# Patient Record
Sex: MALE | Race: WHITE | Employment: OTHER | URBAN - METROPOLITAN AREA
[De-identification: names, ages, dates, MRNs, and addresses within clinical notes are randomized per-mention and may not be internally consistent; named-entity substitution may affect disease eponyms.]

---

## 2016-04-21 LAB — COLONOSCOPY, EXTERNAL: NORMAL

## 2017-04-13 ENCOUNTER — OFFICE VISIT (OUTPATIENT)
Dept: INTERNAL MEDICINE CLINIC | Age: 67
End: 2017-04-13

## 2017-04-13 VITALS
OXYGEN SATURATION: 98 % | HEART RATE: 59 BPM | SYSTOLIC BLOOD PRESSURE: 110 MMHG | HEIGHT: 77 IN | RESPIRATION RATE: 18 BRPM | DIASTOLIC BLOOD PRESSURE: 65 MMHG | WEIGHT: 244.6 LBS | BODY MASS INDEX: 28.88 KG/M2 | TEMPERATURE: 97.9 F

## 2017-04-13 DIAGNOSIS — L23.9 ALLERGIC DERMATITIS: ICD-10-CM

## 2017-04-13 DIAGNOSIS — J30.9 ALLERGIC RHINITIS, UNSPECIFIED ALLERGIC RHINITIS TRIGGER, UNSPECIFIED RHINITIS SEASONALITY: Primary | ICD-10-CM

## 2017-04-13 RX ORDER — HYDROXYZINE HYDROCHLORIDE 10 MG/1
TABLET, FILM COATED ORAL
Qty: 60 TAB | Refills: 0 | Status: SHIPPED | OUTPATIENT
Start: 2017-04-13 | End: 2018-01-31 | Stop reason: ALTCHOICE

## 2017-04-13 RX ORDER — CETIRIZINE HCL 10 MG
10 TABLET ORAL
Qty: 30 TAB | Refills: 0 | Status: ON HOLD
Start: 2017-04-13 | End: 2018-04-05

## 2017-04-13 RX ORDER — METHYLPREDNISOLONE 4 MG/1
TABLET ORAL
Qty: 1 DOSE PACK | Refills: 0 | Status: SHIPPED | OUTPATIENT
Start: 2017-04-13 | End: 2017-12-28 | Stop reason: ALTCHOICE

## 2017-04-13 NOTE — PROGRESS NOTES
Demi Perry is a 79 y.o. male  Chief Complaint   Patient presents with    Rash     biltaeral forearms x 6 days; itching, red    Eye Swelling     bilateral x 2 days; watery; itching     1. Have you been to the ER, urgent care clinic since your last visit? Hospitalized since your last visit? No    2. Have you seen or consulted any other health care providers outside of the [address] [place] [address] since your last visit? Include any pap smears or colon screening. Colonoscopy done on  4/21 records have been scanned to patients chart.

## 2017-04-13 NOTE — MR AVS SNAPSHOT
Visit Information Date & Time Provider Department Dept. Phone Encounter #  
 4/13/2017 11:20 AM Noah Perrin NP [facility] [address] [id number] Upcoming Health Maintenance Date Due Hepatitis C Screening 1950 DTaP/Tdap/Td series (1 - Tdap) 12/16/2003 GLAUCOMA SCREENING Q2Y 1/6/2015 INFLUENZA AGE 9 TO ADULT 8/1/2016 MEDICARE YEARLY EXAM 8/31/2017 Pneumococcal 65+ High/Highest Risk (2 of 2 - PPSV23) 1/1/2019 COLONOSCOPY 4/21/2019 Allergies as of 4/13/2017  Review Complete On: 4/13/2017 By: Noah Perrin NP No Known Allergies Current Immunizations  Reviewed on 8/30/2016 Name Date Hep A Vaccine 11/15/2010 Hep B Vaccine 6/25/2004, 1/16/2004, 12/15/2003 IPV 12/15/2003 Influenza Nasal Vaccine 10/15/2015, 1/9/2008 Influenza Vaccine 9/16/2013, 9/22/2010, 9/24/2009, 12/18/2006, 5/16/2005 Japanese Encephalitis Vaccine 5/23/2005, 5/16/2005 Meningococcal (MCV4) Vaccine 12/15/2003 Novel Influenza-H1N1-09, Injectable 2/20/2010 Pneumococcal Conjugate (PCV-13) 9/15/2016 Pneumococcal Polysaccharide (PPSV-23) 1/1/2014 TB Skin Test (PPD) 9/17/2013 Td 12/15/2003 Typhoid Vaccine, Parenteral, Acetone-Killed, Dried ([place] ) 11/12/2010 Zoster Vaccine, Live 1/1/2014 Not reviewed this visit You Were Diagnosed With   
  
 Codes Comments Allergic rhinitis, unspecified allergic rhinitis trigger, unspecified rhinitis seasonality    -  Primary ICD-10-CM: J30.9 ICD-9-CM: 477.9 Allergic dermatitis     ICD-10-CM: L23.9 ICD-9-CM: 692.9 Vitals BP Pulse Temp Resp Height(growth percentile) Weight(growth percentile) 110/65 (BP 1 Location: Left arm, BP Patient Position: Sitting) (!) 59 97.9 °F (36.6 °C) 18 6' 5\" (1.956 m) 244 lb 9.6 oz (110.9 kg) SpO2 BMI Smoking Status 98% 29.01 kg/m2 Never Smoker Vitals History BMI and BSA Data Body Mass Index Body Surface Area  
 29.01 kg/m 2 2.45 m 2 Preferred Pharmacy Pharmacy Name Phone [id number] [address] [phone] Your Updated Medication List  
  
   
This list is accurate as of: 4/13/17 12:17 PM.  Always use your most recent med list.  
  
  
  
  
 cetirizine 10 mg tablet Commonly known as:  ZYRTEC Take 1 Tab by mouth daily as needed for Allergies. hydrOXYzine HCl 10 mg tablet Commonly known as:  ATARAX Take 1-3 tab every 8 hours as needed  
  
 levothyroxine 125 mcg tablet Commonly known as:  SYNTHROID Take 1 Tab by mouth Daily (before breakfast). Fill after 10/5/2015. Indications: HYPOTHYROIDISM  
  
 methylPREDNISolone 4 mg tablet Commonly known as:  Caye Ser Take as directed  
  
 multivitamin tablet Commonly known as:  ONE A DAY Take 1 Tab by mouth daily. pneumococcal 13 claudio conj dip 0.5 mL Syrg injection Commonly known as:  PREVNAR 13 (PF)  
0.5 mL by IntraMUSCular route PRIOR TO DISCHARGE for 1 dose. Prescriptions Sent to Pharmacy Refills  
 hydrOXYzine HCl (ATARAX) 10 mg tablet 0 Sig: Take 1-3 tab every 8 hours as needed Class: Normal  
 Pharmacy: [facility]acy [address] Ph #: [phone]  
 methylPREDNISolone (MEDROL DOSEPACK) 4 mg tablet 0 Sig: Take as directed Class: Normal  
 Pharmacy: [address] [place] [address], [address] Ph #: [phone] Patient Instructions Dermatitis: Care Instructions Your Care Instructions Dermatitis is the general name used for any rash or inflammation of the skin. Different kinds of dermatitis cause different kinds of rashes. Common causes of a rash include new medicines, plants (such as poison oak or poison ivy), heat, and stress. Certain illnesses can also cause a rash.  
An allergic reaction to something that touches your skin, such as latex, nickel, or poison ivy, is called contact dermatitis. Contact dermatitis may also be caused by something that irritates the skin, such as bleach, a chemical, or soap. These types of rashes cannot be spread from person to person. How long your rash will last depends on what caused it. Rashes may last a few days or months. Follow-up care is a key part of your treatment and safety. Be sure to make and go to all appointments, and call your doctor if you are having problems. It's also a good idea to know your test results and keep a list of the medicines you take. How can you care for yourself at home? · Do not scratch the rash. Cut your nails short, and file them smooth. Or wear gloves if this helps keep you from scratching. · Wash the area with water only. Pat dry. · Put cold, wet cloths on the rash to reduce itching. · Keep cool, and stay out of the sun. · Leave the rash open to the air as much as possible. · If the rash itches, use hydrocortisone cream. Follow the directions on the label. Calamine lotion may help for plant rashes. · Take an over-the-counter antihistamine, such as diphenhydramine (Benadryl) or loratadine (Claritin), to help calm the itching. Read and follow all instructions on the label. · If your doctor prescribed a cream, use it as directed. If your doctor prescribed medicine, take it exactly as directed. When should you call for help? Call your doctor now or seek immediate medical care if: 
· You have symptoms of infection, such as: 
¨ Increased pain, swelling, warmth, or redness. ¨ Red streaks leading from the area. ¨ Pus draining from the area. ¨ A fever. · You have joint pain along with the rash. Watch closely for changes in your health, and be sure to contact your doctor if: 
· Your rash is changing or getting worse. · You are not getting better as expected. Where can you learn more? Go to http://nolberto-roseanna.info/. Enter [phone] in the search box to learn more about \"Dermatitis: Care Instructions. \" Current as of: October 13, 2016 Content Version: 11.2 © 7269-6635 [employer]. Care instructions adapted under license by [employer] (which disclaims liability or warranty for this information). If you have questions about a medical condition or this instruction, always ask your healthcare professional. [place] [address] any warranty or liability for your use of this information. Seasonal Allergies: Care Instructions Your Care Instructions Allergies occur when your body's defense system (immune system) overreacts to certain substances. The immune system treats a harmless substance as if it were a harmful germ or virus. Many things can cause this to happen. Examples include pollens, medicine, food, dust, animal dander, and mold. Your allergies are seasonal if you have symptoms just at certain times of the year. In that case, you are probably allergic to pollens from certain trees, grasses, or weeds. Allergies can be mild or severe. Over-the-counter allergy medicine may help with some symptoms. Read and follow all instructions on the label. Managing your allergies is an important part of staying healthy. Your doctor may suggest that you have tests to help find the cause of your allergies. When you know what things trigger your symptoms, you can avoid them. This can prevent allergy symptoms and other health problems. In some cases, immunotherapy might help. For this treatment, you get shots or use pills that have a small amount of certain allergens in them. Your body \"gets used to\" the allergen, so you react less to it over time. This kind of treatment may help prevent or reduce some allergy symptoms. Follow-up care is a key part of your treatment and safety.  Be sure to make and go to all appointments, and call your doctor if you are having problems. It's also a good idea to know your test results and keep a list of the medicines you take. How can you care for yourself at home? · Be safe with medicines. Take your medicines exactly as prescribed. Call your doctor if you think you are having a problem with your medicine. · During your allergy season, keep windows closed. If you need to use air-conditioning, change or clean all filters every month. Take a shower and change your clothes after you have been outside. · Stay inside when pollen counts are high. Vacuum once or twice a week. Use a vacuum  with a HEPA filter or a double-thickness filter. When should you call for help? Call 911 anytime you think you may need emergency care. For example, call if: 
· You have symptoms of a severe allergic reaction. These may include: 
¨ Sudden raised, red areas (hives) all over your body. ¨ Swelling of the throat, mouth, lips, or tongue. ¨ Trouble breathing. ¨ Passing out (losing consciousness). Or you may feel very lightheaded or suddenly feel weak, confused, or restless. Watch closely for changes in your health, and be sure to contact your doctor if: 
· You need help controlling your allergies. · You have questions about allergy testing. · You do not get better as expected. Where can you learn more? Go to http://nolberto-roseanna.info/. Enter J912 in the search box to learn more about \"Seasonal Allergies: Care Instructions. \" Current as of: February 12, 2016 Content Version: 11.2 © 4861-4583 [employer]. Care instructions adapted under license by [employer] (which disclaims liability or warranty for this information). If you have questions about a medical condition or this instruction, always ask your healthcare professional. [place] [address] any warranty or liability for your use of this information. Introducing [facility]! Dear Zahra Oconnor: Thank you for requesting a InfluAds account. Our records indicate that you already have an active InfluAds account. You can access your account anytime at [email or web address] Did you know that you can access your hospital and ER discharge instructions at any time in InfluAds? You can also review all of your test results from your hospital stay or ER visit. Additional Information If you have questions, please visit the Frequently Asked Questions section of the InfluAds website at [email or web address] Remember, InfluAds is NOT to be used for urgent needs. For medical emergencies, dial 911. Now available from your iPhone and Android! Please provide this summary of care documentation to your next provider. Your primary care clinician is listed as Emery Gastelum. If you have any questions after today's visit, please call [phone].

## 2017-04-13 NOTE — PROGRESS NOTES
HISTORY OF PRESENT ILLNESS  Carolina Miramontes is a 79 y.o. male. HPI  Presents with complaints of itchy reddened rash to bilateral forearms for the past 6 days. Has also noted some itchy to upper back and for the past 2 days he has had some edema to upper eyelids with itching and watery drainage from eyes. Believes rash began after he was working in the yard but denies any known exposure to poisonous plants. Reports he had similar reaction last spring while in [place] and was treated at urgent care center then. Denies any new medications, lotions, soaps, detergents, foods. Denies difficulty swallowing or shortness of breath. Moved to [facility] from [facility] and has been having more allergy symptoms especially this spring. Review of Systems   Constitutional: Positive for malaise/fatigue. Negative for chills and fever. HENT: Positive for congestion. Negative for sore throat. Eyes: Positive for discharge and redness. Respiratory: Negative for cough and shortness of breath. Cardiovascular: Negative for chest pain and palpitations. Gastrointestinal: Negative for nausea and vomiting. Musculoskeletal: Negative for myalgias. Skin: Positive for itching and rash. Neurological: Negative for dizziness, tingling and headaches. Physical Exam   Constitutional: He is oriented to person, place, and time. He appears well-developed and well-nourished. HENT:   Head: Normocephalic and atraumatic. Right Ear: External ear normal.   Left Ear: External ear normal.   Nose: Mucosal edema present. Right sinus exhibits no maxillary sinus tenderness. Left sinus exhibits no maxillary sinus tenderness. Mouth/Throat: No posterior oropharyngeal erythema. Eyes: Right conjunctiva is injected. Left conjunctiva is injected. Mild edema to bilateral upper eyelids with several papular lesions noted   Neck: Normal range of motion. Neck supple. No thyromegaly present.    Cardiovascular: Normal rate and regular rhythm. Pulmonary/Chest: Effort normal and breath sounds normal. He has no wheezes. Lymphadenopathy:     He has no cervical adenopathy. Neurological: He is alert and oriented to person, place, and time. Skin: Rash noted. Rash is papular and urticarial.        Psychiatric: He has a normal mood and affect. His behavior is normal.   Nursing note and vitals reviewed. ASSESSMENT and PLAN  Bishnu Edwards was seen today for rash and eye swelling. Diagnoses and all orders for this visit:    Allergic rhinitis, unspecified allergic rhinitis trigger, unspecified rhinitis seasonality  -     cetirizine (ZYRTEC) 10 mg tablet; Take 1 Tab by mouth daily as needed for Allergies. Allergic dermatitis  -     hydrOXYzine HCl (ATARAX) 10 mg tablet; Take 1-3 tab every 8 hours as needed  -     methylPREDNISolone (MEDROL DOSEPACK) 4 mg tablet; Take as directed    Follow up if signs and symptoms worsen or change. After hours number given.    reviewed diet, exercise and weight control  reviewed medications and side effects in detail

## 2017-04-13 NOTE — PATIENT INSTRUCTIONS
Dermatitis: Care Instructions  Your Care Instructions  Dermatitis is the general name used for any rash or inflammation of the skin. Different kinds of dermatitis cause different kinds of rashes. Common causes of a rash include new medicines, plants (such as poison oak or poison ivy), heat, and stress. Certain illnesses can also cause a rash. An allergic reaction to something that touches your skin, such as latex, nickel, or poison ivy, is called contact dermatitis. Contact dermatitis may also be caused by something that irritates the skin, such as bleach, a chemical, or soap. These types of rashes cannot be spread from person to person. How long your rash will last depends on what caused it. Rashes may last a few days or months. Follow-up care is a key part of your treatment and safety. Be sure to make and go to all appointments, and call your doctor if you are having problems. It's also a good idea to know your test results and keep a list of the medicines you take. How can you care for yourself at home? · Do not scratch the rash. Cut your nails short, and file them smooth. Or wear gloves if this helps keep you from scratching. · Wash the area with water only. Pat dry. · Put cold, wet cloths on the rash to reduce itching. · Keep cool, and stay out of the sun. · Leave the rash open to the air as much as possible. · If the rash itches, use hydrocortisone cream. Follow the directions on the label. Calamine lotion may help for plant rashes. · Take an over-the-counter antihistamine, such as diphenhydramine (Benadryl) or loratadine (Claritin), to help calm the itching. Read and follow all instructions on the label. · If your doctor prescribed a cream, use it as directed. If your doctor prescribed medicine, take it exactly as directed. When should you call for help?   Call your doctor now or seek immediate medical care if:  · You have symptoms of infection, such as:  ¨ Increased pain, swelling, warmth, or redness. ¨ Red streaks leading from the area. ¨ Pus draining from the area. ¨ A fever. · You have joint pain along with the rash. Watch closely for changes in your health, and be sure to contact your doctor if:  · Your rash is changing or getting worse. · You are not getting better as expected. Where can you learn more? Go to http://nolberto-roseanna.info/. Enter [phone] in the search box to learn more about \"Dermatitis: Care Instructions. \"  Current as of: October 13, 2016  Content Version: 11.2  © 5129-4345 [employer]. Care instructions adapted under license by [employer] (which disclaims liability or warranty for this information). If you have questions about a medical condition or this instruction, always ask your healthcare professional. [address] any warranty or liability for your use of this information. Seasonal Allergies: Care Instructions  Your Care Instructions  Allergies occur when your body's defense system (immune system) overreacts to certain substances. The immune system treats a harmless substance as if it were a harmful germ or virus. Many things can cause this to happen. Examples include pollens, medicine, food, dust, animal dander, and mold. Your allergies are seasonal if you have symptoms just at certain times of the year. In that case, you are probably allergic to pollens from certain trees, grasses, or weeds. Allergies can be mild or severe. Over-the-counter allergy medicine may help with some symptoms. Read and follow all instructions on the label. Managing your allergies is an important part of staying healthy. Your doctor may suggest that you have tests to help find the cause of your allergies. When you know what things trigger your symptoms, you can avoid them. This can prevent allergy symptoms and other health problems. In some cases, immunotherapy might help.  For this treatment, you get shots or use pills that have a small amount of certain allergens in them. Your body \"gets used to\" the allergen, so you react less to it over time. This kind of treatment may help prevent or reduce some allergy symptoms. Follow-up care is a key part of your treatment and safety. Be sure to make and go to all appointments, and call your doctor if you are having problems. It's also a good idea to know your test results and keep a list of the medicines you take. How can you care for yourself at home? · Be safe with medicines. Take your medicines exactly as prescribed. Call your doctor if you think you are having a problem with your medicine. · During your allergy season, keep windows closed. If you need to use air-conditioning, change or clean all filters every month. Take a shower and change your clothes after you have been outside. · Stay inside when pollen counts are high. Vacuum once or twice a week. Use a vacuum  with a HEPA filter or a double-thickness filter. When should you call for help? Call 911 anytime you think you may need emergency care. For example, call if:  · You have symptoms of a severe allergic reaction. These may include:  ¨ Sudden raised, red areas (hives) all over your body. ¨ Swelling of the throat, mouth, lips, or tongue. ¨ Trouble breathing. ¨ Passing out (losing consciousness). Or you may feel very lightheaded or suddenly feel weak, confused, or restless. Watch closely for changes in your health, and be sure to contact your doctor if:  · You need help controlling your allergies. · You have questions about allergy testing. · You do not get better as expected. Where can you learn more? Go to http://nolberto-roseanna.info/. Enter J912 in the search box to learn more about \"Seasonal Allergies: Care Instructions. \"  Current as of: February 12, 2016  Content Version: 11.2  © 1456-9672 [employer]ertiv.  Care instructions adapted under license by [employer] (which disclaims liability or warranty for this information). If you have questions about a medical condition or this instruction, always ask your healthcare professional. [address] any warranty or liability for your use of this information.

## 2017-12-28 ENCOUNTER — OFFICE VISIT (OUTPATIENT)
Dept: INTERNAL MEDICINE CLINIC | Age: 67
End: 2017-12-28

## 2017-12-28 VITALS
HEIGHT: 77 IN | HEART RATE: 62 BPM | DIASTOLIC BLOOD PRESSURE: 78 MMHG | RESPIRATION RATE: 18 BRPM | TEMPERATURE: 97.7 F | BODY MASS INDEX: 29.87 KG/M2 | SYSTOLIC BLOOD PRESSURE: 122 MMHG | OXYGEN SATURATION: 97 % | WEIGHT: 253 LBS

## 2017-12-28 DIAGNOSIS — S39.012A STRAIN OF LUMBAR REGION, INITIAL ENCOUNTER: Primary | ICD-10-CM

## 2017-12-28 RX ORDER — CYCLOBENZAPRINE HCL 10 MG
10 TABLET ORAL
Qty: 30 TAB | Refills: 0 | Status: SHIPPED | OUTPATIENT
Start: 2017-12-28 | End: 2018-01-08 | Stop reason: SDUPTHER

## 2017-12-28 RX ORDER — TRIAMCINOLONE ACETONIDE 1 MG/G
CREAM TOPICAL
Refills: 12 | COMMUNITY
Start: 2017-11-20

## 2017-12-28 RX ORDER — DICLOFENAC SODIUM 75 MG/1
75 TABLET, DELAYED RELEASE ORAL 2 TIMES DAILY
Qty: 30 TAB | Refills: 0 | Status: SHIPPED | OUTPATIENT
Start: 2017-12-28 | End: 2018-01-08 | Stop reason: SDUPTHER

## 2017-12-28 NOTE — MR AVS SNAPSHOT
Visit Information Date & Time Provider Department Dept. Phone Encounter #  
 12/28/2017 10:00 AM Zoey Arredondo MD [facility] [address] [id number] Follow-up Instructions Return if symptoms worsen or fail to improve. Your Appointments 1/3/2018  9:00 AM  
Office Visit with Jimmie Sheppard NP [facility]) Appt Note: np - SCC - differentiated - left cheek - referred by Dr. Cedillo Fall - patient will complete paperwork [facility] [id number]  
[address] [place] [id number] Upcoming Health Maintenance Date Due Hepatitis C Screening 1950 DTaP/Tdap/Td series (1 - Tdap) 12/16/2003 Influenza Age 5 to Adult 8/1/2017 MEDICARE YEARLY EXAM 8/31/2017 Pneumococcal 65+ High/Highest Risk (2 of 2 - PPSV23) 1/1/2019 COLONOSCOPY 4/21/2019 GLAUCOMA SCREENING Q2Y 6/23/2019 Allergies as of 12/28/2017  Review Complete On: 12/28/2017 By: Zoey Arredondo MD  
 No Known Allergies Current Immunizations  Reviewed on 8/30/2016 Name Date Hep A Vaccine 11/15/2010 Hep B Vaccine 6/25/2004, 1/16/2004, 12/15/2003 IPV 12/15/2003 Influenza Nasal Vaccine 10/15/2015, 1/9/2008 Influenza Vaccine 9/16/2013, 9/22/2010, 9/24/2009, 12/18/2006, 5/16/2005 Japanese Encephalitis Vaccine 5/23/2005, 5/16/2005 Meningococcal (MCV4) Vaccine 12/15/2003 Novel Influenza-H1N1-09, Injectable 2/20/2010 Pneumococcal Conjugate (PCV-13) 9/15/2016 Pneumococcal Polysaccharide (PPSV-23) 1/1/2014 TB Skin Test (PPD) 9/17/2013 Td 12/15/2003 Typhoid Vaccine, Parenteral, Acetone-Killed, Dried ([place] ) 11/12/2010 Zoster Vaccine, Live 1/1/2014 Not reviewed this visit You Were Diagnosed With   
  
 Codes Comments  Strain of lumbar region, initial encounter    -  Primary ICD-10-CM: V96.757A ICD-9-CM: 168. 2 Vitals BP Pulse Temp Resp Height(growth percentile) Weight(growth percentile) 122/78 (BP 1 Location: Left arm, BP Patient Position: Sitting) 62 97.7 °F (36.5 °C) (Oral) 18 6' 5\" (1.956 m) 253 lb (114.8 kg) SpO2 BMI Smoking Status 97% 30 kg/m2 Never Smoker Vitals History BMI and BSA Data Body Mass Index Body Surface Area  
 30 kg/m 2 2.5 m 2 Preferred Pharmacy Pharmacy Name Phone [id number], [address] [phone] Your Updated Medication List  
  
   
This list is accurate as of: 12/28/17 10:14 AM.  Always use your most recent med list.  
  
  
  
  
 cetirizine 10 mg tablet Commonly known as:  ZYRTEC Take 1 Tab by mouth daily as needed for Allergies. cyclobenzaprine 10 mg tablet Commonly known as:  FLEXERIL Take 1 Tab by mouth three (3) times daily as needed for Muscle Spasm(s). diclofenac EC 75 mg EC tablet Commonly known as:  VOLTAREN Take 1 Tab by mouth two (2) times a day.  
  
 hydrOXYzine HCl 10 mg tablet Commonly known as:  ATARAX Take 1-3 tab every 8 hours as needed  
  
 levothyroxine 125 mcg tablet Commonly known as:  SYNTHROID  
TAKE 1 TABLET BY MOUTH EVERY DAY BEFORE BREAKFAST FOR HYPOTHROIDISM  
  
 multivitamin tablet Commonly known as:  ONE A DAY Take 1 Tab by mouth daily. triamcinolone acetonide 0.1 % topical cream  
Commonly known as:  KENALOG  
APPLY TO BODY ECZEMA UP TO TWICE A DAY AS NEEDED Prescriptions Sent to Pharmacy Refills  
 diclofenac EC (VOLTAREN) 75 mg EC tablet 0 Sig: Take 1 Tab by mouth two (2) times a day. Class: Normal  
 Pharmacy: [address] [place] [address] [address] Ph #: [phone] Route: Oral  
 cyclobenzaprine (FLEXERIL) 10 mg tablet 0 Sig: Take 1 Tab by mouth three (3) times daily as needed for Muscle Spasm(s).   
 Class: Normal  
 Pharmacy: [address] [place] [address], [address] [place] [address] #: [phone] Route: Oral  
  
Follow-up Instructions Return if symptoms worsen or fail to improve. Patient Instructions Back Strain: Care Instructions Your Care Instructions Back strain happens when you overstretch, or pull, a muscle in your back. You may hurt your back in an accident or when you exercise or lift something. Most back pain will get better with rest and time. You can take care of yourself at home to help your back heal. 
Follow-up care is a key part of your treatment and safety. Be sure to make and go to all appointments, and call your doctor if you are having problems. It's also a good idea to know your test results and keep a list of the medicines you take. How can you care for yourself at home? · Try to stay as active as you can, but stop or reduce any activity that causes pain. · Put ice or a cold pack on the sore muscle for 10 to 20 minutes at a time to stop swelling. Try this every 1 to 2 hours for 3 days (when you are awake) or until the swelling goes down. Put a thin cloth between the ice pack and your skin. · After 2 or 3 days, apply a heating pad on low or a warm cloth to your back. Some doctors suggest that you go back and forth between hot and cold treatments. · Take pain medicines exactly as directed. ¨ If the doctor gave you a prescription medicine for pain, take it as prescribed. ¨ If you are not taking a prescription pain medicine, ask your doctor if you can take an over-the-counter medicine. · Try sleeping on your side with a pillow between your legs. Or put a pillow under your knees when you lie on your back. These measures can ease pain in your lower back. · Return to your usual level of activity slowly. When should you call for help? Call 911 anytime you think you may need emergency care. For example, call if: 
? · You are unable to move a leg at all. ?Call your doctor now or seek immediate medical care if: 
? · You have new or worse symptoms in your legs, belly, or buttocks. Symptoms may include: ¨ Numbness or tingling. ¨ Weakness. ¨ Pain. ? · You lose bladder or bowel control. ? Watch closely for changes in your health, and be sure to contact your doctor if you are not getting better as expected. Where can you learn more? Go to http://nolberto-roseanna.info/. Enter Y719 in the search box to learn more about \"Back Strain: Care Instructions. \" Current as of: March 21, 2017 Content Version: 11.4 © 4335-3430 [employer]. Care instructions adapted under license by [employer] (which disclaims liability or warranty for this information). If you have questions about a medical condition or this instruction, always ask your healthcare professional. [address] any warranty or liability for your use of this information. Introducing hospitals & HEALTH SERVICES! Dear Eloina Bennett: Thank you for requesting a 4Tech account. Our records indicate that you already have an active 4Tech account. You can access your account anytime at [email or web address] Did you know that you can access your hospital and ER discharge instructions at any time in 4Tech? You can also review all of your test results from your hospital stay or ER visit. Additional Information If you have questions, please visit the Frequently Asked Questions section of the 4Tech website at [email or web address] Remember, 4Tech is NOT to be used for urgent needs. For medical emergencies, dial 911. Now available from your iPhone and Android! Please provide this summary of care documentation to your next provider. Your primary care clinician is listed as Blank Pillai. If you have any questions after today's visit, please call [phone].

## 2017-12-28 NOTE — PATIENT INSTRUCTIONS
Back Strain: Care Instructions  Your Care Instructions    Back strain happens when you overstretch, or pull, a muscle in your back. You may hurt your back in an accident or when you exercise or lift something. Most back pain will get better with rest and time. You can take care of yourself at home to help your back heal.  Follow-up care is a key part of your treatment and safety. Be sure to make and go to all appointments, and call your doctor if you are having problems. It's also a good idea to know your test results and keep a list of the medicines you take. How can you care for yourself at home? · Try to stay as active as you can, but stop or reduce any activity that causes pain. · Put ice or a cold pack on the sore muscle for 10 to 20 minutes at a time to stop swelling. Try this every 1 to 2 hours for 3 days (when you are awake) or until the swelling goes down. Put a thin cloth between the ice pack and your skin. · After 2 or 3 days, apply a heating pad on low or a warm cloth to your back. Some doctors suggest that you go back and forth between hot and cold treatments. · Take pain medicines exactly as directed. ¨ If the doctor gave you a prescription medicine for pain, take it as prescribed. ¨ If you are not taking a prescription pain medicine, ask your doctor if you can take an over-the-counter medicine. · Try sleeping on your side with a pillow between your legs. Or put a pillow under your knees when you lie on your back. These measures can ease pain in your lower back. · Return to your usual level of activity slowly. When should you call for help? Call 911 anytime you think you may need emergency care. For example, call if:  ? · You are unable to move a leg at all. ?Call your doctor now or seek immediate medical care if:  ? · You have new or worse symptoms in your legs, belly, or buttocks. Symptoms may include:  ¨ Numbness or tingling. ¨ Weakness. ¨ Pain.    ? · You lose bladder or bowel control. ? Watch closely for changes in your health, and be sure to contact your doctor if you are not getting better as expected. Where can you learn more? Go to http://nolberto-roseanna.info/. Enter A101 in the search box to learn more about \"Back Strain: Care Instructions. \"  Current as of: March 21, 2017  Content Version: 11.4  © 7781-9168 [employer]. Care instructions adapted under license by [employer] (which disclaims liability or warranty for this information). If you have questions about a medical condition or this instruction, always ask your healthcare professional. [place] [address] any warranty or liability for your use of this information.

## 2017-12-28 NOTE — PROGRESS NOTES
HISTORY OF PRESENT ILLNESS  Nuno Giron is a 79 y.o. male. HPI  Low Back Pain:  Nuno Giron is a 79 y.o. male who complains of low back pain bilaterally then on L for 5 day(s), is positional with bending or lifting, without radiation down the legs. Had worked out in [place], [place] [address] prior. Severity of pain is 10 out of 10 at worst.  0/10 currently with standing.  numbness, tingling, weakness is not present in left leg(s)/ foot. Precipitating factors: exercise as above. . Prior history of back problems: recurrent rare much milder self limited episodes of low back pain in the past.  Self treatment:  muscle relaxant of his wifes used and beneficial .    The patient denies fevers, chills or sweats. The patient denies bowel/bladder incontinence and saddle numbness. Patient Active Problem List   Diagnosis Code    Acquired hypothyroidism E03.9    Vitamin D deficiency E55.9    Skin cancer C44.90    Advanced care planning/counseling discussion Z71.89     Past Medical History:   Diagnosis Date    Thyroid disease      No Known Allergies  Current Outpatient Prescriptions on File Prior to Visit   Medication Sig Dispense Refill    levothyroxine (SYNTHROID) 125 mcg tablet TAKE 1 TABLET BY MOUTH EVERY DAY BEFORE BREAKFAST FOR HYPOTHROIDISM 90 Tab 1    hydrOXYzine HCl (ATARAX) 10 mg tablet Take 1-3 tab every 8 hours as needed (Patient taking differently: Take 10 mg by mouth three (3) times daily as needed. Take 1-3 tab every 8 hours as needed) 60 Tab 0    cetirizine (ZYRTEC) 10 mg tablet Take 1 Tab by mouth daily as needed for Allergies. 30 Tab 0    multivitamin (ONE A DAY) tablet Take 1 Tab by mouth daily. No current facility-administered medications on file prior to visit.       Social History   Substance Use Topics    Smoking status: Never Smoker    Smokeless tobacco: Never Used    Alcohol use 0.6 - 1.2 oz/week     1 - 2 Cans of beer per week         ROS    Physical Exam  Current vital are: Visit Vitals    /78 (BP 1 Location: Left arm, BP Patient Position: Sitting)    Pulse 62    Temp 97.7 °F (36.5 °C) (Oral)    Resp 18    Ht 6' 5\" (1.956 m)    Wt 253 lb (114.8 kg)    SpO2 97%    BMI 30 kg/m2      Patient appears to be in mild pain, antalgic gait noted. Lumbosacral spine area reveals no local tenderness. Painful LS ROM noted. Spinal alignment is normal.  Straight leg raise is negative at 80 degrees on both sides. Lumbosacral distribution DTR's, motor strength and sensation are normal, including heel and toe gait. Peripheral pulses are palpable and 2 plus. Prior studies inlcude: Lumbar spine X-Ray: not indicated. MRI not indicated    ASSESSMENT and PLAN  Diagnoses and all orders for this visit:    1. Strain of lumbar region, initial encounter  -     diclofenac EC (VOLTAREN) 75 mg EC tablet; Take 1 Tab by mouth two (2) times a day. -     cyclobenzaprine (FLEXERIL) 10 mg tablet; Take 1 Tab by mouth three (3) times daily as needed for Muscle Spasm(s). Wilson Tierney was counseled on causes and treatment of low back pain. he was educated on stretching and strengthening exercises. Written instructions for these were given. he was also counseled on medications like antiinflammatories, muscle relaxants, analgesics, heat or ice if they were prescribed. he is advised to call our office immediately for any new symptoms like weakness, numbness or worsening pain should they develop. he will follow up with me if not improving over 2-4 weeks. Follow-up Disposition:  Return if symptoms worsen or fail to improve.

## 2018-01-03 ENCOUNTER — DOCUMENTATION ONLY (OUTPATIENT)
Dept: DERMATOLOGY | Facility: AMBULATORY SURGERY CENTER | Age: 68
End: 2018-01-03

## 2018-01-03 ENCOUNTER — OFFICE VISIT (OUTPATIENT)
Dept: DERMATOLOGY | Facility: AMBULATORY SURGERY CENTER | Age: 68
End: 2018-01-03

## 2018-01-03 VITALS
TEMPERATURE: 98.1 F | BODY MASS INDEX: 28.34 KG/M2 | RESPIRATION RATE: 16 BRPM | WEIGHT: 240 LBS | HEART RATE: 82 BPM | OXYGEN SATURATION: 97 % | HEIGHT: 77 IN | SYSTOLIC BLOOD PRESSURE: 120 MMHG | DIASTOLIC BLOOD PRESSURE: 82 MMHG

## 2018-01-03 DIAGNOSIS — L57.0 ACTINIC KERATOSIS: ICD-10-CM

## 2018-01-03 DIAGNOSIS — C44.329 SQUAMOUS CELL CARCINOMA OF SKIN OF LEFT CHEEK: Primary | ICD-10-CM

## 2018-01-03 DIAGNOSIS — Z85.828 HISTORY OF NONMELANOMA SKIN CANCER: ICD-10-CM

## 2018-01-03 DIAGNOSIS — L82.1 SEBORRHEIC KERATOSES: ICD-10-CM

## 2018-01-03 NOTE — PROGRESS NOTES
Name: Kaitlin Aguilera       Age: 79 y.o. Date: 1/3/2018    Chief Complaint: had concerns including Skin Exam.    Subjective:    HPI:  Mr.. Kaitlin Aguilera is a 79 y.o. male who was kindly referred by Dr. Betsy Reese and presents for a consult prior to Mohs surgery for a lesion on the left cheek. The lesion appeared 1 years ago. The patient has not had any prior treatment. Associated symptoms include persistent bump that started to grow larger in August.  The patient's pathology report confirms moderate to poorly differentiated SCC. He reports a prior history of NMSC with history of Mohs on his nose. He has a consultation appointment with Dr. Gurjit Villarreal on 1/9/18 for possible post mohs reconstruction. ROS: Consitutional: Negative. Dermatological : positive for - skin lesion changes    Social History     Social History    Marital status:      Spouse name: N/A    Number of children: N/A    Years of education: N/A     Occupational History    Not on file.      Social History Main Topics    Smoking status: Never Smoker    Smokeless tobacco: Never Used    Alcohol use 0.6 - 1.2 oz/week     1 - 2 Cans of beer per week    Drug use: No    Sexual activity: Yes     Partners: Female     Other Topics Concern    Not on file     Social History Narrative       Family History   Problem Relation Age of Onset    Arthritis-osteo Mother     Diabetes Father     Heart Disease Father     Arthritis-osteo Brother     Cancer Neg Hx     Hypertension Neg Hx     Thyroid Disease Neg Hx        Past Medical History:   Diagnosis Date    Skin cancer     SCC nose, right eye lid, left eye brow and left cheek    Sun-damaged skin     Thyroid disease        Past Surgical History:   Procedure Laterality Date    HX COLONOSCOPY      HX OTHER SURGICAL  2012    colonoscopy       Current Outpatient Prescriptions   Medication Sig Dispense Refill    triamcinolone acetonide (KENALOG) 0.1 % topical cream APPLY TO BODY ECZEMA UP TO TWICE A DAY AS NEEDED  12    diclofenac EC (VOLTAREN) 75 mg EC tablet Take 1 Tab by mouth two (2) times a day. 30 Tab 0    cyclobenzaprine (FLEXERIL) 10 mg tablet Take 1 Tab by mouth three (3) times daily as needed for Muscle Spasm(s). 30 Tab 0    levothyroxine (SYNTHROID) 125 mcg tablet TAKE 1 TABLET BY MOUTH EVERY DAY BEFORE BREAKFAST FOR HYPOTHROIDISM 90 Tab 1    hydrOXYzine HCl (ATARAX) 10 mg tablet Take 1-3 tab every 8 hours as needed (Patient taking differently: Take 10 mg by mouth three (3) times daily as needed. Take 1-3 tab every 8 hours as needed) 60 Tab 0    cetirizine (ZYRTEC) 10 mg tablet Take 1 Tab by mouth daily as needed for Allergies. 30 Tab 0    multivitamin (ONE A DAY) tablet Take 1 Tab by mouth daily. No Known Allergies      Objective:    Visit Vitals    /82 (BP 1 Location: Right arm, BP Patient Position: Sitting)    Pulse 82    Temp 98.1 °F (36.7 °C) (Oral)    Resp 16    Ht 6' 5\" (1.956 m)    Wt 108.9 kg (240 lb)    SpO2 97%    BMI 28.46 kg/m2       Satish Coulter is a 79 y.o. male who appears well and in no distress. He is awake, alert, and oriented. There is no preauricular, submandibular, or cervical lymphadenopathy. A limited skin evaluation was completed including the face. There are scattered seborrheic keratoses. There is an AK on the left forehead. There is an erythematous papule on the left medial cheek, with central scab. He confirms location. This is at least 12 mm in diameter. Assessment/Plan:    1. Seborrheic keratoses. The diagnosis was reviewed and the patient was reassured that no treatment is needed for these benign lesions. 2.Personal history of skin cancer. I discussed sun protection, sunscreen use, the warning signs of skin cancer, the need for self-skin examinations, and the need for regular practitioner exams every 6 months with primary derm.   The patient should follow up sooner as needed if new, changing, or symptomatic skin lesions arise. 3. Actinic keratosis. Follow up with primary derm. 4. Squamous cell carcinoma of the left medial cheek. We discussed the diagnosis and process of Mohs surgery. We discussed cure rates, risks and benefits, and option for primary closure here or with Dr. Joe Garcia. He is a consultation upcoming on 1/9/18 with Dr. Joe Garcia. After that consultation he will let us know if he wants to proceed with closure here or post Mohs reconstruction with Dr. Joe Garcia. His questions were answered. He understands that he may eat and drink normally prior to his Mohs surgery, drive himself if desired, take his medications as prescribed. He was scheduled for 1/22/18, tentatively, with the ability to reassess once he has consultation with Dr. Joe Garcia.

## 2018-01-03 NOTE — PROGRESS NOTES
Chief Complaint   Patient presents with    Skin Exam     1. Have you been to the ER, urgent care clinic since your last visit? Hospitalized since your last visit? No    2. Have you seen or consulted any other health care providers outside of the [address] [place] [address] since your last visit? Include any pap smears or colon screening.  No

## 2018-01-03 NOTE — MR AVS SNAPSHOT
Visit Information Date & Time Provider Department Dept. Phone Encounter #  
 1/3/2018  9:00 AM Juliann Bingham NP [phone] [id number] Upcoming Health Maintenance Date Due Hepatitis C Screening 1950 DTaP/Tdap/Td series (1 - Tdap) 12/16/2003 Influenza Age 5 to Adult 8/1/2017 MEDICARE YEARLY EXAM 8/31/2017 Pneumococcal 65+ High/Highest Risk (2 of 2 - PPSV23) 1/1/2019 COLONOSCOPY 4/21/2019 GLAUCOMA SCREENING Q2Y 6/23/2019 Allergies as of 1/3/2018  Review Complete On: 1/3/2018 By: Robbin Martell No Known Allergies Current Immunizations  Reviewed on 8/30/2016 Name Date Hep A Vaccine 11/15/2010 Hep B Vaccine 6/25/2004, 1/16/2004, 12/15/2003 IPV 12/15/2003 Influenza Nasal Vaccine 10/15/2015, 1/9/2008 Influenza Vaccine 9/16/2013, 9/22/2010, 9/24/2009, 12/18/2006, 5/16/2005 Japanese Encephalitis Vaccine 5/23/2005, 5/16/2005 Meningococcal (MCV4) Vaccine 12/15/2003 Novel Influenza-H1N1-09, Injectable 2/20/2010 Pneumococcal Conjugate (PCV-13) 9/15/2016 Pneumococcal Polysaccharide (PPSV-23) 1/1/2014 TB Skin Test (PPD) 9/17/2013 Td 12/15/2003 Typhoid Vaccine, Parenteral, Acetone-Killed, Dried ([place] ) 11/12/2010 Zoster Vaccine, Live 1/1/2014 Not reviewed this visit Vitals BP Pulse Temp Resp Height(growth percentile) Weight(growth percentile) 120/82 (BP 1 Location: Right arm, BP Patient Position: Sitting) 82 98.1 °F (36.7 °C) (Oral) 16 6' 5\" (1.956 m) 240 lb (108.9 kg) SpO2 BMI Smoking Status 97% 28.46 kg/m2 Never Smoker BMI and BSA Data Body Mass Index Body Surface Area  
 28.46 kg/m 2 2.43 m 2 Preferred Pharmacy Pharmacy Name Phone [id number] [address], [address] [phone] Your Updated Medication List  
  
   
 This list is accurate as of: 1/3/18  9:04 AM.  Always use your most recent med list.  
  
  
  
  
 cetirizine 10 mg tablet Commonly known as:  ZYRTEC Take 1 Tab by mouth daily as needed for Allergies. cyclobenzaprine 10 mg tablet Commonly known as:  FLEXERIL Take 1 Tab by mouth three (3) times daily as needed for Muscle Spasm(s). diclofenac EC 75 mg EC tablet Commonly known as:  VOLTAREN Take 1 Tab by mouth two (2) times a day.  
  
 hydrOXYzine HCl 10 mg tablet Commonly known as:  ATARAX Take 1-3 tab every 8 hours as needed  
  
 levothyroxine 125 mcg tablet Commonly known as:  SYNTHROID  
TAKE 1 TABLET BY MOUTH EVERY DAY BEFORE BREAKFAST FOR HYPOTHROIDISM  
  
 multivitamin tablet Commonly known as:  ONE A DAY Take 1 Tab by mouth daily. triamcinolone acetonide 0.1 % topical cream  
Commonly known as:  KENALOG  
APPLY TO BODY ECZEMA UP TO TWICE A DAY AS NEEDED Patient Instructions Self Skin Exam and Sunscreens Early detection and treatment is essential in the treatment of all forms of skin cancer. If caught early, all forms of skin cancer are curable. In addition to your regular visits, you should perform a monthly skin examination. Over time, you become familiar with what is normally found on your skin and can identify new or suspicious spots. One of the screening tools you can use to assess your skin is to follow the ABCDEs: 
 
A= Asymmetry (One half is unlike the other half) B= Border (An irregular, scalloped or poorly defined edge) C= Color (Is varied from one area to another, has shades of tan, brown/ black,       white, red or blue) D= Diameter (Spots larger than 6mm or a pencil eraser) E= Evolving (New spots or one that is changing in size, shape, or color) A follow- up interval will be customized based on your history of skin cancer or level of skin damage and risk factors.   In any case, if you notice a suspicious or new spot, an appointment should be arranged between regular visits. Everyone should use sunscreen and sun-safe practices, which is especially important for those with a personal or family history of skin cancer. Suggestions for this include: 1. Use daily moisturizers containing SPF 30 or higher. 2. Wear long sleeve clothing with UPF ratings and a broad-brimmed hat. 3. Apply sunscreen with SPF 30 or higher to all sun exposed areas if you are going to be in the sun. A broad spectrum UVA/ UVB sunscreen is best.  Dont forget to REAPPLY every two hours or more often if swimming or sweating! 4. Avoid outside activities during peak sun hours, especially in the summer (10am- 2pm). 5. DO NOT use tanning beds. Using sunscreen and sun-safe practices can help reduce the likelihood of developing skin cancer or additional skin cancers in those previously diagnosed. Introducing [facility] & HEALTH SERVICES! Dear Odette Grace: Thank you for requesting a Swipely account. Our records indicate that you already have an active Swipely account. You can access your account anytime at [email or web address] Did you know that you can access your hospital and ER discharge instructions at any time in Swipely? You can also review all of your test results from your hospital stay or ER visit. Additional Information If you have questions, please visit the Frequently Asked Questions section of the Swipely website at [email or web address] Remember, Swipely is NOT to be used for urgent needs. For medical emergencies, dial 911. Now available from your iPhone and Android! Please provide this summary of care documentation to your next provider. Your primary care clinician is listed as Mary Varma. If you have any questions after today's visit, please call [phone].

## 2018-01-03 NOTE — PROGRESS NOTES
Patient Appointment Date: 1/22      Anastasia Herron, 79 y.o., male  Is calling for their Mohs Pre-Op Assessment    does not have Hepatitis C   does not have HIV (If YES, set up consult appointment)  does confirm site (if pathology available)  does ID site. (Can they still visibly see the site)    Brief description of tumor: (symptoms, If prior treatment, duration)    Allergies:  No Known Allergies      does not have an Electrical Implanted Device (Pacemaker, Defibrillator, AICD, brain stimulator)      does not need antibiotics      is not taking NSAIDs    is not taking aspirin      is not taking Garlic  is not taking Ginkgo  is not taking Ginseng  is not taking Fish oils  is not taking Vit E    does not take a blood thinner(i.e. Coumadin/Warfarin, Plavix, Brilinta, Pradaxa, Xarelto, Effient)    is not taking Coumadin/Warfarin (If taking needs to have PT/INR drawn and faxed results within a week of surgery)      Pre operative assessment questions asked to patient. Patient has a general understanding of the procedure, and has been versed that there will be local anesthesia used in the procedure and that He will be ok to drive themselves to and from the appointment.

## 2018-01-08 ENCOUNTER — HOSPITAL ENCOUNTER (OUTPATIENT)
Dept: GENERAL RADIOLOGY | Age: 68
Discharge: HOME | End: 2018-01-08
Payer: MEDICARE

## 2018-01-08 ENCOUNTER — OFFICE VISIT (OUTPATIENT)
Dept: INTERNAL MEDICINE CLINIC | Age: 68
End: 2018-01-08

## 2018-01-08 VITALS
WEIGHT: 250.13 LBS | DIASTOLIC BLOOD PRESSURE: 77 MMHG | HEART RATE: 75 BPM | HEIGHT: 77 IN | SYSTOLIC BLOOD PRESSURE: 115 MMHG | OXYGEN SATURATION: 99 % | RESPIRATION RATE: 18 BRPM | BODY MASS INDEX: 29.53 KG/M2 | TEMPERATURE: 97.6 F

## 2018-01-08 DIAGNOSIS — S39.012D STRAIN OF LUMBAR REGION, SUBSEQUENT ENCOUNTER: Primary | ICD-10-CM

## 2018-01-08 DIAGNOSIS — S39.012D STRAIN OF LUMBAR REGION, SUBSEQUENT ENCOUNTER: ICD-10-CM

## 2018-01-08 PROCEDURE — 72100 X-RAY EXAM L-S SPINE 2/3 VWS: CPT

## 2018-01-08 RX ORDER — DICLOFENAC SODIUM 75 MG/1
75 TABLET, DELAYED RELEASE ORAL 2 TIMES DAILY
Qty: 30 TAB | Refills: 0 | Status: SHIPPED | OUTPATIENT
Start: 2018-01-08 | End: 2018-01-13

## 2018-01-08 RX ORDER — CYCLOBENZAPRINE HCL 10 MG
10 TABLET ORAL
Qty: 30 TAB | Refills: 0 | Status: SHIPPED | OUTPATIENT
Start: 2018-01-08 | End: 2018-01-13

## 2018-01-08 NOTE — PROGRESS NOTES
HISTORY OF PRESENT ILLNESS  Reanna Floyd is a 76 y.o. male. HPI  Problem follow up:  Reanna Floyd returns for follow up visit regarding low changed. he was seen 10 days ago in office diagnosed with lumbar strain and treated with voltaren, flexeril . Workup was significant for same. Notes, labs, studies, imaging related to this problem during prior visit were available . Since that visit, he has not changed. he has been compliant with prescribed treatment. Residual symptoms include: same occasional 10/10 at worst pain. intermittant. New issues associated with this problem include: none. Went to chiropracter x 2 and manipulated. ROS    Physical Exam  Current vital are:   Visit Vitals    /77 (BP 1 Location: Left arm, BP Patient Position: Sitting)    Pulse 75    Temp 97.6 °F (36.4 °C) (Oral)    Resp 18    Ht 6' 5\" (1.956 m)    Wt 250 lb 2 oz (113.5 kg)    SpO2 99%    BMI 29.66 kg/m2      Patient appears to be in mild pain, antalgic gait noted. Lumbosacral spine area reveals mild local tenderness. Painful LS ROM noted. Spinal alignment is normal.  Straight leg raise is negative at 80 degrees on both sides. Lumbosacral distribution DTR's, motor strength and sensation are normal, including heel and toe gait. Peripheral pulses are palpable and 2 plus. Prior studies inlcude: Lumbar spine X-Ray: not available. MRI not indicated    ASSESSMENT and PLAN  Diagnoses and all orders for this visit:    1. Strain of lumbar region, subsequent encounter - ? Improvement with intermittant severe episodes of pain despite multiple approaches. Refer to PT. Check lumbar xray. -     XR SPINE LUMB 2 OR 3 V; Future  -     [facility]  -     diclofenac EC (VOLTAREN) 75 mg EC tablet; Take 1 Tab by mouth two (2) times a day. -     cyclobenzaprine (FLEXERIL) 10 mg tablet; Take 1 Tab by mouth three (3) times daily as needed for Muscle Spasm(s).       Follow-up Disposition:  Return if symptoms worsen or fail to improve.

## 2018-01-08 NOTE — MR AVS SNAPSHOT
Visit Information Date & Time Provider Department Dept. Phone Encounter #  
 1/8/2018  2:15 PM Tiff Chaudhari MD [facility] [address] [facility] [id number] Follow-up Instructions Return if symptoms worsen or fail to improve. Your Appointments 1/22/2018  8:15 AM  
SURGERY with MD Jessica Randall [address] [facility]) Appt Note: scc, left cheek( approved) [facility] [address] [facility] [address]  
[address] [place] [address] [address] [facility] [address] Upcoming Health Maintenance Date Due Hepatitis C Screening 1950 DTaP/Tdap/Td series (1 - Tdap) 12/16/2003 Influenza Age 5 to Adult 8/1/2017 MEDICARE YEARLY EXAM 8/31/2017 Pneumococcal 65+ High/Highest Risk (2 of 2 - PPSV23) 1/1/2019 COLONOSCOPY 4/21/2019 GLAUCOMA SCREENING Q2Y 6/23/2019 Allergies as of 1/8/2018  Review Complete On: 1/3/2018 By: Naye Hahn NP No Known Allergies Current Immunizations  Reviewed on 8/30/2016 Name Date Hep A Vaccine 11/15/2010 Hep B Vaccine 6/25/2004, 1/16/2004, 12/15/2003 IPV 12/15/2003 Influenza Nasal Vaccine 10/15/2015, 1/9/2008 Influenza Vaccine 9/16/2013, 9/22/2010, 9/24/2009, 12/18/2006, 5/16/2005 Japanese Encephalitis Vaccine 5/23/2005, 5/16/2005 Meningococcal (MCV4) Vaccine 12/15/2003 Novel Influenza-H1N1-09, Injectable 2/20/2010 Pneumococcal Conjugate (PCV-13) 9/15/2016 Pneumococcal Polysaccharide (PPSV-23) 1/1/2014 TB Skin Test (PPD) 9/17/2013 Td 12/15/2003 Typhoid Vaccine, Parenteral, Acetone-Killed, Dried ([place] ) 11/12/2010 Zoster Vaccine, Live 1/1/2014 Not reviewed this visit You Were Diagnosed With   
  
 Codes Comments Strain of lumbar region, subsequent encounter    -  Primary ICD-10-CM: S39.012D ICD-9-CM: V58.89, 847.2 Vitals BP Pulse Temp Resp Height(growth percentile) Weight(growth percentile) 115/77 (BP 1 Location: Left arm, BP Patient Position: Sitting) 75 97.6 °F (36.4 °C) (Oral) 18 6' 5\" (1.956 m) 250 lb 2 oz (113.5 kg) SpO2 BMI Smoking Status 99% 29.66 kg/m2 Never Smoker Vitals History BMI and BSA Data Body Mass Index Body Surface Area  
 29.66 kg/m 2 2.48 m 2 Preferred Pharmacy Pharmacy Name Phone [id number]- [address] , [address] [phone] Your Updated Medication List  
  
   
This list is accurate as of: 1/8/18  2:28 PM.  Always use your most recent med list.  
  
  
  
  
 cetirizine 10 mg tablet Commonly known as:  ZYRTEC Take 1 Tab by mouth daily as needed for Allergies. cyclobenzaprine 10 mg tablet Commonly known as:  FLEXERIL Take 1 Tab by mouth three (3) times daily as needed for Muscle Spasm(s). diclofenac EC 75 mg EC tablet Commonly known as:  VOLTAREN Take 1 Tab by mouth two (2) times a day.  
  
 hydrOXYzine HCl 10 mg tablet Commonly known as:  ATARAX Take 1-3 tab every 8 hours as needed  
  
 levothyroxine 125 mcg tablet Commonly known as:  SYNTHROID  
TAKE 1 TABLET BY MOUTH EVERY DAY BEFORE BREAKFAST FOR HYPOTHROIDISM  
  
 multivitamin tablet Commonly known as:  ONE A DAY Take 1 Tab by mouth daily. triamcinolone acetonide 0.1 % topical cream  
Commonly known as:  KENALOG  
APPLY TO BODY ECZEMA UP TO TWICE A DAY AS NEEDED Prescriptions Sent to Pharmacy Refills  
 diclofenac EC (VOLTAREN) 75 mg EC tablet 0 Sig: Take 1 Tab by mouth two (2) times a day. Class: Normal  
 Pharmacy: [address] [place] [address] [address] Ph #: [phone] Route: Oral  
 cyclobenzaprine (FLEXERIL) 10 mg tablet 0 Sig: Take 1 Tab by mouth three (3) times daily as needed for Muscle Spasm(s).   
 Class: Normal  
 Pharmacy: [address] [facility], [address] [facility] #: [phone] Route: Oral  
  
We Performed the Following REFERRAL TO PHYSICAL THERAPY [XCY50 Custom] Follow-up Instructions Return if symptoms worsen or fail to improve. To-Do List   
 01/08/2018 Imaging:  XR SPINE LUMB 2 OR 3 V Referral Information Referral ID Referred By Referred To  
  
 [id number] HERIBERTO, [address], [place] Phone: [phone] Visits Status Start Date End Date 1 New Request 1/8/18 1/8/19 If your referral has a status of pending review or denied, additional information will be sent to support the outcome of this decision. Introducing [facility] [place] [facility]! Dear Angela Duke: Thank you for requesting a [employer] account. Our records indicate that you already have an active [employer] account. You can access your account anytime at [email or web address] Did you know that you can access your hospital and ER discharge instructions at any time in [employer]? You can also review all of your test results from your hospital stay or ER visit. Additional Information If you have questions, please visit the Frequently Asked Questions section of the [employer] website at [email or web address] Remember, [employer] is NOT to be used for urgent needs. For medical emergencies, dial 911. Now available from your iPhone and Android! Please provide this summary of care documentation to your next provider. Your primary care clinician is listed as Timbo Lam. If you have any questions after today's visit, please call [phone].

## 2018-01-09 ENCOUNTER — TELEPHONE (OUTPATIENT)
Dept: DERMATOLOGY | Facility: AMBULATORY SURGERY CENTER | Age: 68
End: 2018-01-09

## 2018-01-09 ENCOUNTER — PATIENT MESSAGE (OUTPATIENT)
Dept: INTERNAL MEDICINE CLINIC | Age: 68
End: 2018-01-09

## 2018-01-09 NOTE — TELEPHONE ENCOUNTER
Return the patient's call. He did meet with Dr. Charol Councilman today. He expresses much frustration over the delay in diagnosis with Dr. Root and the fact that the only coordination for removal could be done with him here in the morning and seeing Dr. Charol Councilman late in the afternoon for closure. He also was not aware that Dr. Charol Councilman could do the surgery solely by himself. I told him that I did not present that as an option when he was here for consultation as I was under the impression that Dr. Root had wanted him to have Mohs surgery. We discussed the 3 options of Mohs surgery and closure here, Mohs surgery here and then closure with Dr. Charol Councilman as well as Dr. Charol Councilman doing the entire surgery and reconstruction. We discussed some of the restrictions. He is frustrated by the inconvenience of the  to coordinated appointments. I explained to him that coordination with 2 surgeons and his personal schedule with his wife's travel and his personal travel is difficult. I was happy to have secured this appointment for him which is earlier than the next available. At that point he probably told me he would have to call me back.

## 2018-01-09 NOTE — TELEPHONE ENCOUNTER
Pt called in stated would like to speak with Jama Armstrong, stated Dr. Enrico Phillips office agreed to keep his appointment on 1/22/17, however he stated it's an inconvenience that he has to wait until 4:30pm to see them after having surgery in the morning.  Call back # [phone]

## 2018-01-10 ENCOUNTER — HOSPITAL ENCOUNTER (OUTPATIENT)
Dept: PHYSICAL THERAPY | Age: 68
Discharge: HOME | End: 2018-01-10
Payer: MEDICARE

## 2018-01-10 PROCEDURE — G8978 MOBILITY CURRENT STATUS: HCPCS | Performed by: PHYSICAL THERAPIST

## 2018-01-10 PROCEDURE — 97112 NEUROMUSCULAR REEDUCATION: CPT | Performed by: PHYSICAL THERAPIST

## 2018-01-10 PROCEDURE — G8979 MOBILITY GOAL STATUS: HCPCS | Performed by: PHYSICAL THERAPIST

## 2018-01-10 PROCEDURE — 97161 PT EVAL LOW COMPLEX 20 MIN: CPT | Performed by: PHYSICAL THERAPIST

## 2018-01-10 NOTE — PROGRESS NOTES
[address] Jina Garcia, [facility] Zack Perez [address]  Phone: [phone]  Fax: [phone]    Plan of Care/Statement of Necessity for Physical Therapy Services  2-15    Patient name: Karyn Grier  : 1950  Provider#: [id number]  Referral source: Angela Raines MD      Medical/Treatment Diagnosis: Strain of muscle, fascia and tendon of lower back, subsequent encounter [S39.012D]     Prior Hospitalization: see medical history     Comorbidities: None  Prior Level of Function: see initial eval  Medications: Verified on Patient Summary List  Start of Care: 1/10/18      Onset Date: 17   The Plan of Care and following information is based on the information from the initial evaluation. Assessment/ key information: Patient is recovering from an episode of acute lumbar muscle spasms on 17. He is showing good progress, but continues to be limited in ROM and functional mobility, specifically with squatting. He also demonstrated significant lumbopelvic stability and postural dysfunction that could be contributing to this problem. He should progress well with PT with a gradual increase in core strength to reduce re-injury. Evaluation Complexity History MEDIUM  Complexity : 1-2 comorbidities / personal factors will impact the outcome/ POC ; Examination MEDIUM Complexity : 3 Standardized tests and measures addressing body structure, function, activity limitation and / or participation in recreation  ;Presentation MEDIUM Complexity : Evolving with changing characteristics  ; Clinical Decision Making MEDIUM Complexity : FOTO score of 26-74  Overall Complexity Rating: MEDIUM    Problem List: pain affecting function, decrease ROM, decrease strength, decrease ADL/ functional abilitiies, decrease activity tolerance, decrease flexibility/ joint mobility and decrease transfer abilities   Treatment Plan may include any combination of the following: Therapeutic exercise, Therapeutic activities, Neuromuscular re-education, Physical agent/modality, Gait/balance training, Manual therapy, Patient education, Self Care training, Functional mobility training, Home safety training and Stair training  Patient / Family readiness to learn indicated by: asking questions, trying to perform skills and interest  Persons(s) to be included in education: patient (P)  Barriers to Learning/Limitations: None  Patient Goal (s): I want to be able to get back to playing golf pain-free.   Patient Self Reported Health Status: excellent  Rehabilitation Potential: excellent    Short Term Goals: To be accomplished in 8 treatments:  1. Patient will be able to bend forward and tie his shoes with no pain or limitation. 2. Patient will be able to squat down and  10# from the floor with no pain or limitation. 3. Patient will be able to walk 1/2 mile with no pain or limitation. Long Term Goals: To be accomplished in 16 treatments:  1. Patient will be able to get in and out of a low car with no pain or limitation. 2. Patient will be able to squat down and  20# with no pain or limitation. 3. Patient will be able to sit for an hour with no pain or limitation. Frequency / Duration: Patient to be seen 2 times per week for 8 weeks. Patient/ Caregiver education and instruction: self care, activity modification and exercises    [x]  Plan of care has been reviewed with RONNA    G-Codes (GP)  Mobility   Current  CK= 40-59%   Goal  CI= 1-19%    The severity rating is based on clinical judgment and the Oswestry score. Certification Period: 1/11/18 - 4/11/18   Julian Landaverde, PT , DPT, OCS, Cert. DN   1/10/2018 2:52 PM    ________________________________________________________________________    I certify that the above Therapy Services are being furnished while the patient is under my care. I agree with the treatment plan and certify that this therapy is necessary.     [address] [place] [address] Signature:____________________  Date:____________Time: _________

## 2018-01-10 NOTE — PROGRESS NOTES
PT INITIAL EVALUATION NOTE - [facility] 2-15    Patient Name: Radhames Sim  Date:1/10/2018  : 1950  [x]  Patient  Verified  Payor: [employer] / Plan: [place] MED[place]ARE PART A & B / Product Type: Medicare /    In time:10:30 AM  Out time:11:30 AM  Total Treatment Time (min): 60  Total Timed Codes (min): 40  1:1 Treatment Time ( [address] only): 60   Visit #: 1     Treatment Area: Strain of muscle, fascia and tendon of lower back, subsequent encounter [S39.012D]    SUBJECTIVE  Pain Level (0-10 scale): 2  Any medication changes, allergies to medications, adverse drug reactions, diagnosis change, or new procedure performed?: [] No    [x] Yes (see summary sheet for update)  Subjective:    Lumbar strain  PLOF: No limitations with bending forward, walking, playing golf  Mechanism of Injury: Patient reports a series of events on 17 that led to a muscle spasm at his lower back. He first played golf in the morning, then he went caroling with his wife for about 45 minutes in the afternoon. When they returned home, he bent forward to  a piece of trash (paper) and his back went into spasm. Previous Treatment/Compliance: After several days he went to a chiropractor that his wife referred him to. He performed adjustments and soft tissue mobilization and this helped some. He returned for two f/u visits the next week with similar effects. He then f/u with his PCP due to have imaging performed. X-rays revealed DDD at L4-S1 and his PCP referred him to PT for core strengthening. PMHx/Surgical Hx: No other significant PMH  Work Hx: Retired  Living Situation: lives at home with wife  Pt Goals: to reduce pain and decrease risk of re-injury  Barriers: none  Motivation: very motivated  Substance use: none  FABQ Score: low  Cognition: A & O x 3       OBJECTIVE/EXAMINATION  Posture:   Increased lumbar lordosis/anterior pelvic tilt in standing  Gait and Functional Mobility:  Gait: WNL  Squat; Unable to advance past 50% of available ROM due to stiffness    Palpation: Mild TTP along the lower right and left lumbar paraspinals  Joint Mobility:NT    Lumbar AROM: WNL          Aberrant movements:  Painful arc: [] Yes  [x] No  Instability catch: [] Yes  [x] No  Difficulty returning from flexion: [x] Yes  [] No  Reversal of lumbopelvic rhythm: [] Yes  [x] No               MMT:  All LE myotomes: .4/5   Core strength: 4/5, pain  Neurological: Sensation: Intact  Special Tests:        Slump: Negative   Nando: Positive bilaterally        Albino: Positive bilaterally    SLR: Negative   HANS: Negative               Modality rationale: decrease pain and increase tissue extensibility to improve the patients ability to bend forward without pain   Min Type Additional Details    [] Estim: []Att   []Unatt        []TENS instruct                  []IFC  []Premod   []NMES                     []Other:  []w/US   []w/ice   []w/heat  Position:  Location:    []  Traction: [] Cervical       []Lumbar                       [] Prone          []Supine                       []Intermittent   []Continuous Lbs:  [] before manual  [] after manual  []w/heat    []  Ultrasound: []Continuous   [] Pulsed at:                           []1MHz   []3MHz Location:  W/cm2:    [] Paraffin         Location:   []w/heat   10 []  Ice     [x]  Heat  []  Ice massage Position: supine  Location: low back    []  Laser  []  Other: Position:  Location:      []  Vasopneumatic Device Pressure:       [] lo [] med [] hi   Temperature:      [x] Skin assessment post-treatment:  [x]intact []redness- no adverse reaction    []redness - adverse reaction:      40 min Neuromuscular Re-education:  [x]  See flow sheet :   Rationale: increase ROM, increase strength and improve coordination  to improve the patients ability to bend forward without pain    With   [] TE   [] TA   [] neuro   [] other: Patient Education: [x] Review HEP    [] Progressed/Changed HEP based on:   [] positioning   [] body mechanics   [] transfers [] heat/ice application    [] other:      Other Objective/Functional Measures:    Pain Level (0-10 scale) post treatment: 0    ASSESSMENT/Changes in Function:     [x]  See Plan of Dong Madden, PT , DPT, OCS, Cert.  DN   1/10/2018  2:51 PM

## 2018-01-11 ENCOUNTER — TELEPHONE (OUTPATIENT)
Dept: DERMATOLOGY | Facility: AMBULATORY SURGERY CENTER | Age: 68
End: 2018-01-11

## 2018-01-11 ENCOUNTER — APPOINTMENT (OUTPATIENT)
Dept: PHYSICAL THERAPY | Age: 68
End: 2018-01-11

## 2018-01-11 NOTE — TELEPHONE ENCOUNTER
I spoke with the patient to see if he had questions about our conversation 2 days ago. He states he would like a minimal scar and would like for Dr. Mary Alex to do everything at once but if the defect is worthy of a plastic surgeon then he would like to use Dr. Ollie Funez as planned. I explained that I would check with Dr. Ollie Funez to see if holding a slot for him would be okay and if Dr. Ollie Funez is good with the plan he has proposed, we will proceed in that fashion. I explained that unless I contacted him, the above plan would be okay to proceed with. I spoke with Dr. Ollie Funez who agrees to be on call for closure if needed/ desired.

## 2018-01-12 ENCOUNTER — APPOINTMENT (OUTPATIENT)
Dept: CT IMAGING | Age: 68
End: 2018-01-12
Attending: PHYSICIAN ASSISTANT
Payer: MEDICARE

## 2018-01-12 ENCOUNTER — HOSPITAL ENCOUNTER (EMERGENCY)
Age: 68
Discharge: HOME | End: 2018-01-13
Attending: EMERGENCY MEDICINE
Payer: MEDICARE

## 2018-01-12 VITALS
TEMPERATURE: 98.2 F | WEIGHT: 245 LBS | RESPIRATION RATE: 19 BRPM | DIASTOLIC BLOOD PRESSURE: 77 MMHG | BODY MASS INDEX: 28.93 KG/M2 | HEIGHT: 77 IN | OXYGEN SATURATION: 92 % | SYSTOLIC BLOOD PRESSURE: 143 MMHG | HEART RATE: 86 BPM

## 2018-01-12 DIAGNOSIS — S39.012A STRAIN OF LUMBAR REGION, INITIAL ENCOUNTER: ICD-10-CM

## 2018-01-12 DIAGNOSIS — M62.830 SPASM OF LUMBAR PARASPINOUS MUSCLE: Primary | ICD-10-CM

## 2018-01-12 PROCEDURE — 99284 EMERGENCY DEPT VISIT MOD MDM: CPT

## 2018-01-12 PROCEDURE — 72131 CT LUMBAR SPINE W/O DYE: CPT

## 2018-01-12 RX ORDER — DEXAMETHASONE SODIUM PHOSPHATE 10 MG/ML
10 INJECTION INTRAMUSCULAR; INTRAVENOUS
Status: COMPLETED | OUTPATIENT
Start: 2018-01-12 | End: 2018-01-13

## 2018-01-12 RX ORDER — DIAZEPAM 5 MG/1
5 TABLET ORAL
Status: COMPLETED | OUTPATIENT
Start: 2018-01-12 | End: 2018-01-13

## 2018-01-13 PROCEDURE — 74011250637 HC RX REV CODE- 250/637: Performed by: PHYSICIAN ASSISTANT

## 2018-01-13 RX ORDER — DIAZEPAM 5 MG/1
5 TABLET ORAL
Qty: 12 TAB | Refills: 0 | Status: ON HOLD | OUTPATIENT
Start: 2018-01-13 | End: 2018-04-05

## 2018-01-13 RX ORDER — METHYLPREDNISOLONE 4 MG/1
TABLET ORAL
Qty: 1 DOSE PACK | Refills: 0 | Status: SHIPPED | OUTPATIENT
Start: 2018-01-13 | End: 2018-04-05

## 2018-01-13 RX ADMIN — DIAZEPAM 5 MG: 5 TABLET ORAL at 00:12

## 2018-01-13 RX ADMIN — DEXAMETHASONE SODIUM PHOSPHATE 10 MG: 10 INJECTION, SOLUTION INTRAMUSCULAR; INTRAVENOUS at 00:15

## 2018-01-13 NOTE — ED NOTES
Patient ambulatory in ED baker. Patient's gait WDL. Provider aware. Patient verbalizes feeling better after medication administration.

## 2018-01-13 NOTE — DISCHARGE INSTRUCTIONS
Back Spasm: Care Instructions  Your Care Instructions  A back spasm is sudden tightness and pain in your back muscles. It may happen from overuse or an injury. Things like sleeping in an awkward way, bending, lifting, standing, or sitting can sometimes cause a spasm. But the cause isn't always clear. Home treatment includes using heat or ice, taking over-the-counter (OTC) pain medicines, and avoiding activities that may cause back pain. For a back spasm that doesn't get better with home care, your doctor may prescribe medicine. Treatments such as massage or manipulation may also help ease a back spasm. Your doctor may also suggest exercise or physical therapy to help improve strength and flexibility in your back muscles. In most cases, getting back to your normal activities is good foryour back. Just make sure to avoid doing things that make your pain worse. Follow-up care is a key part of your treatment and safety. Be sure to make and go to all appointments, and call your doctor if you are having problems. It's also a good idea to know your test results and keep a list of the medicines you take. How can you care for yourself at home? ? Heat, ice, and medicines  ? · To relieve pain, use heat or ice (whichever feels better) on the affected area. ¨ Put a warm water bottle, a heating pad set on low, or a warm cloth on your back. Put a thin cloth between the heating pad and your skin. Do not go to sleep with a heating pad on your skin. ¨ Try ice or a cold pack on the area for 10 to 20 minutes at a time. Put a thin cloth between the ice and your skin. ? · Ask your doctor if you can take acetaminophen (such as Tylenol) or nonsteroidal anti-inflammatory drugs, such as ibuprofen or naproxen. Your doctor can prescribe stronger medicines if needed. Be safe with medicines. Read and follow all instructions on the label. ?Body positions and posture  ?  · Sit or lie in positions that are most comfortable for you and that reduce pain. Try one of these positions when you lie down:  ¨ Lie on your back with your knees bent and supported by large pillows. ¨ Lie on the floor with your legs on the seat of a sofa or chair. Isidore Inoue on your side with your knees and hips bent and a pillow between your legs. ¨ Lie on your stomach if it does not make pain worse. ? · Do not sit up in bed. Avoid soft couches and twisted positions. ? · Avoid bed rest after the first day of back pain. Bed rest can help relieve pain at first, but it delays healing. Continued rest without activity is usually not good for your back. ? · If you must sit for long periods of time, take breaks from sitting. Change positions every 30 minutes. Get up and walk around, or lie in a comfortable position. Activity  ? · Take short walks several times a day. You can start with 5 to 10 minutes, 3 or 4 times a day, and work up to longer walks. Walk on level surfaces and avoid hills and stairs until your back starts to feel better. ? · After your back spasm starts to feel better, try to stretch your muscles every day, especially before and after exercise and at bedtime. Regular stretching can help relax your muscles. ? · To prevent future back pain, do exercises to stretch and strengthen your back and stomach. Learn to use good posture, safe lifting techniques, and other ways to move to help you avoid back pain. When should you call for help? Call 911 anytime you think you may need emergency care. For example, call if:  ? · You are unable to move an arm or a leg at all. ?Call your doctor now or seek immediate medical care if:  ? · You have new or worse symptoms in your legs, belly, or buttocks. Symptoms may include:  ¨ Numbness or tingling. ¨ Weakness. ¨ Pain. ? · You lose bladder or bowel control. ? Watch closely for changes in your health, and be sure to contact your doctor if:  ? · You do not get better as expected. Where can you learn more?   Go to http://nolberto-roseanna.info/. Enter E232 in the search box to learn more about \"Back Spasm: Care Instructions. \"  Current as of: March 21, 2017  Content Version: 11.4  © 8964-4841 [employer]. Care instructions adapted under license by [employer] (which disclaims liability or warranty for this information). If you have questions about a medical condition or this instruction, always ask your healthcare professional. [address] any warranty or liability for your use of this information. Low Back Pain: Exercises  Your Care Instructions  Here are some examples of typical rehabilitation exercises for your condition. Start each exercise slowly. Ease off the exercise if you start to have pain. Your doctor or physical therapist will tell you when you can start these exercises and which ones will work best for you. How to do the exercises  Press-up    1. Lie on your stomach, supporting your body with your forearms. 2. Press your elbows down into the floor to raise your upper back. As you do this, relax your stomach muscles and allow your back to arch without using your back muscles. As your press up, do not let your hips or pelvis come off the floor. 3. Hold for 15 to 30 seconds, then relax. 4. Repeat 2 to 4 times. Alternate arm and leg (bird dog) exercise    Do this exercise slowly. Try to keep your body straight at all times, and do not let one hip drop lower than the other. 1. Start on the floor, on your hands and knees. 2. Tighten your belly muscles. 3. Raise one leg off the floor, and hold it straight out behind you. Be careful not to let your hip drop down, because that will twist your trunk. 4. Hold for about 6 seconds, then lower your leg and switch to the other leg. 5. Repeat 8 to 12 times on each leg. 6. Over time, work up to holding for 10 to 30 seconds each time.   7. If you feel stable and secure with your leg raised, try raising the opposite arm straight out in front of you at the same time. Knee-to-chest exercise    1. Lie on your back with your knees bent and your feet flat on the floor. 2. Bring one knee to your chest, keeping the other foot flat on the floor (or keeping the other leg straight, whichever feels better on your lower back). 3. Keep your lower back pressed to the floor. Hold for at least 15 to 30 seconds. 4. Relax, and lower the knee to the starting position. 5. Repeat with the other leg. Repeat 2 to 4 times with each leg. 6. To get more stretch, put your other leg flat on the floor while pulling your knee to your chest.  Curl-ups    1. Lie on the floor on your back with your knees bent at a 90-degree angle. Your feet should be flat on the floor, about 12 inches from your buttocks. 2. Cross your arms over your chest. If this bothers your neck, try putting your hands behind your neck (not your head), with your elbows spread apart. 3. Slowly tighten your belly muscles and raise your shoulder blades off the floor. 4. Keep your head in line with your body, and do not press your chin to your chest.  5. Hold this position for 1 or 2 seconds, then slowly lower yourself back down to the floor. 6. Repeat 8 to 12 times. Pelvic tilt exercise    1. Lie on your back with your knees bent. 2. \"Brace\" your stomach. This means to tighten your muscles by pulling in and imagining your belly button moving toward your spine. You should feel like your back is pressing to the floor and your hips and pelvis are rocking back. 3. Hold for about 6 seconds while you breathe smoothly. 4. Repeat 8 to 12 times. Heel dig bridging    1. Lie on your back with both knees bent and your ankles bent so that only your heels are digging into the floor. Your knees should be bent about 90 degrees.   2. Then push your heels into the floor, squeeze your buttocks, and lift your hips off the floor until your shoulders, hips, and knees are all in a straight line.  3. Hold for about 6 seconds as you continue to breathe normally, and then slowly lower your hips back down to the floor and rest for up to 10 seconds. 4. Do 8 to 12 repetitions. Hamstring stretch in doorway    1. Lie on your back in a doorway, with one leg through the open door. 2. Slide your leg up the wall to straighten your knee. You should feel a gentle stretch down the back of your leg. 3. Hold the stretch for at least 15 to 30 seconds. Do not arch your back, point your toes, or bend either knee. Keep one heel touching the floor and the other heel touching the wall. 4. Repeat with your other leg. 5. Do 2 to 4 times for each leg. Hip flexor stretch    1. Kneel on the floor with one knee bent and one leg behind you. Place your forward knee over your foot. Keep your other knee touching the floor. 2. Slowly push your hips forward until you feel a stretch in the upper thigh of your rear leg. 3. Hold the stretch for at least 15 to 30 seconds. Repeat with your other leg. 4. Do 2 to 4 times on each side. Wall sit    1. Stand with your back 10 to 12 inches away from a wall. 2. Lean into the wall until your back is flat against it. 3. Slowly slide down until your knees are slightly bent, pressing your lower back into the wall. 4. Hold for about 6 seconds, then slide back up the wall. 5. Repeat 8 to 12 times. Follow-up care is a key part of your treatment and safety. Be sure to make and go to all appointments, and call your doctor if you are having problems. It's also a good idea to know your test results and keep a list of the medicines you take. Where can you learn more? Go to http://nolberto-roseanna.info/. Enter K694 in the search box to learn more about \"Low Back Pain: Exercises. \"  Current as of: March 21, 2017  Content Version: 11.4  © 4259-1281 [employer].  Care instructions adapted under license by [employer] (which disclaims liability or warranty for this information). If you have questions about a medical condition or this instruction, always ask your healthcare professional. [place] [address] any warranty or liability for your use of this information.

## 2018-01-13 NOTE — ED PROVIDER NOTES
HPI Comments: Karyn Grier is a 76 y.o. male who presents via EMS to ER with c/o low back pain and difficulty standing up x 1 hour PTA. 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 Notes he has been having intermittent low back pain/spasms since that time. Has seen PCP twice, had negative xray, and has tried two courses of tx with prednisone, diclofenac and flexeril without improvement in sx. Notes sometimes he has some temporary relief in the pain when  He takes flexeril however when it wears off the pain returns. Notes this evening he was having a dull aching pain in his lower back and was sitting on the toilet when he suddenly started having sharp/stabbing pains in his mid and left lower back. States tried to stand up but was unable to due to pain. Wife tried to help pt stand up for 20-30minutes without success and pt actually ended up falling to the floor. Was able to sit up and get onto his knees with his wife's assistance but after an hour of trying to get up he was still not able to stand up on his own thus called EMS. Notes he did take a flexeril around 9:50PM which feels like it has started to kick in some but notes every time he tries to move, sharp, stabbing pains return. Fentanyl given by EMS en route with some improvement in pain at rest. No urinary/bowel incontinence. No weakness, numbness, tingling. No other complaints. He specifically denies any fevers, chills, nausea, vomiting, chest pain, shortness of breath, headache, rash, diarrhea, abdominal pain, urinary/bowel changes, sweating or weight loss.     PCP: Ellie Clements MD   PMHx significant for: Past Medical History:  No date: Skin cancer      Comment: SCC nose, right eye lid, left eye brow and                left cheek  No date: Sun-damaged skin  No date: Thyroid disease    PSHx significant for: Past Surgical History:  No date: HX COLONOSCOPY  2012: HX OTHER SURGICAL      Comment: colonoscopy       No Known Allergies    There are no other complaints, changes or physical findings at this time. The history is provided by the patient and the spouse. Past Medical History:   Diagnosis Date    Skin cancer     SCC nose, right eye lid, left eye brow and left cheek    Sun-damaged skin     Thyroid disease        Past Surgical History:   Procedure Laterality Date    HX COLONOSCOPY      HX OTHER SURGICAL  2012    colonoscopy         Family History:   Problem Relation Age of Onset    Arthritis-osteo Mother     Diabetes Father     Heart Disease Father     Arthritis-osteo Brother     Cancer Neg Hx     Hypertension Neg Hx     Thyroid Disease Neg Hx        Social History     Social History    Marital status:      Spouse name: N/A    Number of children: N/A    Years of education: N/A     Occupational History    Not on file. Social History Main Topics    Smoking status: Never Smoker    Smokeless tobacco: Never Used    Alcohol use 0.6 - 1.2 oz/week     1 - 2 Cans of beer per week    Drug use: No    Sexual activity: Yes     Partners: Female     Other Topics Concern    Not on file     Social History Narrative         ALLERGIES: Review of patient's allergies indicates no known allergies. Review of Systems   Constitutional: Negative. Negative for appetite change, chills, fatigue and fever. HENT: Negative. Negative for congestion and sore throat. Eyes: Negative. Negative for visual disturbance. Respiratory: Negative. Negative for cough and shortness of breath. Cardiovascular: Negative. Negative for chest pain, palpitations and leg swelling. Gastrointestinal: Negative. Negative for abdominal pain, constipation, diarrhea, nausea and vomiting. Genitourinary: Negative.   Negative for dysuria, flank pain and hematuria. No urinary/bowel incontinence   Musculoskeletal: Positive for back pain. Negative for neck pain. Skin: Negative. Negative for rash. Neurological: Negative. Negative for dizziness, syncope, weakness, numbness and headaches. Hematological: Negative. Psychiatric/Behavioral: Negative. All other systems reviewed and are negative. Vitals:    01/12/18 2327   BP: 143/77   Pulse: 86   Resp: 19   Temp: 98.2 °F (36.8 °C)   SpO2: 92%   Weight: 111.1 kg (245 lb)   Height: 6' 5\" (1.956 m)            Physical Exam   Constitutional: He is oriented to person, place, and time. He appears well-developed and well-nourished. He appears distressed (uncomfortable appearing). HENT:   Head: Normocephalic and atraumatic. Mouth/Throat: Oropharynx is clear and moist.   Neck: Normal range of motion. Cardiovascular: Normal rate, S1 normal, S2 normal, normal heart sounds, intact distal pulses and normal pulses. Exam reveals no gallop and no friction rub. No murmur heard. Pulses:       Dorsalis pedis pulses are 2+ on the right side, and 2+ on the left side. Pulmonary/Chest: Effort normal and breath sounds normal. No accessory muscle usage. No respiratory distress. He has no decreased breath sounds. He has no wheezes. He has no rhonchi. He has no rales. Abdominal: Soft. Normal appearance and bowel sounds are normal. He exhibits no distension. There is no hepatosplenomegaly. There is no tenderness. There is no CVA tenderness. Musculoskeletal: Normal range of motion. He exhibits no edema or tenderness. Midline lumbar spinal TTP with associated L lumbar paraspinal musculature TTP and active spasm on exam. No crepitus or stepoff. No midline cervical, thoracic spinal tenderness to palpation. FROM spine and all joints/extremities in ER without difficulty. Ambulatory in ER without difficulty. Neurological: He is alert and oriented to person, place, and time. He has normal strength.  No sensory deficit. No focal neurologic deficit. Strength 5/5 and equal bilateral upper and lower extremities. NVI all extremities, brisk cap refill all extremities. DP pulse 2+ bilaterally. Radial pulse 2+ bilaterally. Skin: Skin is warm and dry. No rash noted. He is not diaphoretic. No erythema. No pallor. Psychiatric: He has a normal mood and affect. His behavior is normal.   Nursing note and vitals reviewed. MDM  Number of Diagnoses or Management Options  Diagnosis management comments: DDx: spasm, herniated disc, lumbar stenosis    1:17 AM  I reviewed our electronic medical record system for any past medical records and prior imaging and/or lab results that were available that may contribute to the patients current condition. Reviewed Lumbar xray from PCP visit 1/8/18, results below:  Impression:  1. Degenerative disc disease at L4/L5 and L5/S1. Degenerative changes in the  posterior elements about L5. Sarah Tana Amount and/or Complexity of Data Reviewed  Tests in the radiology section of CPT®: ordered and reviewed  Obtain history from someone other than the patient: yes (Wife; daughter )  Review and summarize past medical records: yes  Discuss the patient with other providers: yes (Dr. Mario Harris)    Patient Progress  Patient progress: stable    ED Course       Procedures                       12:32 AM   Rob Tena PA-C discussed patient with Amber Roche MD who is in agreement with care plan as outlined. Will be in to see the patient. No further recommendations. Rob Tena PA-C        2:04 AM  CT shows mild spondylosis otherwise no acute abnormality. Amber Roche MD in to evaluate patient. States pain has improved but still there especially with movement and doesn't think he will be able to walk/stand due to pain. Will attempt to ambulate patient. If unable to ambulate, will admit for disability.  Rob Tena PA-C     2:33 AM  Patient ambulated in ER without assistance without difficulty. Will discharge home. Angelique Ulloa PA-C     2:33 AM  Pt has been reevaluated. There are no new complaints, changes, or physical findings at this time. Medications have been reviewed w/ pt and/or family. Pt and/or family's questions have been answered. Pt and/or family expressed good understanding of the dx/tx/rx and is in agreement with plan of care. Pt instructed and agreed to f/u w/ PCP, ortho and to return to ED upon further deterioration. Pt is ready for discharge. LABORATORY TESTS:  No results found for this or any previous visit (from the past 12 hour(s)). IMAGING RESULTS:  CT SPINE LUMB WO CONT   Final Result        Ct Spine Lumb Wo Cont    Result Date: 1/13/2018  Clinical indication: Low back pain. Axial CT scan of the lumbar sacral spine obtained without contrast with coronal and sagittal reconstructions. CT dose reduction was achieved through the use of a standardized protocol tailored for this examination and automatic exposure control for dose modulation . Kelli Cosme The alignment is satisfactory. There is no fracture or focal lytic lesion. There are no paraspinal masses or fluid collection. There is no bony canal compromise. There is some mild facet hypertrophy. IMPRESSION: Mild spondylosis. No acute findings. MEDICATIONS GIVEN:  Medications   diazePAM (VALIUM) tablet 5 mg (5 mg Oral Given 1/13/18 0012)   dexamethasone (PF) (DECADRON) injection 10 mg (10 mg Oral Given 1/13/18 0015)       IMPRESSION:  1. Spasm of lumbar paraspinous muscle    2. Strain of lumbar region, initial encounter        PLAN:  1. Current Discharge Medication List      START taking these medications    Details   diazePAM (VALIUM) 5 mg tablet Take 1 Tab by mouth three (3) times daily as needed (spasm).  Max Daily Amount: 15 mg.  Qty: 12 Tab, Refills: 0    Associated Diagnoses: Spasm of lumbar paraspinous muscle      methylPREDNISolone (MEDROL, PAM,) 4 mg tablet Take as directed on package  Qty: 1 Dose Pack, Refills: 0         CONTINUE these medications which have NOT CHANGED    Details   triamcinolone acetonide (KENALOG) 0.1 % topical cream APPLY TO BODY ECZEMA UP TO TWICE A DAY AS NEEDED  Refills: 12    Associated Diagnoses: Strain of lumbar region, initial encounter      levothyroxine (SYNTHROID) 125 mcg tablet TAKE 1 TABLET BY MOUTH EVERY DAY BEFORE BREAKFAST FOR HYPOTHROIDISM  Qty: 90 Tab, Refills: 1    Associated Diagnoses: Acquired hypothyroidism      hydrOXYzine HCl (ATARAX) 10 mg tablet Take 1-3 tab every 8 hours as needed  Qty: 60 Tab, Refills: 0    Associated Diagnoses: Allergic dermatitis      cetirizine (ZYRTEC) 10 mg tablet Take 1 Tab by mouth daily as needed for Allergies. Qty: 30 Tab, Refills: 0    Associated Diagnoses: Allergic rhinitis, unspecified allergic rhinitis trigger, unspecified rhinitis seasonality      multivitamin (ONE A DAY) tablet Take 1 Tab by mouth daily.          STOP taking these medications       diclofenac EC (VOLTAREN) 75 mg EC tablet Comments:   Reason for Stopping:         cyclobenzaprine (FLEXERIL) 10 mg tablet Comments:   Reason for Stoppin.   Follow-up Information     Follow up With Details Comments Contact Info    Martin Dang MD Schedule an appointment as soon as possible for a visit in 1 week As needed; [address]  [address] [facility] [address]  [address]      Junior Matias MD Schedule an appointment as soon as possible for a visit in 3 days As needed [address]  [phone]      [facility] EMERGENCY DEPT  If symptoms worsen [address]  [phone]            Return to ED if worse

## 2018-01-13 NOTE — ED TRIAGE NOTES
Pt. Ashley Loan in by EMS for back pain, was sitting on toilet, had spasm and could not stand up without help. Pt. Received 100mcg of fentanyl and 4mg zofran by EMS. Pt. Took PO flexeril prior to leaving. Back pain noted since prior to noel.

## 2018-01-16 ENCOUNTER — HOSPITAL ENCOUNTER (OUTPATIENT)
Dept: PHYSICAL THERAPY | Age: 68
Discharge: HOME | End: 2018-01-16
Payer: MEDICARE

## 2018-01-16 PROCEDURE — 97112 NEUROMUSCULAR REEDUCATION: CPT | Performed by: PHYSICAL THERAPIST

## 2018-01-16 NOTE — PROGRESS NOTES
PT DAILY TREATMENT NOTE - [facility] 2-15    Patient Name: April Jodi  Date:2018  : 1950  [x]  Patient  Verified  Payor: Lukasz Franoc / Plan: [place] & [place] / Product Type: Medicare /    In time:10:00 AM  Out time:10:45 AM  Total Treatment Time (min): 45  Total Timed Codes (min): 30  1:1 Treatment Time ( only): 30   Visit #: 2     Treatment Area: Strain of muscle, fascia and tendon of lower back, subsequent encounter [S39.012D]    SUBJECTIVE  Pain Level (0-10 scale): 2  Any medication changes, allergies to medications, adverse drug reactions, diagnosis change, or new procedure performed?: [x] No    [] Yes (see summary sheet for update)  Subjective functional status/changes:   [] No changes reported  Patient reports that on Friday his low back went into muscle spasm while he was on the toilet. It is a very low toilet and it occurred as he went to stand up. He also is curious if it is due to seeing PT and chiropractor on consecutive days. This week he will hold off from the chiropractor.     OBJECTIVE    Modality rationale: decrease pain and increase tissue extensibility to improve the patients ability to the patients ability to get up from a chair   Min Type Additional Details    [] Estim: []Att   []Unatt        []TENS instruct                  []IFC  []Premod   []NMES                     []Other:  []w/US   []w/ice   []w/heat  Position:  Location:    []  Traction: [] Cervical       []Lumbar                       [] Prone          []Supine                       []Intermittent   []Continuous Lbs:  [] before manual  [] after manual  []w/heat    []  Ultrasound: []Continuous   [] Pulsed at:                           []1MHz   []3MHz Location:  W/cm2:    [] Paraffin         Location:   []w/heat   15 []  Ice     [x]  Heat  []  Ice massage Position: supine  Location: low back    []  Laser  []  Other: Position:  Location:      []  Vasopneumatic Device Pressure:       [] lo [] med [] hi   Temperature: [x] Skin assessment post-treatment:  [x]intact []redness- no adverse reaction    []redness - adverse reaction:     10 min Therapeutic exercise:  [x]  See flow sheet :   Rationale: increase ROM and increase strength  to improve the patients ability to get up from a chair without pain     30 min Neuromuscular Re-education:  [x]  See flow sheet :   Rationale: increase ROM, increase strength and improve coordination  to improve the patients ability to get up from a chair without pain          With   [] TE   [] TA   [] neuro   [] other: Patient Education: [x] Review HEP    [] Progressed/Changed HEP based on:   [] positioning   [] body mechanics   [] transfers   [] heat/ice application    [] other:      Other Objective/Functional Measures:      Pain Level (0-10 scale) post treatment: 0    ASSESSMENT/Changes in Function:     Patient will continue to benefit from skilled PT services to modify and progress therapeutic interventions, address functional mobility deficits, address ROM deficits, address strength deficits, analyze and address soft tissue restrictions, analyze and cue movement patterns, analyze and modify body mechanics/ergonomics and assess and modify postural abnormalities to attain remaining goals. []  See Plan of Care  []  See progress note/recertification  []  See Discharge Summary         Progress towards goals / Updated goals:  Patient tolerated today's therapy well and the patient will incorporate more stretching into his daily HEP to prevent muscle spasms. PLAN  [x]  Upgrade activities as tolerated     [x]  Continue plan of care  []  Update interventions per flow sheet       []  Discharge due to:_  []  Other:_      Mercedes Agarwal, PT , DPT, OCS, Cert.  DN   1/16/2018  1:48 PM

## 2018-01-18 ENCOUNTER — HOSPITAL ENCOUNTER (OUTPATIENT)
Dept: PHYSICAL THERAPY | Age: 68
Discharge: HOME | End: 2018-01-18
Payer: MEDICARE

## 2018-01-18 PROCEDURE — 97112 NEUROMUSCULAR REEDUCATION: CPT | Performed by: PHYSICAL THERAPIST

## 2018-01-18 PROCEDURE — 97110 THERAPEUTIC EXERCISES: CPT | Performed by: PHYSICAL THERAPIST

## 2018-01-18 NOTE — PROGRESS NOTES
PT DAILY TREATMENT NOTE - [facility] 2-15    Patient Name: Jeff Neri  Date:2018  : 1950  [x]  Patient  Verified  Payor: Carlo Cid / Plan: [place] MED[place]ARE PART A & B / Product Type: Medicare /    In time:5:00 PM  Out time:5:55 PM  Total Treatment Time (min): 55  Total Timed Codes (min):40  1:1 Treatment Time ( [address] only): 25  Visit #: 3     Treatment Area: Strain of muscle, fascia and tendon of lower back, subsequent encounter [S39.036D]    SUBJECTIVE  Pain Level (0-10 scale): 1  Any medication changes, allergies to medications, adverse drug reactions, diagnosis change, or new procedure performed?: [x] No    [] Yes (see summary sheet for update)  Subjective functional status/changes:   [] No changes reported  Patient reports that he has no instances of muscle spasm since his last visit and is mainly just stiff.     OBJECTIVE    Modality rationale: decrease pain and increase tissue extensibility to improve the patients ability to the patients ability to get up from a chair   Min Type Additional Details    [] Estim: []Att   []Unatt        []TENS instruct                  []IFC  []Premod   []NMES                     []Other:  []w/US   []w/ice   []w/heat  Position:  Location:    []  Traction: [] Cervical       []Lumbar                       [] Prone          []Supine                       []Intermittent   []Continuous Lbs:  [] before manual  [] after manual  []w/heat    []  Ultrasound: []Continuous   [] Pulsed at:                           []1MHz   []3MHz Location:  W/cm2:    [] Paraffin         Location:   []w/heat   15 []  Ice     [x]  Heat  []  Ice massage Position: supine  Location: low back    []  Laser  []  Other: Position:  Location:      []  Vasopneumatic Device Pressure:       [] lo [] med [] hi   Temperature:      [x] Skin assessment post-treatment:  [x]intact []redness- no adverse reaction    []redness - adverse reaction:     10 min Therapeutic exercise:  [x]  See flow sheet :   Rationale: increase ROM and increase strength  to improve the patients ability to get up from a chair without pain     30 min Neuromuscular Re-education:  [x]  See flow sheet :   Rationale: increase ROM, increase strength and improve coordination  to improve the patients ability to get up from a chair without pain          With   [] TE   [] TA   [] neuro   [] other: Patient Education: [x] Review HEP    [] Progressed/Changed HEP based on:   [] positioning   [] body mechanics   [] transfers   [] heat/ice application    [] other:      Other Objective/Functional Measures:      Pain Level (0-10 scale) post treatment: 0    ASSESSMENT/Changes in Function:     Patient will continue to benefit from skilled PT services to modify and progress therapeutic interventions, address functional mobility deficits, address ROM deficits, address strength deficits, analyze and address soft tissue restrictions, analyze and cue movement patterns, analyze and modify body mechanics/ergonomics and assess and modify postural abnormalities to attain remaining goals. []  See Plan of Care  []  See progress note/recertification  []  See Discharge Summary         Progress towards goals / Updated goals:  Patient tolerated today's progression of therapeutic exercises well and continues to show good progress with regular performance of HEP. PLAN  [x]  Upgrade activities as tolerated     [x]  Continue plan of care  []  Update interventions per flow sheet       []  Discharge due to:_  []  Other:_      Paul Mares PT , DPT, OCS, Cert.  DN   1/18/2018  1:48 PM

## 2018-01-22 ENCOUNTER — OFFICE VISIT (OUTPATIENT)
Dept: DERMATOLOGY | Facility: AMBULATORY SURGERY CENTER | Age: 68
End: 2018-01-22

## 2018-01-22 ENCOUNTER — TELEPHONE (OUTPATIENT)
Dept: INTERNAL MEDICINE CLINIC | Age: 68
End: 2018-01-22

## 2018-01-22 VITALS
HEART RATE: 87 BPM | DIASTOLIC BLOOD PRESSURE: 84 MMHG | SYSTOLIC BLOOD PRESSURE: 154 MMHG | WEIGHT: 245 LBS | RESPIRATION RATE: 16 BRPM | OXYGEN SATURATION: 99 % | TEMPERATURE: 97.8 F | BODY MASS INDEX: 29.05 KG/M2

## 2018-01-22 DIAGNOSIS — C44.329 SQUAMOUS CELL CARCINOMA OF SKIN OF LEFT CHEEK: Primary | ICD-10-CM

## 2018-01-22 DIAGNOSIS — M54.50 ACUTE MIDLINE LOW BACK PAIN WITHOUT SCIATICA: Primary | ICD-10-CM

## 2018-01-22 NOTE — PATIENT INSTRUCTIONS
WOUND CARE INSTRUCTIONS    1. Keep the dressing clean and dry and do not remove for 48 hours. 2. Then change the dressing once a day as follows:  a. Wash hands before and after each dressing change. b. Remove dressing and wash site gently with mild soap and water, rinse, and pat dry.  c. Apply an ointment (Bacitracin, Polysporin, Neosporin, Petroleum jelly or Aquaphor). d. Apply a non-stick (Telfa) dressing or Band-Aid to cover the wound. Remove pressure bandage on wednesday, then wash gently and apply a thin layer Vaseline and a band-aid to site daily for 1 week. 3. Watch for:  BLEEDING: A small amount of drainage may occur. If bleeding occurs, elevate and rest the surgery site. Apply gauze and steady pressure for 15 minutes. If bleeding continues, call this office. INFECTION: Signs of infection include increased redness, pain, warmth, drainage of pus, and fever. If this occurs, call this office. 4. Special Instructions (follow any that are checked):  · [x] You have stitches that DO NOT need to be removed. · [x] Avoid bending at the waist and heavy lifting for two days. · [x] Sleep with your head elevated for the next two nights. · [x] Rest the surgery site and keep it elevated as much as possible for two days. · [] You may apply an ice-pack for 10-15 minutes every waking hour for the rest of the day. · [] Eat a soft diet and avoid hot food and hot drinks for the rest of the day. · [] Other instructions: Follow up as directed. Take Tylenol or Ibuprofen for pain as needed. Once the site is healed with no remaining bandages or open areas, protect your surgical site and scar from the sun, as this area will be more sensitive. Use a broad spectrum sunscreen SPF 30 or higher daily, and a chemical free product (one containing zinc oxide or titanium dioxide) is a good choice if the area is sensitive.     You may begin to gently massage the surgical site in 2-3 weeks, rubbing in a circular motion along the scar. This can help reduce swelling and thickness of a scar. A scar cream may be used beginnning 1 month after the surgery. If you have any questions or concerns, please call our office Monday through Friday at [phone].

## 2018-01-22 NOTE — TELEPHONE ENCOUNTER
Pt is requesting a  MRI for his lower back. Pt was seen on 01/08/2018 with PCP for back issues and then ended up in the ER  on 01/12/0218 for back issues.  ( hosp notes in chart)       Pt can be reached at [phone]

## 2018-01-22 NOTE — TELEPHONE ENCOUNTER
Mukesh Bound, can you get him an appt with Dr Maryanne Mcpherson or [facility] this week for unrelenting low back pain? He's had CT of L spine which revealed mild spondylosis. I don't know what MRI would add to that but would have ortho spine order if necessary. Thanks.

## 2018-01-22 NOTE — PROGRESS NOTES
This note is written by Faye Montes, as dictated by Jeet Gastelum. Dee Lou MD.    CC: Squamous cell carcinoma on the left cheek     History of present illness:     Win Blackwood is a 76 y.o. male referred by Dr. Zay Chaudhary. 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 This is a new squamous cell carcinoma present for one year described as a bump with no prior treatment. Biopsy confirmed the diagnosis of squamous cell carcinoma, and I reviewed the written pathology report. He has discussed reconstruction of the surgical site with Dr. Marisue Nissen. He states he would like to have the surgical site reconstructed by me if possible, but will have Dr. Marisue Nissen perform reconstruction if necessary. He is feeling well and in his usual state of health today. He has no pain, no current illnesses, no other skin concerns. His allergies, medications, medical, and social history are reviewed by me today. He has a history of nonmelanoma skin cancer. His PCP, Dr. Lisa Orozco, will be informed of this visit. Exam:     He is an awake, alert, and oriented 76 y.o. male who appears well and in no distress. There is no preauricular, submandibular, or cervical lymphadenopathy. I examined his face. He has a 9 x 6 mm firm papule on his left cheek. He confirms location. Assessment/plan:    1. Squamous cell carcinoma, left cheek. I discussed the diagnosis of squamous cell carcinoma and summarized the pathology report. Mohs surgery is indicated by site, size, and histology. The procedure was discussed, verbal and written consent were obtained. I performed the procedure. Two stages were required to reach a tumor free plane. He elected to have the surgical defect managed by me with complex repair. There were no complications. He will follow up as needed as the site heals. Indications, risks, and options were discussed with Win Blackwood preoperatively.  Risks including, but not limited to: pain, bleeding, infection, tumor recurrence, scarring and damage to motor and/or sensory nerves, were discussed. Ronald Andino chose Mohs surgery. Ronald Andino was an acceptable surgery candidate. Ronald Andino was placed in the appropriate position on the operating table in the Mohs surgery procedure room. The area was prepped and draped in the standard manner. Gentian violet was used to outline the clinical margins of the tumor. Local anesthesia was then obtained. The grossly visible tumor was then removed, an underlying layer was excised and mapped according to the Mohs technique, and the individual specimens examined microscopically. The process was repeated until microscopic examination of the tissue specimens confirmed a tumor-free plane. Hemostasis was obtained with electrosurgery and pressure. The wound was covered between stages with moist saline gauze. The wound management options of second intent healing, layered closure, local flap, and/or full thickness skin graft were discussed. Ronald Andino understands the aims, risks, alternatives, and possible complications and elects to proceed with a complex layered closure. Wound margins were made vertical, edges undermined in the subcutaneous plane, standing cones corrected at both poles followed by layered closure. The wound was closed with buried 5-0 polysorb suture in the muscle and deep subcutis to reduce width of the wound, a second layer in the dermis to reduce tension on the skin edges, and skin edges were approximated with 6-0 fast gut suture. The final closure length was 35 mm. The wound was bandaged with Vaseline, Telfa, gauze and Coverroll. Wound care instructions (written and verbal) and a follow up appointment were given to Ronald Andino before discharge. Ronald Andino was discharged in good condition. 2. History of nonmelanoma skin cancer.  I discussed the diagnosis and recommend routine examinations with Dr. Nghia Yip for surveillance. The documentation recorded by the scribe accurately reflects the service I personally performed and the decisions made by me. [facility]   OFFICE PROCEDURE PROGRESS NOTE     Chart reviewed for the following:     Shad Duke MD, have reviewed the History, Physical and updated the Allergic reactions for [address] performed immediately prior to start of procedure:     Shad Duke MD, have performed the following reviews on Sonny Graft prior to the start of the procedure:     * Patient was identified by name and date of birth   * Agreement on procedure being performed was verified   * Risks and Benefits explained to the patient   * Procedure site verified and marked as necessary   * Patient was positioned for comfort   * Consent was signed and verified     Time: 8:17 AM   Date of procedure: 1/22/2018  Procedure performed by: Kirit Duke MD   Provider assisted by: LPN   Patient assisted by: self   How tolerated by patient: tolerated the procedure well with no complications   Comments: none

## 2018-01-22 NOTE — MR AVS SNAPSHOT
[facility] [address] 
[phone] Patient: Whitney Long MRN: [id number] UTN:[id number] Visit Information Date & Time Provider Department Dept. Phone Encounter #  
 1/22/2018  8:15 AM Raymundo Delacruz MD [facility] [phone] Upcoming Health Maintenance Date Due Hepatitis C Screening 1950 DTaP/Tdap/Td series (1 - Tdap) 12/16/2003 Influenza Age 5 to Adult 8/1/2017 MEDICARE YEARLY EXAM 8/31/2017 Pneumococcal 65+ High/Highest Risk (2 of 2 - PPSV23) 1/1/2019 COLONOSCOPY 4/21/2019 GLAUCOMA SCREENING Q2Y 6/23/2019 Allergies as of 1/22/2018  Review Complete On: 1/22/2018 By: Rob Coats LPN No Known Allergies Current Immunizations  Reviewed on 8/30/2016 Name Date Hep A Vaccine 11/15/2010 Hep B Vaccine 6/25/2004, 1/16/2004, 12/15/2003 IPV 12/15/2003 Influenza Nasal Vaccine 10/15/2015, 1/9/2008 Influenza Vaccine 9/16/2013, 9/22/2010, 9/24/2009, 12/18/2006, 5/16/2005 Japanese Encephalitis Vaccine 5/23/2005, 5/16/2005 Meningococcal (MCV4) Vaccine 12/15/2003 Novel Influenza-H1N1-09, Injectable 2/20/2010 Pneumococcal Conjugate (PCV-13) 9/15/2016 Pneumococcal Polysaccharide (PPSV-23) 1/1/2014 TB Skin Test (PPD) 9/17/2013 Td 12/15/2003 Typhoid Vaccine, Parenteral, Acetone-Killed, Dried ([place] ) 11/12/2010 Zoster Vaccine, Live 1/1/2014 Not reviewed this visit You Were Diagnosed With   
  
 Codes Comments Squamous cell carcinoma of skin of left cheek    -  Primary ICD-10-CM: K85.593 ICD-9-CM: 173.32 Vitals BP Pulse Temp Resp Weight(growth percentile) SpO2  
 154/84 (BP 1 Location: Left arm, BP Patient Position: Sitting) 87 97.8 °F (36.6 °C) (Oral) 16 245 lb (111.1 kg) 99% BMI Smoking Status 29.05 kg/m2 Never Smoker BMI and BSA Data Body Mass Index Body Surface Area 29.05 kg/m 2 2.46 m 2 Preferred Pharmacy Pharmacy Name Phone [id number] [address], [address] [phone] Your Updated Medication List  
  
   
This list is accurate as of: 1/22/18  8:34 AM.  Always use your most recent med list.  
  
  
  
  
 cetirizine 10 mg tablet Commonly known as:  ZYRTEC Take 1 Tab by mouth daily as needed for Allergies. diazePAM 5 mg tablet Commonly known as:  VALIUM Take 1 Tab by mouth three (3) times daily as needed (spasm). Max Daily Amount: 15 mg.  
  
 hydrOXYzine HCl 10 mg tablet Commonly known as:  ATARAX Take 1-3 tab every 8 hours as needed  
  
 levothyroxine 125 mcg tablet Commonly known as:  SYNTHROID  
TAKE 1 TABLET BY MOUTH EVERY DAY BEFORE BREAKFAST FOR HYPOTHROIDISM  
  
 methylPREDNISolone 4 mg tablet Commonly known as:  MEDROL (PAM) Take as directed on package  
  
 multivitamin tablet Commonly known as:  ONE A DAY Take 1 Tab by mouth daily. triamcinolone acetonide 0.1 % topical cream  
Commonly known as:  KENALOG  
APPLY TO BODY ECZEMA UP TO TWICE A DAY AS NEEDED To-Do List   
 01/24/2018 10:30 AM  
  Appointment with Tram Li PT at [facility] [address] ([phone])  
  
 01/26/2018 12:00 PM  
  Appointment with Tram Li PT at [facility] [address] ([phone])  
  
 01/30/2018 10:00 AM  
  Appointment with Tram Li PT at [facility] [address] ([phone])  
  
 02/01/2018 11:00 AM  
  Appointment with Tram Li PT at [facility] [address] ([phone])  
  
 02/06/2018 10:00 AM  
  Appointment with Tram Li PT at [facility] [address] ([phone])  
  
 02/08/2018 10:00 AM  
  Appointment with Tram Li PT at [facility] [address] ([phone]) Patient Instructions WOUND CARE INSTRUCTIONS 1. Keep the dressing clean and dry and do not remove for 48 hours. 2. Then change the dressing once a day as follows: 
a. Wash hands before and after each dressing change. b. Remove dressing and wash site gently with mild soap and water, rinse, and pat dry. 
c. Apply an ointment (Bacitracin, Polysporin, Neosporin, Petroleum jelly or Aquaphor). d. Apply a non-stick (Telfa) dressing or Band-Aid to cover the wound. Remove pressure bandage on wednesday, then wash gently and apply a thin layer Vaseline and a band-aid to site daily for 1 week. 3. Watch for: BLEEDING: A small amount of drainage may occur. If bleeding occurs, elevate and rest the surgery site. Apply gauze and steady pressure for 15 minutes. If bleeding continues, call this office. INFECTION: Signs of infection include increased redness, pain, warmth, drainage of pus, and fever. If this occurs, call this office. 4. Special Instructions (follow any that are checked): ·  You have stitches that DO NOT need to be removed. ·  Avoid bending at the waist and heavy lifting for two days. ·  Sleep with your head elevated for the next two nights. ·  Rest the surgery site and keep it elevated as much as possible for two days. ·  You may apply an ice-pack for 10-15 minutes every waking hour for the rest of the day. ·  Eat a soft diet and avoid hot food and hot drinks for the rest of the day. ·  Other instructions: Follow up as directed. Take Tylenol or Ibuprofen for pain as needed. Once the site is healed with no remaining bandages or open areas, protect your surgical site and scar from the sun, as this area will be more sensitive. Use a broad spectrum sunscreen SPF 30 or higher daily, and a chemical free product (one containing zinc oxide or titanium dioxide) is a good choice if the area is sensitive. You may begin to gently massage the surgical site in 2-3 weeks, rubbing in a circular motion along the scar.  This can help reduce swelling and thickness of a scar. A scar cream may be used beginnning 1 month after the surgery. If you have any questions or concerns, please call our office Monday through Friday at [phone]. Introducing [facility]! Dear Fabio Larios: Thank you for requesting a [employer] account. Our records indicate that you already have an active [employer] account. You can access your account anytime at [email or web address] Did you know that you can access your hospital and ER discharge instructions at any time in [employer] Stack? You can also review all of your test results from your hospital stay or ER visit. Additional Information If you have questions, please visit the Frequently Asked Questions section of the [employer] website at [email or web address] Remember, [employer] Stack is NOT to be used for urgent needs. For medical emergencies, dial 911. Now available from your iPhone and Android! Please provide this summary of care documentation to your next provider. Your primary care clinician is listed as Luis Durant. If you have any questions after today's visit, please call [phone].

## 2018-01-23 ENCOUNTER — PATIENT MESSAGE (OUTPATIENT)
Dept: INTERNAL MEDICINE CLINIC | Age: 68
End: 2018-01-23

## 2018-01-23 NOTE — TELEPHONE ENCOUNTER
Patient has an appointment already scheduled with a provider at [address] [place] [address] for his back pain.

## 2018-01-24 ENCOUNTER — TELEPHONE (OUTPATIENT)
Dept: INTERNAL MEDICINE CLINIC | Age: 68
End: 2018-01-24

## 2018-01-24 ENCOUNTER — HOSPITAL ENCOUNTER (OUTPATIENT)
Dept: PHYSICAL THERAPY | Age: 68
Discharge: HOME | End: 2018-01-24
Payer: MEDICARE

## 2018-01-24 PROCEDURE — 97110 THERAPEUTIC EXERCISES: CPT | Performed by: PHYSICAL THERAPIST

## 2018-01-24 PROCEDURE — 97112 NEUROMUSCULAR REEDUCATION: CPT | Performed by: PHYSICAL THERAPIST

## 2018-01-24 NOTE — TELEPHONE ENCOUNTER
----- Message from Timbo Larios Page sent at 1/24/2018  8:13 AM EST -----  Regarding: Dr. Priya Marte  Pt is requesting a return call, regarding an appt for today. Pt states they are having lower back pain. Pt called on Monday and did not receive a return call. Best contact number is [phone].

## 2018-01-24 NOTE — PROGRESS NOTES
PT DAILY TREATMENT NOTE - [facility] 2-15    Patient Name: Bobby Turpin  Date:2018  : 1950  [x]  Patient  Verified  Payor: [place] MED[place] / Plan: [place] MED[place]ARE [place] A & B / Product Type: Medicare /    In time: 10:30 PM  Out time:5:55 PM  Total Treatment Time (min): 60  Total Timed Codes (min):45  1:1 Treatment Time ([address] only): 39  Visit #: 4     Treatment Area: Strain of muscle, fascia and tendon of lower back, subsequent encounter [S39.302D]    SUBJECTIVE  Pain Level (0-10 scale): 210  Any medication changes, allergies to medications, adverse drug reactions, diagnosis change, or new procedure performed?: [x] No    [] Yes (see summary sheet for update)  Subjective functional status/changes:   [] No changes reported  Patient reports that he is a little better than when he started. He reports that the pain is a little less intense and a little less often. He reports that he does his exercises some but not as much as he probably should.     OBJECTIVE    Modality rationale: decrease pain and increase tissue extensibility to improve the patients ability to the patients ability to return to N ADL skills   Min Type Additional Details    [] Estim: []Att   []Unatt        []TENS instruct                  []IFC  []Premod   []NMES                     []Other:  []w/US   []w/ice   []w/heat  Position:   Location:     []  Traction: [] Cervical       []Lumbar                       [] Prone          []Supine                       []Intermittent   []Continuous Lbs:  [] before manual  [] after manual  []w/heat    []  Ultrasound: []Continuous   [] Pulsed at:                           []1MHz   []3MHz Location:  W/cm2:    [] Paraffin         Location:   []w/heat   15 []  Ice     [x]  Heat  []  Ice massage Position: supine  Location: low back    []  Laser  []  Other: Position:  Location:      []  Vasopneumatic Device Pressure:       [] lo [] med [] hi   Temperature:      [x] Skin assessment post-treatment:  [x]intact [x]redness- no adverse reaction    []redness - adverse reaction:     15 min Therapeutic exercise:  [x]  See flow sheet :   Rationale: increase ROM and increase strength  to improve the patients ability to get up from a chair without pain     30 min Neuromuscular Re-education:  [x]  See flow sheet :   Rationale: increase ROM, increase strength and improve coordination  to improve the patients ability to ambulate without antalgia          With   [] TE   [] TA   [] neuro   [] other: Patient Education: [x] Review HEP    [] Progressed/Changed HEP based on:   [] positioning   [] body mechanics   [] transfers   [] heat/ice application    [] other:      Other Objective/Functional Measures:   Limited UE swing, limited trunk rotation and short stride length bilaterally with ambulation  All transfers with good quality and poor (slow) speed of movement     Pain Level (0-10 scale) post treatment: 0    ASSESSMENT/Changes in Function:     Patient will continue to benefit from skilled PT services to modify and progress therapeutic interventions, address functional mobility deficits, address ROM deficits, address strength deficits, analyze and address soft tissue restrictions, analyze and cue movement patterns, analyze and modify body mechanics/ergonomics and assess and modify postural abnormalities to attain remaining goals. []  See Plan of Care  []  See progress note/recertification  []  See Discharge Summary         Progress towards goals / Updated goals:  Patient demonstrates some nervousness and tentative movement during session secondary to presumption of onset of pain. He tolerated today's intervention well with good noted body awareness. PLAN  [x]  Upgrade activities as tolerated     [x]  Continue plan of care  []  Update interventions per flow sheet       []  Discharge due to:_  [x]  Other: resume PT with Cal Villanueva, primary PT, at next visit.   Progress with combination of traditional intervention and NIRMALA intervention as appropriate.       Jacqueline Kirkpatrick, PT , DPT, [facility]1/24/2018  1:48 PM

## 2018-01-26 ENCOUNTER — APPOINTMENT (OUTPATIENT)
Dept: PHYSICAL THERAPY | Age: 68
End: 2018-01-26
Payer: MEDICARE

## 2018-01-26 NOTE — TELEPHONE ENCOUNTER
From: Karyn Grier  To: Ellie Clements MD  Sent: 1/23/2018 10:00 PM EST  Subject: Referral Request    I am continuing to have back issues, limiting what I can do. It has been a month with little results other than a trip to the emergency room with back spasms. I would like to talk with you about my options. I do not want to wait another month to find out what is happening with my back. I will be at PT at 1030 and would like to stop in to see you either before or after or at a time tomorrow that allows us to discuss this issue. I called on Monday and have not had a return response. Thank you for your time.     Karlie Mattson

## 2018-01-28 DIAGNOSIS — S39.012D STRAIN OF LUMBAR REGION, SUBSEQUENT ENCOUNTER: ICD-10-CM

## 2018-01-29 ENCOUNTER — HOSPITAL ENCOUNTER (OUTPATIENT)
Dept: PHYSICAL THERAPY | Age: 68
Discharge: HOME | End: 2018-01-29
Payer: MEDICARE

## 2018-01-29 PROCEDURE — 97110 THERAPEUTIC EXERCISES: CPT | Performed by: PHYSICAL THERAPIST

## 2018-01-29 PROCEDURE — 97112 NEUROMUSCULAR REEDUCATION: CPT | Performed by: PHYSICAL THERAPIST

## 2018-01-29 PROCEDURE — 97162 PT EVAL MOD COMPLEX 30 MIN: CPT | Performed by: PHYSICAL THERAPIST

## 2018-01-29 PROCEDURE — 97014 ELECTRIC STIMULATION THERAPY: CPT | Performed by: PHYSICAL THERAPIST

## 2018-01-29 PROCEDURE — 97140 MANUAL THERAPY 1/> REGIONS: CPT | Performed by: PHYSICAL THERAPIST

## 2018-01-29 RX ORDER — DICLOFENAC SODIUM 75 MG/1
TABLET, DELAYED RELEASE ORAL
Qty: 30 TAB | Refills: 0 | Status: SHIPPED | OUTPATIENT
Start: 2018-01-29 | End: 2018-02-10 | Stop reason: SDUPTHER

## 2018-01-29 NOTE — PROGRESS NOTES
PT DAILY TREATMENT NOTE - [facility] 2-15    Patient Name: Kacy Boyer  Date:2018  : 1950  [x]  Patient  Verified  Payor: [employer] / Plan: [place] MEDICARE PART A & B / Product Type: Medicare /    In time: 10:55 AM  Out time:12:00 PM  Total Treatment Time (min): 65  Total Timed Codes (min):55  1:1 Treatment Time ([address] only): 54  Visit #: 5     Treatment Area: Strain of muscle, fascia and tendon of lower back, subsequent encounter [S39.314D]    SUBJECTIVE  Pain Level (0-10 scale): 2/10  Any medication changes, allergies to medications, adverse drug reactions, diagnosis change, or new procedure performed?: [x] No    [] Yes (see summary sheet for update)  Subjective functional status/changes:   [] No changes reported  Patient is now able to bend forward past his knees, which is a significant improvement, but is still unable to tie his shoes.     OBJECTIVE    Modality rationale: decrease pain and increase tissue extensibility to improve the patients ability to the patients ability to return to N ADL skills   Min Type Additional Details    [] Estim: []Att   []Unatt        []TENS instruct                  []IFC  []Premod   []NMES                     []Other:  []w/US   []w/ice   []w/heat  Position:   Location:     []  Traction: [] Cervical       []Lumbar                       [] Prone          []Supine                       []Intermittent   []Continuous Lbs:  [] before manual  [] after manual  []w/heat    []  Ultrasound: []Continuous   [] Pulsed at:                           []1MHz   []3MHz Location:  W/cm2:    [] Paraffin         Location:   []w/heat   10 []  Ice     [x]  Heat  []  Ice massage Position: supine  Location: low back    []  Laser  []  Other: Position:  Location:      []  Vasopneumatic Device Pressure:       [] lo [] med [] hi   Temperature:      [x] Skin assessment post-treatment:  [x]intact [x]redness- no adverse reaction    []redness - adverse reaction:     25 min Therapeutic exercise:  [x]  See flow sheet :   Rationale: increase ROM and increase strength  to improve the patients ability to get up from a chair without pain     30 min Neuromuscular Re-education:  [x]  See flow sheet :   Rationale: increase ROM, increase strength and improve coordination  to improve the patients ability to ambulate without antalgia          With   [] TE   [] TA   [] neuro   [] other: Patient Education: [x] Review HEP    [] Progressed/Changed HEP based on:   [] positioning   [] body mechanics   [] transfers   [] heat/ice application    [] other:      Other Objective/Functional Measures:     Pain Level (0-10 scale) post treatment: 0    ASSESSMENT/Changes in Function:     Patient will continue to benefit from skilled PT services to modify and progress therapeutic interventions, address functional mobility deficits, address ROM deficits, address strength deficits, analyze and address soft tissue restrictions, analyze and cue movement patterns, analyze and modify body mechanics/ergonomics and assess and modify postural abnormalities to attain remaining goals. []  See Plan of Care  []  See progress note/recertification  []  See Discharge Summary         Progress towards goals / Updated goals:  Patient tolerated today's progression of therapeutic and neuromuscular re-education exercises very well and is showing improved tolerance to forward bending. PLAN  [x]  Upgrade activities as tolerated     [x]  Continue plan of care  []  Update interventions per flow sheet       []  Discharge due to:_  [x]  Other:    Kalyan Lares, PT  , DPT, OCS, Cert.  DN   1/29/2018  1:48 PM

## 2018-01-30 ENCOUNTER — APPOINTMENT (OUTPATIENT)
Dept: PHYSICAL THERAPY | Age: 68
End: 2018-01-30
Payer: MEDICARE

## 2018-01-31 ENCOUNTER — HOSPITAL ENCOUNTER (OUTPATIENT)
Dept: PHYSICAL THERAPY | Age: 68
Discharge: HOME | End: 2018-01-31
Payer: MEDICARE

## 2018-01-31 ENCOUNTER — OFFICE VISIT (OUTPATIENT)
Dept: INTERNAL MEDICINE CLINIC | Age: 68
End: 2018-01-31

## 2018-01-31 VITALS
OXYGEN SATURATION: 98 % | HEIGHT: 77 IN | HEART RATE: 98 BPM | BODY MASS INDEX: 28.71 KG/M2 | WEIGHT: 243.2 LBS | RESPIRATION RATE: 12 BRPM | DIASTOLIC BLOOD PRESSURE: 76 MMHG | TEMPERATURE: 98.2 F | SYSTOLIC BLOOD PRESSURE: 126 MMHG

## 2018-01-31 DIAGNOSIS — E55.9 VITAMIN D DEFICIENCY: ICD-10-CM

## 2018-01-31 DIAGNOSIS — Z13.6 SCREENING FOR ISCHEMIC HEART DISEASE: ICD-10-CM

## 2018-01-31 DIAGNOSIS — Z13.39 SCREENING FOR ALCOHOLISM: ICD-10-CM

## 2018-01-31 DIAGNOSIS — R53.83 FATIGUE, UNSPECIFIED TYPE: ICD-10-CM

## 2018-01-31 DIAGNOSIS — Z00.00 MEDICARE ANNUAL WELLNESS VISIT, SUBSEQUENT: Primary | ICD-10-CM

## 2018-01-31 DIAGNOSIS — Z12.11 SCREEN FOR COLON CANCER: ICD-10-CM

## 2018-01-31 DIAGNOSIS — Z71.89 ADVANCED CARE PLANNING/COUNSELING DISCUSSION: ICD-10-CM

## 2018-01-31 DIAGNOSIS — Z13.220 SCREENING FOR LIPID DISORDERS: ICD-10-CM

## 2018-01-31 DIAGNOSIS — Z23 ENCOUNTER FOR IMMUNIZATION: ICD-10-CM

## 2018-01-31 DIAGNOSIS — E03.9 ACQUIRED HYPOTHYROIDISM: Chronic | ICD-10-CM

## 2018-01-31 DIAGNOSIS — M54.50 ACUTE MIDLINE LOW BACK PAIN WITHOUT SCIATICA: ICD-10-CM

## 2018-01-31 DIAGNOSIS — Z11.59 ENCOUNTER FOR HEPATITIS C SCREENING TEST FOR LOW RISK PATIENT: ICD-10-CM

## 2018-01-31 DIAGNOSIS — Z12.5 SPECIAL SCREENING FOR MALIGNANT NEOPLASM OF PROSTATE: ICD-10-CM

## 2018-01-31 DIAGNOSIS — Z13.31 SCREENING FOR DEPRESSION: ICD-10-CM

## 2018-01-31 PROCEDURE — 97112 NEUROMUSCULAR REEDUCATION: CPT | Performed by: PHYSICAL THERAPIST

## 2018-01-31 PROCEDURE — 97110 THERAPEUTIC EXERCISES: CPT | Performed by: PHYSICAL THERAPIST

## 2018-01-31 NOTE — PROGRESS NOTES
PT DAILY TREATMENT NOTE - [facility] 2-15    Patient Name: James Case  Date:2018  : 1950  [x]  Patient  Verified  Payor: [place] MEDICARE / Plan: [place] MEDICARE PART A & B / Product Type: Medicare /    In time: 10:55 AM  Out time:12:00 PM  Total Treatment Time (min): 65  Total Timed Codes (min):55  1:1 Treatment Time ([address] only): 54  Visit #: 5     Treatment Area: Strain of muscle, fascia and tendon of lower back, subsequent encounter [S39.392D]    SUBJECTIVE  Pain Level (0-10 scale): 1/10  Any medication changes, allergies to medications, adverse drug reactions, diagnosis change, or new procedure performed?: [x] No    [] Yes (see summary sheet for update)  Subjective functional status/changes:   [] No changes reported  Patient reports that he had little pain this morning, except when getting out of the car.     OBJECTIVE    Modality rationale: decrease pain and increase tissue extensibility to improve the patients ability to the patients ability to return to N ADL skills   Min Type Additional Details    [] Estim: []Att   []Unatt        []TENS instruct                  []IFC  []Premod   []NMES                     []Other:  []w/US   []w/ice   []w/heat  Position:   Location:     []  Traction: [] Cervical       []Lumbar                       [] Prone          []Supine                       []Intermittent   []Continuous Lbs:  [] before manual  [] after manual  []w/heat    []  Ultrasound: []Continuous   [] Pulsed at:                           []1MHz   []3MHz Location:  W/cm2:    [] Paraffin         Location:   []w/heat   10 []  Ice     [x]  Heat  []  Ice massage Position: supine  Location: low back    []  Laser  []  Other: Position:  Location:      []  Vasopneumatic Device Pressure:       [] lo [] med [] hi   Temperature:      [x] Skin assessment post-treatment:  [x]intact [x]redness- no adverse reaction    []redness - adverse reaction:     25 min Therapeutic exercise:  [x]  See flow sheet :   Rationale: increase ROM and increase strength  to improve the patients ability to get up from a chair without pain     30 min Neuromuscular Re-education:  [x]  See flow sheet :   Rationale: increase ROM, increase strength and improve coordination  to improve the patients ability to ambulate without antalgia          With   [] TE   [] TA   [] neuro   [] other: Patient Education: [x] Review HEP    [] Progressed/Changed HEP based on:   [] positioning   [] body mechanics   [] transfers   [] heat/ice application    [] other:      Other Objective/Functional Measures:     Pain Level (0-10 scale) post treatment: 0    ASSESSMENT/Changes in Function:     Patient will continue to benefit from skilled PT services to modify and progress therapeutic interventions, address functional mobility deficits, address ROM deficits, address strength deficits, analyze and address soft tissue restrictions, analyze and cue movement patterns, analyze and modify body mechanics/ergonomics and assess and modify postural abnormalities to attain remaining goals. []  See Plan of Care  []  See progress note/recertification  []  See Discharge Summary         Progress towards goals / Updated goals:  Patient continues to do very well with PT with improved lumbar AROM towards WNL and less pain reported at end-range. PLAN  [x]  Upgrade activities as tolerated     [x]  Continue plan of care  []  Update interventions per flow sheet       []  Discharge due to:_  [x]  Other:    Moe Taylor, PT  , DPT, OCS, Cert.  DN   1/31/2018  1:48 PM

## 2018-01-31 NOTE — MR AVS SNAPSHOT
[address] [place] [address] 
 
 
 [id number] [facility] 
[phone] Patient: Kacy Boyer MRN: [id number] U:0/7/3250 Visit Information Date & Time Provider Department Dept. Phone Encounter #  
 1/31/2018  9:00 AM Mariah Madden NP [facility] [address] [facility] [id number] Upcoming Health Maintenance Date Due Hepatitis C Screening 1950 DTaP/Tdap/Td series (1 - Tdap) 12/16/2003 Influenza Age 5 to Adult 8/1/2017 MEDICARE YEARLY EXAM 8/31/2017 Pneumococcal 65+ High/Highest Risk (2 of 2 - PPSV23) 1/1/2019 COLONOSCOPY 4/21/2019 GLAUCOMA SCREENING Q2Y 6/23/2019 Allergies as of 1/31/2018  Review Complete On: 1/31/2018 By: Mariah Madden NP No Known Allergies Current Immunizations  Reviewed on 1/31/2018 Name Date Hep A Vaccine 11/15/2010 Hep B Vaccine 6/25/2004, 1/16/2004, 12/15/2003 IPV 12/15/2003 Influenza Nasal Vaccine 10/15/2015, 1/9/2008 Influenza Vaccine 9/16/2013, 9/22/2010, 9/24/2009, 12/18/2006, 5/16/2005 Japanese Encephalitis Vaccine 5/23/2005, 5/16/2005 Meningococcal (MCV4) Vaccine 12/15/2003 Novel Influenza-H1N1-09, Injectable 2/20/2010 Pneumococcal Conjugate (PCV-13) 9/15/2016 Pneumococcal Polysaccharide (PPSV-23)  Incomplete, 1/1/2014 TB Skin Test (PPD) 9/17/2013 Td 12/15/2003 Tdap 1/24/2012 Typhoid Vaccine, Parenteral, Acetone-Killed, Dried ([place] ) 11/12/2010 Zoster Vaccine, Live 1/1/2014 Reviewed by Mariah Madden NP on 1/31/2018 at  9:26 AM  
You Were Diagnosed With   
  
 Codes Comments Acquired hypothyroidism    -  Primary ICD-10-CM: E03.9 ICD-9-CM: 615. 9 Vitamin D deficiency     ICD-10-CM: E55.9 ICD-9-CM: 268.9 Encounter for hepatitis C screening test for low risk patient     ICD-10-CM: Z11.59 
ICD-9-CM: V73.89 Screening for prostate cancer     ICD-10-CM: Z12.5 ICD-9-CM: V76.44   
 Screening for lipid disorders     ICD-10-CM: Z13.220 ICD-9-CM: V77.91 Medicare annual wellness visit, subsequent     ICD-10-CM: Z00.00 ICD-9-CM: V70.0 Encounter for immunization     ICD-10-CM: B94 ICD-9-CM: V03.89 Special screening for malignant neoplasm of prostate     ICD-10-CM: Z12.5 ICD-9-CM: V76.44 Screening for ischemic heart disease     ICD-10-CM: Z13.6 ICD-9-CM: V81.0 Screening for alcoholism     ICD-10-CM: Z13.89 ICD-9-CM: V79.1 Screening for depression     ICD-10-CM: Z13.89 ICD-9-CM: V79.0 Screen for colon cancer     ICD-10-CM: Z12.11 ICD-9-CM: V76.51 Fatigue, unspecified type     ICD-10-CM: R53.83 ICD-9-CM: 780.79 Vitals BP Pulse Temp Resp Height(growth percentile) Weight(growth percentile) 126/76 (BP 1 Location: Left arm, BP Patient Position: Sitting) 98 98.2 °F (36.8 °C) (Oral) 12 6' 5\" (1.956 m) 243 lb 3.2 oz (110.3 kg) SpO2 BMI Smoking Status 98% 28.84 kg/m2 Never Smoker BMI and BSA Data Body Mass Index Body Surface Area  
 28.84 kg/m 2 2.45 m 2 Preferred Pharmacy Pharmacy Name Phone [phone] [facility], [address] [phone] Your Updated Medication List  
  
   
This list is accurate as of: 1/31/18  9:52 AM.  Always use your most recent med list.  
  
  
  
  
 cetirizine 10 mg tablet Commonly known as:  ZYRTEC Take 1 Tab by mouth daily as needed for Allergies. diazePAM 5 mg tablet Commonly known as:  VALIUM Take 1 Tab by mouth three (3) times daily as needed (spasm). Max Daily Amount: 15 mg.  
  
 diclofenac EC 75 mg EC tablet Commonly known as:  VOLTAREN  
TAKE 1 TABLET BY MOUTH TWICE A DAY  
  
 levothyroxine 125 mcg tablet Commonly known as:  SYNTHROID  
TAKE 1 TABLET BY MOUTH EVERY DAY BEFORE BREAKFAST FOR HYPOTHROIDISM  
  
 methylPREDNISolone 4 mg tablet Commonly known as:  MEDROL (PAM) Take as directed on package  
  
 multivitamin tablet Commonly known as:  ONE A DAY Take 1 Tab by mouth daily. triamcinolone acetonide 0.1 % topical cream  
Commonly known as:  KENALOG  
APPLY TO BODY ECZEMA UP TO TWICE A DAY AS NEEDED  
  
 VITAMIN C PO Take  by mouth. We Performed the Following ADMIN PNEUMOCOCCAL VACCINE [ [facility]] CBC WITH AUTOMATED DIFF [50791 CPT(R)] [address] [[id number] [facility]] HEPATITIS C AB [19853 CPT(R)] LIPID PANEL [08762 CPT(R)] METABOLIC PANEL, COMPREHENSIVE [89322 CPT(R)] OCCULT BLOOD, IMMUNOASSAY (FIT) [id number] CPT(R)] PNEUMOCOCCAL POLYSACCHARIDE VACCINE, 23-VALENT, ADULT OR IMMUNOSUPPRESSED PT DOSE, [69460 CPT(R)] IN ANNUAL ALCOHOL SCREEN 15 MIN [id number] [facility]] IN PROSTATE CA SCREENING; ALEENA [ [facility]] PSA SCREENING (SCREENING) [ [facility]] TSH 3RD GENERATION [86800 CPT(R)] URINALYSIS W/ RFLX MICROSCOPIC [54286 CPT(R)] VITAMIN D, 25 HYDROXY [id number] CPT(R)] To-Do List   
 01/31/2018 10:00 AM  
  Appointment with Ladonna Alvarado, PT at [facility]  [address] ([phone])  
  
 02/01/2018 8:00 PM  
(Arrive by 7:45 PM) Appointment with [facility] MRI 1 at [facility] [address] ([phone]) 1. Please bring a list or a bag of your current medications to your appointment 2. Please be sure to remove ALL hair clips, pins, extensions, etc., prior to arriving for your MRI procedure. 3. If you have any medical implants or devices, please bring associated medical card with you. 4. Bring any non [facility] films or CDs pertaining to the area being imaged with you on the day of appointment. 5. A written order with a valid diagnosis and Physicians  signature is required for all scheduled tests. 6. Check in at registration 30min before your appointment time unless you were instructed to do otherwise. [place] patients, please arrive 15 minutes prior to appointment for registration.   
  
 02/02/2018 9:00 AM  
 Appointment with Katie Herrera PT at [facility] PT [address] ([phone])  
  
 02/05/2018 10:00 AM  
  Appointment with Katie Herrera PT at [facility] PT [address] ([phone])  
  
 02/07/2018 10:00 AM  
  Appointment with Katie Herrera PT at [facility] PT [address] ([phone])  
  
 02/09/2018 10:00 AM  
  Appointment with Katie Herrera PT at [facility] PT [address] ([phone])  
  
 02/12/2018 10:00 AM  
  Appointment with Katie Herrera PT at [facility] PT [address] ([phone])  
  
 02/14/2018 10:00 AM  
  Appointment with Katie Herrera PT at [facility] PT [address] ([phone])  
  
 02/16/2018 10:00 AM  
  Appointment with Katie Herrera PT at [facility] PT [address] ([phone])  
  
 02/19/2018 10:00 AM  
  Appointment with Katie Herrera PT at [facility] PT [address] ([phone]) Patient Instructions Medicare Wellness Visit, Male The best way to live healthy is to have a healthy lifestyle by eating a well-balanced diet, exercising regularly, limiting alcohol and stopping smoking. Regular physical exams and screening tests are another way to keep healthy. Preventive exams provided by your health care provider can find health problems before they become diseases or illnesses. Preventive services including immunizations, screening tests, monitoring and exams can help you take care of your own health. All people over age 72 should have a pneumovax  and and a prevnar shot to prevent pneumonia. These are once in a lifetime unless you and your provider decide differently. All people over 65 should have a yearly flu shot and a tetanus vaccine every 10 years. Screening for diabetes mellitus with a blood sugar test should be done every year.  
 
Glaucoma is a disease of the eye due to increased ocular pressure that can lead to blindness and it should be done every year by an eye professional. 
 
Cardiovascular screening tests that check for elevated lipids (fatty part of blood) which can lead to heart disease and strokes should be done every 5 years. Colorectal screening that evaluates for blood or polyps in your colon should be done yearly as a stool test or every five years as a flexible sigmoidoscope or every 10 years as a colonoscopy up to age 76. Men up to age 76 may need a screening blood test for prostate cancer at certain intervals, depending on their personal and family history. This decision is between the patient and his provider. If you have been a smoker or had family history of abdominal aortic aneurysms, you and your provider may decide to schedule an ultrasound test of your aorta. Hepatitis C screening is also recommended for anyone born between [birth year] through [address]. A shingles vaccine is also recommended once in a lifetime after age 61. Your Medicare Wellness Exam is recommended annually. Here is a list of your current Health Maintenance items with a due date: 
Health Maintenance Due Topic Date Due  
 Hepatitis C Test  1950  DTaP/Tdap/Td  (1 - Tdap) 12/16/2003  Flu Vaccine  08/01/2017 [address] [place] [address] Annual Well Visit  08/31/2017 Pneumococcal Polysaccharide Vaccine: Care Instructions Your Care Instructions The pneumococcal polysaccharide vaccine (PPSV) can prevent some of the serious complications of pneumonia. This includes infection in the bloodstream (bacteremia) or throughout the body (septicemia). PPSV is recommended for people ages 72 years and older. People ages 3 to 59 who have a long-term illness should also get the vaccine. This includes people with diabetes, heart disease, liver disease, or lung disease. PPSV can also help people who have a weakened immune system. This includes cancer patients and people who don't have a spleen. The immune system helps your body fight infection and other illnesses. PPSV is given as a shot. It's usually given in the arm.  Healthy older adults get the shot once. Other people may need to have a second dose. The shot may cause pain and redness at the site. It may also cause a mild fever for a short time. Follow-up care is a key part of your treatment and safety. Be sure to make and go to all appointments, and call your doctor if you are having problems. It's also a good idea to know your test results and keep a list of the medicines you take. How can you care for yourself at home? · Take an over-the-counter pain medicine, such as acetaminophen (Tylenol), ibuprofen (Advil, Motrin), or naproxen (Aleve), if your arm is sore after the shot. Be safe with medicines. Read and follow all instructions on the label. · Give acetaminophen (Tylenol) or ibuprofen (Advil, Motrin) to your child for pain or fussiness after the shot. Read and follow all instructions on the label. Do not give aspirin to anyone younger than 20. It has been linked to Reye syndrome, a serious illness. · Put ice or a cold pack on the sore area for 10 to 20 minutes at a time. Put a thin cloth between the ice and your skin. When should you call for help? Call 911 anytime you think you may need emergency care. For example, call if: 
? · You have a seizure. ? · You have symptoms of a severe allergic reaction. These may include: 
¨ Sudden raised, red areas (hives) all over the body. ¨ Swelling of the throat, mouth, lips, or tongue. ¨ Trouble breathing. ¨ Passing out (losing consciousness). Or you may feel very lightheaded or suddenly feel weak, confused, or restless. ?Call your doctor now or seek immediate medical care if: 
? · You have symptoms of an allergic reaction, such as: ¨ A rash or hives (raised, red areas on the skin). ¨ Itching. ¨ Swelling. ¨ Belly pain, nausea, or vomiting. ? · You have a high fever. ? Watch closely for changes in your health, and be sure to contact your doctor if you have any problems. Where can you learn more? Go to http://nolberto-roseanna.info/. Enter T225 in the search box to learn more about \"Pneumococcal Polysaccharide Vaccine: Care Instructions. \" Current as of: September 24, 2016 Content Version: 11.4 © 2006-2017 [employer]. Care instructions adapted under license by [employer] (which disclaims liability or warranty for this information). If you have questions about a medical condition or this instruction, always ask your healthcare professional. [address] any warranty or liability for your use of this information. Pneumococcal Polysaccharide Vaccine: What You Need to Know Why get vaccinated? Vaccination can protect older adults (and some children and younger adults) from pneumococcal disease. Pneumococcal disease is caused by bacteria that can spread from person to person through close contact. It can cause ear infections, and it can also lead to more serious infections of the: 
· Lungs (pneumonia), · Blood (bacteremia), and 
· Covering of the brain and spinal cord (meningitis). Meningitis can cause deafness and brain damage, and it can be fatal. 
Anyone can get pneumococcal disease, but children under 3years of age, people with certain medical conditions, adults over 72years of age, and cigarette smokers are at the highest risk. About 18,000 older adults die each year from pneumococcal disease in the [place]. Treatment of pneumococcal infections with penicillin and other drugs used to be more effective. But some strains of the disease have become resistant to these drugs. This makes prevention of the disease, through vaccination, even more important. Pneumococcal polysaccharide vaccine (PPSV23) Pneumococcal polysaccharide vaccine (PPSV23) protects against 23 types of pneumococcal bacteria. It will not prevent all pneumococcal disease. PPSV23 is recommended for: · All adults 72years of age and older, 
 · Anyone 2 through 59years of age with certain long-term health problems, 
· Anyone 2 through 59years of age with a weakened immune system, 
· Adults 23 through 59years of age who smoke cigarettes or have asthma. Most people need only one dose of PPSV. A second dose is recommended for certain high-risk groups. People 72 and older should get a dose even if they have gotten one or more doses of the vaccine before they turned 65. Your healthcare provider can give you more information about these recommendations. Most healthy adults develop protection within 2 to 3 weeks of getting the shot. Some people should not get this vaccine · Anyone who has had a life-threatening allergic reaction to PPSV should not get another dose. · Anyone who has a severe allergy to any component of PPSV should not receive it. Tell your provider if you have any severe allergies. · Anyone who is moderately or severely ill when the shot is scheduled may be asked to wait until they recover before getting the vaccine. Someone with a mild illness can usually be vaccinated. · Children less than 3years of age should not receive this vaccine. · There is no evidence that PPSV is harmful to either a pregnant woman or to her fetus. However, as a precaution, women who need the vaccine should be vaccinated before becoming pregnant, if possible. Risks of a vaccine reaction With any medicine, including vaccines, there is a chance of side effects. These are usually mild and go away on their own, but serious reactions are also possible. About half of people who get PPSV have mild side effects, such as redness or pain where the shot is given, which go away within about two days. Less than 1 out of 100 people develop a fever, muscle aches, or more severe local reactions. Problems that could happen after any vaccine: · People sometimes faint after a medical procedure, including vaccination. Sitting or lying down for about 15 minutes can help prevent fainting, and injuries caused by a fall. Tell your doctor if you feel dizzy, or have vision changes or ringing in the ears. · Some people get severe pain in the shoulder and have difficulty moving the arm where a shot was given. This happens very rarely. · Any medication can cause a severe allergic reaction. Such reactions from a vaccine are very rare, estimated at about 1 in a million doses, and would happen within a few minutes to a few hours after the vaccination. As with any medicine, there is a very remote chance of a vaccine causing a serious injury or death. The safety of vaccines is always being monitored. For more information, visit: www.cdc.gov/vaccinesafety/ What if there is a serious reaction? What should I look for? Look for anything that concerns you, such as signs of a severe allergic reaction, very high fever, or unusual behavior. Signs of a severe allergic reaction can include hives, swelling of the face and throat, difficulty breathing, a fast heartbeat, dizziness, and weakness. These would usually start a few minutes to a few hours after the vaccination. What should I do? If you think it is a severe allergic reaction or other emergency that can't wait, call 9-1-1 or get to the nearest hospital. Otherwise, call your doctor. Afterward, the reaction should be reported to the Vaccine Adverse Event Reporting System (VAERS). Your doctor might file this report, or you can do it yourself through the VAERS web site at www.vaers. hhs.gov, or by calling 2-768.848.4569. VAERS does not give medical advice. How can I learn more? · Ask your doctor. He or she can give you the vaccine package insert or suggest other sources of information. · Call your local or state health department. · Contact the Centers for Disease Control and Prevention (CDC): 
¨ Call 6-221.715.8122 (1-800-CDC-INFO) or ¨ Visit CDC's website at www.cdc.gov/vaccines Vaccine Information Statement PPSV Vaccine 
(04/24/2015) Department of Health and [employer] Centers for Disease Control and Prevention Many Vaccine Information Statements are available in Fijian and other languages. See www.immunize.org/vis. Hojas de información Sobre Vacunas están disponibles en español y en muchos otros idiomas. Visite Martha.si. Care instructions adapted under license by [facility] (which disclaims liability or warranty for this information). If you have questions about a medical condition or this instruction, always ask your healthcare professional. [address] [address] any warranty or liability for your use of this information. Well Visit, Over 72: Care Instructions Your Care Instructions Physical exams can help you stay healthy. Your doctor has checked your overall health and may have suggested ways to take good care of yourself. He or she also may have recommended tests. At home, you can help prevent illness with healthy eating, regular exercise, and other steps. Follow-up care is a key part of your treatment and safety. Be sure to make and go to all appointments, and call your doctor if you are having problems. It's also a good idea to know your test results and keep a list of the medicines you take. How can you care for yourself at home? · Reach and stay at a healthy weight. This will lower your risk for many problems, such as obesity, diabetes, heart disease, and high blood pressure. · Get at least 30 minutes of exercise on most days of the week. Walking is a good choice. You also may want to do other activities, such as running, swimming, cycling, or playing tennis or team sports. · Do not smoke. Smoking can make health problems worse. If you need help quitting, talk to your doctor about stop-smoking programs and medicines. These can increase your chances of quitting for good. · Protect your skin from too much sun. When you're outdoors from 10 a.m. to 4 p.m., stay in the shade or cover up with clothing and a hat with a wide brim. Wear sunglasses that block UV rays. Even when it's cloudy, put broad-spectrum sunscreen (SPF 30 or higher) on any exposed skin. · See a dentist one or two times a year for checkups and to have your teeth cleaned. · Wear a seat belt in the car. · Limit alcohol to 2 drinks a day for men and 1 drink a day for women. Too much alcohol can cause health problems. Follow your doctor's advice about when to have certain tests. These tests can spot problems early. For men and women · Cholesterol. Your doctor will tell you how often to have this done based on your overall health and other things that can increase your risk for heart attack and stroke. · Blood pressure. Have your blood pressure checked during a routine doctor visit. Your doctor will tell you how often to check your blood pressure based on your age, your blood pressure results, and other factors. · Diabetes. Ask your doctor whether you should have tests for diabetes. · Vision. Experts recommend that you have yearly exams for glaucoma and other age-related eye problems. · Hearing. Tell your doctor if you notice any change in your hearing. You can have tests to find out how well you hear. · Colon cancer tests. Keep having colon cancer tests as your doctor recommends. You can have one of several types of tests. · Heart attack and stroke risk. At least every 4 to 6 years, you should have your risk for heart attack and stroke assessed. Your doctor uses factors such as your age, blood pressure, cholesterol, and whether you smoke or have diabetes to show what your risk for a heart attack or stroke is over the next 10 years. · Osteoporosis. Talk to your doctor about whether you should have a bone density test to find out whether you have thinning bones.  Also ask your doctor about whether you should take calcium and vitamin D supplements. For women · Pap test and pelvic exam. You may no longer need a Pap test. Talk with your doctor about whether to stop or continue to have Pap tests. · Breast exam and mammogram. Ask how often you should have a mammogram, which is an X-ray of your breasts. A mammogram can spot breast cancer before it can be felt and when it is easiest to treat. · Thyroid disease. Talk to your doctor about whether to have your thyroid checked as part of a regular physical exam. Women have an increased chance of a thyroid problem. For men · Prostate exam. Talk to your doctor about whether you should have a blood test (called a PSA test) for prostate cancer. Experts disagree on whether men should have this test. Some experts recommend that you discuss the benefits and risks of the test with your doctor. · Abdominal aortic aneurysm. Ask your doctor whether you should have a test to check for an aneurysm. You may need a test if you ever smoked or if your parent, brother, sister, or child has had an aneurysm. When should you call for help? Watch closely for changes in your health, and be sure to contact your doctor if you have any problems or symptoms that concern you. Where can you learn more? Go to http://nolberto-roseanna.info/. Enter F001 in the search box to learn more about \"Well Visit, Over 65: Care Instructions. \" Current as of: May 12, 2017 Content Version: 11.4 © 1458-6842 Healthwise, [employer]orporated. Care instructions adapted under license by [employer] (which disclaims liability or warranty for this information). If you have questions about a medical condition or this instruction, always ask your healthcare professional. [address]  any warranty or liability for your use of this information. Introducing [facility]! Dear Elspeth Saint: Thank you for requesting a [facility] account. Our records indicate that you already have an active [facility] account. You can access your account anytime at [email or web address] Did you know that you can access your hospital and ER discharge instructions at any time in [facility]? You can also review all of your test results from your hospital stay or ER visit. Additional Information If you have questions, please visit the Frequently Asked Questions section of the [facility] website at [email or web address] Remember, [facility] is NOT to be used for urgent needs. For medical emergencies, dial 911. Now available from your iPhone and Android! Please provide this summary of care documentation to your next provider. Your primary care clinician is listed as aKy Young. If you have any questions after today's visit, please call [phone].

## 2018-01-31 NOTE — PATIENT INSTRUCTIONS
Medicare Wellness Visit, Male    The best way to live healthy is to have a healthy lifestyle by eating a well-balanced diet, exercising regularly, limiting alcohol and stopping smoking. Regular physical exams and screening tests are another way to keep healthy. Preventive exams provided by your health care provider can find health problems before they become diseases or illnesses. Preventive services including immunizations, screening tests, monitoring and exams can help you take care of your own health. All people over age 72 should have a pneumovax  and and a prevnar shot to prevent pneumonia. These are once in a lifetime unless you and your provider decide differently. All people over 65 should have a yearly flu shot and a tetanus vaccine every 10 years. Screening for diabetes mellitus with a blood sugar test should be done every year. Glaucoma is a disease of the eye due to increased ocular pressure that can lead to blindness and it should be done every year by an eye professional.    Cardiovascular screening tests that check for elevated lipids (fatty part of blood) which can lead to heart disease and strokes should be done every 5 years. Colorectal screening that evaluates for blood or polyps in your colon should be done yearly as a stool test or every five years as a flexible sigmoidoscope or every 10 years as a colonoscopy up to age 76. Men up to age 76 may need a screening blood test for prostate cancer at certain intervals, depending on their personal and family history. This decision is between the patient and his provider. If you have been a smoker or had family history of abdominal aortic aneurysms, you and your provider may decide to schedule an ultrasound test of your aorta. Hepatitis C screening is also recommended for anyone born between [birth year] through [address]. A shingles vaccine is also recommended once in a lifetime after age 61.     Your Medicare Wellness Exam is recommended annually. Here is a list of your current Health Maintenance items with a due date:  Health Maintenance Due   Topic Date Due    Hepatitis C Test  1950    DTaP/Tdap/Td  (1 - Tdap) 12/16/2003    Flu Vaccine  08/01/2017    Annual Well Visit  08/31/2017          Pneumococcal Polysaccharide Vaccine: Care Instructions  Your Care Instructions    The pneumococcal polysaccharide vaccine (PPSV) can prevent some of the serious complications of pneumonia. This includes infection in the bloodstream (bacteremia) or throughout the body (septicemia). PPSV is recommended for people ages 72 years and older. People ages 3 to 59 who have a long-term illness should also get the vaccine. This includes people with diabetes, heart disease, liver disease, or lung disease. PPSV can also help people who have a weakened immune system. This includes cancer patients and people who don't have a spleen. The immune system helps your body fight infection and other illnesses. PPSV is given as a shot. It's usually given in the arm. Healthy older adults get the shot once. Other people may need to have a second dose. The shot may cause pain and redness at the site. It may also cause a mild fever for a short time. Follow-up care is a key part of your treatment and safety. Be sure to make and go to all appointments, and call your doctor if you are having problems. It's also a good idea to know your test results and keep a list of the medicines you take. How can you care for yourself at home? · Take an over-the-counter pain medicine, such as acetaminophen (Tylenol), ibuprofen (Advil, Motrin), or naproxen (Aleve), if your arm is sore after the shot. Be safe with medicines. Read and follow all instructions on the label. · Give acetaminophen (Tylenol) or ibuprofen (Advil, Motrin) to your child for pain or fussiness after the shot. Read and follow all instructions on the label. Do not give aspirin to anyone younger than 20.  It has been linked to Reye syndrome, a serious illness. · Put ice or a cold pack on the sore area for 10 to 20 minutes at a time. Put a thin cloth between the ice and your skin. When should you call for help? Call 911 anytime you think you may need emergency care. For example, call if:  ? · You have a seizure. ? · You have symptoms of a severe allergic reaction. These may include:  ¨ Sudden raised, red areas (hives) all over the body. ¨ Swelling of the throat, mouth, lips, or tongue. ¨ Trouble breathing. ¨ Passing out (losing consciousness). Or you may feel very lightheaded or suddenly feel weak, confused, or restless. ?Call your doctor now or seek immediate medical care if:  ? · You have symptoms of an allergic reaction, such as:  ¨ A rash or hives (raised, red areas on the skin). ¨ Itching. ¨ Swelling. ¨ Belly pain, nausea, or vomiting. ? · You have a high fever. ? Watch closely for changes in your health, and be sure to contact your doctor if you have any problems. Where can you learn more? Go to http://nolbertoPromimicroseanna.info/. Enter T225 in the search box to learn more about \"Pneumococcal Polysaccharide Vaccine: Care Instructions. \"  Current as of: September 24, 2016  Content Version: 11.4  © 0142-9954 [employer]. Care instructions adapted under license by [employer] (which disclaims liability or warranty for this information). If you have questions about a medical condition or this instruction, always ask your healthcare professional. [place] [address] any warranty or liability for your use of this information. Pneumococcal Polysaccharide Vaccine: What You Need to Know  Why get vaccinated? Vaccination can protect older adults (and some children and younger adults) from pneumococcal disease. Pneumococcal disease is caused by bacteria that can spread from person to person through close contact.  It can cause ear infections, and it can also lead to more serious infections of the:  · Lungs (pneumonia),  · Blood (bacteremia), and  · Covering of the brain and spinal cord (meningitis). Meningitis can cause deafness and brain damage, and it can be fatal.  Anyone can get pneumococcal disease, but children under 3years of age, people with certain medical conditions, adults over 72years of age, and cigarette smokers are at the highest risk. About 18,000 older adults die each year from pneumococcal disease in the [place]. Treatment of pneumococcal infections with penicillin and other drugs used to be more effective. But some strains of the disease have become resistant to these drugs. This makes prevention of the disease, through vaccination, even more important. Pneumococcal polysaccharide vaccine (PPSV23)  Pneumococcal polysaccharide vaccine (PPSV23) protects against 23 types of pneumococcal bacteria. It will not prevent all pneumococcal disease. PPSV23 is recommended for:  · All adults 72years of age and older,  · Anyone 2 through 59years of age with certain long-term health problems,  · Anyone 2 through 59years of age with a weakened immune system,  · Adults 23 through 59years of age who smoke cigarettes or have asthma. Most people need only one dose of PPSV. A second dose is recommended for certain high-risk groups. People 72 and older should get a dose even if they have gotten one or more doses of the vaccine before they turned 65. Your healthcare provider can give you more information about these recommendations. Most healthy adults develop protection within 2 to 3 weeks of getting the shot. Some people should not get this vaccine  · Anyone who has had a life-threatening allergic reaction to PPSV should not get another dose. · Anyone who has a severe allergy to any component of PPSV should not receive it. Tell your provider if you have any severe allergies.   · Anyone who is moderately or severely ill when the shot is scheduled may be asked to wait until they recover before getting the vaccine. Someone with a mild illness can usually be vaccinated. · Children less than 3years of age should not receive this vaccine. · There is no evidence that PPSV is harmful to either a pregnant woman or to her fetus. However, as a precaution, women who need the vaccine should be vaccinated before becoming pregnant, if possible. Risks of a vaccine reaction  With any medicine, including vaccines, there is a chance of side effects. These are usually mild and go away on their own, but serious reactions are also possible. About half of people who get PPSV have mild side effects, such as redness or pain where the shot is given, which go away within about two days. Less than 1 out of 100 people develop a fever, muscle aches, or more severe local reactions. Problems that could happen after any vaccine:  · People sometimes faint after a medical procedure, including vaccination. Sitting or lying down for about 15 minutes can help prevent fainting, and injuries caused by a fall. Tell your doctor if you feel dizzy, or have vision changes or ringing in the ears. · Some people get severe pain in the shoulder and have difficulty moving the arm where a shot was given. This happens very rarely. · Any medication can cause a severe allergic reaction. Such reactions from a vaccine are very rare, estimated at about 1 in a million doses, and would happen within a few minutes to a few hours after the vaccination. As with any medicine, there is a very remote chance of a vaccine causing a serious injury or death. The safety of vaccines is always being monitored. For more information, visit: www.cdc.gov/vaccinesafety/  What if there is a serious reaction? What should I look for? Look for anything that concerns you, such as signs of a severe allergic reaction, very high fever, or unusual behavior.   Signs of a severe allergic reaction can include hives, swelling of the face and throat, difficulty breathing, a fast heartbeat, dizziness, and weakness. These would usually start a few minutes to a few hours after the vaccination. What should I do? If you think it is a severe allergic reaction or other emergency that can't wait, call 9-1-1 or get to the nearest hospital. Otherwise, call your doctor. Afterward, the reaction should be reported to the Vaccine Adverse Event Reporting System (VAERS). Your doctor might file this report, or you can do it yourself through the VAERS web site at www.vaers. [facility].gov, or by calling 3-845.814.2744. VAERS does not give medical advice. How can I learn more? · Ask your doctor. He or she can give you the vaccine package insert or suggest other sources of information. · Call your local or state health department. · Contact the Centers for Disease Control and Prevention (CDC):  ¨ Call 5-291.832.2729 (1-800-CDC-INFO) or  ¨ Visit CDC's website at www.cdc.gov/vaccines  Vaccine Information Statement  PPSV Vaccine  (04/24/2015)  Department of Health and Human Services  Centers for Disease Control and Prevention  Many Vaccine Information Statements are available in [place] and other languages. See www.immunize.org/vis. Hojas de información Sobre Vacunas están disponibles en español y en muchos otros idiomas. Visite Martha.si. Care instructions adapted under license by [employer] (which disclaims liability or warranty for this information). If you have questions about a medical condition or this instruction, always ask your healthcare professional. [place] [address] any warranty or liability for your use of this information. Well Visit, Over 72: Care Instructions  Your Care Instructions    Physical exams can help you stay healthy. Your doctor has checked your overall health and may have suggested ways to take good care of yourself. He or she also may have recommended tests.  At home, you can help prevent illness with healthy eating, regular exercise, and other steps. Follow-up care is a key part of your treatment and safety. Be sure to make and go to all appointments, and call your doctor if you are having problems. It's also a good idea to know your test results and keep a list of the medicines you take. How can you care for yourself at home? · Reach and stay at a healthy weight. This will lower your risk for many problems, such as obesity, diabetes, heart disease, and high blood pressure. · Get at least 30 minutes of exercise on most days of the week. Walking is a good choice. You also may want to do other activities, such as running, swimming, cycling, or playing tennis or team sports. · Do not smoke. Smoking can make health problems worse. If you need help quitting, talk to your doctor about stop-smoking programs and medicines. These can increase your chances of quitting for good. · Protect your skin from too much sun. When you're outdoors from 10 a.m. to 4 p.m., stay in the shade or cover up with clothing and a hat with a wide brim. Wear sunglasses that block UV rays. Even when it's cloudy, put broad-spectrum sunscreen (SPF 30 or higher) on any exposed skin. · See a dentist one or two times a year for checkups and to have your teeth cleaned. · Wear a seat belt in the car. · Limit alcohol to 2 drinks a day for men and 1 drink a day for women. Too much alcohol can cause health problems. Follow your doctor's advice about when to have certain tests. These tests can spot problems early. For men and women  · Cholesterol. Your doctor will tell you how often to have this done based on your overall health and other things that can increase your risk for heart attack and stroke. · Blood pressure. Have your blood pressure checked during a routine doctor visit. Your doctor will tell you how often to check your blood pressure based on your age, your blood pressure results, and other factors. · Diabetes.  Ask your doctor whether you should have tests for diabetes. · Vision. Experts recommend that you have yearly exams for glaucoma and other age-related eye problems. · Hearing. Tell your doctor if you notice any change in your hearing. You can have tests to find out how well you hear. · Colon cancer tests. Keep having colon cancer tests as your doctor recommends. You can have one of several types of tests. · Heart attack and stroke risk. At least every 4 to 6 years, you should have your risk for heart attack and stroke assessed. Your doctor uses factors such as your age, blood pressure, cholesterol, and whether you smoke or have diabetes to show what your risk for a heart attack or stroke is over the next 10 years. · Osteoporosis. Talk to your doctor about whether you should have a bone density test to find out whether you have thinning bones. Also ask your doctor about whether you should take calcium and vitamin D supplements. For women  · Pap test and pelvic exam. You may no longer need a Pap test. Talk with your doctor about whether to stop or continue to have Pap tests. · Breast exam and mammogram. Ask how often you should have a mammogram, which is an X-ray of your breasts. A mammogram can spot breast cancer before it can be felt and when it is easiest to treat. · Thyroid disease. Talk to your doctor about whether to have your thyroid checked as part of a regular physical exam. Women have an increased chance of a thyroid problem. For men  · Prostate exam. Talk to your doctor about whether you should have a blood test (called a PSA test) for prostate cancer. Experts disagree on whether men should have this test. Some experts recommend that you discuss the benefits and risks of the test with your doctor. · Abdominal aortic aneurysm. Ask your doctor whether you should have a test to check for an aneurysm.  You may need a test if you ever smoked or if your parent, brother, sister, or child has had an aneurysm. When should you call for help? Watch closely for changes in your health, and be sure to contact your doctor if you have any problems or symptoms that concern you. Where can you learn more? Go to http://nolberto-roseanna.info/. Enter A282 in the search box to learn more about \"Well Visit, Over 65: Care Instructions. \"  Current as of: May 12, 2017  Content Version: 11.4  © 5258-2748 [employer]. Care instructions adapted under license by [employer] (which disclaims liability or warranty for this information). If you have questions about a medical condition or this instruction, always ask your healthcare professional. [place] [address] any warranty or liability for your use of this information. Medicare Part B Preventive Services Guidelines/Limitations Date last completed and Frequency Due Date   Cardiovascular Screening Blood Tests (every 5 years)  Total cholesterol, HDL, Triglycerides Order as a panel if possible As recommended by your PCP or Specialist    As recommended by your PCP As recommended by your PCP or Specialist   Colorectal Cancer Screening  -Fecal occult blood test (annual)  -Flexible sigmoidoscopy (5y)  -Screening colonoscopy (10y)  -Barium Enema Age 49-80;  After age [de-identified] if history of abnormal results Completed 4/21/2016     Recommended follow-up in 3-5 years  As recommended by your PCP or Specialist     Counseling to Prevent Tobacco Use (up to 8 sessions per year)  - Counseling greater than 3 and up to 10 minutes  - Counseling greater than 10 minutes Patients must be asymptomatic of tobacco-related conditions to receive as preventive service N/A N/A   Diabetes Screening Tests (at least every 3 years, Medicare covers annually or at 6-month intervals for prediabetic patients)    Fasting blood sugar (FBS) or glucose tolerance test (GTT) Patient must be diagnosed with one of the following:  -Hypertension, Dyslipidemia, obesity, previous impaired FBS or GTT  Or any two of the following: overweight, FH of diabetes, age ? 72, history of gestational diabetes, birth of baby weighing more than 9 pounds Completed 1/2016    Recommended every 3 years for non-diabetics     As recommended by your PCP or Specialist     Glaucoma Screening (no USPSTF recommendation) Diabetes mellitus, family history, , age 48 or over,  American, age 72 or over Completed 6/2017    Recommended annually As recommended by your PCP or Specialist   Prostate Cancer Screening (annually up to age 76)  - Digital rectal exam (ALEENA)  - Prostate specific antigen (PSA) Annually (age 48 or over), ALEENA not paid separately when covered E/M service is provided on same date Completed 1/2016    Recommended annually to age 76 As recommended by your PCP or Specialist     Seasonal Influenza Vaccination (annually)   Recommended Annually Due Fall 2017   TDAP Vaccination  As recommended by your PCP or Specialist    Recommended every 10 years As recommended by your PCP or Specialist   Zoster (Shingles) Vaccination Covered by Medicare Part D through the pharmacy- PCP provides prescription Completed 1/2014    Recommended once over age 48  Complete   Pneumococcal Vaccination (once after 72)  Pneumo 23- 1/2014  Recommended once over the age of 72    Prevnar 15- 9/2016 Recommended once over the age of 72 Complete        Complete   Ultrasound Screening for Abdominal Aortic Aneurysm (AAA) (once) Patient must be referred through IP[facility] and not have had a screening for abdominal aortic aneurysm before under Medicare.   Limited to patients who meet one of the following criteria:  - Men who are 73-68 years old and have smoked more than 100 cigarettes in their lifetime.  -Anyone with a FH of AAA  -Anyone recommended for screening by USPSTF Not indicated unless recommended by PCP   Not indicated unless recommended by PCP     Family Practice Management 2011    Please bring a copy of your completed advance medical directive to the office so it may be added to your medical record. Thank you. If you have any questions or concerns please feel free to contact me at [phone]. It was a pleasure meeting you today and participating in your healthcare. Esteban Snowden RN        Medicare Wellness Visit, Male    The best way to live healthy is to have a healthy lifestyle by eating a well-balanced diet, exercising regularly, limiting alcohol and stopping smoking. Regular physical exams and screening tests are another way to keep healthy. Preventive exams provided by your health care provider can find health problems before they become diseases or illnesses. Preventive services including immunizations, screening tests, monitoring and exams can help you take care of your own health. All people over age 72 should have a pneumovax  and and a prevnar shot to prevent pneumonia. These are once in a lifetime unless you and your provider decide differently. All people over 65 should have a yearly flu shot and a tetanus vaccine every 10 years. Screening for diabetes mellitus with a blood sugar test should be done every year. Glaucoma is a disease of the eye due to increased ocular pressure that can lead to blindness and it should be done every year by an eye professional.    Cardiovascular screening tests that check for elevated lipids (fatty part of blood) which can lead to heart disease and strokes should be done every 5 years. Colorectal screening that evaluates for blood or polyps in your colon should be done yearly as a stool test or every five years as a flexible sigmoidoscope or every 10 years as a colonoscopy up to age 76. Men up to age 76 may need a screening blood test for prostate cancer at certain intervals, depending on their personal and family history. This decision is between the patient and his provider.     If you have been a smoker or had family history of abdominal aortic aneurysms, you and your provider may decide to schedule an ultrasound test of your aorta. Hepatitis C screening is also recommended for anyone born between [birth year] through [address]. A shingles vaccine is also recommended once in a lifetime after age 61. Your Medicare Wellness Exam is recommended annually.     Here is a list of your current Health Maintenance items with a due date:  Health Maintenance Due   Topic Date Due    Hepatitis C Test  1950    Flu Vaccine  08/01/2017

## 2018-01-31 NOTE — PROGRESS NOTES
This is a Subsequent Medicare Annual Wellness Exam (AWV) (Performed 12 months after IPPE or effective date of Medicare Part B enrollment)    I have reviewed the patient's medical history in detail and updated the computerized patient record. History     Past Medical History:   Diagnosis Date    Family history of skin cancer     brother-BCC    Skin cancer     SCC nose, right eye lid, left eye brow and left cheek    Sun-damaged skin     Thyroid disease       Past Surgical History:   Procedure Laterality Date    HX COLONOSCOPY      HX MOHS PROCEDURES  01/22/2018    SCC L cheek by Dr. Nguyễn Yuan  2012    colonoscopy     Current Outpatient Prescriptions   Medication Sig Dispense Refill    ASCORBATE CALCIUM (VITAMIN C PO) Take  by mouth.  diclofenac EC (VOLTAREN) 75 mg EC tablet TAKE 1 TABLET BY MOUTH TWICE A DAY 30 Tab 0    diazePAM (VALIUM) 5 mg tablet Take 1 Tab by mouth three (3) times daily as needed (spasm). Max Daily Amount: 15 mg. 12 Tab 0    methylPREDNISolone (MEDROL, PAM,) 4 mg tablet Take as directed on package 1 Dose Pack 0    triamcinolone acetonide (KENALOG) 0.1 % topical cream APPLY TO BODY ECZEMA UP TO TWICE A DAY AS NEEDED  12    levothyroxine (SYNTHROID) 125 mcg tablet TAKE 1 TABLET BY MOUTH EVERY DAY BEFORE BREAKFAST FOR HYPOTHROIDISM 90 Tab 1    cetirizine (ZYRTEC) 10 mg tablet Take 1 Tab by mouth daily as needed for Allergies. 30 Tab 0    multivitamin (ONE A DAY) tablet Take 1 Tab by mouth daily.        No Known Allergies  Family History   Problem Relation Age of Onset    Arthritis-osteo Mother     Diabetes Father     Heart Disease Father     Arthritis-osteo Brother     Osteoporosis Brother     No Known Problems Son     No Known Problems Son     No Known Problems Daughter     Cancer Neg Hx     Hypertension Neg Hx     Thyroid Disease Neg Hx      Social History   Substance Use Topics    Smoking status: Never Smoker    Smokeless tobacco: Never Used  Alcohol use 0.6 - 1.2 oz/week     1 - 2 Cans of beer per week     Patient Active Problem List   Diagnosis Code    Acquired hypothyroidism E03.9    Vitamin D deficiency E55.9    Skin cancer C44.90    Advanced care planning/counseling discussion Z71.89       Depression Risk Factor Screening:     PHQ over the last two weeks 2017   Little interest or pleasure in doing things Not at all   Feeling down, depressed or hopeless Not at all   Total Score PHQ 2 0     Alcohol Risk Factor Screening:   {screenin}    Functional Ability and Level of Safety:   Hearing Loss  {Desc; hearing loss:34436::\"Hearing is good. \"}    Activities of Daily Living  The home contains: {AWV Home Safety:51271::\"no safety equipment. \"}  {Functional ADL's:19540::\"Patient does total self care\"}    Fall Risk  Fall Risk Assessment, last 12 mths 2017   Able to walk? Yes   Fall in past 12 months? No       Abuse Screen  {Abuse Screen:::\"Patient is not abused\"}    Cognitive Screening   Evaluation of Cognitive Function:  Has your family/caregiver stated any concerns about your memory: {AWV no/yes:65335::\"no\"}  {Cognitive Screenin::\"Normal\"}    Patient Care Team   Patient Care Team:  Lady Ramos MD as PCP - General (Internal Medicine)    Assessment/Plan   Education and counseling provided:  {Education List, choose as appropriate:::\"Are appropriate based on today's review and evaluation\"}    Diagnoses and all orders for this visit:    1. Acquired hypothyroidism    2. Vitamin D deficiency    3. Encounter for hepatitis C screening test for low risk patient  -     HEPATITIS C AB    4. Screening for prostate cancer    5. Screening for lipid disorders  -     Lipid Panel ([id number])    6. Medicare annual wellness visit, subsequent    7. Encounter for immunization  -     Pneumococcal Admin ()  -     Pneumococcal Polysaccharide Vaccine    8.  Special screening for malignant neoplasm of prostate  -     Digital Rectal Exam ()  -     PSA Screening ([id number])    9. Screening for ischemic heart disease  -     Lipid Panel ([id number])    10. Screening for alcoholism  -     Annual  Alcohol Screen 15 min ()    11. Screening for depression  -     Depression Screen Annual    12.  Screen for colon cancer        Health Maintenance Due   Topic Date Due    Hepatitis C Screening  1950    DTaP/Tdap/Td series (1 - Tdap) 12/16/2003    Influenza Age 5 to Adult  08/01/2017    MEDICARE YEARLY EXAM  08/31/2017

## 2018-02-01 ENCOUNTER — HOSPITAL ENCOUNTER (OUTPATIENT)
Dept: MRI IMAGING | Age: 68
Discharge: HOME | End: 2018-02-01
Attending: FAMILY MEDICINE
Payer: MEDICARE

## 2018-02-01 ENCOUNTER — HOSPITAL ENCOUNTER (OUTPATIENT)
Dept: LAB | Age: 68
Discharge: HOME | End: 2018-02-01
Payer: MEDICARE

## 2018-02-01 ENCOUNTER — APPOINTMENT (OUTPATIENT)
Dept: PHYSICAL THERAPY | Age: 68
End: 2018-02-01
Payer: MEDICARE

## 2018-02-01 DIAGNOSIS — M47.816 FACET SYNDROME, LUMBAR: ICD-10-CM

## 2018-02-01 DIAGNOSIS — M51.37 DDD (DEGENERATIVE DISC DISEASE), LUMBOSACRAL: ICD-10-CM

## 2018-02-01 PROCEDURE — 84443 ASSAY THYROID STIM HORMONE: CPT

## 2018-02-01 PROCEDURE — 85025 COMPLETE CBC W/AUTO DIFF WBC: CPT

## 2018-02-01 PROCEDURE — 86803 HEPATITIS C AB TEST: CPT

## 2018-02-01 PROCEDURE — 36415 COLL VENOUS BLD VENIPUNCTURE: CPT

## 2018-02-01 PROCEDURE — 82306 VITAMIN D 25 HYDROXY: CPT

## 2018-02-01 PROCEDURE — 81003 URINALYSIS AUTO W/O SCOPE: CPT

## 2018-02-01 PROCEDURE — 80061 LIPID PANEL: CPT

## 2018-02-01 PROCEDURE — 84153 ASSAY OF PSA TOTAL: CPT

## 2018-02-01 PROCEDURE — 72148 MRI LUMBAR SPINE W/O DYE: CPT

## 2018-02-01 PROCEDURE — 80053 COMPREHEN METABOLIC PANEL: CPT

## 2018-02-01 NOTE — PROGRESS NOTES
Nurse Navigator Medicare Wellness Visit performed by WILLAM Chacon RN    This is a Subsequent Ivan Exam (AWV) (Performed 12 months after IPPE or effective date of Medicare Part B enrollment)    I have reviewed the patient's medical history in detail and updated the computerized patient record. History     Past Medical History:   Diagnosis Date    Family history of skin cancer     brother-BCC    Skin cancer     SCC nose, right eye lid, left eye brow and left cheek    Sun-damaged skin     Thyroid disease       Past Surgical History:   Procedure Laterality Date    HX COLONOSCOPY      HX MOHS PROCEDURES  01/22/2018    SCC L cheek by Dr. Marino Blank  2012    colonoscopy     Current Outpatient Prescriptions   Medication Sig Dispense Refill    ASCORBATE CALCIUM (VITAMIN C PO) Take  by mouth.  diclofenac EC (VOLTAREN) 75 mg EC tablet TAKE 1 TABLET BY MOUTH TWICE A DAY 30 Tab 0    diazePAM (VALIUM) 5 mg tablet Take 1 Tab by mouth three (3) times daily as needed (spasm). Max Daily Amount: 15 mg. 12 Tab 0    methylPREDNISolone (MEDROL, PAM,) 4 mg tablet Take as directed on package 1 Dose Pack 0    triamcinolone acetonide (KENALOG) 0.1 % topical cream APPLY TO BODY ECZEMA UP TO TWICE A DAY AS NEEDED  12    levothyroxine (SYNTHROID) 125 mcg tablet TAKE 1 TABLET BY MOUTH EVERY DAY BEFORE BREAKFAST FOR HYPOTHROIDISM 90 Tab 1    cetirizine (ZYRTEC) 10 mg tablet Take 1 Tab by mouth daily as needed for Allergies. 30 Tab 0    multivitamin (ONE A DAY) tablet Take 1 Tab by mouth daily.        No Known Allergies  Family History   Problem Relation Age of Onset    Arthritis-osteo Mother     Diabetes Father     Heart Disease Father     Arthritis-osteo Brother     Osteoporosis Brother     No Known Problems Son     No Known Problems Son     No Known Problems Daughter     Cancer Neg Hx     Hypertension Neg Hx     Thyroid Disease Neg Hx      Social History   Substance Use Topics    Smoking status: Never Smoker    Smokeless tobacco: Never Used    Alcohol use 0.6 - 1.2 oz/week     1 - 2 Cans of beer per week     Patient Active Problem List   Diagnosis Code    Acquired hypothyroidism E03.9    Vitamin D deficiency E55.9    Skin cancer C44.90    Advanced care planning/counseling discussion Z71.89       Depression Risk Factor Screening:   Patient denies feelings of being down, depressed or hopeless at this time. Patient states that they have a strong support system within their family & friends. PHQ over the last two weeks 2/1/2018   Little interest or pleasure in doing things Not at all   Feeling down, depressed or hopeless Not at all   Total Score PHQ 2 0     Alcohol Risk Factor Screening: You do not drink alcohol or very rarely. Functional Ability and Level of Safety:   Hearing Loss  Hearing is good. Activities of Daily Living  The home contains: no safety equipment. Patient does total self care   Patient states that he lives with his wife in a private residence. Patient states independence in all ADLs & denies the use of assistive devices for ambulation. NN encouraged patient to continue and/ or introduce routine physical exercise into their daily routine as applicable & as recommended by PCP. Patient verbalized understanding & agreement to take this into consideration. Patient shares that he is normally very physically active (bowling, golf, racquetball), but he has been experiencing back spasms/ back pain which limits his activity & has begun to interfere with his quality of life. Patient adds that he will be getting an MRI this Thursday night & he is followed by Dr. Christa Simental. Patient will discuss this in more detail with PCP today.   ADL Assessment 2/1/2018   Feeding yourself No Help Needed   Getting from bed to chair No Help Needed   Getting dressed No Help Needed   Bathing or showering No Help Needed   Walk across the room (includes cane/walker) No Help Needed Using the telphone No Help Needed   Taking your medications No Help Needed   Preparing meals No Help Needed   Managing money (expenses/bills) No Help Needed   Moderately strenuous housework (laundry) No Help Needed   Shopping for personal items (toiletries/medicines) No Help Needed   Shopping for groceries No Help Needed   Driving No Help Needed   Climbing a flight of stairs No Help Needed   Getting to places beyond walking distances No Help Needed     Patient denies falls within the past year & verbalizes awareness of fall prevention strategies. Fall Risk  Fall Risk Assessment, last 12 mths 2/1/2018   Able to walk? Yes   Fall in past 12 months? No       Abuse Screen  Patient is not abused   Abuse Screening Questionnaire 2/1/2018   Do you ever feel afraid of your partner? N   Are you in a relationship with someone who physically or mentally threatens you? N   Is it safe for you to go home? Y       Cognitive Screening   Evaluation of Cognitive Function:  Has your family/caregiver stated any concerns about your memory: no      Patient Care Team   Patient Care Team:  Thurmond Holter, MD as PCP - General (Internal Medicine)    Assessment/Plan   Education and counseling provided:  Are appropriate based on today's review and evaluation  End-of-Life planning (with patient's consent)  Pneumococcal Vaccine  Influenza Vaccine  Prostate cancer screening tests (PSA, covered annually)  Colorectal cancer screening tests  Screening for glaucoma  tdap & shingles vaccinations    Diagnoses and all orders for this visit:    1. Medicare annual wellness visit, subsequent    2. Acquired hypothyroidism  -     TSH 3RD GENERATION    3. Acute midline low back pain without sciatica    4. Vitamin D deficiency  -     VITAMIN D, 25 HYDROXY    5. Fatigue, unspecified type  -     CBC WITH AUTOMATED DIFF  -     URINALYSIS W/ RFLX MICROSCOPIC  -     METABOLIC PANEL, COMPREHENSIVE    6.  Encounter for hepatitis C screening test for low risk patient  -     HEPATITIS C AB    7. Screening for lipid disorders  -     Lipid Panel ([id number])    8. Encounter for immunization  -     Pneumococcal Admin ()  -     Pneumococcal Polysaccharide Vaccine    9. Special screening for malignant neoplasm of prostate  -     Digital Rectal Exam ()  -     PSA Screening ([id number])    10. Screening for ischemic heart disease  -     Lipid Panel ([id number])    11. Screening for alcoholism  -     Annual  Alcohol Screen 15 min ()    12. Screening for depression  -     Depression Screen Annual    13. Screen for colon cancer  -     OCCULT BLOOD, IMMUNOASSAY (FIT)    AWV Summary:    1. Patient states that a completed Advanced Medical Directive is at home. NN encouraged patient to bring a copy of the completed Advanced Medical Directive to the office for scanning into the medical record. Patient verbalized understanding & agreement. 2. Patient is up to date on the following immunizations:  Immunization History   Administered Date(s) Administered    Hep A Vaccine 11/15/2010    Hep B Vaccine 12/15/2003, 01/16/2004, 06/25/2004    IPV 12/15/2003    Influenza Nasal Vaccine 01/09/2008, 10/15/2015    Influenza Vaccine 05/16/2005, 12/18/2006, 09/24/2009, 09/22/2010, 09/16/2013    Japanese Encephalitis Vaccine 05/16/2005, 05/23/2005    Meningococcal (MCV4) Vaccine 12/15/2003    Novel Influenza-H1N1-09, Injectable 02/20/2010    Pneumococcal Conjugate (PCV-13) 09/15/2016    Pneumococcal Polysaccharide (PPSV-23) 01/01/2014, 01/31/2018    TB Skin Test (PPD) 09/17/2013    Td 12/15/2003    Tdap 01/24/2012    Typhoid Vaccine, Parenteral, Acetone-Killed, Dried ([place] ) 11/12/2010    Zoster Vaccine, Live 01/01/2014   Patient confirmed the aforementioned preventative immunization dates are correct. Patient states that he has not yet received a 2017 flu vaccine. NN encouraged patient to stop by the pharmacy on his way out of the building today. PCP notified.  Patient's health maintenance immunization record has been updated & is current. 3. Patient states that he follows his PCP's recommendations for PSA screenings (report on file from 1/2016) & Colonoscopy screenings (report on file from 4/21/2016 performed by Dr. Jas Mattson at [facility] with recommended follow-up screening in 3-5 years). Patient confirmed the aforementioned preventative screening dates. Today, PCP provided patient with orders for screening Hep C & PSA lab tests, as well as an order for a take home FIT colon screening & the applicable take home kit. 4. Patient wears corrective lenses. Patient reports having a routine eye exam & glaucoma screening within the last year performed by Dr. Kaylie Aranda. TRE faxed requesting a copy of patient's last eye exam with glaucoma screening with patient's verbal approval. Patient completed a specialized eye exam on 6/23/2017 performed by Dr. Linda Brown at the [facility] & a copy of this report is on file in the patient's medical record. Patient verbalized understanding of all information discussed. Patient was given the opportunity to ask questions. Medication reconciliation completed by MA/ LPN and reviewed by PCP. Patient provided AVS, either a printed version or electronic version in [employer], which includes Medicare Wellness Preventative Screening Table.       Health Maintenance Due   Topic Date Due    Hepatitis C Screening  1950    Influenza Age 5 to Adult  08/01/2017

## 2018-02-01 NOTE — PROGRESS NOTES
HISTORY OF PRESENT ILLNESS  Usha Fajardo is a 76 y.o. male. HPI  Presents for [place] visit. Provider Addendum  I personally saw and examined the patient. I have discussed and reviewed note with Nurse Navigator and agree with the Nurse Navigator's findings and recommendations for this Wellness Exam.  I was present during the key portions of separately billed procedures. Orders written and signed by me as per chart. Scar Gomez, COLLEEN    SUBJECTIVE: Usha Fajardo is a 76 y.o. male here for follow up of hypothyroidism. Lab Results   Component Value Date/Time    TSH 3.310 08/30/2016 09:46 AM     Thyroid ROS: denies fatigue, weight changes, heat/cold intolerance, bowel/skin changes or CVS symptoms. Has been having lower back pain since November after he bent forward to  a piece of paper; he is scheduled to have MRI today; has been seeing Dr Bro Griffin and is currently on Medrol dose pack and taking Valium on prn basis. He has usually been physically active with golf and racquetball but has not been able to do any activities since hurting his back. Has been Vitamin D deficient in the past; he is not currently taking any Vitamin D supplements. Patient Active Problem List   Diagnosis Code    Acquired hypothyroidism E03.9    Vitamin D deficiency E55.9    Skin cancer C44.90    Advanced care planning/counseling discussion Z71.89     Past Surgical History:   Procedure Laterality Date    HX COLONOSCOPY      HX MOHS PROCEDURES  01/22/2018    SCC BRIA agee by Dr. Rikki Hardin  2012    colonoscopy     Social History     Social History    Marital status:      Spouse name: N/A    Number of children: N/A    Years of education: N/A     Occupational History    Not on file.      Social History Main Topics    Smoking status: Never Smoker    Smokeless tobacco: Never Used    Alcohol use 0.6 - 1.2 oz/week     1 - 2 Cans of beer per week    Drug use: No    Sexual activity: Yes     Partners: Female     Other Topics Concern    Not on file     Social History Narrative     Family History   Problem Relation Age of Onset    Arthritis-osteo Mother     Diabetes Father     Heart Disease Father     Arthritis-osteo Brother     Osteoporosis Brother     No Known Problems Son     No Known Problems Son     No Known Problems Daughter     Cancer Neg Hx     Hypertension Neg Hx     Thyroid Disease Neg Hx      Current Outpatient Prescriptions   Medication Sig    ASCORBATE CALCIUM (VITAMIN C PO) Take  by mouth.  diclofenac EC (VOLTAREN) 75 mg EC tablet TAKE 1 TABLET BY MOUTH TWICE A DAY    diazePAM (VALIUM) 5 mg tablet Take 1 Tab by mouth three (3) times daily as needed (spasm). Max Daily Amount: 15 mg.    methylPREDNISolone (MEDROL, PAM,) 4 mg tablet Take as directed on package    triamcinolone acetonide (KENALOG) 0.1 % topical cream APPLY TO BODY ECZEMA UP TO TWICE A DAY AS NEEDED    levothyroxine (SYNTHROID) 125 mcg tablet TAKE 1 TABLET BY MOUTH EVERY DAY BEFORE BREAKFAST FOR HYPOTHROIDISM    cetirizine (ZYRTEC) 10 mg tablet Take 1 Tab by mouth daily as needed for Allergies.  multivitamin (ONE A DAY) tablet Take 1 Tab by mouth daily. No current facility-administered medications for this visit.       No Known Allergies  Immunization History   Administered Date(s) Administered    Hep A Vaccine 11/15/2010    Hep B Vaccine 12/15/2003, 01/16/2004, 06/25/2004    IPV 12/15/2003    Influenza Nasal Vaccine 01/09/2008, 10/15/2015    Influenza Vaccine 05/16/2005, 12/18/2006, 09/24/2009, 09/22/2010, 09/16/2013    Japanese Encephalitis Vaccine 05/16/2005, 05/23/2005    Meningococcal (MCV4) Vaccine 12/15/2003    Novel Influenza-H1N1-09, Injectable 02/20/2010    Pneumococcal Conjugate (PCV-13) 09/15/2016    Pneumococcal Polysaccharide (PPSV-23) 01/01/2014, 01/31/2018    TB Skin Test (PPD) 09/17/2013    Td 12/15/2003    Tdap 01/24/2012    Typhoid Vaccine, Parenteral, Acetone-Killed, Dried ([place] ) 11/12/2010    Zoster Vaccine, Live 01/01/2014         Review of Systems   Constitutional: Positive for malaise/fatigue. Negative for chills and fever. HENT: Negative for congestion, sinus pain and sore throat. Respiratory: Negative for cough, shortness of breath and wheezing. Cardiovascular: Negative for chest pain, palpitations and leg swelling. Gastrointestinal: Negative for abdominal pain, blood in stool, constipation, diarrhea, heartburn, nausea and vomiting. Genitourinary: Negative for dysuria and frequency. Musculoskeletal: Positive for back pain. Negative for myalgias. Neurological: Negative for dizziness, tingling and headaches. Psychiatric/Behavioral: Negative for depression. The patient is not nervous/anxious. /76 (BP 1 Location: Left arm, BP Patient Position: Sitting)  Pulse 98  Temp 98.2 °F (36.8 °C) (Oral)   Resp 12  Ht 6' 5\" (1.956 m)  Wt 243 lb 3.2 oz (110.3 kg)  SpO2 98%  BMI 28.84 kg/m2  Physical Exam   Constitutional: He is oriented to person, place, and time. He appears well-developed and well-nourished. Antalgic gait; appears uncomfortable   HENT:   Head: Normocephalic and atraumatic. Right Ear: External ear normal.   Left Ear: External ear normal.   Nose: Nose normal.   Mouth/Throat: Oropharynx is clear and moist.   Neck: Normal range of motion. Neck supple. No thyromegaly present. Cardiovascular: Normal rate, regular rhythm and normal heart sounds. Pulmonary/Chest: Effort normal and breath sounds normal. He has no wheezes. Abdominal: Soft. Bowel sounds are normal. There is no tenderness. There is no rebound. Genitourinary: Prostate normal and penis normal. Rectal exam shows guaiac negative stool. Musculoskeletal:        Lumbar back: He exhibits decreased range of motion and tenderness. Back:    Lymphadenopathy:     He has no cervical adenopathy.    Neurological: He is alert and oriented to person, place, and time. Nursing note and vitals reviewed. ASSESSMENT and PLAN  Diagnoses and all orders for this visit:    1. Medicare annual wellness visit, subsequent    2. Acquired hypothyroidism  -     TSH 3RD GENERATION    3. Acute midline low back pain without sciatica -- being followed by Dr Rosy Sanchez with Orthopedics and he is scheduled for MRI today    4. Vitamin D deficiency  -     VITAMIN D, 25 HYDROXY    5. Fatigue, unspecified type  -     CBC WITH AUTOMATED DIFF  -     URINALYSIS W/ RFLX MICROSCOPIC  -     METABOLIC PANEL, COMPREHENSIVE    6. Encounter for hepatitis C screening test for low risk patient  -     HEPATITIS C AB    7. Screening for lipid disorders  -     Lipid Panel ([id number])    8. Encounter for immunization -- Pneumovax 23 given today  -     Pneumococcal Admin ()  -     Pneumococcal Polysaccharide Vaccine    9. Special screening for malignant neoplasm of prostate  -     Digital Rectal Exam ()  -     PSA Screening ([id number])    10. Screening for ischemic heart disease  -     Lipid Panel ([id number])    11. Screening for alcoholism  -     Annual  Alcohol Screen 15 min ()    12. Screening for depression  -     Depression Screen Annual    13. Screen for colon cancer -- given cards for occult blood testing  -     OCCULT BLOOD, IMMUNOASSAY (FIT)    Sonny Quintin was counseled on age-appropriate/ guideline-based risk prevention behaviors and screening for a 76y.o. year old   male . We also discussed adjustments in screening based on family history if necessary. Printed instructions for preventative screening guidelines and healthy behaviors given to patient with after visit summary.         lab results and schedule of future lab studies reviewed with patient  reviewed diet, exercise and weight control  cardiovascular risk and specific lipid/LDL goals reviewed  reviewed medications and side effects in detail

## 2018-02-02 ENCOUNTER — HOSPITAL ENCOUNTER (OUTPATIENT)
Dept: PHYSICAL THERAPY | Age: 68
Discharge: HOME | End: 2018-02-02
Payer: MEDICARE

## 2018-02-02 LAB
25(OH)D3+25(OH)D2 SERPL-MCNC: 29.7 NG/ML (ref 30–100)
ALBUMIN SERPL-MCNC: 4.5 G/DL (ref 3.6–4.8)
ALBUMIN/GLOB SERPL: 2 {RATIO} (ref 1.2–2.2)
ALP SERPL-CCNC: 84 IU/L (ref 39–117)
ALT SERPL-CCNC: 20 IU/L (ref 0–44)
APPEARANCE UR: CLEAR
AST SERPL-CCNC: 20 IU/L (ref 0–40)
BASOPHILS # BLD AUTO: 0 X10E3/UL (ref 0–0.2)
BASOPHILS NFR BLD AUTO: 0 %
BILIRUB SERPL-MCNC: 1.4 MG/DL (ref 0–1.2)
BILIRUB UR QL STRIP: NEGATIVE
BUN SERPL-MCNC: 22 MG/DL (ref 8–27)
BUN/CREAT SERPL: 20 (ref 10–24)
CALCIUM SERPL-MCNC: 9 MG/DL (ref 8.6–10.2)
CHLORIDE SERPL-SCNC: 100 MMOL/L (ref 96–106)
CHOLEST SERPL-MCNC: 229 MG/DL (ref 100–199)
CO2 SERPL-SCNC: 24 MMOL/L (ref 18–29)
COLOR UR: YELLOW
CREAT SERPL-MCNC: 1.12 MG/DL (ref 0.76–1.27)
EOSINOPHIL # BLD AUTO: 0 X10E3/UL (ref 0–0.4)
EOSINOPHIL NFR BLD AUTO: 1 %
ERYTHROCYTE [DISTWIDTH] IN BLOOD BY AUTOMATED COUNT: 15.4 % (ref 12.3–15.4)
GFR SERPLBLD CREATININE-BSD FMLA CKD-EPI: 67 ML/MIN/1.73
GFR SERPLBLD CREATININE-BSD FMLA CKD-EPI: 78 ML/MIN/1.73
GLOBULIN SER CALC-MCNC: 2.3 G/DL (ref 1.5–4.5)
GLUCOSE SERPL-MCNC: 99 MG/DL (ref 65–99)
GLUCOSE UR QL: NEGATIVE
HCT VFR BLD AUTO: 41.3 % (ref 37.5–51)
HCV AB S/CO SERPL IA: 0.1 S/CO RATIO (ref 0–0.9)
HDLC SERPL-MCNC: 62 MG/DL
HGB BLD-MCNC: 14.1 G/DL (ref 13–17.7)
HGB UR QL STRIP: NEGATIVE
IMM GRANULOCYTES # BLD: 0 X10E3/UL (ref 0–0.1)
IMM GRANULOCYTES NFR BLD: 0 %
INTERPRETATION, 910389: NORMAL
KETONES UR QL STRIP: NEGATIVE
LDLC SERPL CALC-MCNC: 155 MG/DL (ref 0–99)
LEUKOCYTE ESTERASE UR QL STRIP: NEGATIVE
LYMPHOCYTES # BLD AUTO: 0.8 X10E3/UL (ref 0.7–3.1)
LYMPHOCYTES NFR BLD AUTO: 10 %
MCH RBC QN AUTO: 31.3 PG (ref 26.6–33)
MCHC RBC AUTO-ENTMCNC: 34.1 G/DL (ref 31.5–35.7)
MCV RBC AUTO: 92 FL (ref 79–97)
MICRO URNS: NORMAL
MONOCYTES # BLD AUTO: 0.3 X10E3/UL (ref 0.1–0.9)
MONOCYTES NFR BLD AUTO: 4 %
NEUTROPHILS # BLD AUTO: 6.8 X10E3/UL (ref 1.4–7)
NEUTROPHILS NFR BLD AUTO: 85 %
NITRITE UR QL STRIP: NEGATIVE
PH UR STRIP: 7 [PH] (ref 5–7.5)
PLATELET # BLD AUTO: 199 X10E3/UL (ref 150–379)
POTASSIUM SERPL-SCNC: 4.8 MMOL/L (ref 3.5–5.2)
PROT SERPL-MCNC: 6.8 G/DL (ref 6–8.5)
PROT UR QL STRIP: NEGATIVE
PSA SERPL-MCNC: 1.9 NG/ML (ref 0–4)
RBC # BLD AUTO: 4.5 X10E6/UL (ref 4.14–5.8)
SODIUM SERPL-SCNC: 143 MMOL/L (ref 134–144)
SP GR UR: 1.01 (ref 1–1.03)
TRIGL SERPL-MCNC: 62 MG/DL (ref 0–149)
TSH SERPL DL<=0.005 MIU/L-ACNC: 2.99 UIU/ML (ref 0.45–4.5)
UROBILINOGEN UR STRIP-MCNC: 0.2 MG/DL (ref 0.2–1)
VLDLC SERPL CALC-MCNC: 12 MG/DL (ref 5–40)
WBC # BLD AUTO: 7.9 X10E3/UL (ref 3.4–10.8)

## 2018-02-02 PROCEDURE — 97112 NEUROMUSCULAR REEDUCATION: CPT | Performed by: PHYSICAL THERAPIST

## 2018-02-02 PROCEDURE — 97110 THERAPEUTIC EXERCISES: CPT | Performed by: PHYSICAL THERAPIST

## 2018-02-02 NOTE — PROGRESS NOTES
PT DAILY TREATMENT NOTE - [facility] 2-15    Patient Name: Sarah Tian  Date:2018  : 1950  [x]  Patient  Verified  Payor: Don Gottron / Plan: [place] / Product Type: Medicare /    In time: 9:45 AM  Out time:10:50 AM  Total Treatment Time (min): 65  Total Timed Codes (min):55  1:1 Treatment Time ( [address] only): 54  Visit #: 6    Treatment Area: Strain of muscle, fascia and tendon of lower back, subsequent encounter [S39.154D]    SUBJECTIVE  Pain Level (0-10 scale): 1/10  Any medication changes, allergies to medications, adverse drug reactions, diagnosis change, or new procedure performed?: [x] No    [] Yes (see summary sheet for update)  Subjective functional status/changes:   [] No changes reported  Patient reports that he is moving with greater ease and is walking faster than before.     OBJECTIVE    Modality rationale: decrease pain and increase tissue extensibility to improve the patients ability to the patients ability to return to N ADL skills   Min Type Additional Details    [] Estim: []Att   []Unatt        []TENS instruct                  []IFC  []Premod   []NMES                     []Other:  []w/US   []w/ice   []w/heat  Position:   Location:     []  Traction: [] Cervical       []Lumbar                       [] Prone          []Supine                       []Intermittent   []Continuous Lbs:  [] before manual  [] after manual  []w/heat    []  Ultrasound: []Continuous   [] Pulsed at:                           []1MHz   []3MHz Location:  W/cm2:    [] Paraffin         Location:   []w/heat   10 []  Ice     [x]  Heat  []  Ice massage Position: supine  Location: low back    []  Laser  []  Other: Position:  Location:      []  Vasopneumatic Device Pressure:       [] lo [] med [] hi   Temperature:      [x] Skin assessment post-treatment:  [x]intact [x]redness- no adverse reaction    []redness - adverse reaction:     25 min Therapeutic exercise:  [x]  See flow sheet :   Rationale: increase ROM and increase strength  to improve the patients ability to get up from a chair without pain     30 min Neuromuscular Re-education:  [x]  See flow sheet :   Rationale: increase ROM, increase strength and improve coordination  to improve the patients ability to ambulate without antalgia          With   [] TE   [] TA   [] neuro   [] other: Patient Education: [x] Review HEP    [] Progressed/Changed HEP based on:   [] positioning   [] body mechanics   [] transfers   [] heat/ice application    [] other:      Other Objective/Functional Measures:     Pain Level (0-10 scale) post treatment: 0    ASSESSMENT/Changes in Function:     Patient will continue to benefit from skilled PT services to modify and progress therapeutic interventions, address functional mobility deficits, address ROM deficits, address strength deficits, analyze and address soft tissue restrictions, analyze and cue movement patterns, analyze and modify body mechanics/ergonomics and assess and modify postural abnormalities to attain remaining goals. []  See Plan of Care  []  See progress note/recertification  []  See Discharge Summary         Progress towards goals / Updated goals:  Patient continues to show excellent progress overall and will assess short term goals next visit. PLAN  [x]  Upgrade activities as tolerated     [x]  Continue plan of care  []  Update interventions per flow sheet       []  Discharge due to:_  [x]  Other:    Fidelia Hernadez, PT  , DPT, OCS, Cert.  DN   2/2/2018  1:48 PM

## 2018-02-05 ENCOUNTER — HOSPITAL ENCOUNTER (OUTPATIENT)
Dept: PHYSICAL THERAPY | Age: 68
Discharge: HOME | End: 2018-02-05
Payer: MEDICARE

## 2018-02-05 PROCEDURE — 97110 THERAPEUTIC EXERCISES: CPT | Performed by: PHYSICAL THERAPIST

## 2018-02-05 PROCEDURE — G8978 MOBILITY CURRENT STATUS: HCPCS | Performed by: PHYSICAL THERAPIST

## 2018-02-05 PROCEDURE — G8979 MOBILITY GOAL STATUS: HCPCS | Performed by: PHYSICAL THERAPIST

## 2018-02-05 NOTE — PROGRESS NOTES
[facility]  [place]  [address], [address]  Phone: [phone]      Fax:  [phone]    Progress Note    Name: Twin Nance   : 1950   MD: Moira Sanz MD       Treatment Diagnosis: Strain of muscle, fascia and tendon of lower back, subsequent encounter [S39.012D]  Start of Care: 1/10/18    Visits from Start of Care: 8  Missed Visits: 0    Summary of Care:Patient has progressed very well with PT with improved ROM, core stability, and a significant decrease in pain. He has met all short term goals and is should continue to progress to long term goals by anticipated discharge date. Assessment / Recommendations:     Short Term Goals: To be accomplished in 8 treatments:  1. Patient will be able to bend forward and tie his shoes with no pain or limitation. Met.  *Although patient has recently been ordered to avoid bending forward due to MRI results. Will discuss with orthopedist soon to decide on further action. 2. Patient will be able to squat down and  10# from the floor with no pain or limitation. Met.  3. Patient will be able to walk 1/2 mile with no pain or limitation. Met. Patient will continue to benefit from PT under the current plan of care to reach all long term goals. G-Codes (GP)  Mobility  [id number] Current  CI= 1-19%   Goal  CI= 1-19%     The severity rating is based on the FOTO Score score. Shey Lacy, PT , DPT, OCS, Cert. DN   2018 10:28 AM    ________________________________________________________________________  NOTE TO PHYSICIAN:  Please complete the following and fax to: [facility]: Fax: [phone]. Selvin Clemons Retain this original for your records. If you are unable to process this request in 24 hours, please contact our office.        ____ I have read the above report and request that my patient continue therapy with the following changes/special instructions:  ____ I have read the above report and request that my patient be discharged from therapy    Physician's Signature:_________________ Date:___________Time:__________

## 2018-02-05 NOTE — PROGRESS NOTES
PT DAILY TREATMENT NOTE - [facility] 2-15    Patient Name: Karyn Grier  Date:2018  : 1950  [x]  Patient  Verified  Payor: [place] MEDICARE / Plan: [place] MEDICARE PART A & B / Product Type: Medicare /    In time: 9:45 AM  Out time:10:50 AM  Total Treatment Time (min): 65  Total Timed Codes (min):55  1:1 Treatment Time ([address] only): 54  Visit #: 7    Treatment Area: Strain of muscle, fascia and tendon of lower back, subsequent encounter [S39.245D]    SUBJECTIVE  Pain Level (0-10 scale): 0/10  Any medication changes, allergies to medications, adverse drug reactions, diagnosis change, or new procedure performed?: [x] No    [] Yes (see summary sheet for update)  Subjective functional status/changes:   [] No changes reported  Patient reports that he is feeling much better and is now able to tie his shoes without pain. Earlier this morning, he received the results of MRI and his orthopedist has spotted some spinal edema. Due to the presence of the edema, he ordered that he avoid bending forward over the next 8 weeks. He has a consult with him tomorrow to discuss the results further.     OBJECTIVE    Modality rationale: decrease pain and increase tissue extensibility to improve the patients ability to the patients ability to return to N ADL skills   Min Type Additional Details    [] Estim: []Att   []Unatt        []TENS instruct                  []IFC  []Premod   []NMES                     []Other:  []w/US   []w/ice   []w/heat  Position:   Location:     []  Traction: [] Cervical       []Lumbar                       [] Prone          []Supine                       []Intermittent   []Continuous Lbs:  [] before manual  [] after manual  []w/heat    []  Ultrasound: []Continuous   [] Pulsed at:                           []1MHz   []3MHz Location:  W/cm2:    [] Paraffin         Location:   []w/heat   10 []  Ice     [x]  Heat  []  Ice massage Position: supine  Location: low back    []  Laser  []  Other: Position:  Location:      [] Vasopneumatic Device Pressure:       [] lo [] med [] hi   Temperature:      [x] Skin assessment post-treatment:  [x]intact [x]redness- no adverse reaction    []redness - adverse reaction:     55 min Therapeutic exercise:  [x]  See flow sheet :   Rationale: increase ROM and increase strength  to improve the patients ability to get up from a chair without pain            With   [] TE   [] TA   [] neuro   [] other: Patient Education: [x] Review HEP    [] Progressed/Changed HEP based on:   [] positioning   [] body mechanics   [] transfers   [] heat/ice application    [] other:      Other Objective/Functional Measures:     Pain Level (0-10 scale) post treatment: 0    ASSESSMENT/Changes in Function:     Patient will continue to benefit from skilled PT services to modify and progress therapeutic interventions, address functional mobility deficits, address ROM deficits, address strength deficits, analyze and address soft tissue restrictions, analyze and cue movement patterns, analyze and modify body mechanics/ergonomics and assess and modify postural abnormalities to attain remaining goals. []  See Plan of Care  [x]  See progress note/recertification  []  See Discharge Summary    PLAN  [x]  Upgrade activities as tolerated     [x]  Continue plan of care  []  Update interventions per flow sheet       []  Discharge due to:_  [x]  Other:    Ruth Worthington, PT  , DPT, OCS, Cert.  DN   2/5/2018  1:48 PM

## 2018-02-06 ENCOUNTER — APPOINTMENT (OUTPATIENT)
Dept: PHYSICAL THERAPY | Age: 68
End: 2018-02-06
Payer: MEDICARE

## 2018-02-07 ENCOUNTER — HOSPITAL ENCOUNTER (OUTPATIENT)
Dept: PHYSICAL THERAPY | Age: 68
Discharge: HOME | End: 2018-02-07
Payer: MEDICARE

## 2018-02-07 PROCEDURE — 97110 THERAPEUTIC EXERCISES: CPT | Performed by: PHYSICAL THERAPIST

## 2018-02-07 NOTE — PROGRESS NOTES
PT DAILY TREATMENT NOTE - [facility] 2-15    Patient Name: Noemi Mosquera  Date:2018  : 1950  [x]  Patient  Verified  Payor: [place] MEDICARE / Plan: [place] MEDICARE PART A & B / Product Type: Medicare /    In time: 10:05 AM  Out time:10:50 AM  Total Treatment Time (min): 65  Total Timed Codes (min):55  1:1 Treatment Time ([address] only): 40  Visit #: 8    Treatment Area: Strain of muscle, fascia and tendon of lower back, subsequent encounter [S39.048D]    SUBJECTIVE  Pain Level (0-10 scale): 0/10  Any medication changes, allergies to medications, adverse drug reactions, diagnosis change, or new procedure performed?: [x] No    [] Yes (see summary sheet for update)  Subjective functional status/changes:   [] No changes reported  Patient reports continued improvement at his lower back, however he continues to be on a flexion restriction and was told by his orthopedist to wear a back brace for the next 4 weeks.     OBJECTIVE    Modality rationale: decrease pain and increase tissue extensibility to improve the patients ability to the patients ability to return to N ADL skills   Min Type Additional Details    [] Estim: []Att   []Unatt        []TENS instruct                  []IFC  []Premod   []NMES                     []Other:  []w/US   []w/ice   []w/heat  Position:   Location:     []  Traction: [] Cervical       []Lumbar                       [] Prone          []Supine                       []Intermittent   []Continuous Lbs:  [] before manual  [] after manual  []w/heat    []  Ultrasound: []Continuous   [] Pulsed at:                           []1MHz   []3MHz Location:  W/cm2:    [] Paraffin         Location:   []w/heat   10 []  Ice     [x]  Heat  []  Ice massage Position: supine  Location: low back    []  Laser  []  Other: Position:  Location:      []  Vasopneumatic Device Pressure:       [] lo [] med [] hi   Temperature:      [x] Skin assessment post-treatment:  [x]intact [x]redness- no adverse reaction    []redness - adverse reaction:     55 min Therapeutic exercise:  [x]  See flow sheet :   Rationale: increase ROM and increase strength  to improve the patients ability to get up from a chair without pain            With   [] TE   [] TA   [] neuro   [] other: Patient Education: [x] Review HEP    [] Progressed/Changed HEP based on:   [] positioning   [] body mechanics   [] transfers   [] heat/ice application    [] other:      Other Objective/Functional Measures:     Pain Level (0-10 scale) post treatment: 0    ASSESSMENT/Changes in Function:     Patient will continue to benefit from skilled PT services to modify and progress therapeutic interventions, address functional mobility deficits, address ROM deficits, address strength deficits, analyze and address soft tissue restrictions, analyze and cue movement patterns, analyze and modify body mechanics/ergonomics and assess and modify postural abnormalities to attain remaining goals. []  See Plan of Care  []  See progress note/recertification  []  See Discharge Summary    Progress towards goals:  Patient continues to show good progress overall towards long term goals and tolerates therapeutic exercises very well. PLAN  [x]  Upgrade activities as tolerated     [x]  Continue plan of care  []  Update interventions per flow sheet       []  Discharge due to:_  [x]  Other:    Foster Sandoval, PT  , DPT, OCS, Cert.  DN   2/7/2018  1:48 PM

## 2018-02-08 ENCOUNTER — APPOINTMENT (OUTPATIENT)
Dept: PHYSICAL THERAPY | Age: 68
End: 2018-02-08
Payer: MEDICARE

## 2018-02-09 ENCOUNTER — HOSPITAL ENCOUNTER (OUTPATIENT)
Dept: PHYSICAL THERAPY | Age: 68
Discharge: HOME | End: 2018-02-09
Payer: MEDICARE

## 2018-02-09 PROCEDURE — 77030003560 HC NDL HUBR BARD -A

## 2018-02-09 PROCEDURE — 97110 THERAPEUTIC EXERCISES: CPT | Performed by: PHYSICAL THERAPIST

## 2018-02-09 NOTE — PROGRESS NOTES
PT DAILY TREATMENT NOTE - [facility] 2-15    Patient Name: Satish Coulter  Date:2018  : 1950  [x]  Patient  Verified  Payor: [place] MEDICARE / Plan: [place] MEDICARE PART A & B / Product Type: Medicare /    In time: 10:00 AM  Out time:11:05 AM  Total Treatment Time (min): 65  Total Timed Codes (min):55  1:1 Treatment Time ([address] only): 30  Visit #: 8    Treatment Area: Strain of muscle, fascia and tendon of lower back, subsequent encounter [S39.D]    SUBJECTIVE  Pain Level (0-10 scale): 0/10  Any medication changes, allergies to medications, adverse drug reactions, diagnosis change, or new procedure performed?: [x] No    [] Yes (see summary sheet for update)  Subjective functional status/changes:   [] No changes reported  Patient reports continued improvement at his lower back, however he continues to be on a flexion restriction and was told by his orthopedist to wear a back brace for the next 4 weeks.     OBJECTIVE    Modality rationale: decrease pain and increase tissue extensibility to improve the patients ability to the patients ability to return to N ADL skills   Min Type Additional Details    [] Estim: []Att   []Unatt        []TENS instruct                  []IFC  []Premod   []NMES                     []Other:  []w/US   []w/ice   []w/heat  Position:   Location:     []  Traction: [] Cervical       []Lumbar                       [] Prone          []Supine                       []Intermittent   []Continuous Lbs:  [] before manual  [] after manual  []w/heat    []  Ultrasound: []Continuous   [] Pulsed at:                           []1MHz   []3MHz Location:  W/cm2:    [] Paraffin         Location:   []w/heat   10 []  Ice     [x]  Heat  []  Ice massage Position: supine  Location: low back    []  Laser  []  Other: Position:  Location:      []  Vasopneumatic Device Pressure:       [] lo [] med [] hi   Temperature:      [x] Skin assessment post-treatment:  [x]intact [x]redness- no adverse reaction    []redness - adverse reaction:     55 min Therapeutic exercise:  [x]  See flow sheet :   Rationale: increase ROM and increase strength  to improve the patients ability to get up from a chair without pain            With   [] TE   [] TA   [] neuro   [] other: Patient Education: [x] Review HEP    [] Progressed/Changed HEP based on:   [] positioning   [] body mechanics   [] transfers   [] heat/ice application    [] other:      Other Objective/Functional Measures:     Pain Level (0-10 scale) post treatment: 0    ASSESSMENT/Changes in Function:     Patient will continue to benefit from skilled PT services to modify and progress therapeutic interventions, address functional mobility deficits, address ROM deficits, address strength deficits, analyze and address soft tissue restrictions, analyze and cue movement patterns, analyze and modify body mechanics/ergonomics and assess and modify postural abnormalities to attain remaining goals. []  See Plan of Care  []  See progress note/recertification  []  See Discharge Summary    Progress towards goals:  Patient tolerated today's progression of therapeutic exercises well and has shown no new signs of back pain or radicular symptoms. PLAN  [x]  Upgrade activities as tolerated     [x]  Continue plan of care  []  Update interventions per flow sheet       []  Discharge due to:_  [x]  Other:    Be Catherine, PT  , DPT, OCS, Cert.  DN   2/9/2018  1:48 PM

## 2018-02-10 DIAGNOSIS — S39.012D STRAIN OF LUMBAR REGION, SUBSEQUENT ENCOUNTER: ICD-10-CM

## 2018-02-11 RX ORDER — DICLOFENAC SODIUM 75 MG/1
TABLET, DELAYED RELEASE ORAL
Qty: 30 TAB | Refills: 0 | Status: SHIPPED | OUTPATIENT
Start: 2018-02-11 | End: 2018-02-22 | Stop reason: SDUPTHER

## 2018-02-12 ENCOUNTER — HOSPITAL ENCOUNTER (OUTPATIENT)
Dept: PHYSICAL THERAPY | Age: 68
Discharge: HOME | End: 2018-02-12
Payer: MEDICARE

## 2018-02-12 PROCEDURE — 97110 THERAPEUTIC EXERCISES: CPT | Performed by: PHYSICAL THERAPIST

## 2018-02-14 ENCOUNTER — HOSPITAL ENCOUNTER (OUTPATIENT)
Dept: PHYSICAL THERAPY | Age: 68
Discharge: HOME | End: 2018-02-14
Payer: MEDICARE

## 2018-02-14 DIAGNOSIS — E03.9 ACQUIRED HYPOTHYROIDISM: Chronic | ICD-10-CM

## 2018-02-14 PROCEDURE — 97110 THERAPEUTIC EXERCISES: CPT | Performed by: PHYSICAL THERAPIST

## 2018-02-14 RX ORDER — LEVOTHYROXINE SODIUM 125 UG/1
TABLET ORAL
Qty: 90 TAB | Refills: 1 | Status: SHIPPED | OUTPATIENT
Start: 2018-02-14 | End: 2018-08-09 | Stop reason: SDUPTHER

## 2018-02-14 NOTE — PROGRESS NOTES
PT DAILY TREATMENT NOTE - [facility] 2-15    Patient Name: Rufina Mcmanus  Date:2018  : 1950  [x]  Patient  Verified  Payor: Edith Clements / Plan: [place] MEDICARE PART A & B / Product Type: Medicare /    In time: 9:55 AM  Out time:11:00 AM  Total Treatment Time (min): 65  Total Timed Codes (min):55  1:1 Treatment Time ([address] only): 54  Visit #: 11    Treatment Area: Strain of muscle, fascia and tendon of lower back, subsequent encounter [S39.342D]    SUBJECTIVE  Pain Level (0-10 scale): 0/10  Any medication changes, allergies to medications, adverse drug reactions, diagnosis change, or new procedure performed?: [x] No    [] Yes (see summary sheet for update)  Subjective functional status/changes:   [] No changes reported  Patient reports that his left adductor was very sore after his last visit, but overall his back continues to feel much better. He is now consistently tying his shoes without limitation.     OBJECTIVE    Modality rationale: decrease pain and increase tissue extensibility to improve the patients ability to the patients ability to return to N ADL skills   Min Type Additional Details    [] Estim: []Att   []Unatt        []TENS instruct                  []IFC  []Premod   []NMES                     []Other:  []w/US   []w/ice   []w/heat  Position:   Location:     []  Traction: [] Cervical       []Lumbar                       [] Prone          []Supine                       []Intermittent   []Continuous Lbs:  [] before manual  [] after manual  []w/heat    []  Ultrasound: []Continuous   [] Pulsed at:                           []1MHz   []3MHz Location:  W/cm2:    [] Paraffin         Location:   []w/heat   10 []  Ice     [x]  Heat  []  Ice massage Position: supine  Location: low back    []  Laser  []  Other: Position:  Location:      []  Vasopneumatic Device Pressure:       [] lo [] med [] hi   Temperature:      [x] Skin assessment post-treatment:  [x]intact [x]redness- no adverse reaction    []redness - adverse reaction:     55 min Therapeutic exercise:  [x]  See flow sheet :   Rationale: increase ROM and increase strength  to improve the patients ability to get up from a chair without pain            With   [] TE   [] TA   [] neuro   [] other: Patient Education: [x] Review HEP    [] Progressed/Changed HEP based on:   [] positioning   [] body mechanics   [] transfers   [] heat/ice application    [] other:      Other Objective/Functional Measures:     Pain Level (0-10 scale) post treatment: 0    ASSESSMENT/Changes in Function:     Patient will continue to benefit from skilled PT services to modify and progress therapeutic interventions, address functional mobility deficits, address ROM deficits, address strength deficits, analyze and address soft tissue restrictions, analyze and cue movement patterns, analyze and modify body mechanics/ergonomics and assess and modify postural abnormalities to attain remaining goals. []  See Plan of Care  []  See progress note/recertification  []  See Discharge Summary    Progress towards goals:  Patient tolerated today's progression of therapeutic exercises very well and continues to show excellent progress towards long term goals. PLAN  [x]  Upgrade activities as tolerated     [x]  Continue plan of care  []  Update interventions per flow sheet       []  Discharge due to:_  [x]  Other:    Sebas Felton, PT  , DPT, OCS, Cert.  DN   2/14/2018  1:48 PM

## 2018-02-16 ENCOUNTER — HOSPITAL ENCOUNTER (OUTPATIENT)
Dept: PHYSICAL THERAPY | Age: 68
Discharge: HOME | End: 2018-02-16
Payer: MEDICARE

## 2018-02-16 PROCEDURE — 97110 THERAPEUTIC EXERCISES: CPT | Performed by: PHYSICAL THERAPIST

## 2018-02-19 ENCOUNTER — HOSPITAL ENCOUNTER (OUTPATIENT)
Dept: PHYSICAL THERAPY | Age: 68
Discharge: HOME | End: 2018-02-19
Payer: MEDICARE

## 2018-02-19 PROCEDURE — 97110 THERAPEUTIC EXERCISES: CPT | Performed by: PHYSICAL THERAPIST

## 2018-02-19 NOTE — PROGRESS NOTES
PT DAILY TREATMENT NOTE - [facility] 2-15    Patient Name: Satish Coulter  Date:2018  : 1950  [x]  Patient  Verified  Payor: [place] MEDICARE / Plan: [place] MEDICARE PART A & B / Product Type: Medicare /    In time: 10:00 AM  Out time:11:05 AM  Total Treatment Time (min): 65  Total Timed Codes (min):55  1:1 Treatment Time ([address] only): 40  Visit #: 12    Treatment Area: Strain of muscle, fascia and tendon of lower back, subsequent encounter [S39.350D]    SUBJECTIVE  Pain Level (0-10 scale): 0/10  Any medication changes, allergies to medications, adverse drug reactions, diagnosis change, or new procedure performed?: [x] No    [] Yes (see summary sheet for update)  Subjective functional status/changes:   [x] No changes reported    OBJECTIVE    Modality rationale: decrease pain and increase tissue extensibility to improve the patients ability to the patients ability to return to N ADL skills   Min Type Additional Details    [] Estim: []Att   []Unatt        []TENS instruct                  []IFC  []Premod   []NMES                     []Other:  []w/US   []w/ice   []w/heat  Position:   Location:     []  Traction: [] Cervical       []Lumbar                       [] Prone          []Supine                       []Intermittent   []Continuous Lbs:  [] before manual  [] after manual  []w/heat    []  Ultrasound: []Continuous   [] Pulsed at:                           []1MHz   []3MHz Location:  W/cm2:    [] Paraffin         Location:   []w/heat   10 []  Ice     [x]  Heat  []  Ice massage Position: supine  Location: low back    []  Laser  []  Other: Position:  Location:      []  Vasopneumatic Device Pressure:       [] lo [] med [] hi   Temperature:      [x] Skin assessment post-treatment:  [x]intact [x]redness- no adverse reaction    []redness - adverse reaction:     40 min Therapeutic exercise:  [x]  See flow sheet :   Rationale: increase ROM and increase strength  to improve the patients ability to get up from a chair without pain            With   [] TE   [] TA   [] neuro   [] other: Patient Education: [x] Review HEP    [] Progressed/Changed HEP based on:   [] positioning   [] body mechanics   [] transfers   [] heat/ice application    [] other:      Other Objective/Functional Measures:     Pain Level (0-10 scale) post treatment: 0    ASSESSMENT/Changes in Function:     Patient will continue to benefit from skilled PT services to modify and progress therapeutic interventions, address functional mobility deficits, address ROM deficits, address strength deficits, analyze and address soft tissue restrictions, analyze and cue movement patterns, analyze and modify body mechanics/ergonomics and assess and modify postural abnormalities to attain remaining goals. []  See Plan of Care  []  See progress note/recertification  []  See Discharge Summary    Progress towards goals:  Patient tolerated today's therapy very well and will likely discharge next visit. PLAN  [x]  Upgrade activities as tolerated     [x]  Continue plan of care  []  Update interventions per flow sheet       []  Discharge due to:_  [x]  Other:    Moe Taylor, PT  , DPT, OCS, Cert.  DN   2/19/2018  1:48 PM

## 2018-02-21 ENCOUNTER — HOSPITAL ENCOUNTER (OUTPATIENT)
Dept: PHYSICAL THERAPY | Age: 68
Discharge: HOME | End: 2018-02-21
Payer: MEDICARE

## 2018-02-21 PROCEDURE — 97110 THERAPEUTIC EXERCISES: CPT | Performed by: PHYSICAL THERAPIST

## 2018-02-21 PROCEDURE — G8979 MOBILITY GOAL STATUS: HCPCS | Performed by: PHYSICAL THERAPIST

## 2018-02-21 PROCEDURE — G8980 MOBILITY D/C STATUS: HCPCS | Performed by: PHYSICAL THERAPIST

## 2018-02-21 NOTE — PROGRESS NOTES
PT DAILY TREATMENT NOTE - [facility] 2-15    Patient Name: Twin Nance  Date:2018  : 1950  [x]  Patient  Verified  [place]or: [place] / Plan: [place] MED[place]ARE PART A & B / Product Type: Medicare /    In time: 10:30 AM  Out time:11:25 AM  Total Treatment Time (min): 65  Total Timed Codes (min):55  1:1 Treatment Time ([address] only): 25  Visit #: 13    Treatment Area: Strain of muscle, fascia and tendon of lower back, subsequent encounter [S39.020D]    SUBJECTIVE  Pain Level (0-10 scale): 0/10  Any medication changes, allergies to medications, adverse drug reactions, diagnosis change, or new procedure performed?: [x] No    [] Yes (see summary sheet for update)  Subjective functional status/changes:   [] No changes reported  Patient reports no significant pain this morning and is happy with his progress.     OBJECTIVE    Modality rationale: decrease pain and increase tissue extensibility to improve the patients ability to the patients ability to return to N ADL skills   Min Type Additional Details    [] Estim: []Att   []Unatt        []TENS instruct                  []IFC  []Premod   []NMES                     []Other:  []w/US   []w/ice   []w/heat  Position:   Location:     []  Traction: [] Cervical       []Lumbar                       [] Prone          []Supine                       []Intermittent   []Continuous Lbs:  [] before manual  [] after manual  []w/heat    []  Ultrasound: []Continuous   [] Pulsed at:                           []1MHz   []3MHz Location:  W/cm2:    [] Paraffin         Location:   []w/heat   10 []  Ice     [x]  Heat  []  Ice massage Position: supine  Location: low back    []  Laser  []  Other: Position:  Location:      []  Vasopneumatic Device Pressure:       [] lo [] med [] hi   Temperature:      [x] Skin assessment post-treatment:  [x]intact [x]redness- no adverse reaction    []redness - adverse reaction:     40 min Therapeutic exercise:  [x]  See flow sheet :   Rationale: increase ROM and increase strength  to improve the patients ability to get up from a chair without pain            With   [] TE   [] TA   [] neuro   [] other: Patient Education: [x] Review HEP    [] Progressed/Changed HEP based on:   [] positioning   [] body mechanics   [] transfers   [] heat/ice application    [] other:      Other Objective/Functional Measures:     Pain Level (0-10 scale) post treatment: 0    ASSESSMENT/Changes in Function:     Patient will continue to benefit from skilled PT services to modify and progress therapeutic interventions, address functional mobility deficits, address ROM deficits, address strength deficits, analyze and address soft tissue restrictions, analyze and cue movement patterns, analyze and modify body mechanics/ergonomics and assess and modify postural abnormalities to attain remaining goals. []  See Plan of Care  []  See progress note/recertification  [x]  See Discharge Summary    PLAN  [x]  Upgrade activities as tolerated     [x]  Continue plan of care  []  Update interventions per flow sheet       []  Discharge due to:_  [x]  Other:    Tiffany Benavidez, PT  , DPT, OCS, Cert.  DN   2/21/2018  1:48 PM

## 2018-02-21 NOTE — PROGRESS NOTES
[address] [facility] [place] [address] [place] [address] Zack Perez  Phone: [phone] Fax: [phone]      Discharge Summary 2-15    Patient name: Wni Blackwood  : 1950  Provider#: [id number]  Referral source: Noah Mcginnis MD      Medical/Treatment Diagnosis: Strain of muscle, fascia and tendon of lower back, subsequent encounter [S39.012D]     Prior Hospitalization: see medical history     Comorbidities: See Plan of Care  Prior Level of Function: See Plan of Care  Medications: Verified on Patient Summary List    Start of Care: 1/10/18      Onset Date:17   Visits from Start of Care: 13     Missed Visits: 0  Reporting Period : 1/10/18 to 18    Assessment/Summary of care: Mr. Mcarthur Bosworth was seen for 13 PT visits over the course of 6 weeks and did very well with PT. He demonstrated a significant improvement in lumbar AROM, core stability, and close to a full resolution of low back pain. He achieved all long term goals and will leave for a 6 week vacation. If he has an exacerbation of symptoms, he was told to return to the clinic to resume PT if needed. Short Term Goals: To be accomplished in 8 treatments:  1. Patient will be able to bend forward and tie his shoes with no pain or limitation. Met.  2. Patient will be able to squat down and  10# from the floor with no pain or limitation. Met.  3. Patient will be able to walk 1/2 mile with no pain or limitation. Met.  Long Term Goals: To be accomplished in 16 treatments:  1. Patient will be able to get in and out of a low car with no pain or limitation. Met.  2. Patient will be able to squat down and  20# with no pain or limitation. Met.  3. Patient will be able to sit for an hour with no pain or limitation. Met.    G-Codes (GP)  Mobility   [id number] Goal  CI= 1-19%  D/C  CI= 1-19%    The severity rating is based on clinical judgment and the Oswestry score.     RECOMMENDATIONS:  [x]Discontinue therapy: [x]Patient has reached or is progressing toward set goals     []Patient is non-compliant or has abdicated     []Due to lack of appreciable progress towards set goals     []Other  Radha Harrison, PT , DPT, OCS, Cert.  DN   2/21/2018 11:44 AM

## 2018-02-22 DIAGNOSIS — S39.012D STRAIN OF LUMBAR REGION, SUBSEQUENT ENCOUNTER: ICD-10-CM

## 2018-02-22 RX ORDER — DICLOFENAC SODIUM 75 MG/1
TABLET, DELAYED RELEASE ORAL
Qty: 30 TAB | Refills: 0 | Status: ON HOLD | OUTPATIENT
Start: 2018-02-22 | End: 2018-04-05

## 2018-04-05 ENCOUNTER — HOSPITAL ENCOUNTER (INPATIENT)
Age: 68
LOS: 5 days | Discharge: HOME HEALTH | DRG: 478 | End: 2018-04-10
Attending: STUDENT IN AN ORGANIZED HEALTH CARE EDUCATION/TRAINING PROGRAM | Admitting: INTERNAL MEDICINE
Payer: MEDICARE

## 2018-04-05 ENCOUNTER — HOSPITAL ENCOUNTER (OUTPATIENT)
Dept: MRI IMAGING | Age: 68
Discharge: HOME | DRG: 478 | End: 2018-04-05
Attending: FAMILY MEDICINE
Payer: MEDICARE

## 2018-04-05 VITALS — WEIGHT: 235 LBS | HEIGHT: 77 IN | BODY MASS INDEX: 27.75 KG/M2

## 2018-04-05 DIAGNOSIS — M46.46 DISCITIS OF LUMBAR REGION: Primary | ICD-10-CM

## 2018-04-05 DIAGNOSIS — M48.46XA LUMBAR STRESS FRACTURE: ICD-10-CM
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
## 2018-04-05 PROBLEM — D72.819 LEUKOPENIA: Status: ACTIVE | Noted: 2018-04-05

## 2018-04-05 PROBLEM — M86.9 OSTEOMYELITIS (HCC): Status: ACTIVE | Noted: 2018-04-05

## 2018-04-05 PROBLEM — M46.40 DISKITIS: Status: ACTIVE | Noted: 2018-04-05

## 2018-04-05 LAB
ALBUMIN SERPL-MCNC: 3.6 G/DL (ref 3.5–5)
ALBUMIN/GLOB SERPL: 1.1 {RATIO} (ref 1.1–2.2)
ALP SERPL-CCNC: 99 U/L (ref 45–117)
ALT SERPL-CCNC: 22 U/L (ref 12–78)
ANION GAP SERPL CALC-SCNC: 6 MMOL/L (ref 5–15)
AST SERPL-CCNC: 15 U/L (ref 15–37)
BASOPHILS # BLD: 0 K/UL (ref 0–0.1)
BASOPHILS NFR BLD: 1 % (ref 0–1)
BILIRUB SERPL-MCNC: 0.6 MG/DL (ref 0.2–1)
BUN SERPL-MCNC: 27 MG/DL (ref 6–20)
BUN/CREAT SERPL: 27 (ref 12–20)
CALCIUM SERPL-MCNC: 8.9 MG/DL (ref 8.5–10.1)
CHLORIDE SERPL-SCNC: 105 MMOL/L (ref 97–108)
CO2 SERPL-SCNC: 29 MMOL/L (ref 21–32)
CREAT SERPL-MCNC: 1.01 MG/DL (ref 0.7–1.3)
CRP SERPL-MCNC: 3.17 MG/DL
DIFFERENTIAL METHOD BLD: ABNORMAL
EOSINOPHIL # BLD: 0.1 K/UL (ref 0–0.4)
EOSINOPHIL NFR BLD: 4 % (ref 0–7)
ERYTHROCYTE [DISTWIDTH] IN BLOOD BY AUTOMATED COUNT: 14.2 % (ref 11.5–14.5)
ERYTHROCYTE [SEDIMENTATION RATE] IN BLOOD: 48 MM/HR (ref 0–20)
GLOBULIN SER CALC-MCNC: 3.4 G/DL (ref 2–4)
GLUCOSE SERPL-MCNC: 100 MG/DL (ref 65–100)
HCT VFR BLD AUTO: 35.8 % (ref 36.6–50.3)
HGB BLD-MCNC: 11.6 G/DL (ref 12.1–17)
IMM GRANULOCYTES # BLD: 0 K/UL (ref 0–0.04)
IMM GRANULOCYTES NFR BLD AUTO: 0 % (ref 0–0.5)
LYMPHOCYTES # BLD: 0.8 K/UL (ref 0.8–3.5)
LYMPHOCYTES NFR BLD: 24 % (ref 12–49)
MCH RBC QN AUTO: 29.8 PG (ref 26–34)
MCHC RBC AUTO-ENTMCNC: 32.4 G/DL (ref 30–36.5)
MCV RBC AUTO: 92 FL (ref 80–99)
MONOCYTES # BLD: 0.2 K/UL (ref 0–1)
MONOCYTES NFR BLD: 7 % (ref 5–13)
NEUTS SEG # BLD: 2.1 K/UL (ref 1.8–8)
NEUTS SEG NFR BLD: 64 % (ref 32–75)
NRBC # BLD: 0 K/UL (ref 0–0.01)
NRBC BLD-RTO: 0 PER 100 WBC
PLATELET # BLD AUTO: 189 K/UL (ref 150–400)
PMV BLD AUTO: 9.8 FL (ref 8.9–12.9)
POTASSIUM SERPL-SCNC: 3.7 MMOL/L (ref 3.5–5.1)
PROT SERPL-MCNC: 7 G/DL (ref 6.4–8.2)
RBC # BLD AUTO: 3.89 M/UL (ref 4.1–5.7)
RBC MORPH BLD: ABNORMAL
RBC MORPH BLD: ABNORMAL
SODIUM SERPL-SCNC: 140 MMOL/L (ref 136–145)
WBC # BLD AUTO: 3.2 K/UL (ref 4.1–11.1)

## 2018-04-05 PROCEDURE — 80053 COMPREHEN METABOLIC PANEL: CPT | Performed by: EMERGENCY MEDICINE

## 2018-04-05 PROCEDURE — 74011250637 HC RX REV CODE- 250/637: Performed by: INTERNAL MEDICINE

## 2018-04-05 PROCEDURE — 65270000029 HC RM PRIVATE

## 2018-04-05 PROCEDURE — 36415 COLL VENOUS BLD VENIPUNCTURE: CPT | Performed by: EMERGENCY MEDICINE

## 2018-04-05 PROCEDURE — A9575 INJ GADOTERATE MEGLUMI 0.1ML: HCPCS | Performed by: RADIOLOGY

## 2018-04-05 PROCEDURE — 99284 EMERGENCY DEPT VISIT MOD MDM: CPT

## 2018-04-05 PROCEDURE — 85025 COMPLETE CBC W/AUTO DIFF WBC: CPT | Performed by: EMERGENCY MEDICINE

## 2018-04-05 PROCEDURE — 87040 BLOOD CULTURE FOR BACTERIA: CPT | Performed by: EMERGENCY MEDICINE

## 2018-04-05 PROCEDURE — 74011250636 HC RX REV CODE- 250/636: Performed by: RADIOLOGY

## 2018-04-05 PROCEDURE — 72158 MRI LUMBAR SPINE W/O & W/DYE: CPT

## 2018-04-05 PROCEDURE — 86140 C-REACTIVE PROTEIN: CPT | Performed by: EMERGENCY MEDICINE

## 2018-04-05 PROCEDURE — 85652 RBC SED RATE AUTOMATED: CPT | Performed by: EMERGENCY MEDICINE

## 2018-04-05 RX ORDER — LEVOTHYROXINE SODIUM 125 UG/1
125 TABLET ORAL
Status: DISCONTINUED | OUTPATIENT
Start: 2018-04-06 | End: 2018-04-10 | Stop reason: HOSPADM

## 2018-04-05 RX ORDER — DIAZEPAM 5 MG/1
5 TABLET ORAL
COMMUNITY
End: 2018-04-12 | Stop reason: SDUPTHER

## 2018-04-05 RX ORDER — TRAMADOL HYDROCHLORIDE 50 MG/1
50 TABLET ORAL
Status: DISCONTINUED | OUTPATIENT
Start: 2018-04-05 | End: 2018-04-08

## 2018-04-05 RX ORDER — DIAZEPAM 5 MG/1
5 TABLET ORAL
Status: DISCONTINUED | OUTPATIENT
Start: 2018-04-05 | End: 2018-04-05 | Stop reason: SDUPTHER

## 2018-04-05 RX ORDER — DIAZEPAM 5 MG/1
5 TABLET ORAL
Status: DISCONTINUED | OUTPATIENT
Start: 2018-04-05 | End: 2018-04-10 | Stop reason: HOSPADM

## 2018-04-05 RX ORDER — DIAZEPAM 5 MG/1
5 TABLET ORAL
COMMUNITY
End: 2018-04-17 | Stop reason: SDUPTHER

## 2018-04-05 RX ORDER — GADOTERATE MEGLUMINE 376.9 MG/ML
20 INJECTION INTRAVENOUS
Status: COMPLETED | OUTPATIENT
Start: 2018-04-05 | End: 2018-04-05

## 2018-04-05 RX ORDER — TRAMADOL HYDROCHLORIDE 50 MG/1
50 TABLET ORAL
Status: ON HOLD | COMMUNITY
End: 2018-04-10

## 2018-04-05 RX ORDER — HYDROMORPHONE HYDROCHLORIDE 2 MG/ML
1 INJECTION, SOLUTION INTRAMUSCULAR; INTRAVENOUS; SUBCUTANEOUS
Status: DISCONTINUED | OUTPATIENT
Start: 2018-04-05 | End: 2018-04-07 | Stop reason: CLARIF

## 2018-04-05 RX ORDER — SODIUM CHLORIDE 0.9 % (FLUSH) 0.9 %
5-10 SYRINGE (ML) INJECTION AS NEEDED
Status: DISCONTINUED | OUTPATIENT
Start: 2018-04-05 | End: 2018-04-10 | Stop reason: HOSPADM

## 2018-04-05 RX ORDER — DICLOFENAC SODIUM 75 MG/1
75 TABLET, DELAYED RELEASE ORAL
COMMUNITY
End: 2018-05-16

## 2018-04-05 RX ORDER — SODIUM CHLORIDE 0.9 % (FLUSH) 0.9 %
5-10 SYRINGE (ML) INJECTION EVERY 8 HOURS
Status: DISCONTINUED | OUTPATIENT
Start: 2018-04-05 | End: 2018-04-10 | Stop reason: HOSPADM

## 2018-04-05 RX ORDER — DICLOFENAC SODIUM 75 MG/1
75 TABLET, DELAYED RELEASE ORAL
COMMUNITY
End: 2018-04-17 | Stop reason: SDUPTHER

## 2018-04-05 RX ADMIN — Medication 10 ML: at 23:18

## 2018-04-05 RX ADMIN — DIAZEPAM 5 MG: 5 TABLET ORAL at 23:17

## 2018-04-05 RX ADMIN — TRAMADOL HYDROCHLORIDE 50 MG: 50 TABLET, FILM COATED ORAL at 23:17

## 2018-04-05 RX ADMIN — GADOTERATE MEGLUMINE 20 ML: 376.9 INJECTION INTRAVENOUS at 08:53

## 2018-04-05 NOTE — PROGRESS NOTES
Orthopedics is aware of the patient. IR to be consulted for aspiration and biopsy of L2-L3. No epidural abscess on MRI nor is the patient septic; so no reason to start IV antibiotics until after aspiration by IR. Will consult ID for antibiotic management following aspiration and biopsy. NPO after midnight if IR can't aspirate tonight. Orthopedics will continue to follow. Thank you to hospitalist service for their admission.       MARILU HullC  [facility]  [address] [facility]

## 2018-04-05 NOTE — IP AVS SNAPSHOT
[address] 
[phone] Patient: Wei Flores MRN: [id number] MEW:[birth year] A check paty indicates which time of day the medication should be taken. My Medications START taking these medications Instructions Each Dose to Equal  
 Morning Noon Evening Bedtime  
 cefTRIAXone 2 gram 2 g, ADDaptor 1 Device IVPB Your last dose was: Tuesday at [facility] next dose is: Wednesday 1pm  
   
 2 g by IntraVENous route every twenty-four (24) hours for 53 days. 2 g  
    
   
   
   
  
 cyclobenzaprine 5 mg tablet Commonly known as:  FLEXERIL Your last dose was:  Not given in hospital  
Your next dose is:  As needed Take 1 Tab by mouth three (3) times daily as needed for Muscle Spasm(s). 5 mg DAPTOmycin (CUBICIN) 50 mg/mL IV Syringe Your last dose was: Tuesday at 1200 [facility] next dose is: Wednesday 1pm  
   
 13 mL by IntraVENous route every twenty-four (24) hours for 53 days. 650 mg  
    
   
   
   
  
 senna-docusate 8.6-50 mg per tablet Commonly known as:  Alia [place] Your last dose was:  Declined in hospital  
Your next dose is: If needed Take 1 Tab by mouth daily. 1 Tab CHANGE how you take these medications Instructions Each Dose to Equal  
 Morning Noon Evening Bedtime  
 traMADol 50 mg tablet Commonly known as:  ULTRAM  
What changed:  when to take this Your last dose was:  Sunday at 1200 [facility] next dose is:  As needed Take 1 Tab by mouth every six (6) hours as needed for Pain (for moderate to severe pain). Max Daily Amount: 200 mg.  
 50 mg CONTINUE taking these medications Instructions Each Dose to Equal  
 Morning Noon Evening Bedtime * diclofenac EC 75 mg EC tablet Commonly known as:  VOLTAREN Your last dose was:  Not given in hospital  
 Your next dose is:  Resume home routine Take 75 mg by mouth nightly. 75 mg  
    
   
   
   
  
 * diclofenac EC 75 mg EC tablet Commonly known as:  VOLTAREN Your next dose is:  Resume home routine Take 75 mg by mouth daily as needed. 75 mg  
    
   
   
   
  
 levothyroxine 125 mcg tablet Commonly known as:  SYNTHROID Your last dose was: Tuesday morning Your next dose is: Wednesday morning TAKE 1 TABLET BY MOUTH EVERY DAY BEFORE BREAKFAST FOR HYPOTHROIDISM  
     
   
   
   
  
 multivitamin tablet Commonly known as:  ONE A DAY Your last dose was:  Not given in hospital  
Your next dose is:  Resume home routine Take 1 Tab by mouth daily. 1 Tab  
    
   
   
   
  
 triamcinolone acetonide 0.1 % topical cream  
Commonly known as:  KENALOG Your next dose is:  Resume home routine APPLY TO BODY ECZEMA UP TO TWICE A DAY AS NEEDED * VALIUM 5 mg tablet Generic drug:  diazePAM  
Your last dose was:  Monday at [id number] [address]  next dose is:  Tuesday bedtime Take 5 mg by mouth nightly. 5 mg * VALIUM 5 mg tablet Generic drug:  diazePAM  
Your last dose was:  Monday 10pm  
Your next dose is:  As needed Take 5 mg by mouth daily as needed for Anxiety. 5 mg VITAMIN C PO Your last dose was:  Not given in hospital  
Your next dose is:  Resume home routine Take 1 Tab by mouth daily. 1 Tab * Notice: This list has 4 medication(s) that are the same as other medications prescribed for you. Read the directions carefully, and ask your doctor or other care provider to review them with you. Where to Get Your Medications Information on where to get these meds will be given to you by the nurse or doctor. ! Ask your nurse or doctor about these medications  
  cefTRIAXone 2 gram 2 g, ADDaptor 1 Device IVPB cyclobenzaprine 5 mg tablet DAPTOmycin (CUBICIN) 50 mg/mL IV Syringe  
 senna-docusate 8.6-50 mg per tablet  
 traMADol 50 mg tablet

## 2018-04-05 NOTE — Clinical Note
Status[de-identified] Inpatient [101] Type of Bed: Orthopedics [13] Inpatient Hospitalization Certified Necessary for the Following Reasons: 3. Patient receiving treatment that can only be provided in an inpatient setting (further clarification in H&P documentation) Admitting Diagnosis: Diskitis [813644] Admitting Physician: Lizett Bowers Attending Physician: Lizett Bowers Estimated Length of Stay: 3-4 Midnights Discharge Plan[de-identified] Home with Office Follow-up

## 2018-04-05 NOTE — IP AVS SNAPSHOT
303 [facility] 
 
 
 [address] 1007 [facility] 
[phone] Patient: Gallo Merritt MRN: [id number] TLY:5[birth year]4/[birth year]30 About your hospitalization You were admitted on:  April 5, 2018 You last received care in the:  SF 4M POST SURG ORT 2 You were discharged on:  April 10, 2018 Why you were hospitalized Your primary diagnosis was:  Discitis Your diagnoses also included:  Acquired Hypothyroidism, Leukopenia, Osteomyelitis (Hcc), Fall Follow-up Information Follow up With Details Comments Contact Info 201 [facility] [address] 
1st Floor [facility] [address] 
[phone] [facility] [place]  IV antibiotics  [address] 1200 [facility] [address] [phone] Abraham Alvarez MD Go on 4/17/2018 hospital PCP f/u appointment on Tuesday April 17, 2018 @ 3:00 p.m. If patient is unable to attend, please call the office [address] 250 [facility] [address] 
[phone] Izzy River MD Schedule an appointment as soon as possible for a visit in 2 weeks  [address] 
[phone] Noni De La Paz DO Schedule an appointment as soon as possible for a visit in 6 weeks  [facility]5 [facility] 302 1007 [facility] 
[phone] Your Scheduled Appointments Tuesday April 17, 2018  3:00 PM EDT Office Visit with Nisreen Kirkland NP [facility] ([facility]) [address] [facility] 1007 [facility]  
[phone] Wednesday May 16, 2018  2:00 PM EDT New Patient with Noni De La Paz DO [facility] ([facility]) 320 [facility] 1007 [facility]  
[phone] Discharge Orders None A check paty indicates which time of day the medication should be taken. My Medications START taking these medications Instructions Each Dose to Equal  
 Morning Noon Evening Bedtime  
 cefTRIAXone 2 gram 2 g, ADDaptor 1 Device IVPB Your last dose was: Tuesday at 1200 [facility] next dose is: Wednesday 1pm  
   
 2 g by IntraVENous route every twenty-four (24) hours for 53 days. 2 g  
    
   
   
   
  
 cyclobenzaprine 5 mg tablet Commonly known as:  FLEXERIL Your last dose was:  Not given in hospital  
Your next dose is:  As needed Take 1 Tab by mouth three (3) times daily as needed for Muscle Spasm(s). 5 mg DAPTOmycin (CUBICIN) 50 mg/mL IV Syringe Your last dose was: Tuesday at 1200 [facility] next dose is: Wednesday 1pm  
   
 13 mL by IntraVENous route every twenty-four (24) hours for 53 days. 650 mg  
    
   
   
   
  
 senna-docusate 8.6-50 mg per tablet Commonly known as:  José Miguel Mcleod Your last dose was:  Declined in hospital  
Your next dose is: If needed Take 1 Tab by mouth daily. 1 Tab CHANGE how you take these medications Instructions Each Dose to Equal  
 Morning Noon Evening Bedtime  
 traMADol 50 mg tablet Commonly known as:  ULTRAM  
What changed:  when to take this Your last dose was:  Sunday at 1200 [facility] next dose is:  As needed Take 1 Tab by mouth every six (6) hours as needed for Pain (for moderate to severe pain). Max Daily Amount: 200 mg.  
 50 mg CONTINUE taking these medications Instructions Each Dose to Equal  
 Morning Noon Evening Bedtime * diclofenac EC 75 mg EC tablet Commonly known as:  VOLTAREN Your last dose was:  Not given in hospital  
Your next dose is:  Resume home routine Take 75 mg by mouth nightly. 75 mg  
    
   
   
   
  
 * diclofenac EC 75 mg EC tablet Commonly known as:  VOLTAREN Your next dose is:  Resume home routine Take 75 mg by mouth daily as needed.   
 75 mg  
    
   
   
   
  
 levothyroxine 125 mcg tablet Commonly known as:  SYNTHROID Your last dose was: Tuesday morning Your next dose is: Wednesday morning TAKE 1 TABLET BY MOUTH EVERY DAY BEFORE BREAKFAST FOR HYPOTHROIDISM  
     
   
   
   
  
 multivitamin tablet Commonly known as:  ONE A DAY Your last dose was:  Not given in hospital  
Your next dose is:  Resume home routine Take 1 Tab by mouth daily. 1 Tab  
    
   
   
   
  
 triamcinolone acetonide 0.1 % topical cream  
Commonly known as:  KENALOG Your next dose is:  Resume home routine APPLY TO BODY ECZEMA UP TO TWICE A DAY AS NEEDED * VALIUM 5 mg tablet Generic drug:  diazePAM  
Your last dose was:  Monday at [address] next dose is:  Tuesday bedtime Take 5 mg by mouth nightly. 5 mg * VALIUM 5 mg tablet Generic drug:  diazePAM  
Your last dose was:  Monday 10pm  
Your next dose is:  As needed Take 5 mg by mouth daily as needed for Anxiety. 5 mg VITAMIN C PO Your last dose was:  Not given in hospital  
Your next dose is:  Resume home routine Take 1 Tab by mouth daily. 1 Tab * Notice: This list has 4 medication(s) that are the same as other medications prescribed for you. Read the directions carefully, and ask your doctor or other care provider to review them with you. Where to Get Your Medications Information on where to get these meds will be given to you by the nurse or doctor. ! Ask your nurse or doctor about these medications  
  cefTRIAXone 2 gram 2 g, ADDaptor 1 Device IVPB cyclobenzaprine 5 mg tablet DAPTOmycin (CUBICIN) 50 mg/mL IV Syringe  
 senna-docusate 8.6-50 mg per tablet  
 traMADol 50 mg tablet Opioid Education  Prescription Opioids: What You Need to Know: 
 
 
ADMIT DATE: 2018 DISCHARGE DATE: 4/10/2018 ADMITTING DIAGNOSIS: 
Osteomyelitis and discitis of L2/3 area DISCHARGE DIAGNOSIS: 
same MEDICATIONS: 
See after visit summary · It is important that you take the medication exactly as they are prescribed. · Keep your medication in the bottles provided by the pharmacist and keep a list of the medication names, dosages, and times to be taken in your wallet. · Do not take other medications without consulting your doctor Pain Management: per above medications What to do at [facility] Recommended diet:  Regular Diet Recommended activity: Activity as tolerated 1) Return to the hospital if you feel worse 2) If you experience any of the following symptoms then please call your primary care physician or return to the emergency room if you cannot get hold of your doctor: 
Fever, chills, nausea, vomiting, diarrhea, change in mentation, falling, bleeding, shortness of breath, chest pain, severe headache, severe abdominal pain, 3) Follows instructions from ID and home health 4) You will need IV antibiotics until  5) Follow up with your doctors Follow Up: Follow-up Information Follow up With Details Comments Contact Info 201 [facility] [address] 
1st Floor [facility] [address] 
[phone] [facility]  IV antibiotics  2801 [facility]. [address] [place] [address] [phone] Adalgisa Ruiz MD Schedule an appointment as soon as possible for a visit in 2 weeks  [address] 250 [facility] [address] [facility] 
[phone]  Troy Santana MD Schedule an appointment as soon as possible for a visit in 2 weeks  [address] [place] [address] [place]  
[phone] Dina Agarwal DO Schedule an appointment as soon as possible for a visit in 6 weeks  [place] [address] [address] [place] [address] 
[phone] Information obtained by : 
I understand that if any problems occur once I am at home I am to contact my physician. I understand and acknowledge receipt of the instructions indicated above. Physician's or R.N.'s Signature                                                                  Date/Time Patient or Representative Signature                                                          Date/Time Osteomyelitis: Care Instructions Your Care Instructions Osteomyelitis (say \"yi-orwb-xu-fj-de-MV-tus\") is a bone infection. It is caused by bacteria. The bacteria can infect the bone where it has been injured, or they can be carried through the blood from another area in the body. Osteomyelitis can be a short- or long-term problem. It is treated with antibiotics. You may get the antibiotics as pills or through a needle in a vein (IV). You will probably get treatment in the hospital at first. The type of treatment depends on the type of bacteria causing the infection, the bones affected, and how bad the infection is. Sometimes people need surgery to drain pus from bone or to fix damaged bone. Short-term osteomyelitis that is treated right away usually can be cured. But the long-term form sometimes comes back after treatment. You can help your chances of stopping the infection by taking your medicines as directed. Follow-up care is a key part of your treatment and safety.  Be sure to make and go to all appointments, and call your doctor if you are having problems. It's also a good idea to know your test results and keep a list of the medicines you take. How can you care for yourself at home? · Take your antibiotics as directed. Do not stop taking them just because you feel better. You need to take the full course of antibiotics. · Take pain medicines exactly as directed. ¨ If the doctor gave you a prescription medicine for pain, take it as prescribed. ¨ If you are not taking a prescription pain medicine, ask your doctor if you can take an over-the-counter medicine. · Do mild exercise and stretching if your doctor says it is okay. This can help keep your bones and muscles healthy. Avoid strenuous work or exercise until your doctor says you can do it. · Consider physical therapy if your doctor suggests it. Physical therapy may help you have a normal range of movement. · Do not smoke. Smoking can slow healing of the infection. If you need help quitting, talk to your doctor about stop-smoking programs and medicines. These can increase your chances of quitting for good. When should you call for help? Call 911 anytime you think you may need emergency care. For example, call if: 
? · You have severe bone pain. ?Call your doctor now or seek immediate medical care if: 
? · You continue to have bone pain. ? · You have signs of infection, such as: 
¨ Increased pain, swelling, warmth, or redness. ¨ Red streaks leading from a wound. ¨ Pus draining from a wound. ¨ A fever. ? Watch closely for changes in your health, and be sure to contact your doctor if: 
? · You do not get better as expected. Where can you learn more? Go to http://nolberto-roseanna.info/. Enter W476 in the search box to learn more about \"Osteomyelitis: Care Instructions. \" Current as of: March 20, 2017 Content Version: 11.4 © 1280-0343 Healthwise, Incorporated.  Care instructions adapted under license by [address] (which disclaims liability or warranty for this information). If you have questions about a medical condition or this instruction, always ask your healthcare professional. [address] any warranty or liability for your use of this information. ACO Transitions of Care Introducing [employer] [address] [place] offers a voluntary care coordination program to provide high quality service and care to [facility] fee-for-service beneficiaries. Damian Kidd was designed to help you enhance your health and well-being through the following services: ? Transitions of Care  support for individuals who are transitioning from one care setting to another (example: Hospital to home). ? Chronic and Complex Care Coordination  support for individuals and caregivers of those with serious or chronic illnesses or with more than one chronic (ongoing) condition and those who take a number of different medications. If you meet specific medical criteria, a [facility] [address] may call you directly to coordinate your care with your primary care physician and your other care providers. For questions about the [facility] programs, please, contact your physicians office. For general questions or additional information about Accountable Care Organizations: 
Please visit www.medicare.gov/acos. html or call 1-800-MEDICARE (2-337.373.3927) TTY users should call 5-750.471.1041. Synapse Announcement We are excited to announce that we are making your provider's discharge notes available to you in Synapse. You will see these notes when they are completed and signed by the physician that discharged you from your recent hospital stay.   If you have any questions or concerns about any information you see in Synapse, please call the [facility] Department where you were seen or reach out to your Primary Care Provider for more information about your plan of care. Introducing [facility]! Dear Prince Alvarado: Thank you for requesting a [employer] account. Our records indicate that you already have an active [employer] account. You can access your account anytime at [email or web address] Did you know that you can access your hospital and ER discharge instructions at any time in [employer]? You can also review all of your test results from your hospital stay or ER visit. Additional Information If you have questions, please visit the Frequently Asked Questions section of the [employer] website at [email or web address] Remember, [employer] is NOT to be used for urgent needs. For medical emergencies, dial 911. Now available from your iPhone and Android! Introducing Felix Alarcon As a [facility] patient, I wanted to make you aware of our electronic visit tool called Felix Alarcon. [facility] 24/Skicka TÃ¥rta allows you to connect within minutes with a medical provider 24 hours a day, seven days a week via a mobile device or tablet or logging into a secure website from your computer. You can access Felix Alarcon from anywhere in the [place]. A virtual visit might be right for you when you have a simple condition and feel like you just dont want to get out of bed, or cant get away from work for an appointment, when your regular [facility] provider is not available (evenings, weekends or holidays), or when youre out of town and need minor care. Electronic visits cost only $49 and if the [facility] 24/7 provider determines a prescription is needed to treat your condition, one can be electronically transmitted to a nearby pharmacy*. Please take a moment to enroll today if you have not already done so. The enrollment process is free and takes just a few minutes.   To enroll, please download the [address] [place] [address] 24/7 verena to your tablet or phone, or visit [email or web address] to enroll on your computer. And, as an [address] [place] [address] patient with a [employer] account, the results of your visits will be scanned into your electronic medical record and your primary care provider will be able to view the scanned results. We urge you to continue to see your regular [address] [place] [address] provider for your ongoing medical care. And while your primary care provider may not be the one available when you seek a Strikeface virtual visit, the peace of mind you get from getting a real diagnosis real time can be priceless. For more information on Strikeface, view our Frequently Asked Questions (FAQs) at [email or web address]. Sincerely, 
 
Ruth Maya MD 
Chief Medical Officer [facility] [place] *:  certain medications cannot be prescribed via Strikeface Unresulted Labs-Please follow up with your PCP about these lab tests Order Current Status AFB CULTURE + SMEAR W/RFLX ID FROM CULTURE Preliminary result CULTURE, ANAEROBIC Preliminary result CULTURE, BLOOD Preliminary result CULTURE, BLOOD Preliminary result CULTURE, FUNGUS Preliminary result CULTURE, TISSUE W GRAM STAIN Preliminary result Providers Seen During Your Hospitalization Provider Specialty Primary office phone Shelli Feng MD Emergency Medicine [phone] Prashanth Fischer MD Hospitalist [phone] Carlen Schirmer, MD Internal Medicine [phone] Bridgette Dowling MD Internal Medicine [phone] Immunizations Administered for This Admission Name Date Influenza Vaccine (Quad) PF 4/10/2018 Your Primary Care Physician (PCP) Primary Care Physician Office Phone Office Fax Pam Schroeder [phone] You are allergic to the following No active allergies Recent Documentation Height Weight BMI Smoking Status 1.956 m 106.7 kg 27.89 kg/m2 Never Smoker Emergency Contacts Name Discharge Info Relation Home Work Mobile Alice Mullins CAREGIVER [3] Spouse [3] [phone] Patient Belongings The following personal items are in your possession at time of discharge: 
  Dental Appliances: None  Visual Aid: Glasses, With patient   Hearing Aids/Status: With patient  Home Medications: None   Jewelry: Ring  Clothing: Slippers, Shirt, Undergarments, Pants    Other Valuables: Eyeglasses, Cell Phone, Other (comment) (IPAD) Please provide this summary of care documentation to your next provider. Signatures-by signing, you are acknowledging that this After Visit Summary has been reviewed with you and you have received a copy. Patient Signature:  ____________________________________________________________ Date:  ____________________________________________________________  
  
[facility] Provider Signature:  ____________________________________________________________ Date:  ____________________________________________________________

## 2018-04-05 NOTE — ED TRIAGE NOTES
Patient presents upon recommendation of doctor for evaluation of possible L3 infection that showed up on MRI this morning. Patient reports overuse of back, and increased pain x 3 months.

## 2018-04-05 NOTE — ED PROVIDER NOTES
HPI Comments: 76 y.o. male with past medical history significant for thyroid disease and SCC who presents from home via private vehicle at recommendation of orthopedics with chief complaint of back pain. Pt and spouse report that the pt has been having back issues for ~ the last 4 months after injury on 12/24/17 but had been doing well on medication and PT. He and his wife embarked on international cruises a month ago, but pt has had increased back spasms and fallen 3 - 4 times with multiple other \"stumbles. \" Pt had a contrast MRI this morning and was called this afternoon and told to come to the ED for treatment (IV ABx) of possible discitis. Pt states that he has had no new sx today, but has been having difficulty sitting in chairs and being as active as he normally for a while. He notes that the pain is worse in the evenings. Pain is located in his lower back and does not radiate. Wife notes that the pt is \"urinating all the time\" and that his legs had been swollen on their trip but have improved and are at baseline. Pt has never had any spinal surgery - only surgery reported was for BCCA. There are no other acute medical concerns at this time. PCP: Twila Tomas MD    Note written by John Barrios, as dictated by Chuck Rain MD 5:44 PM     The history is provided by the patient and the spouse. No  was used.         Past Medical History:   Diagnosis Date    Family history of skin cancer     brother-BCC    Skin cancer     SCC nose, right eye lid, left eye brow and left cheek    Sun-damaged skin     Thyroid disease        Past Surgical History:   Procedure Laterality Date    HX COLONOSCOPY      HX MOHS PROCEDURES  01/22/2018    SCC L cheek by Dr. Susan Herrera  2012    colonoscopy         Family History:   Problem Relation Age of Onset    Arthritis-osteo Mother     Diabetes Father     Heart Disease Father     Arthritis-osteo Brother     Osteoporosis Brother     No Known Problems Son     No Known Problems Son     No Known Problems Daughter     Cancer Neg Hx     Hypertension Neg Hx     Thyroid Disease Neg Hx        Social History     Social History    Marital status:      Spouse name: N/A    Number of children: N/A    Years of education: N/A     Occupational History    Not on file. Social History Main Topics    Smoking status: Never Smoker    Smokeless tobacco: Never Used    Alcohol use 0.6 - 1.2 oz/week     1 - 2 Cans of beer per week    Drug use: No    Sexual activity: Yes     Partners: Female     Other Topics Concern    Not on file     Social History Narrative         ALLERGIES: Review of patient's allergies indicates no known allergies. Review of Systems   Constitutional: Negative for chills and fever. HENT: Negative for sore throat. Eyes: Negative for pain. Respiratory: Negative for cough and shortness of breath. Cardiovascular: Negative for chest pain. Leg swelling: chronic. Gastrointestinal: Negative for abdominal pain and vomiting. Genitourinary: Negative for dysuria. Musculoskeletal: Positive for back pain (lower). Skin: Negative for rash. Neurological: Negative for syncope and headaches. Psychiatric/Behavioral: Negative for confusion. All other systems reviewed and are negative. Vitals:    04/05/18 1626   BP: 135/83   Pulse: 79   Resp: 16   Temp: 98 °F (36.7 °C)   SpO2: 96%   Weight: 106.7 kg (235 lb 3.5 oz)   Height: 6' 5\" (1.956 m)            Physical Exam   Constitutional: He is oriented to person, place, and time. He appears well-developed. No distress. HENT:   Head: Normocephalic and atraumatic. Eyes: Conjunctivae and EOM are normal.   Neck: Normal range of motion. Neck supple. Cardiovascular: Normal rate, regular rhythm and normal heart sounds. No murmur heard. Pulmonary/Chest: Effort normal and breath sounds normal. No respiratory distress. Abdominal: Soft.  Bowel sounds are normal. He exhibits no distension. There is no tenderness. There is no rebound. Musculoskeletal: Normal range of motion. He exhibits no edema or tenderness. Neurological: He is alert and oriented to person, place, and time. No cranial nerve deficit. He exhibits normal muscle tone. Coordination normal.   Skin: Skin is warm and dry. Nursing note and vitals reviewed. Note written by John Antonio, as dictated by Brad Ross MD 7:07 PM      [facility]      ED Course       Procedures    CONSULT NOTE:  8:00 PM Brad Ross MD communicated with Hailey Baugh for Orthopedics via [facility] Text. Discussed available diagnostic tests and clinical findings. Ortho would like for the hospitalist to admit the pt. CONSULT NOTE:  8:01 PM Brad Ross MD spoke with Dr. Marie Eckert, Consult for Hospitalist.  Discussed available diagnostic tests and clinical findings. Dr. Marie Eckert will admit the pt.

## 2018-04-06 ENCOUNTER — APPOINTMENT (OUTPATIENT)
Dept: INTERVENTIONAL RADIOLOGY/VASCULAR | Age: 68
DRG: 478 | End: 2018-04-06
Attending: PHYSICIAN ASSISTANT
Payer: MEDICARE

## 2018-04-06 LAB
ANION GAP SERPL CALC-SCNC: 7 MMOL/L (ref 5–15)
BUN SERPL-MCNC: 26 MG/DL (ref 6–20)
BUN/CREAT SERPL: 27 (ref 12–20)
CALCIUM SERPL-MCNC: 8.4 MG/DL (ref 8.5–10.1)
CHLORIDE SERPL-SCNC: 107 MMOL/L (ref 97–108)
CO2 SERPL-SCNC: 27 MMOL/L (ref 21–32)
CREAT SERPL-MCNC: 0.96 MG/DL (ref 0.7–1.3)
ERYTHROCYTE [DISTWIDTH] IN BLOOD BY AUTOMATED COUNT: 14.1 % (ref 11.5–14.5)
GLUCOSE SERPL-MCNC: 101 MG/DL (ref 65–100)
GRAM STN SPEC: NORMAL
HCT VFR BLD AUTO: 32.9 % (ref 36.6–50.3)
HGB BLD-MCNC: 10.6 G/DL (ref 12.1–17)
MCH RBC QN AUTO: 29.4 PG (ref 26–34)
MCHC RBC AUTO-ENTMCNC: 32.2 G/DL (ref 30–36.5)
MCV RBC AUTO: 91.1 FL (ref 80–99)
NRBC # BLD: 0 K/UL (ref 0–0.01)
NRBC BLD-RTO: 0 PER 100 WBC
PLATELET # BLD AUTO: 175 K/UL (ref 150–400)
PMV BLD AUTO: 9.7 FL (ref 8.9–12.9)
POTASSIUM SERPL-SCNC: 3.6 MMOL/L (ref 3.5–5.1)
RBC # BLD AUTO: 3.61 M/UL (ref 4.1–5.7)
SERVICE CMNT-IMP: NORMAL
SODIUM SERPL-SCNC: 141 MMOL/L (ref 136–145)
WBC # BLD AUTO: 3.2 K/UL (ref 4.1–11.1)

## 2018-04-06 PROCEDURE — 77030029065 HC DRSG HEMO QCLOT ZMED -B

## 2018-04-06 PROCEDURE — 85027 COMPLETE CBC AUTOMATED: CPT | Performed by: INTERNAL MEDICINE

## 2018-04-06 PROCEDURE — 77030007812 HC INTRO SPN TRCR KYPH -D

## 2018-04-06 PROCEDURE — 0QB03ZX EXCISION OF LUMBAR VERTEBRA, PERCUTANEOUS APPROACH, DIAGNOSTIC: ICD-10-PCS | Performed by: RADIOLOGY

## 2018-04-06 PROCEDURE — 65270000029 HC RM PRIVATE

## 2018-04-06 PROCEDURE — 80048 BASIC METABOLIC PNL TOTAL CA: CPT | Performed by: INTERNAL MEDICINE

## 2018-04-06 PROCEDURE — 99153 MOD SED SAME PHYS/QHP EA: CPT

## 2018-04-06 PROCEDURE — 77030003666 HC NDL SPINAL BD -A

## 2018-04-06 PROCEDURE — 77030011218 HC DEV BIOP BN KYPH -C

## 2018-04-06 PROCEDURE — 88307 TISSUE EXAM BY PATHOLOGIST: CPT | Performed by: RADIOLOGY

## 2018-04-06 PROCEDURE — 87102 FUNGUS ISOLATION CULTURE: CPT | Performed by: INTERNAL MEDICINE

## 2018-04-06 PROCEDURE — 87075 CULTR BACTERIA EXCEPT BLOOD: CPT | Performed by: PHYSICIAN ASSISTANT

## 2018-04-06 PROCEDURE — 88311 DECALCIFY TISSUE: CPT | Performed by: RADIOLOGY

## 2018-04-06 PROCEDURE — 93970 EXTREMITY STUDY: CPT

## 2018-04-06 PROCEDURE — 87116 MYCOBACTERIA CULTURE: CPT | Performed by: INTERNAL MEDICINE

## 2018-04-06 PROCEDURE — 36415 COLL VENOUS BLD VENIPUNCTURE: CPT | Performed by: INTERNAL MEDICINE

## 2018-04-06 PROCEDURE — 74011250636 HC RX REV CODE- 250/636: Performed by: RADIOLOGY

## 2018-04-06 PROCEDURE — 74011250637 HC RX REV CODE- 250/637: Performed by: INTERNAL MEDICINE

## 2018-04-06 PROCEDURE — 74011000258 HC RX REV CODE- 258: Performed by: INTERNAL MEDICINE

## 2018-04-06 PROCEDURE — 74011000250 HC RX REV CODE- 250: Performed by: RADIOLOGY

## 2018-04-06 PROCEDURE — 87205 SMEAR GRAM STAIN: CPT | Performed by: INTERNAL MEDICINE

## 2018-04-06 PROCEDURE — 74011250636 HC RX REV CODE- 250/636: Performed by: INTERNAL MEDICINE

## 2018-04-06 PROCEDURE — 87205 SMEAR GRAM STAIN: CPT | Performed by: PHYSICIAN ASSISTANT

## 2018-04-06 RX ORDER — VANCOMYCIN/0.9 % SOD CHLORIDE 1.5G/250ML
1500 PLASTIC BAG, INJECTION (ML) INTRAVENOUS EVERY 12 HOURS
Status: DISCONTINUED | OUTPATIENT
Start: 2018-04-07 | End: 2018-04-08 | Stop reason: DRUGHIGH

## 2018-04-06 RX ORDER — BUPIVACAINE HYDROCHLORIDE 5 MG/ML
100 INJECTION, SOLUTION EPIDURAL; INTRACAUDAL
Status: DISCONTINUED | OUTPATIENT
Start: 2018-04-06 | End: 2018-04-06 | Stop reason: HOSPADM

## 2018-04-06 RX ORDER — FENTANYL CITRATE 50 UG/ML
25-200 INJECTION, SOLUTION INTRAMUSCULAR; INTRAVENOUS
Status: DISCONTINUED | OUTPATIENT
Start: 2018-04-06 | End: 2018-04-06 | Stop reason: HOSPADM

## 2018-04-06 RX ORDER — LIDOCAINE HYDROCHLORIDE 10 MG/ML
5-20 INJECTION INFILTRATION; PERINEURAL
Status: DISCONTINUED | OUTPATIENT
Start: 2018-04-06 | End: 2018-04-06 | Stop reason: HOSPADM

## 2018-04-06 RX ORDER — MIDAZOLAM HYDROCHLORIDE 1 MG/ML
.5-1 INJECTION, SOLUTION INTRAMUSCULAR; INTRAVENOUS
Status: DISCONTINUED | OUTPATIENT
Start: 2018-04-06 | End: 2018-04-06 | Stop reason: HOSPADM

## 2018-04-06 RX ADMIN — BUPIVACAINE HYDROCHLORIDE 50 MG: 5 INJECTION, SOLUTION EPIDURAL; INTRACAUDAL; PERINEURAL at 09:38

## 2018-04-06 RX ADMIN — LIDOCAINE HYDROCHLORIDE 15 ML: 10 INJECTION, SOLUTION INFILTRATION; PERINEURAL at 09:30

## 2018-04-06 RX ADMIN — Medication 10 ML: at 15:39

## 2018-04-06 RX ADMIN — SODIUM BICARBONATE 5 ML: 0.2 INJECTION, SOLUTION INTRAVENOUS at 09:33

## 2018-04-06 RX ADMIN — FENTANYL CITRATE 50 MCG: 50 INJECTION, SOLUTION INTRAMUSCULAR; INTRAVENOUS at 09:40

## 2018-04-06 RX ADMIN — Medication 10 ML: at 05:49

## 2018-04-06 RX ADMIN — SODIUM CHLORIDE 3.38 G: 900 INJECTION, SOLUTION INTRAVENOUS at 19:09

## 2018-04-06 RX ADMIN — FENTANYL CITRATE 50 MCG: 50 INJECTION, SOLUTION INTRAMUSCULAR; INTRAVENOUS at 09:32

## 2018-04-06 RX ADMIN — FENTANYL CITRATE 50 MCG: 50 INJECTION, SOLUTION INTRAMUSCULAR; INTRAVENOUS at 09:09

## 2018-04-06 RX ADMIN — PIPERACILLIN SODIUM AND TAZOBACTAM SODIUM 3.38 G: 3; .375 INJECTION, POWDER, LYOPHILIZED, FOR SOLUTION INTRAVENOUS at 10:19

## 2018-04-06 RX ADMIN — VANCOMYCIN HYDROCHLORIDE 2500 MG: 10 INJECTION, POWDER, LYOPHILIZED, FOR SOLUTION INTRAVENOUS at 12:17

## 2018-04-06 RX ADMIN — MIDAZOLAM 2 MG: 1 INJECTION INTRAMUSCULAR; INTRAVENOUS at 09:27

## 2018-04-06 RX ADMIN — DIAZEPAM 5 MG: 5 TABLET ORAL at 20:45

## 2018-04-06 RX ADMIN — TRAMADOL HYDROCHLORIDE 50 MG: 50 TABLET, FILM COATED ORAL at 20:45

## 2018-04-06 RX ADMIN — Medication 10 ML: at 21:11

## 2018-04-06 RX ADMIN — LEVOTHYROXINE SODIUM 125 MCG: 125 TABLET ORAL at 05:48

## 2018-04-06 NOTE — H&P
Radiology History and Physical    Patient: Sly Dubois 76 y.o. male     Chief Complaint:   Chief Complaint   Patient presents with    Back Pain       History of Present Illness: R/O discitis L2-L3. History:    Past Medical History:   Diagnosis Date    Family history of skin cancer     brother-BCC    Skin cancer     SCC nose, right eye lid, left eye brow and left cheek    Sun-damaged skin     Thyroid disease      Family History   Problem Relation Age of Onset    Arthritis-osteo Mother     Diabetes Father     Heart Disease Father     Arthritis-osteo Brother     Osteoporosis Brother     No Known Problems Son     No Known Problems Son     No Known Problems Daughter     Cancer Neg Hx     Hypertension Neg Hx     Thyroid Disease Neg Hx      Social History     Social History    Marital status:      Spouse name: N/A    Number of children: N/A    Years of education: N/A     Occupational History    Not on file.      Social History Main Topics    Smoking status: Never Smoker    Smokeless tobacco: Never Used    Alcohol use 0.6 - 1.2 oz/week     1 - 2 Cans of beer per week    Drug use: No    Sexual activity: Yes     Partners: Female     Other Topics Concern    Not on file     Social History Narrative       Allergies: No Known Allergies    Current Medications:  Current Facility-Administered Medications   Medication Dose Route Frequency    piperacillin-tazobactam (ZOSYN) 3.375 g in 0.9% sodium chloride (MBP/ADV) 100 mL  3.375 g IntraVENous NOW    piperacillin-tazobactam (ZOSYN) 3.375 g in 0.9% sodium chloride (MBP/ADV) 100 mL  3.375 g IntraVENous Q8H    levothyroxine (SYNTHROID) tablet 125 mcg  125 mcg Oral ACB    sodium chloride (NS) flush 5-10 mL  5-10 mL IntraVENous Q8H    sodium chloride (NS) flush 5-10 mL  5-10 mL IntraVENous PRN    HYDROmorphone (PF) (DILAUDID) injection 1 mg  1 mg IntraVENous Q4H PRN    diazePAM (VALIUM) tablet 5 mg  5 mg Oral QHS    diazePAM (VALIUM) tablet 5 mg  5 mg Oral DAILY PRN    traMADol (ULTRAM) tablet 50 mg  50 mg Oral QHS PRN        Physical Exam:  Blood pressure 140/75, pulse 60, temperature 97.7 °F (36.5 °C), resp. rate 16, height 6' 5\" (1.956 m), weight 106.7 kg (235 lb 3.5 oz), SpO2 92 %. GENERAL: alert, cooperative, mild distress, appears stated age, LUNG: clear to auscultation bilaterally, HEART: regular rate and rhythm      Alerts:    Hospital Problems  Date Reviewed: 4/5/2018          Codes Class Noted POA    Discitis ICD-10-CM: M46.40  ICD-9-CM: 722.90  4/5/2018 Unknown        Leukopenia ICD-10-CM: D72.819  ICD-9-CM: 288.50  4/5/2018 Unknown        Osteomyelitis ([address] ICD-10-CM: M86.9  ICD-9-CM: 730.20  4/5/2018 Unknown        Fall ICD-10-CM: Escobar Sr. Yanira Fuchseker  ICD-9-CM: E888.9  4/5/2018 Unknown        Acquired hypothyroidism (Chronic) ICD-10-CM: E03.9  ICD-9-CM: 244.9  1/11/2016 Yes              Laboratory:      Recent Labs      04/06/18   0422   HGB  10.6*   HCT  32.9*   WBC  3.2*   PLT  175   BUN  26*   CREA  0.96   K  3.6         Plan of Care/Planned Procedure:  Risks, benefits, and alternatives reviewed with patient and he agrees to proceed with the procedure.

## 2018-04-06 NOTE — ROUTINE PROCESS
TRANSFER - IN REPORT:    Verbal report received from bedside RN on [facility]  being received from [phone] for ordered procedure      Report consisted of patients Situation, Background, Assessment and   Recommendations(SBAR). Information from the following report(s) SBAR was reviewed with the receiving nurse. Opportunity for questions and clarification was provided. Assessment completed upon patients arrival to unit and care assumed.

## 2018-04-06 NOTE — CONSULTS
ID consult    NAME:  Timo Robin                      :   1950                       MRN:   [id number]   Date/Time:  2018 10:51 AM  Subjective:   REASON FOR CONSULT:   Discitis    History from patient and wife  Timo Robin  is a 76 y.o. with a history of BCC skin, hypothyroidism presents with back pain of 3 month duration with ntermittent flare ups an now with an abnormal MRI suggestive of infection. Pt is a very active male and in Dec he was playing racquet ball, golf, basket ball and one week he over did these along with weight lifting and on  he developed acute back pain of severe intensity and went to his PCP who got an xray ( normal) and gave him diclofenac and flexeril. A few days later while sititng on the toilet his nmuscles spasmed and he could not get up and collapsed on the floor and his daughter and son in law cmae and helped him to the bed as his wife could not get him up. They called 911 and he was taken to [place] on 18 and a CT was okay and he was sent home. HE then saw ortho and was referred for PT and while doing PT his pain was worse and he went for MRI without contrast on  and it showed L3 vertebral body acute versus subacute Schmorl's node formation into an anterior osteophyte at L2-3. He was given Medrol brinda and brace. HE was doing fine for some time and he went on a cruise to [place]. During that trip his back developed spasms and he would collapse to his knees X 5 times- He cut short his trip and returned to [facility] on 3/27 and went to his ortho who got a MRI with contrast done on 18. As it showed findings suggestive of infection he was admitted to the hospital and underwent IRguided lumbar biopsy and after that he was started on vanco and zoyn  I am  asked to consult him for antibiotic management. Pt denies any fever , chills, nausea, vomiting, pain abdomen , urinary symptoms ( eventhough his wife says frequecy).   No cuts or wounds on the skin- he does a lot of yard work. No weakness legs , had constipation but better now    Past Medical History:   Diagnosis Date    Family history of skin cancer     brother-BCC    Skin cancer     SCC nose, right eye lid, left eye brow and left cheek    Sun-damaged skin     Thyroid disease       Past Surgical History:   Procedure Laterality Date    HX COLONOSCOPY      HX MOHS PROCEDURES  01/22/2018    SCC L cheek by Dr. Randalyn Hashimoto  2012    colonoscopy      Social History     Social History    Marital status:      Spouse name: N/A    Number of children: N/A    Years of education: N/A     Occupational History    Not on file.      Social History Main Topics    Smoking status: Never Smoker    Smokeless tobacco: Never Used    Alcohol use 0.6 - 1.2 oz/week     1 - 2 Cans of beer per week    Drug use: No    Sexual activity: Yes     Partners: Female     Other Topics Concern    Not on file     Social History Narrative      Family History   Problem Relation Age of Onset    Arthritis-osteo Mother     Diabetes Father     Heart Disease Father     Arthritis-osteo Brother     Osteoporosis Brother     No Known Problems Son     No Known Problems Son     No Known Problems Daughter     Cancer Neg Hx     Hypertension Neg Hx     Thyroid Disease Neg Hx      No Known Allergies      Current Facility-Administered Medications   Medication Dose Route Frequency Provider Last Rate Last Dose    piperacillin-tazobactam (ZOSYN) 3.375 g in 0.9% sodium chloride (MBP/ADV) 100 mL  3.375 g IntraVENous Q8H Catracho Rushing MD        levothyroxine (SYNTHROID) tablet 125 mcg  125 mcg Oral ACB Filiberto Lazo MD   125 mcg at 04/06/18 0548    sodium chloride (NS) flush 5-10 mL  5-10 mL IntraVENous Q8H Filiberto Lazo MD   10 mL at 04/06/18 0549    sodium chloride (NS) flush 5-10 mL  5-10 mL IntraVENous PRN Filiberto Lazo MD        HYDROmorphone (PF) (DILAUDID) injection 1 mg  1 mg IntraVENous Q4H PRN Filiberto Lazo MD        diazePAM (VALIUM) tablet 5 mg  5 mg Oral QHS Filiberto Lazo MD   5 mg at 04/05/18 2317    diazePAM (VALIUM) tablet 5 mg  5 mg Oral DAILY PRN Filiberot Lazo MD        traMADol (ULTRAM) tablet 50 mg  50 mg Oral QHS PRN Filiberto Lazo MD   50 mg at 04/05/18 2317        REVIEW OF SYSTEMS:     Const: negative fever, negative chills, negative weight loss  Eyes:  negative diplopia or visual changes, negative eye pain  ENT:  negative coryza, negative sore throat  Resp:  negative cough, hemoptysis, dyspnea  Cards: negative for chest pain, palpitations, lower extremity edema  : negative for frequency, dysuria and hematuria  Skin:  negative for rash and pruritus  Heme: negative for easy bruising and gum/nose bleeding  MS: negative for myalgias, arthralgias, back pain and muscle weakness  Neurolo:negative for headaches, dizziness, vertigo, memory problems   Psych: negative for feelings of anxiety, depression     Pertinent Positives include :    Objective:   VITALS:    Visit Vitals    /66 (BP 1 Location: Right arm, BP Patient Position: At rest)    Pulse (!) 56    Temp 97.7 °F (36.5 °C)    Resp 15    Ht 6' 5\" (1.956 m)    Wt 235 lb 3.5 oz (106.7 kg)    SpO2 92%    BMI 27.89 kg/m2       PHYSICAL EXAM:   General:    Alert, cooperative, no distress, appears stated age. Head:   Normocephalic, without obvious abnormality, atraumatic. Eyes:   Conjunctivae clear, anicteric sclerae. Pupils are equal  Nose:  Nares normal. No drainage or sinus tenderness. Throat:    Lips, mucosa, and tongue normal.  No Thrush  Neck:  Supple, symmetrical,  no adenopathy, thyroid: non tender    no carotid bruit and no JVD. Back:    No CVA tenderness. Lungs:   Clear to auscultation bilaterally. No Wheezing or Rhonchi. No rales. Heart:   Regular rate and rhythm,  no murmur, rub or gallop. Abdomen:   Soft, non-tender,not distended. Bowel sounds normal. No masses  Extremities: Extremities normal, atraumatic, no cyanosis. No edema. No clubbing  Skin:     No rashes or lesions. Not Jaundiced  Lymph: Cervical, supraclavicular normal.  Neurologic: Grossly non-focal    Pertinent Labs   wbc 3.2 ( was 7.9 in feb)  N 64%)  HB 11.6 ( was 14.1 in feb)    ESR 48  Cr 1.01    IMAGING RESULTS:  MRI reviewed as above    Impression/Recommendation    Rhonda Halsted  is a 76 y.o. with a history of BCC skin, hypothyroidism presents with back pain of 3 month duration with ntermittent flare ups an now with an abnormal MRI suggestive of infection. R/o vertebral osteo with discitis and phlegmonous collection  S/o IR guided biopsy and culture  Sent for bacterial culture  please add fungal and mycobacterial culture to it because of chronicity  On vanco and zosyn- would have preferred to have waited until the culture finalized as he was stable  But it has been started- will avoid vanco/zosyn cmbo as risk for kidney injury- also zosyn not required as anerobes unlikely. change zosyn to cefepime . Leucopenia- observe closely    Anemia- has been on diclofenac- r/o GI bleed    Hypothyroidism- on synthroid    H/o BCC    Discussed with patient and his wife in great   detail.   ID will follow remotely this weekend- call if needed

## 2018-04-06 NOTE — PROGRESS NOTES
Pepe Fleming [address]yna [address] [place] [address]  [address]  [phone]      Medical Progress Note      NAME: Annie Caldwell   :  1950  MRM:  [id number]    Date/Time: 2018          Subjective:     Chief Complaint:  F/u discitis    Chart/notes/labs/studies reviewed, patient examined at bedside. Still having back pain. No fevers or chills. Notes recent dental cleaning, otherwise no recent surgeries, illness/infections, IVDA. Usually very active. Objective:       Vitals:          Last 24hrs VS reviewed since prior progress note. Most recent are:    Visit Vitals    /64 (BP 1 Location: Left arm, BP Patient Position: At rest)    Pulse 60    Temp 97.9 °F (36.6 °C)    Resp 16    Ht 6' 5\" (1.956 m)    Wt 106.7 kg (235 lb 3.5 oz)    SpO2 99%    BMI 27.89 kg/m2     SpO2 Readings from Last 6 Encounters:   18 99%   18 98%   18 99%   18 92%   18 99%   18 97%          Intake/Output Summary (Last 24 hours) at 18 1508  Last data filed at 18 1217   Gross per 24 hour   Intake              125 ml   Output                0 ml   Net              125 ml          Exam:     Physical Exam:    Gen:  Well-developed, well-nourished, in no acute distress. Pleasant   HEENT:  Sclerae nonicteric, hearing intact to voice, mucous membranes moist  Neck:  Supple, without masses. Resp:  No accessory muscle use, CTAB without wheezes, rales, or rhonchi  Card: RRR, without m/r/g. Trace BLE edema. Abd:  +bowel sounds, soft, NTTP, nondistended. Neuro: Face symmetric, tongue midline, speech fluent, follows commands appropriately  Psych:  Alert, oriented x 3.  Good insight     Medications Reviewed: (see below)    Lab Data Reviewed: (see below)    ______________________________________________________________________    Medications:     Current Facility-Administered Medications   Medication Dose Route Frequency    piperacillin-tazobactam (ZOSYN) 3.375 g in 0.9% sodium chloride (MBP/ADV) 100 mL  3.375 g IntraVENous Q8H    [START ON 4/7/2018] vancomycin (VANCOCIN) 1500 mg in  ml infusion  1,500 mg IntraVENous Q12H    levothyroxine (SYNTHROID) tablet 125 mcg  125 mcg Oral ACB    sodium chloride (NS) flush 5-10 mL  5-10 mL IntraVENous Q8H    sodium chloride (NS) flush 5-10 mL  5-10 mL IntraVENous PRN    HYDROmorphone (PF) (DILAUDID) injection 1 mg  1 mg IntraVENous Q4H PRN    diazePAM (VALIUM) tablet 5 mg  5 mg Oral QHS    diazePAM (VALIUM) tablet 5 mg  5 mg Oral DAILY PRN    traMADol (ULTRAM) tablet 50 mg  50 mg Oral QHS PRN            Lab Review:     Recent Labs      04/06/18 0422 04/05/18 1946   WBC  3.2*  3.2*   HGB  10.6*  11.6*   HCT  32.9*  35.8*   PLT  175  189     Recent Labs      04/06/18 0422 04/05/18 1946   NA  141  140   K  3.6  3.7   CL  107  105   CO2  27  29   GLU  101*  100   BUN  26*  27*   CREA  0.96  1.01   CA  8.4*  8.9   ALB   --   3.6   SGOT   --   15   ALT   --   22     No components found for: GLPOC  No results for input(s): PH, PCO2, PO2, HCO3, FIO2 in the last 72 hours. No results for input(s): INR in the last 72 hours. No lab exists for component: INREXT  No results found for: SDES  Lab Results   Component Value Date/Time    Culture result: NO GROWTH AFTER 10 HOURS 04/05/2018 07:46 PM    Culture result: NO GROWTH AFTER 10 HOURS 04/05/2018 07:46 PM            Assessment / Plan:      Discitis of L2-L3/ Osteomyelitis: underwent biopsy by IR, gram stain negative. Started and vanc and Zosyn. ID to see. Pain control. Ortho following. Add-on pathology. Falls: due to the above. PT / OT, fall precautions       BLE edema: trace, but apparently was worse. Check LE doppers given recent trip to [facility]     Acquired hypothyroidism (): Continue synthroid      Leukopenia: may be 2/2 infection.  Follow CBC with diff           Total time spent with patient: 35 minutes, d/w Dr. Sadaf Woods and wife at bedside Care Plan discussed with: Patient, Nursing Staff and >50% of time spent in counseling and coordination of care    Discussed:  Care Plan and D/C Planning    Prophylaxis:  Lovenox to start tomorrow    Disposition:  Home w/Family vs rehab           ___________________________________________________    Attending Physician: Nic Chapman MD

## 2018-04-06 NOTE — PROGRESS NOTES
[facility]: MED RECONCILIATION    -pt takes diazepam and diclofenac for spasms and only takes tramadol if those do not control the spams  -he tries to limit the diclofenac to just once daily when he can     Medications added:   · Tramadol 50 mg     Medications removed:  · Cetirizine 10 mg  · Medrol 4 mg dosepak     Medications adjusted:  · Valium- previously 5mg TID prn  · Diclofenac ED 85mg BID    Information obtained from: pt, pill bottles, pictures of pill bottles, rx  query    Significant PMH/Disease States:   Past Medical History:   Diagnosis Date    Family history of skin cancer     brother-BCC    Skin cancer     SCC nose, right eye lid, left eye brow and left cheek    Sun-damaged skin     Thyroid disease          Chief Complaint for this Admission:   Chief Complaint   Patient presents with    Back Pain         Allergies: Review of patient's allergies indicates no known allergies. Prior to Admission Medications:     Prior to Admission Medications   Prescriptions Last Dose Informant Patient Reported? Taking? ASCORBATE CALCIUM (VITAMIN C PO)  Self Yes Yes   Sig: Take 1 Tab by mouth daily. diazePAM (VALIUM) 5 mg tablet 4/4/2018 at PM  Yes Yes   Sig: Take 5 mg by mouth nightly. diazePAM (VALIUM) 5 mg tablet   Yes Yes   Sig: Take 5 mg by mouth daily as needed for Anxiety. diclofenac EC (VOLTAREN) 75 mg EC tablet 4/4/2018 at PM  Yes Yes   Sig: Take 75 mg by mouth nightly. diclofenac EC (VOLTAREN) 75 mg EC tablet   Yes Yes   Sig: Take 75 mg by mouth daily as needed. levothyroxine (SYNTHROID) 125 mcg tablet 4/5/2018 at AM Self No Yes   Sig: TAKE 1 TABLET BY MOUTH EVERY DAY BEFORE BREAKFAST FOR HYPOTHROIDISM   multivitamin (ONE A DAY) tablet 4/5/2018 at AM Self Yes Yes   Sig: Take 1 Tab by mouth daily. traMADol (ULTRAM) 50 mg tablet  Self Yes Yes   Sig: Take 50 mg by mouth nightly as needed for Pain (for moderate to severe pain).    triamcinolone acetonide (KENALOG) 0.1 % topical cream  Self Yes Yes Sig: APPLY TO BODY ECZEMA UP TO TWICE A DAY AS NEEDED      Facility-Administered Medications: None       Thank you  Parish Staples  Student Pharmacist      Thank you,  Parish Pastrana, Pharm. D.

## 2018-04-06 NOTE — ROUTINE PROCESS
Bedside and Verbal shift change report given to Medford Schaumann, RN (oncoming nurse) by Evelina Castillo RN (offgoing nurse). Report included the following information SBAR, Kardex, ED Summary, Procedure Summary, Intake/Output, MAR and Recent Results.

## 2018-04-06 NOTE — ROUTINE PROCESS
TRANSFER - OUT REPORT:    Verbal report given to Ludivina Colon RN(name) on Ervin Gordon  being transferred to 4th floor(unit) for routine progression of care       Report consisted of patients Situation, Background, Assessment and   Recommendations(SBAR). Information from the following report(s) SBAR, Kardex, ED Summary and MAR was reviewed with the receiving nurse. Lines:   Peripheral IV 04/05/18 Right Antecubital (Active)   Site Assessment Clean, dry, & intact 4/5/2018  7:54 PM   Phlebitis Assessment 0 4/5/2018  7:54 PM   Infiltration Assessment 0 4/5/2018  7:54 PM   Dressing Status Clean, dry, & intact 4/5/2018  7:54 PM   Dressing Type Transparent 4/5/2018  7:54 PM   Hub Color/Line Status Patent; Flushed 4/5/2018  7:54 PM        Opportunity for questions and clarification was provided.       Patient transported with:   [employer]

## 2018-04-06 NOTE — PROGRESS NOTES
[facility] Dosing Services:    Consult for antibiotic dosing of Vancomycin by Dr. Itzel Stern  Pharmacist reviewed antibiotic appropriateness for 76year old , 107 kg,  male  for indication of discitis/ osteomyelitis. Day of Therapy 1    Plan:  Vancomycin therapy:  Vancomycin loading dose of 2500 mg given. Follow with maintenance dose of 1500 (mg) every 12 hours (frequency). Dose calculated to approximate a therapeutic trough of 15-20 mcg/mL. Plan for level: after third dose  Pharmacy to follow daily and will make changes to dose and/or frequency based on clinical status.     Pharmacist Marcin Davis                           [id number]

## 2018-04-06 NOTE — PROGRESS NOTES
Primary Nurse Thomas Hare, RN and Mazin Pineda, RN, RN performed a dual skin assessment on this patient No impairment noted  Haja score is 22. Rash on rt side lower back from essential oils.

## 2018-04-06 NOTE — H&P
[address]  [phone]    Admission History and Physical      NAME:  Joanie Givens   :   1950   MRN:  [id number]     PCP:  Christina Tucker MD     Date/Time:  2018         Subjective:     CHIEF COMPLAINT: lower back pain      HISTORY OF PRESENT ILLNESS:     Mr. Lonne Kussmaul is a 76 y.o.  male who is admitted with discitis/ osteomyelitis. Mr. Lonne Kussmaul with PMH of hypothyroidism presented to ER c/o lower back pain, which started about 3 months ago and became progressively worse. The pain is sharp, spasmic and moderate to severe in intensity without radiation. Denies fever. Has been falling frequently. He was seen by ortho and had MRI of the lower spine which showed discitis/ osteomyelitis and referred to ER. Past Medical History:   Diagnosis Date    Family history of skin cancer     brother-BCC    Skin cancer     SCC nose, right eye lid, left eye brow and left cheek    Sun-damaged skin     Thyroid disease         Past Surgical History:   Procedure Laterality Date    HX COLONOSCOPY      HX MOHS PROCEDURES  2018    SCC L cheek by Dr. Randalyn Hashimoto  2012    colonoscopy       Social History   Substance Use Topics    Smoking status: Never Smoker    Smokeless tobacco: Never Used    Alcohol use 0.6 - 1.2 oz/week     1 - 2 Cans of beer per week        Family History   Problem Relation Age of Onset    Arthritis-osteo Mother     Diabetes Father     Heart Disease Father     Arthritis-osteo Brother     Osteoporosis Brother     No Known Problems Son     No Known Problems Son     No Known Problems Daughter     Cancer Neg Hx     Hypertension Neg Hx     Thyroid Disease Neg Hx         No Known Allergies     Prior to Admission medications    Medication Sig Start Date End Date Taking?  Authorizing Provider   diclofenac EC (VOLTAREN) 75 mg EC tablet TAKE 1 TABLET BY MOUTH TWICE A DAY 18   Albert Stallings Katie Huff MD   levothyroxine (SYNTHROID) 125 mcg tablet TAKE 1 TABLET BY MOUTH EVERY DAY BEFORE BREAKFAST FOR HYPOTHROIDISM 2/14/18   Clemente Quiles MD   ASCORBATE CALCIUM (VITAMIN C PO) Take  by mouth. Historical Provider   diazePAM (VALIUM) 5 mg tablet Take 1 Tab by mouth three (3) times daily as needed (spasm). Max Daily Amount: 15 mg. 1/13/18   Mi Mccall PA-C   methylPREDNISolone (MEDROL, PAM,) 4 mg tablet Take as directed on package 1/13/18   Mi Mccall PA-C   triamcinolone acetonide (KENALOG) 0.1 % topical cream APPLY TO BODY ECZEMA UP TO TWICE A DAY AS NEEDED 11/20/17   Historical Provider   cetirizine (ZYRTEC) 10 mg tablet Take 1 Tab by mouth daily as needed for Allergies. 4/13/17   Gillian Ribeiro NP   multivitamin (ONE A DAY) tablet Take 1 Tab by mouth daily.     Historical Provider         Review of Systems:  (bold if positive, if negative)    Gen:  Eyes:  ENT:  CVS:  Pulm:  GI:    :    MS:  Pain, Skin:  Psych:  Endo:    Hem:  Renal:    Neuro:            Objective:      VITALS:    Vital signs reviewed; most recent are:    Visit Vitals    /84    Pulse 67    Temp 98 °F (36.7 °C)    Resp 24    Ht 6' 5\" (1.956 m)    Wt 106.7 kg (235 lb 3.5 oz)    SpO2 100%    BMI 27.89 kg/m2     SpO2 Readings from Last 6 Encounters:   04/05/18 100%   01/31/18 98%   01/22/18 99%   01/12/18 92%   01/08/18 99%   01/03/18 97%        No intake or output data in the 24 hours ending 04/05/18 2049         Exam:     Physical Exam:    Gen:  Well-developed, well-nourished, in no acute distress  HEENT:  Pink conjunctivae, PERRL, hearing intact to voice, moist mucous membranes  Neck:  Supple, without masses, thyroid non-tender  Resp:  No accessory muscle use, clear breath sounds without wheezes rales or rhonchi  Card:  No murmurs, normal S1, S2 without thrills, bruits or peripheral edema  Abd:  Soft, non-tender, non-distended, normoactive bowel sounds are present, no palpable organomegaly and no detectable hernias  Lymph:  No cervical or inguinal adenopathy  Musc:  No cyanosis or clubbing  Skin:  No rashes or ulcers, skin turgor is good  Neuro:  Cranial nerves are grossly intact, no focal motor weakness, follows commands appropriately  Psych:  Good insight, oriented to person, place and time, alert       Labs:    Recent Labs      04/05/18 1946   WBC  3.2*   HGB  11.6*   HCT  35.8*   PLT  189     Recent Labs      04/05/18 1946   NA  140   K  3.7   CL  105   CO2  29   GLU  100   BUN  27*   CREA  1.01   CA  8.9   ALB  3.6   TBILI  0.6   SGOT  15   ALT  22     No results found for: GLUCPOC  No results for input(s): PH, PCO2, PO2, HCO3, FIO2 in the last 72 hours. No results for input(s): INR in the last 72 hours. No lab exists for component: INREXT    Telemetry reviewed:          Assessment/Plan:    1. Discitis (4/5/2018) of L2-L3/ Osteomyelitis ([address] (4/5/2018). Admit to ortho. Spoke with Dr Ken Galicia, orthopedics who recommended to do Bx by IR. Will not start ABx until Bx is done and will consult ID. May need APICC line and long term ABx. Pain management      2. Fall. Fall precaution. 3.  Acquired hypothyroidism (1/11/2016). Continue synthroid    4. Leukopenia (4/5/2018). Monitor.                 Previous medical records reviewed     Risk of deterioration: high      Total time spent with patient: 79 [phone] [facility] discussed with: Patient, Nursing Staff and >50% of time spent in counseling and coordination of care    Discussed:  Care Plan    Prophylaxis:  SCD's    Probable Disposition:  Home w/Family           ___________________________________________________    Attending Physician: Vern Felder MD

## 2018-04-06 NOTE — PROGRESS NOTES
Bedside and Verbal shift change report given to Eryn Rendon (oncoming nurse) by [facility] (offgoing nurse). Report included the following information SBAR, Kardex, Procedure Summary, Intake/Output, MAR and Recent Results.

## 2018-04-06 NOTE — PROGRESS NOTES
4/6/2018 3:25 PM Met with pt, pt's wife and pt's daughter. Charted address and phone number confirmed. Pt lives with his wife in [place]. Pt has no history of home health. Discussed need for home health and iv infusion, explained process. Choices given. [facility]  was selected as home health agency and [address] for iv abx. Referrals sent. Pt was independent with adls(except his wife puts on his socks) and driving prior to admission. Pt has rx coverage and fills his scripts at [facility].   Nurse navigator for pt's PCP notified of admission. CM will follow up on Monday once final discharge recommendations are made. Care Management Interventions  PCP Verified by CM: Yes Josiephine Range )  Mode of Transport at Discharge: Other (see comment) (Pt's wife )  MyChart Signup: No  Discharge Durable Medical Equipment: No  Physical Therapy Consult: Yes  Occupational Therapy Consult: Yes  Speech Therapy Consult: No  Current Support Network: Lives with Spouse, Own Home  Confirm Follow Up Transport: Family  Plan discussed with Pt/Family/Caregiver: Yes  Freedom of Choice Offered: Yes       4/6/2018 8:39 AM EMR reviewed, pt currently off the floor. ID consult noted. CM will follow up with pt when he returns to the floor and follow for final discharge recommendations. Pt is an ACO pt.  ADONIS Louie

## 2018-04-06 NOTE — PROCEDURES
[facility]  *** FINAL REPORT ***    Name: Gretchen Mims  MRN: [id number]    Inpatient  : 1950  HIS Order #: [id number]  [id number] [facility] Visit #: [id number]  Date: 2018    TYPE OF TEST: Peripheral Venous Testing    REASON FOR TEST  Pain in limb, Limb swelling    Right Leg:-  Deep venous thrombosis:           No  Superficial venous thrombosis:    No  Deep venous insufficiency:        No  Superficial venous insufficiency: No    Left Leg:-  Deep venous thrombosis:           No  Superficial venous thrombosis:    No  Deep venous insufficiency:        No  Superficial venous insufficiency: No      INTERPRETATION/FINDINGS  PROCEDURE:  BILATERAL LE VENOUS DUPLEX. Evaluation of lower extremity veins with ultrasound (B-mode imaging,  pulsed Doppler, color Doppler). Includes the common femoral, deep  femoral, femoral, popliteal, posterior tibial, peroneal, and great  saphenous veins. FINDINGS:  Aminata Elliott scale and color flow duplex images of the veins in  both lower extremities demonstrate normal compressibility, spontaneous   and augmented flow profiles, and absence of filling defects  throughout the deep and superficial veins in both lower extremities. CONCLUSION:  Bilateral lower extremity venous duplex negative for deep   venous thrombosis or thrombophlebitis. ADDITIONAL COMMENTS    I have personally reviewed the data relevant to the interpretation of  this  study. TECHNOLOGIST: Aniket Taveras. Ross  Signed: 2018 05:40 PM    PHYSICIAN: Victor M Thorpe.  Angel Manriquez MD  Signed: 2018 12:52 PM

## 2018-04-06 NOTE — PROGRESS NOTES
Problem: Falls - Risk of  Goal: *Absence of Falls  Document Lola Fall Risk and appropriate interventions in the flowsheet.    Outcome: Progressing Towards Goal  Fall Risk Interventions:            Medication Interventions: Patient to call before getting OOB, Teach patient to arise slowly

## 2018-04-06 NOTE — PROCEDURES
PROCEDURE:Bone biopsy L2-L3. INDICATION:R/O discitis. ANESTHESIA:sedation and local.  COMPLICATION:NONE. SPECIMENS REMOVED:multiple cores for pathology. BLOOD LOSS:NONE. /ASSISTANT:LALI Love RECOMMENDATIONS:none. CONSENT OBTAINED:YES.  NOTES:none.

## 2018-04-06 NOTE — PROGRESS NOTES
1000  TRANSFER - IN REPORT:    Verbal report received from Zoila(name) on Keely Washington  being received from angio(unit) for routine post - op      Report consisted of patients Situation, Background, Assessment and   Recommendations(SBAR). Information from the following report(s) SBAR, Procedure Summary and MAR was reviewed with the receiving nurse. Opportunity for questions and clarification was provided. Assessment completed upon patients arrival to unit and care assumed. Pt voices understanding of post procedure bedrest instructions. 10:54 AM  TRANSFER - OUT REPORT:    Verbal report given to Dafne(name) on Keely Washington  being transferred to 4th floor(unit) for routine progression of care       Report consisted of patients Situation, Background, Assessment and   Recommendations(SBAR). Information from the following report(s) SBAR, Procedure Summary and MAR was reviewed with the receiving nurse. Lines:   Peripheral IV 04/05/18 Right Antecubital (Active)   Site Assessment Clean, dry, & intact 4/6/2018 10:36 AM   Phlebitis Assessment 0 4/6/2018 10:36 AM   Infiltration Assessment 0 4/6/2018 10:36 AM   Dressing Status Clean, dry, & intact 4/6/2018 10:36 AM   Dressing Type Transparent 4/6/2018 10:36 AM   Hub Color/Line Status Pink; Infusing 4/6/2018 10:36 AM   Action Taken Open ports on tubing capped 4/6/2018  4:26 AM   Alcohol Cap Used Yes 4/6/2018 10:36 AM        Opportunity for questions and clarification was provided.       Patient transported with:   Registered Nurse    11:07 AM  Pt trasnsfered back to 4th floor with ARABELLA

## 2018-04-06 NOTE — H&P
Radiology History and Physical    Patient: Sly Dubois 76 y.o. male     Chief Complaint:   Chief Complaint   Patient presents with    Back Pain       History of Present Illness: R/O discitis. History:    Past Medical History:   Diagnosis Date    Family history of skin cancer     brother-BCC    Skin cancer     SCC nose, right eye lid, left eye brow and left cheek    Sun-damaged skin     Thyroid disease      Family History   Problem Relation Age of Onset    Arthritis-osteo Mother     Diabetes Father     Heart Disease Father     Arthritis-osteo Brother     Osteoporosis Brother     No Known Problems Son     No Known Problems Son     No Known Problems Daughter     Cancer Neg Hx     Hypertension Neg Hx     Thyroid Disease Neg Hx      Social History     Social History    Marital status:      Spouse name: N/A    Number of children: N/A    Years of education: N/A     Occupational History    Not on file.      Social History Main Topics    Smoking status: Never Smoker    Smokeless tobacco: Never Used    Alcohol use 0.6 - 1.2 oz/week     1 - 2 Cans of beer per week    Drug use: No    Sexual activity: Yes     Partners: Female     Other Topics Concern    Not on file     Social History Narrative       Allergies: No Known Allergies    Current Medications:  Current Facility-Administered Medications   Medication Dose Route Frequency    piperacillin-tazobactam (ZOSYN) 3.375 g in 0.9% sodium chloride (MBP/ADV) 100 mL  3.375 g IntraVENous NOW    piperacillin-tazobactam (ZOSYN) 3.375 g in 0.9% sodium chloride (MBP/ADV) 100 mL  3.375 g IntraVENous Q8H    levothyroxine (SYNTHROID) tablet 125 mcg  125 mcg Oral ACB    sodium chloride (NS) flush 5-10 mL  5-10 mL IntraVENous Q8H    sodium chloride (NS) flush 5-10 mL  5-10 mL IntraVENous PRN    HYDROmorphone (PF) (DILAUDID) injection 1 mg  1 mg IntraVENous Q4H PRN    diazePAM (VALIUM) tablet 5 mg  5 mg Oral QHS    diazePAM (VALIUM) tablet 5 mg  5 mg Oral DAILY PRN    traMADol (ULTRAM) tablet 50 mg  50 mg Oral QHS PRN        Physical Exam:  Blood pressure 114/66, pulse 65, temperature 97.7 °F (36.5 °C), resp. rate 20, height 6' 5\" (1.956 m), weight 106.7 kg (235 lb 3.5 oz), SpO2 96 %. GENERAL: alert, cooperative, mild distress, appears stated age, LUNG: clear to auscultation bilaterally, HEART: regular rate and rhythm      Alerts:    Hospital Problems  Date Reviewed: 4/5/2018          Codes Class Noted POA    Discitis ICD-10-CM: M46.40  ICD-9-CM: 722.90  4/5/2018 Unknown        Leukopenia ICD-10-CM: D72.819  ICD-9-CM: 288.50  4/5/2018 Unknown        Osteomyelitis ([address] ICD-10-CM: M86.9  ICD-9-CM: 730.20  4/5/2018 Unknown        Fall ICD-10-CM: Joe Valera. Psychiatric  ICD-9-CM: E888.9  4/5/2018 Unknown        Acquired hypothyroidism (Chronic) ICD-10-CM: E03.9  ICD-9-CM: 244.9  1/11/2016 Yes              Laboratory:      Recent Labs      04/06/18   0422   HGB  10.6*   HCT  32.9*   WBC  3.2*   PLT  175   BUN  26*   CREA  0.96   K  3.6         Plan of Care/Planned Procedure:  Risks, benefits, and alternatives reviewed with patient and he agrees to proceed with the procedure.

## 2018-04-07 LAB
ALBUMIN SERPL-MCNC: 3.3 G/DL (ref 3.5–5)
ALBUMIN/GLOB SERPL: 1 {RATIO} (ref 1.1–2.2)
ALP SERPL-CCNC: 94 U/L (ref 45–117)
ALT SERPL-CCNC: 22 U/L (ref 12–78)
ANION GAP SERPL CALC-SCNC: 8 MMOL/L (ref 5–15)
AST SERPL-CCNC: 16 U/L (ref 15–37)
BASOPHILS # BLD: 0 K/UL (ref 0–0.1)
BASOPHILS NFR BLD: 1 % (ref 0–1)
BILIRUB DIRECT SERPL-MCNC: 0.2 MG/DL (ref 0–0.2)
BILIRUB SERPL-MCNC: 1 MG/DL (ref 0.2–1)
BUN SERPL-MCNC: 20 MG/DL (ref 6–20)
BUN/CREAT SERPL: 20 (ref 12–20)
CALCIUM SERPL-MCNC: 8.7 MG/DL (ref 8.5–10.1)
CHLORIDE SERPL-SCNC: 106 MMOL/L (ref 97–108)
CO2 SERPL-SCNC: 24 MMOL/L (ref 21–32)
CREAT SERPL-MCNC: 1.02 MG/DL (ref 0.7–1.3)
DIFFERENTIAL METHOD BLD: ABNORMAL
EOSINOPHIL # BLD: 0.2 K/UL (ref 0–0.4)
EOSINOPHIL NFR BLD: 5 % (ref 0–7)
ERYTHROCYTE [DISTWIDTH] IN BLOOD BY AUTOMATED COUNT: 14.3 % (ref 11.5–14.5)
GLOBULIN SER CALC-MCNC: 3.3 G/DL (ref 2–4)
GLUCOSE SERPL-MCNC: 109 MG/DL (ref 65–100)
HCT VFR BLD AUTO: 34 % (ref 36.6–50.3)
HGB BLD-MCNC: 11.3 G/DL (ref 12.1–17)
IMM GRANULOCYTES # BLD: 0 K/UL (ref 0–0.04)
IMM GRANULOCYTES NFR BLD AUTO: 1 % (ref 0–0.5)
LYMPHOCYTES # BLD: 0.9 K/UL (ref 0.8–3.5)
LYMPHOCYTES NFR BLD: 25 % (ref 12–49)
MAGNESIUM SERPL-MCNC: 1.8 MG/DL (ref 1.6–2.4)
MCH RBC QN AUTO: 30.2 PG (ref 26–34)
MCHC RBC AUTO-ENTMCNC: 33.2 G/DL (ref 30–36.5)
MCV RBC AUTO: 90.9 FL (ref 80–99)
MONOCYTES # BLD: 0.3 K/UL (ref 0–1)
MONOCYTES NFR BLD: 8 % (ref 5–13)
NEUTS SEG # BLD: 2.2 K/UL (ref 1.8–8)
NEUTS SEG NFR BLD: 62 % (ref 32–75)
NRBC # BLD: 0 K/UL (ref 0–0.01)
NRBC BLD-RTO: 0 PER 100 WBC
PHOSPHATE SERPL-MCNC: 3.4 MG/DL (ref 2.6–4.7)
PLATELET # BLD AUTO: 177 K/UL (ref 150–400)
PMV BLD AUTO: 9.4 FL (ref 8.9–12.9)
POTASSIUM SERPL-SCNC: 3.9 MMOL/L (ref 3.5–5.1)
PROT SERPL-MCNC: 6.6 G/DL (ref 6.4–8.2)
RBC # BLD AUTO: 3.74 M/UL (ref 4.1–5.7)
SODIUM SERPL-SCNC: 138 MMOL/L (ref 136–145)
WBC # BLD AUTO: 3.6 K/UL (ref 4.1–11.1)

## 2018-04-07 PROCEDURE — 97116 GAIT TRAINING THERAPY: CPT

## 2018-04-07 PROCEDURE — 85025 COMPLETE CBC W/AUTO DIFF WBC: CPT | Performed by: INTERNAL MEDICINE

## 2018-04-07 PROCEDURE — 83735 ASSAY OF MAGNESIUM: CPT | Performed by: INTERNAL MEDICINE

## 2018-04-07 PROCEDURE — 65270000029 HC RM PRIVATE

## 2018-04-07 PROCEDURE — 74011000258 HC RX REV CODE- 258: Performed by: INTERNAL MEDICINE

## 2018-04-07 PROCEDURE — 97161 PT EVAL LOW COMPLEX 20 MIN: CPT

## 2018-04-07 PROCEDURE — 74011250636 HC RX REV CODE- 250/636: Performed by: INTERNAL MEDICINE

## 2018-04-07 PROCEDURE — 80076 HEPATIC FUNCTION PANEL: CPT | Performed by: INTERNAL MEDICINE

## 2018-04-07 PROCEDURE — 80048 BASIC METABOLIC PNL TOTAL CA: CPT | Performed by: INTERNAL MEDICINE

## 2018-04-07 PROCEDURE — 36415 COLL VENOUS BLD VENIPUNCTURE: CPT | Performed by: INTERNAL MEDICINE

## 2018-04-07 PROCEDURE — 84100 ASSAY OF PHOSPHORUS: CPT | Performed by: INTERNAL MEDICINE

## 2018-04-07 PROCEDURE — 74011250637 HC RX REV CODE- 250/637: Performed by: INTERNAL MEDICINE

## 2018-04-07 RX ORDER — HYDROMORPHONE HYDROCHLORIDE 2 MG/ML
1 INJECTION, SOLUTION INTRAMUSCULAR; INTRAVENOUS; SUBCUTANEOUS
Status: DISCONTINUED | OUTPATIENT
Start: 2018-04-07 | End: 2018-04-10 | Stop reason: HOSPADM

## 2018-04-07 RX ADMIN — LEVOTHYROXINE SODIUM 125 MCG: 125 TABLET ORAL at 06:36

## 2018-04-07 RX ADMIN — CEFEPIME HYDROCHLORIDE 2 G: 2 INJECTION, POWDER, FOR SOLUTION INTRAVENOUS at 10:51

## 2018-04-07 RX ADMIN — SODIUM CHLORIDE 3.38 G: 900 INJECTION, SOLUTION INTRAVENOUS at 02:52

## 2018-04-07 RX ADMIN — CEFEPIME HYDROCHLORIDE 2 G: 2 INJECTION, POWDER, FOR SOLUTION INTRAVENOUS at 20:38

## 2018-04-07 RX ADMIN — Medication 10 ML: at 17:56

## 2018-04-07 RX ADMIN — VANCOMYCIN HYDROCHLORIDE 1500 MG: 10 INJECTION, POWDER, LYOPHILIZED, FOR SOLUTION INTRAVENOUS at 00:08

## 2018-04-07 RX ADMIN — DIAZEPAM 5 MG: 5 TABLET ORAL at 21:45

## 2018-04-07 RX ADMIN — Medication 10 ML: at 20:40

## 2018-04-07 RX ADMIN — TRAMADOL HYDROCHLORIDE 50 MG: 50 TABLET, FILM COATED ORAL at 21:45

## 2018-04-07 RX ADMIN — VANCOMYCIN HYDROCHLORIDE 1500 MG: 10 INJECTION, POWDER, LYOPHILIZED, FOR SOLUTION INTRAVENOUS at 12:45

## 2018-04-07 NOTE — PROGRESS NOTES
Regarding Cell-count lab order regarding spinal sample yesterday. Per lab this cannot be done with current sample and lumbar/spinal bx would have to be repeated to obtain this result. Spoke to ortho. Ok to discontinue lab request, however order already appears to be discontinued.

## 2018-04-07 NOTE — PROGRESS NOTES
physical Therapy EVALUATION/DISCHARGE  Patient: Tara Xavier (89 y.o. male)  Date: 4/7/2018  Primary Diagnosis: Diskitis  Osteomyelitis ([address]        Precautions:   Fall  ASSESSMENT :  Based on the objective data described below, the patient presents with functional strength, ROM, balance, transfers and gait following admission for discitis. Patient was received up in chair willing to participate with PT. He reports history of back spasms while on recent cruise causing him to fall and cut vacation short. He was getting some OP PT. He had biopsy and is awaiting results, but so far tests reveal L2-L3 discitis. Dopplers negative for DVT. Patient reports MD told him not to bend or lift. PT educated him regarding BLT's and he expressed understanding. PT assisted him with dressing, then he ambulated 250 feet and up and down 4 steps with supervision. No loss of balance or buckling. At baseline he was very active and plays basketball and does yard work. Patient is anxious to return to increased activity. He is safe to ambulate on his own while in hospital and safe for discharge from PT standpoint. Skilled physical therapy is not indicated at this time. PLAN :  Discharge Recommendations: None  Further Equipment Recommendations for Discharge: none     SUBJECTIVE:   Patient stated It feels good to walk; I get stiff if I sit too long.     OBJECTIVE DATA SUMMARY:   HISTORY:    Past Medical History:   Diagnosis Date    Family history of skin cancer     brother-BCC    Skin cancer     SCC nose, right eye lid, left eye brow and left cheek    Sun-damaged skin     Thyroid disease      Past Surgical History:   Procedure Laterality Date    HX COLONOSCOPY      HX MOHS PROCEDURES  01/22/2018    SCC L cheek by Dr. Alfonso Lorenzo  2012    colonoscopy     Prior Level of Function/Home Situation: independent  Personal factors and/or comorbidities impacting plan of care: none    [address] [facility] Environment: Private residence  # Steps to Enter: 3  Rails to [employer]: Yes  One/Two Story Residence: Two story, live on 1st floor  Living Alone: No  Support Systems: Spouse/Significant Other/Partner  Patient Expects to be Discharged to[de-identified] Private residence  Current DME Used/Available at Home: None    EXAMINATION/PRESENTATION/DECISION MAKING:   Critical Behavior:  Neurologic State: Alert  Orientation Level: Oriented X4  Cognition: Appropriate safety awareness, Appropriate for age attention/concentration, Appropriate decision making, Follows commands  Safety/Judgement: Insight into deficits  Hearing: Auditory  Auditory Impairment: Hearing aid(s), Hard of hearing, bilateral  Skin:  Not fully observed    Range Of Motion:  AROM: Within functional limits (BLE)                       Strength:    Strength: Within functional limits (slight weakness to bilateral hip flexors but still 4/5)                    Tone & Sensation:   Tone: Normal              Sensation: Intact (denies numbness or tingling)                      Functional Mobility:  Bed Mobility:              Transfers:  Sit to Stand: Independent  Stand to Sit: Independent                       Balance:   Sitting: Intact  Standing: Intact  Ambulation/Gait Training:  Distance (ft): 250 Feet (ft)  Assistive Device: Gait belt  Ambulation - Level of Assistance: Supervision                                                    Stairs:  Number of Stairs Trained: 4  Stairs - Level of Assistance: Contact guard assistance   Rail Use: Right      Therapeutic Exercises:   Instructed patient is seated and supine BLE exercises to BLE including heel slide, SLR, hip abduction, hip flexion in sitting.      Functional Measure:  Tinetti test:    Sitting Balance: 1  Arises: 2  Attempts to Rise: 2  Immediate Standing Balance: 2  Standing Balance: 2  Nudged: 2  Eyes Closed: 1  Turn 360 Degrees - Continuous/Discontinuous: 1  Turn 360 Degrees - Steady/Unsteady: 1  Sitting Down: 2  Balance Score: 16  Indication of Gait: 1  R Step Length/Height: 1  L Step Length/Height: 1  R Foot Clearance: 1  L Foot Clearance: 1  Step Symmetry: 1  Step Continuity: 1  Path: 2  Trunk: 2  Walking Time: 1  Gait Score: 12  Total Score: 28       Tinetti Test and G-code impairment scale:  Percentage of Impairment CH    0%   CI    1-19% CJ    20-39% CK    40-59% CL    60-79% CM    80-99% CN     100%   Tinetti  Score 0-28 28 23-27 17-22 12-16 6-11 1-5 0       Tinetti Tool Score Risk of Falls  <19 = High Fall Risk  19-24 = Moderate Fall Risk  25-28 = Low Fall Risk  Tinetti ME. Performance-Oriented Assessment of Mobility Problems in Elderly Patients. [facility] 66; [id number]. (Scoring Description: PT Bulletin Feb. 10, 1993)    Older adults: Leland Madrigal et al, 2009; n = 1000 [facility] elderly evaluated with ABC, FEREDOM, ADL, and IADL)  · Mean FREEDOM score for males aged 69-68 years = 26.21(3.40)  · Mean FREEDOM score for females age 69-68 years = 25.16(4.30)  · Mean FREEDOM score for males over 80 years = 23.29(6.02)  · Mean FREEDOM score for females over 80 years = 17.20(8.32)            Physical Therapy Evaluation Charge Determination   History Examination Presentation Decision-Making   MEDIUM  Complexity : 1-2 comorbidities / personal factors will impact the outcome/ POC  LOW Complexity : 1-2 Standardized tests and measures addressing body structure, function, activity limitation and / or participation in recreation  LOW Complexity : Stable, uncomplicated  Other outcome measures tinetti  LOW       Based on the above components, the patient evaluation is determined to be of the following complexity level: LOW     Pain:  Pain Scale 1: Numeric (0 - 10)  Pain Intensity 1: 0     Activity Tolerance:   good  Please refer to the flowsheet for vital signs taken during this treatment.   After treatment:   [x]   Patient left in no apparent distress sitting up in chair  []   Patient left in no apparent distress in bed  [x]   Call bell left within reach  [x]   Nursing notified  []   Caregiver present  []   Bed alarm activated    COMMUNICATION/EDUCATION:   Communication/Collaboration:  [x]   Fall prevention education was provided and the patient/caregiver indicated understanding. [x]   Patient/family have participated as able and agree with findings and recommendations. []   Patient is unable to participate in plan of care at this time.   Findings and recommendations were discussed with: Registered Nurse    Thank you for this referral.  Wilmer Molina, PT   Time Calculation: 23 mins

## 2018-04-07 NOTE — PROGRESS NOTES
Orthopaedic Progress Note  Post Op day: * No surgery found *    2018 8:18 AM     Patient: Joni Garcia MRN: [id number]  SSN: [id number]    YOB: 1950  Age: 76 y.o. Sex: male      Admit date:  2018  Date of Surgery:  * No surgery found *   Procedures:    Admitting Physician:  Dianelys Zaragoza MD   Surgeon:  * Surgery not found *    Consulting Physician(s): Treatment Team: Attending Provider: Shonna Morales MD; Consulting Provider: Riaz Oswald MD; Surgeon: MARYAM Freeman; Consulting Provider: Amarilis Davidson MD; Consulting Provider: Sánchez Motley MD; Utilization Review: Mauro Taylor RN; Care Manager: Bree Manuel    SUBJECTIVE:     Joni Garcia is resting in bed with minimal complaints of back pain. Denies radiculopathy. Up to chair, walking in halls PRN. OBJECTIVE:       Physical Exam:  General: Alert, cooperative, no distress. Respiratory: Respirations unlabored  Neurological:  Neurovascular exam within normal limits. Motor: + DF/PF. 5.5 strength throughout. Musculoskeletal: Calves soft, supple, non-tender upon palpation.           Vital Signs:      Patient Vitals for the past 8 hrs:   BP Temp Pulse Resp SpO2   18 0757 123/69 97.8 °F (36.6 °C) 68 16 94 %   18 0531 113/65 97.6 °F (36.4 °C) 62 16 94 %   18 0204 120/69 98.2 °F (36.8 °C) 68 15 93 %                                          Temp (24hrs), Av.9 °F (36.6 °C), Min:97.5 °F (36.4 °C), Max:98.6 °F (37 °C)      Labs:        Recent Labs      18   05   HCT  34.0*   HGB  11.3*     Lab Results   Component Value Date/Time    Sodium 138 2018 05:24 AM    Potassium 3.9 2018 05:24 AM    Chloride 106 2018 05:24 AM    CO2 24 2018 05:24 AM    Glucose 109 (H) 2018 05:24 AM    BUN 20 2018 05:24 AM    Creatinine 1.02 2018 05:24 AM    Calcium 8.7 2018 05:24 AM       PT/OT:                Patient mobility ASSESSMENT / PLAN:   Active Problems:    Acquired hypothyroidism (1/11/2016)      Discitis (4/5/2018)      Leukopenia (4/5/2018)      Osteomyelitis ([address] (4/5/2018)      Fall (4/5/2018)                    Pain Control: Continue with current regimen; seems to control pain appropriately. DVT Prophylaxis: Lovenox to start today. SCD's bilaterally   Therapy/ Weight bearing: WBAT; up ad wang   Medical concerns: Await culture results: on broad spectrum abx for now until final culture results available. D has seen patient. Disposition: Most likely home with IV abx.       Signed By:  Hallie Calderon NP    [facility] [place]  [facility]

## 2018-04-07 NOTE — PROGRESS NOTES
Pepe [facility]  [address] [facility], [address] [place] [address]  [phone]      Medical Progress Note      NAME: Thalia Serna   :  1950  MRM:  [id number]    Date/Time: 2018          Subjective:     Chief Complaint:  F/u discitis    Chart/notes/labs/studies reviewed, patient examined at bedside. Still having back pain but better. Ambulated with PT and did okay, even on stairs. No fevers or chills. Objective:       Vitals:          Last 24hrs VS reviewed since prior progress note. Most recent are:    Visit Vitals    /63 (BP 1 Location: Left arm, BP Patient Position: At rest)    Pulse 66    Temp 98.2 °F (36.8 °C)    Resp 18    Ht 6' 5\" (1.956 m)    Wt 106.7 kg (235 lb 3.5 oz)    SpO2 96%    BMI 27.89 kg/m2     SpO2 Readings from Last 6 Encounters:   18 96%   18 98%   18 99%   18 92%   18 99%   18 97%        No intake or output data in the 24 hours ending 18 1523       Exam:     Physical Exam:    Gen:  Well-developed, well-nourished, in no acute distress. Pleasant   HEENT:  Sclerae nonicteric, hearing intact to voice, mucous membranes moist  Neck:  Supple, without masses. Resp:  No accessory muscle use, CTAB without wheezes, rales, or rhonchi  Card: RRR, without m/r/g. Trace BLE edema. Abd:  +bowel sounds, soft, NTTP, nondistended. Neuro: Face symmetric, tongue midline, speech fluent, follows commands appropriately  Psych:  Alert, oriented x 3.  Good insight     Medications Reviewed: (see below)    Lab Data Reviewed: (see below)    ______________________________________________________________________    Medications:     Current Facility-Administered Medications   Medication Dose Route Frequency    cefepime (MAXIPIME) 2 g in 0.9% sodium chloride (MBP/ADV) 100 mL  2 g IntraVENous Q8H    vancomycin (VANCOCIN) 1500 mg in  ml infusion  1,500 mg IntraVENous Q12H    levothyroxine (SYNTHROID) tablet 125 mcg 125 mcg Oral ACB    sodium chloride (NS) flush 5-10 mL  5-10 mL IntraVENous Q8H    sodium chloride (NS) flush 5-10 mL  5-10 mL IntraVENous PRN    HYDROmorphone (PF) (DILAUDID) injection 1 mg  1 mg IntraVENous Q4H PRN    diazePAM (VALIUM) tablet 5 mg  5 mg Oral QHS    diazePAM (VALIUM) tablet 5 mg  5 mg Oral DAILY PRN    traMADol (ULTRAM) tablet 50 mg  50 mg Oral QHS PRN            Lab Review:     Recent Labs      04/07/18 0524 04/06/18 0422 04/05/18 1946   WBC  3.6*  3.2*  3.2*   HGB  11.3*  10.6*  11.6*   HCT  34.0*  32.9*  35.8*   PLT  177  175  189     Recent Labs      04/07/18 0524 04/06/18 0422 04/05/18 1946   NA  138  141  140   K  3.9  3.6  3.7   CL  106  107  105   CO2  24  27  29   GLU  109*  101*  100   BUN  20  26*  27*   CREA  1.02  0.96  1.01   CA  8.7  8.4*  8.9   MG  1.8   --    --    PHOS  3.4   --    --    ALB  3.3*   --   3.6   SGOT  16   --   15   ALT  22   --   22     No components found for: GLPOC  No results for input(s): PH, PCO2, PO2, HCO3, FIO2 in the last 72 hours. No results for input(s): INR in the last 72 hours. No lab exists for component: INREXT, INREXT  No results found for: SDES  Lab Results   Component Value Date/Time    Culture result: NO GROWTH AFTER 23 HOURS 04/06/2018 10:49 AM    Culture result: NO GROWTH 2 DAYS 04/05/2018 07:46 PM    Culture result: NO GROWTH 2 DAYS 04/05/2018 07:46 PM            Assessment / Plan:      Discitis of L2-L3/ Osteomyelitis: Underwent biopsy by IR. Unfortunately cell count was not sent but gram stain negative. Started vanc yesterday. Zosyn changed to cefepime at [facility] recommendation. Adding fungal and AFB cultures. Pt did have recent dental cleaning but no other RFs for infection. Added cytology. Falls: due to the above. PT / OT, fall precautions       BLE edema: trace, but apparently was worse.  Checked LE doppers given recent trip to [facility] which were negative     Acquired hypothyroidism (): Continue LT4    Leukopenia: may be 2/2 infection. Follow CBC with diff. Stable.  No neutropenia           Total time spent with patient: 25 minutes                 Care Plan discussed with: Patient, Nursing Staff and >50% of time spent in counseling and coordination of care    Discussed:  Care Plan and D/C Planning    Prophylaxis:  Lovenox to start tomorrow    Disposition:  Home w/Family vs rehab           ___________________________________________________    Attending Physician: Abiodun Soto MD

## 2018-04-08 LAB
ANION GAP SERPL CALC-SCNC: 8 MMOL/L (ref 5–15)
BASOPHILS # BLD: 0 K/UL (ref 0–0.1)
BASOPHILS NFR BLD: 1 % (ref 0–1)
BUN SERPL-MCNC: 20 MG/DL (ref 6–20)
BUN/CREAT SERPL: 21 (ref 12–20)
CALCIUM SERPL-MCNC: 8.2 MG/DL (ref 8.5–10.1)
CHLORIDE SERPL-SCNC: 106 MMOL/L (ref 97–108)
CO2 SERPL-SCNC: 26 MMOL/L (ref 21–32)
CREAT SERPL-MCNC: 0.95 MG/DL (ref 0.7–1.3)
DATE LAST DOSE: ABNORMAL
DIFFERENTIAL METHOD BLD: ABNORMAL
EOSINOPHIL # BLD: 0.2 K/UL (ref 0–0.4)
EOSINOPHIL NFR BLD: 4 % (ref 0–7)
ERYTHROCYTE [DISTWIDTH] IN BLOOD BY AUTOMATED COUNT: 14.2 % (ref 11.5–14.5)
GLUCOSE SERPL-MCNC: 96 MG/DL (ref 65–100)
HCT VFR BLD AUTO: 32.2 % (ref 36.6–50.3)
HGB BLD-MCNC: 10.8 G/DL (ref 12.1–17)
IMM GRANULOCYTES # BLD: 0 K/UL (ref 0–0.04)
IMM GRANULOCYTES NFR BLD AUTO: 1 % (ref 0–0.5)
LYMPHOCYTES # BLD: 1.1 K/UL (ref 0.8–3.5)
LYMPHOCYTES NFR BLD: 30 % (ref 12–49)
MCH RBC QN AUTO: 30.3 PG (ref 26–34)
MCHC RBC AUTO-ENTMCNC: 33.5 G/DL (ref 30–36.5)
MCV RBC AUTO: 90.2 FL (ref 80–99)
MONOCYTES # BLD: 0.3 K/UL (ref 0–1)
MONOCYTES NFR BLD: 8 % (ref 5–13)
NEUTS SEG # BLD: 2.1 K/UL (ref 1.8–8)
NEUTS SEG NFR BLD: 57 % (ref 32–75)
NRBC # BLD: 0 K/UL (ref 0–0.01)
NRBC BLD-RTO: 0 PER 100 WBC
PLATELET # BLD AUTO: 201 K/UL (ref 150–400)
PMV BLD AUTO: 10.4 FL (ref 8.9–12.9)
POTASSIUM SERPL-SCNC: 3.8 MMOL/L (ref 3.5–5.1)
RBC # BLD AUTO: 3.57 M/UL (ref 4.1–5.7)
REPORTED DOSE,DOSE: ABNORMAL UNITS
REPORTED DOSE/TIME,TMG: 1200
SODIUM SERPL-SCNC: 140 MMOL/L (ref 136–145)
VANCOMYCIN TROUGH SERPL-MCNC: 12.8 UG/ML (ref 5–10)
WBC # BLD AUTO: 3.7 K/UL (ref 4.1–11.1)

## 2018-04-08 PROCEDURE — 85025 COMPLETE CBC W/AUTO DIFF WBC: CPT | Performed by: INTERNAL MEDICINE

## 2018-04-08 PROCEDURE — 74011250637 HC RX REV CODE- 250/637: Performed by: INTERNAL MEDICINE

## 2018-04-08 PROCEDURE — 36415 COLL VENOUS BLD VENIPUNCTURE: CPT | Performed by: INTERNAL MEDICINE

## 2018-04-08 PROCEDURE — 80048 BASIC METABOLIC PNL TOTAL CA: CPT | Performed by: INTERNAL MEDICINE

## 2018-04-08 PROCEDURE — 80202 ASSAY OF VANCOMYCIN: CPT | Performed by: INTERNAL MEDICINE

## 2018-04-08 PROCEDURE — 65270000029 HC RM PRIVATE

## 2018-04-08 PROCEDURE — 97535 SELF CARE MNGMENT TRAINING: CPT

## 2018-04-08 PROCEDURE — 77030027138 HC INCENT SPIROMETER -A

## 2018-04-08 PROCEDURE — 74011250636 HC RX REV CODE- 250/636: Performed by: INTERNAL MEDICINE

## 2018-04-08 PROCEDURE — 74011000258 HC RX REV CODE- 258: Performed by: INTERNAL MEDICINE

## 2018-04-08 PROCEDURE — 97165 OT EVAL LOW COMPLEX 30 MIN: CPT

## 2018-04-08 RX ORDER — ENOXAPARIN SODIUM 100 MG/ML
40 INJECTION SUBCUTANEOUS EVERY 24 HOURS
Status: DISCONTINUED | OUTPATIENT
Start: 2018-04-09 | End: 2018-04-10 | Stop reason: HOSPADM

## 2018-04-08 RX ORDER — AMOXICILLIN 250 MG
1 CAPSULE ORAL 2 TIMES DAILY
Status: DISCONTINUED | OUTPATIENT
Start: 2018-04-08 | End: 2018-04-10 | Stop reason: HOSPADM

## 2018-04-08 RX ORDER — TRAMADOL HYDROCHLORIDE 50 MG/1
50 TABLET ORAL
Status: DISCONTINUED | OUTPATIENT
Start: 2018-04-08 | End: 2018-04-10 | Stop reason: HOSPADM

## 2018-04-08 RX ORDER — OXYCODONE AND ACETAMINOPHEN 5; 325 MG/1; MG/1
1 TABLET ORAL
Status: DISCONTINUED | OUTPATIENT
Start: 2018-04-08 | End: 2018-04-10 | Stop reason: HOSPADM

## 2018-04-08 RX ADMIN — VANCOMYCIN HYDROCHLORIDE 1500 MG: 10 INJECTION, POWDER, LYOPHILIZED, FOR SOLUTION INTRAVENOUS at 12:53

## 2018-04-08 RX ADMIN — TRAMADOL HYDROCHLORIDE 50 MG: 50 TABLET, FILM COATED ORAL at 11:26

## 2018-04-08 RX ADMIN — Medication 10 ML: at 20:49

## 2018-04-08 RX ADMIN — VANCOMYCIN HYDROCHLORIDE 1750 MG: 10 INJECTION, POWDER, LYOPHILIZED, FOR SOLUTION INTRAVENOUS at 22:17

## 2018-04-08 RX ADMIN — TRAMADOL HYDROCHLORIDE 50 MG: 50 TABLET, FILM COATED ORAL at 22:02

## 2018-04-08 RX ADMIN — DIAZEPAM 5 MG: 5 TABLET ORAL at 12:52

## 2018-04-08 RX ADMIN — Medication 10 ML: at 17:10

## 2018-04-08 RX ADMIN — CEFEPIME HYDROCHLORIDE 2 G: 2 INJECTION, POWDER, FOR SOLUTION INTRAVENOUS at 11:34

## 2018-04-08 RX ADMIN — DIAZEPAM 5 MG: 5 TABLET ORAL at 22:02

## 2018-04-08 RX ADMIN — VANCOMYCIN HYDROCHLORIDE 1500 MG: 10 INJECTION, POWDER, LYOPHILIZED, FOR SOLUTION INTRAVENOUS at 01:35

## 2018-04-08 RX ADMIN — LEVOTHYROXINE SODIUM 125 MCG: 125 TABLET ORAL at 06:47

## 2018-04-08 RX ADMIN — CEFEPIME HYDROCHLORIDE 2 G: 2 INJECTION, POWDER, FOR SOLUTION INTRAVENOUS at 20:49

## 2018-04-08 RX ADMIN — CEFEPIME HYDROCHLORIDE 2 G: 2 INJECTION, POWDER, FOR SOLUTION INTRAVENOUS at 04:33

## 2018-04-08 NOTE — PROGRESS NOTES
Spiritual Care Assessment/Progress Note  [address]      NAME: Joni Garcia      MRN: [id number]  AGE: 76 y.o. SEX: male  Denominational Affiliation: No Sabianism   Language: English     4/8/2018     Total Time (in minutes): 40     Spiritual Assessment begun in [facility] SURG [facility]T 2 through conversation with:         [x]Patient        [x] Family    [] Friend(s)        Reason for Consult: Initial/Spiritual assessment, patient floor     Spiritual beliefs: (Please include comment if needed)     [x] Involved in a mars tradition/spiritual practice:     [] Supported by a mars community:      [] Claims no spiritual orientation:      [] Seeking spiritual identity:           [] Adheres to an individual form of spirituality:      [] Not able to assess:                     Identified resources for coping:      [] Prayer                  [] Devotional reading               [] Music                  [] Guided Imagery     [x] Family/friends                 [] Pet visits     [] Other:        Interventions offered during this visit: (See comments for more details)    Patient Interventions: Affirmation of emotions/emotional suffering, Affirmation of mars, Catharsis/review of pertinent events in supportive environment, Coping skills reviewed/reinforced, Iconic (affirming the presence of God/Higher Power), Initial/Spiritual assessment, patient floor, Prayer (assurance of), Normalization of emotional/spiritual concerns     Family/Friend(s):  Affirmation of emotions/emotional suffering, Affirmation of mars, Catharsis/review of pertinent events in supportive environment, Iconic (affirming the presence of God/Higher Power), Prayer (assurance of)     Plan of Care:     [] Discuss Spiritual/Cultural needs    [] Support AMD and/or advance care planning process      [] Support grieving process   [] Coordinate Rites/Rituals    [] Coordination with community clergy   [x] No spiritual needs identified at this time   [] Detailed Plan of Care below (See Comments)  [] Make referral to Music Therapy  [] Make referral to Pet Therapy     [] Make referral to Addiction services  [] Make referral to [facility]  [] Make referral to Spiritual Care Partner  [] No future visits requested             Comments: Initial spiritual assessment in [facility]. Mr. Sharyon Dance wife Juan Francisco Cardenas was present. they have been together since they were teenagers. They shared have 3 wonderful children. He shared his frustration with his back and the challenges he has faced since [place]. Provided a listening ear as they shared about their frustrations with the busyness of the ER when they arrive. The [facility], Juan Francisco Cardenas shared testimony of how God blessed her as a result of prayer. Provided spiritual presence and assurance of prayer. Advised of  Availability.   Visited by: Jeet Hancock [address] [facility] Ellen Buenrostro ([id number])

## 2018-04-08 NOTE — PROGRESS NOTES
Orthopedic & Surgical Nursing Communication Tool      7:54 AM  4/8/2018     Bedside and Verbal shift change report given to Racheal Serna RN (incoming nurse) by Christal Tam RN (outgoing nurse) on Dale Bend a 76 y.o. male who was admitted on 4/5/2018  5:30 PM. Report included the following information SBAR, Kardex, Intake/Output, MAR, Recent Results and Med Rec Status. Hourly rounding completed during shift. No acute distress         Opportunity for questions and clarifications were given to the incoming nurse. Patient's bed is in low position, side rails x2, door open PRN, call bell within reach and patient not in distress.     Christal Tam RN

## 2018-04-08 NOTE — PROGRESS NOTES
Bedside and Verbal shift change report given to Edith Osullivan RN (oncoming nurse) by Stuart Rouse RN (offgoing nurse). Report included the following information SBAR, Kardex, Procedure Summary, Intake/Output, MAR and Recent Results.

## 2018-04-08 NOTE — PROGRESS NOTES
Problem: Self Care Deficits Care Plan (Adult)  Goal: *Acute Goals and Plan of Care (Insert Text)  Occupational Therapy Goals  Initiated 4/8/2018  1. Patient will perform lower body dressing, using AE PRN, with supervision/set-up within 7 day(s). 2.  Patient will perform grooming, standing sink, with supervision/set-up within 7 day(s). 3.  Patient will perform toilet transfers with supervision/set-up within 7 day(s). 4.  Patient will perform all aspects of toileting with supervision/set-up within 7 day(s). 5.  Patient will utilize energy conservation techniques during functional activities with verbal cues within 7 day(s). Occupational Therapy EVALUATION  Patient: Sly Dubois (24 y.o. male)  Date: 4/8/2018  Primary Diagnosis: Diskitis  Osteomyelitis ([facility] [address]        Precautions:   Fall    ASSESSMENT :  Based on the objective data described below, the patient presents with hospital admission secondary to diskitis. Patient received supine in bed and reports more pain/spasms and near fall today secondary to spasms during transfer to [facility]. Patient noted seen by PT yesterday and discharged from PT services as patient able to perform mobility without assistance. Patient reports today not so well. Patient reports walking to bathroom with wife in room and reports right side low back spasm causing knees to give out, and need to hold to bathroom door frame for support. Patient reports medicated with tramadol and valium since spasm and now patient agreeable to OOB for OT evaluation and able to perform log roll technique with supervision and increased time. Upon sitting he notes increased tightness to R side low back. Patient able to stand from EOB with CGA x 2 and use of RW for additional support. Patient reports most pain with transition from sit to stand. He is able to transfer to [facility] set up over toilet in bathroom with assist x 2 for increased safety.   Patient reports improved height for sit/stand with elevated commode seat, but unable to use functionally due to small size of toilet opening. Patient able to return to EOB with assist x 2 with very slow speed. Reviewed BLT and log roll technique and patient verbalized understanding. Patient and wife requesting follow up PT and agreeable to continued OT to increase independence with ADL tasks. Patient reports independence at baseline and eager to return. Patient will benefit from skilled intervention to address the above impairments. Patients rehabilitation potential is considered to be Good  Factors which may influence rehabilitation potential include:   [x]             None noted  []             Mental ability/status  []             Medical condition  []             Home/family situation and support systems  []             Safety awareness  []             Pain tolerance/management  []             Other:      PLAN :  Recommendations and Planned Interventions:  [x]               Self Care Training                  [x]        Therapeutic Activities  [x]               Functional Mobility Training    []        Cognitive Retraining  [x]               Therapeutic Exercises           [x]        Endurance Activities  [x]               Balance Training                   []        Neuromuscular Re-Education  []               Visual/Perceptual Training     [x]   Home Safety Training  [x]               Patient Education                 [x]        Family Training/Education  []               Other (comment):    Frequency/Duration: Patient will be followed by occupational therapy 5 times a week to address goals. Discharge Recommendations: Home Health vs None  Further Equipment Recommendations for Discharge: TBD     SUBJECTIVE:   Patient stated That spasm hits and it knocks me down.     OBJECTIVE DATA SUMMARY:   HISTORY:   Past Medical History:   Diagnosis Date    Family history of skin cancer     brother-BCC    Skin cancer     SCC nose, right eye lid, left eye brow and left cheek    Sun-damaged skin     Thyroid disease      Past Surgical History:   Procedure Laterality Date    HX COLONOSCOPY      HX MOHS PROCEDURES  01/22/2018    SCC L cheek by Dr. Yeison Campos  2012    colonoscopy       Prior Level of Function/Environment/Context: independent , active   Occupations in which the patient is/was successful, what are the barriers preventing that success:   Performance Patterns (routines, roles, habits, and rituals):   Personal Interests and/or values:   Expanded or extensive additional review of patient history:     Home Situation  Home Environment: Private residence  # Steps to Enter: 3  Rails to Enter: Yes  One/Two Story Residence: Two story, live on 1st floor  Lift Chair Available: No  Living Alone: No  Support Systems: Spouse/Significant Other/Partner  Patient Expects to be Discharged to[de-identified] Private residence  Current DME Used/Available at Home: None  Tub or Shower Type: Shower  [x]  Right hand dominant   []  Left hand dominant    EXAMINATION OF PERFORMANCE DEFICITS:  Cognitive/Behavioral Status:  Neurologic State: Alert  Orientation Level: Oriented X4  Cognition: Follows commands  Perception: Appears intact  Perseveration: No perseveration noted  Safety/Judgement: Insight into deficits    Skin: intact as seen    Edema: none noted     Hearing: Auditory  Auditory Impairment: Hearing aid(s), Hard of hearing, bilateral    Vision/Perceptual:                                     Range of Motion:  AROM: Within functional limits                         Strength:  Strength: Within functional limits                Coordination:  Coordination: Within functional limits  Fine Motor Skills-Upper: Left Intact; Right Intact    Gross Motor Skills-Upper: Left Intact; Right Intact    Tone & Sensation:  Tone: Normal  Sensation: Intact                      Balance:  Sitting: Intact  Standing: With support    Functional Mobility and Transfers for ADLs:  Bed Mobility:  Rolling: Supervision  Supine to Sit: Stand-by assistance  Scooting: Supervision    Transfers:  Sit to Stand: Contact guard assistance;Assist x2  Stand to Sit: Contact guard assistance  Toilet Transfer : Contact guard assistance;Assist x2; Additional time    ADL Assessment:  Feeding: Independent    Oral Facial Hygiene/Grooming: Supervision (seated )    Bathing: Moderate assistance (sit to stand for rissa bathing, distal LEs )    Upper Body Dressing: Supervision    Lower Body Dressing: Moderate assistance    Toileting: Minimum assistance (grab bar assist to stand )                ADL Intervention and task modifications:                                     Cognitive Retraining  Safety/Judgement: Insight into deficits    Therapeutic Exercise:     Functional Measure:  Barthel Index:    Bathin  Bladder: 10  Bowels: 10  Groomin  Dressin  Feeding: 10  Mobility: 0  Stairs: 0  Toilet Use: 5  Transfer (Bed to Chair and Back): 10  Total: 55       Barthel and G-code impairment scale:  Percentage of impairment CH  0% CI  1-19% CJ  20-39% CK  40-59% CL  60-79% CM  80-99% CN  100%   Barthel Score 0-100 100 99-80 79-60 59-40 20-39 1-19   0   Barthel Score 0-20 20 17-19 13-16 9-12 5-8 1-4 0      The Barthel ADL Index: Guidelines  1. The index should be used as a record of what a patient does, not as a record of what a patient could do. 2. The main aim is to establish degree of independence from any help, physical or verbal, however minor and for whatever reason. 3. The need for supervision renders the patient not independent. 4. A patient's performance should be established using the best available evidence. Asking the patient, friends/relatives and nurses are the usual sources, but direct observation and common sense are also important. However direct testing is not needed. 5. Usually the patient's performance over the preceding 24-48 hours is important, but occasionally longer periods will be relevant.   6. Middle categories imply that the patient supplies over 50 per cent of the effort. 7. Use of aids to be independent is allowed. Ermelinda Landrum., Barthel, D.W. (0564). Functional evaluation: the Barthel Index. [address] (14)2. RADHA Malhotra, Malena Flynn., Vanessa Castellanos., Nilsa Anne, [facility] (1999). Measuring the change indisability after inpatient rehabilitation; comparison of the responsiveness of the Barthel Index and Functional [place] Measure. Journal of Neurology, Neurosurgery, and Psychiatry, 66(4), 029-252. Bhavin Rock, NKaylaJ.A, LUZ Patton, & Radhika Bull MNORMAN. (2004.) Assessment of post-stroke quality of life in cost-effectiveness studies: The usefulness of the Barthel Index and the EuroQoL-5D. Quality of Life Research, 13, 486-10         G codes: In compliance with CMSs Claims Based Outcome Reporting, the following G-code set was chosen for this patient based on their primary functional limitation being treated: The outcome measure chosen to determine the severity of the functional limitation was the Barthel Index  with a score of 55/100 which was correlated with the impairment scale. ?  Self Care:     - CURRENT STATUS: CK - 40%-59% impaired, limited or restricted    - GOAL STATUS: CJ - 20%-39% impaired, limited or restricted    - D/C STATUS:  ---------------To be determined---------------     Occupational Therapy Evaluation Charge Determination   History Examination Decision-Making   LOW Complexity : Brief history review  LOW Complexity : 1-3 performance deficits relating to physical, cognitive , or psychosocial skils that result in activity limitations and / or participation restrictions  LOW Complexity : No comorbidities that affect functional and no verbal or physical assistance needed to complete eval tasks       Based on the above components, the patient evaluation is determined to be of the following complexity level: LOW   Pain:  Pain Scale 1: Numeric (0 - 10)  Pain Intensity 1: 0              Activity Tolerance:   VSS  Please refer to the flowsheet for vital signs taken during this treatment. After treatment:   [] Patient left in no apparent distress sitting up in chair  [x] Patient left in no apparent distress in bed  [x] Call bell left within reach  [x] Nursing notified  [x] Caregiver present  [] Bed alarm activated    COMMUNICATION/EDUCATION:   The patients plan of care was discussed with: Physical Therapist and Registered Nurse. [x] Home safety education was provided and the patient/caregiver indicated understanding. [x] Patient/family have participated as able in goal setting and plan of care. [x] Patient/family agree to work toward stated goals and plan of care. [] Patient understands intent and goals of therapy, but is neutral about his/her participation. [] Patient is unable to participate in goal setting and plan of care. This patients plan of care is appropriate for delegation to [facility].     Thank you for this referral.  Mildred Jean-Baptiste, OTR/L  Time Calculation: 36 mins

## 2018-04-08 NOTE — PROGRESS NOTES
Peep Fleming [facility]  [address] [facility] [place], [address] [place] [address]  [phone]      Medical Progress Note      NAME: Danielle Cuello   :  1950  MRM:  [id number]    Date/Time: 2018         Subjective:     Chief Complaint:  Patient was seen and examined by me. Chart reviewed. Still with lower back pain. No fevers, chills       Objective:       Vitals:       Last 24hrs VS reviewed since prior progress note.  Most recent are:    Visit Vitals    /66 (BP 1 Location: Left arm, BP Patient Position: Sitting)    Pulse 92    Temp 97.9 °F (36.6 °C)    Resp 18    Ht 6' 5\" (1.956 m)    Wt 106.7 kg (235 lb 3.5 oz)    SpO2 96%    BMI 27.89 kg/m2     SpO2 Readings from Last 6 Encounters:   18 96%   18 98%   18 99%   18 92%   18 99%   18 97%        No intake or output data in the 24 hours ending 18 1337     Exam:     Physical Exam:    Gen:  Well-developed, well-nourished, in no acute distress  HEENT:  Pink conjunctivae, PERRL, hearing intact to voice, moist mucous membranes  Neck:  Supple, without masses, thyroid non-tender  Resp:  No accessory muscle use, clear breath sounds without wheezes rales or rhonchi  Card:  No murmurs, normal S1, S2 without thrills, bruits or peripheral edema  Abd:  Soft, non-tender, non-distended, normoactive bowel sounds are present  Musc:  No cyanosis or clubbing  Skin:  No rashes  Neuro:  Cranial nerves 3-12 are grossly intact, follows commands appropriately  Psych:  Good insight, oriented to person, place and time, alert    Medications Reviewed: (see below)    Lab Data Reviewed: (see below)    ______________________________________________________________________    Medications:     Current Facility-Administered Medications   Medication Dose Route Frequency    vancomycin (VANCOCIN) 1,750 mg in 0.9% sodium chloride 500 mL IVPB  1,750 mg IntraVENous Q12H    cefepime (MAXIPIME) 2 g in 0.9% sodium chloride (MBP/ADV) 100 mL  2 g IntraVENous Q8H    HYDROmorphone (DILAUDID) injection 1 mg  1 mg IntraVENous Q4H PRN    levothyroxine (SYNTHROID) tablet 125 mcg  125 mcg Oral ACB    sodium chloride (NS) flush 5-10 mL  5-10 mL IntraVENous Q8H    sodium chloride (NS) flush 5-10 mL  5-10 mL IntraVENous PRN    diazePAM (VALIUM) tablet 5 mg  5 mg Oral QHS    diazePAM (VALIUM) tablet 5 mg  5 mg Oral DAILY PRN    traMADol (ULTRAM) tablet 50 mg  50 mg Oral QHS PRN          Lab Review:     Recent Labs      04/08/18 0129 04/07/18 0524 04/06/18   0422   WBC  3.7*  3.6*  3.2*   HGB  10.8*  11.3*  10.6*   HCT  32.2*  34.0*  32.9*   PLT  201  177  175     Recent Labs      04/08/18 0129 04/07/18 0524 04/06/18 0422  04/05/18   1946   NA  140  138  141  140   K  3.8  3.9  3.6  3.7   CL  106  106  107  105   CO2  26  24  27  29   GLU  96  109*  101*  100   BUN  20  20  26*  27*   CREA  0.95  1.02  0.96  1.01   CA  8.2*  8.7  8.4*  8.9   MG   --   1.8   --    --    PHOS   --   3.4   --    --    ALB   --   3.3*   --   3.6   TBILI   --   1.0   --   0.6   SGOT   --   16   --   15   ALT   --   22   --   22     No results found for: GLUCPOC       Assessment / Plan: Active Problems:    77 yo hx of hypothyroid, presented w/ back pain, L2/3 discitis, osteomyelitis    1) Discitis/osteomyelitis of L2/3: seen on MRI. S/p IR biopsy on 04/06, cultures pending. Cont IV Vanc/cefepime. ID and ortho following. Likely will need 6 weeks of IV Abx    2) Hypothyroid: cont synthroid    3) Leukopenia: due to infection.   Will monitor     Total time spent with patient: 25 min                  Care Plan discussed with: Patient, nursing    Discussed:  Care Plan    Prophylaxis:  Lovenox    Disposition:  Home w/Family           ___________________________________________________    Attending Physician: Mariangel Martínez MD

## 2018-04-09 ENCOUNTER — HOME HEALTH ADMISSION (OUTPATIENT)
Dept: HOME HEALTH SERVICES | Facility: HOME HEALTH | Age: 68
End: 2018-04-09
Payer: MEDICARE

## 2018-04-09 LAB — CREAT SERPL-MCNC: 0.91 MG/DL (ref 0.7–1.3)

## 2018-04-09 PROCEDURE — 74011250636 HC RX REV CODE- 250/636: Performed by: INTERNAL MEDICINE

## 2018-04-09 PROCEDURE — 74011250637 HC RX REV CODE- 250/637: Performed by: INTERNAL MEDICINE

## 2018-04-09 PROCEDURE — 36415 COLL VENOUS BLD VENIPUNCTURE: CPT | Performed by: INTERNAL MEDICINE

## 2018-04-09 PROCEDURE — 74011000258 HC RX REV CODE- 258: Performed by: INTERNAL MEDICINE

## 2018-04-09 PROCEDURE — 65270000029 HC RM PRIVATE

## 2018-04-09 PROCEDURE — 36569 INSJ PICC 5 YR+ W/O IMAGING: CPT | Performed by: INTERNAL MEDICINE

## 2018-04-09 PROCEDURE — 97535 SELF CARE MNGMENT TRAINING: CPT

## 2018-04-09 PROCEDURE — 82565 ASSAY OF CREATININE: CPT | Performed by: INTERNAL MEDICINE

## 2018-04-09 RX ADMIN — VANCOMYCIN HYDROCHLORIDE 1750 MG: 10 INJECTION, POWDER, LYOPHILIZED, FOR SOLUTION INTRAVENOUS at 21:59

## 2018-04-09 RX ADMIN — VANCOMYCIN HYDROCHLORIDE 1750 MG: 10 INJECTION, POWDER, LYOPHILIZED, FOR SOLUTION INTRAVENOUS at 10:47

## 2018-04-09 RX ADMIN — Medication 10 ML: at 21:59

## 2018-04-09 RX ADMIN — LEVOTHYROXINE SODIUM 125 MCG: 125 TABLET ORAL at 05:31

## 2018-04-09 RX ADMIN — CEFEPIME HYDROCHLORIDE 2 G: 2 INJECTION, POWDER, FOR SOLUTION INTRAVENOUS at 05:17

## 2018-04-09 RX ADMIN — Medication 10 ML: at 06:00

## 2018-04-09 RX ADMIN — CEFEPIME HYDROCHLORIDE 2 G: 2 INJECTION, POWDER, FOR SOLUTION INTRAVENOUS at 19:54

## 2018-04-09 RX ADMIN — DIAZEPAM 5 MG: 5 TABLET ORAL at 21:59

## 2018-04-09 RX ADMIN — Medication 10 ML: at 13:39

## 2018-04-09 RX ADMIN — CEFEPIME HYDROCHLORIDE 2 G: 2 INJECTION, POWDER, FOR SOLUTION INTRAVENOUS at 12:55

## 2018-04-09 NOTE — PROGRESS NOTES
4/9/2018 2:09 PM Spoke with DENISE WEBER, planning for pt's iv abx orders to be completed tomorrow. [facility] and [facility] were made aware of planning for discharge tomorrow. CM will follow up. 4/9/2018 9:03 AM EMR reviewed. Planning for pt to discharge home with home health through [facility] and possible iv abx through [facility], pending final culture results. CM will follow for final discharge recommendations.  ADONIS Connor

## 2018-04-09 NOTE — PROGRESS NOTES
[facility] Progress Note           Alex Mathew DO    [phone] Office  [phone]  Fax    2018      Assessment & Plan:   1. Discitis/osteomyelitis of L2/3: seen on MRI. S/p IR biopsy on . Cultures NGSF. Will ask ortho-spine to consider open biopsy. If no further intervention planned will treat with 6-8 weeks empiric abx. Cont IV Vanc/cefepime. 2. Leukopenia. Follow              Subjective:     C/o back pain    Objective:     Vitals:   Visit Vitals    /65 (BP 1 Location: Right arm, BP Patient Position: At rest)    Pulse 68    Temp 98.7 °F (37.1 °C)    Resp 16    Ht 6' 5\" (1.956 m)    Wt 235 lb 3.5 oz (106.7 kg)    SpO2 97%    BMI 27.89 kg/m2        Tmax:  Temp (24hrs), Av.2 °F (36.8 °C), Min:97.8 °F (36.6 °C), Max:98.7 °F (37.1 °C)      Exam:   Patient is intubated:  no    Physical Examination:   General:  Alert, cooperative, no distress   Head:  Normocephalic, atraumatic. Eyes:  Conjunctivae clear   Neck:        Lungs:   No distress. Clear to auscultation bilaterally. Chest wall:  No tenderness or deformity. Heart:  Regular rate and rhythm, S1, S2 normal, no murmur   Abdomen:   Soft, non-tender, non-distended   Extremities: Moves all. Skin:  No rashes or lesions   Neurologic: CNII-XII intact. Labs:        No lab exists for component: ITNL   No results for input(s): CPK, CKMB, TROIQ in the last 72 hours. Recent Labs      18   0525  18   0129  18   0524   NA   --   140  138   K   --   3.8  3.9   CL   --   106  106   CO2   --   26  24   BUN   --   20  20   CREA  0.91  0.95  1.02   GLU   --   96  109*   PHOS   --    --   3.4   MG   --    --   1.8   ALB   --    --   3.3*   WBC   --   3.7*  3.6*   HGB   --   10.8*  11.3*   HCT   --   32.2*  34.0*   PLT   --   201  177     No results for input(s): INR, PTP, APTT in the last 72 hours.     No lab exists for component: INREXT  Needs: urine analysis, urine sodium, protein and creatinine  No results found for: YSABEL, CREAU      Cultures:     No results found for: SDES  Lab Results   Component Value Date/Time    Culture result: NO GROWTH 3 DAYS 04/06/2018 10:49 AM    Culture result: NO GROWTH 3 DAYS 04/06/2018 09:45 AM    Culture result: NO FUNGUS ISOLATED 2 DAYS 04/06/2018 09:45 AM       Radiology:     Medications       Current Facility-Administered Medications   Medication Dose Route Frequency Last Dose    [START ON 4/10/2018] Vancomycin Trough 09:30 4/10  1 Each Other ONCE      vancomycin (VANCOCIN) 1,750 mg in 0.9% sodium chloride 500 mL IVPB  1,750 mg IntraVENous Q12H 1,750 mg at 04/09/18 1047    enoxaparin (LOVENOX) injection 40 mg  40 mg SubCUTAneous Q24H      traMADol (ULTRAM) tablet 50 mg  50 mg Oral Q6H PRN 50 mg at 04/08/18 2202    senna-docusate (PERICOLACE) 8.6-50 mg per tablet 1 Tab  1 Tab Oral BID      oxyCODONE-acetaminophen (PERCOCET) 5-325 mg per tablet 1 Tab  1 Tab Oral Q4H PRN      cefepime (MAXIPIME) 2 g in 0.9% sodium chloride (MBP/ADV) 100 mL  2 g IntraVENous Q8H 2 g at 04/09/18 1255    HYDROmorphone (DILAUDID) injection 1 mg  1 mg IntraVENous Q4H PRN      levothyroxine (SYNTHROID) tablet 125 mcg  125 mcg Oral  mcg at 04/09/18 0531    sodium chloride (NS) flush 5-10 mL  5-10 mL IntraVENous Q8H 10 mL at 04/09/18 0600    sodium chloride (NS) flush 5-10 mL  5-10 mL IntraVENous PRN      diazePAM (VALIUM) tablet 5 mg  5 mg Oral QHS 5 mg at 04/08/18 2202    diazePAM (VALIUM) tablet 5 mg  5 mg Oral DAILY PRN 5 mg at 04/08/18 1252           Case discussed with: Ezekiel GRAHAM DO

## 2018-04-09 NOTE — PROGRESS NOTES
Bedside and Verbal shift change report given to Marcela Cullen RN (oncoming nurse) by Bulmaro Doshi RN (offgoing nurse). Report included the following information SBAR, Kardex, Procedure Summary, Intake/Output, MAR and Recent Results.

## 2018-04-09 NOTE — PROGRESS NOTES
Bedside and Verbal shift change report given to Leoncio Cohi RN (oncoming nurse) by Michael Kaur RN (offgoing nurse). Report included the following information SBAR, Kardex, Procedure Summary, Intake/Output, MAR and Recent Results.

## 2018-04-09 NOTE — PROGRESS NOTES
VAT Note:  Chart reviewed for PICC placement evaluation. Areas reviewed include, but are not limited to, patient's current and past H&P, Lab and Procedure Results, all notes, media records and medications. Acknowledged of PICC line order. Final antbx orders not in place as of yet. Patient will need 6-8 weeks of antbx at discharge. Will monitor chart for Antbx orders. [address] notified VAT team of lost PIV and Vanc due 1000.  In anticipation of discharge today on empiric antibiotics will place line this am.

## 2018-04-09 NOTE — PROGRESS NOTES
[facility] [place] [address]  [address] [facility], [address] [place] [address]  [phone]      Medical Progress Note      NAME: Sendy Coelho   :  1950  MRM:  [id number]    Date/Time: 2018         Subjective:     Chief Complaint:  Patient was seen and examined by me. Chart reviewed. Pain is better today. No fevers       Objective:       Vitals:       Last 24hrs VS reviewed since prior progress note.  Most recent are:    Visit Vitals    /65 (BP 1 Location: Right arm, BP Patient Position: At rest)    Pulse 68    Temp 98.7 °F (37.1 °C)    Resp 16    Ht 6' 5\" (1.956 m)    Wt 106.7 kg (235 lb 3.5 oz)    SpO2 97%    BMI 27.89 kg/m2     SpO2 Readings from Last 6 Encounters:   18 97%   18 98%   18 99%   18 92%   18 99%   18 97%            Intake/Output Summary (Last 24 hours) at 18 1228  Last data filed at 18 0827   Gross per 24 hour   Intake                0 ml   Output                0 ml   Net                0 ml        Exam:     Physical Exam:    Gen:  Well-developed, well-nourished, in no acute distress  HEENT:  Pink conjunctivae, PERRL, hearing intact to voice, moist mucous membranes  Neck:  Supple, without masses, thyroid non-tender  Resp:  No accessory muscle use, clear breath sounds without wheezes rales or rhonchi  Card:  No murmurs, normal S1, S2 without thrills, bruits or peripheral edema  Abd:  Soft, non-tender, non-distended, normoactive bowel sounds are present  Musc:  No cyanosis or clubbing  Skin:  No rashes  Neuro:  Cranial nerves 3-12 are grossly intact, follows commands appropriately  Psych:  Good insight, oriented to person, place and time, alert    Medications Reviewed: (see below)    Lab Data Reviewed: (see below)    ______________________________________________________________________    Medications:     Current Facility-Administered Medications   Medication Dose Route Frequency    [START ON 4/10/2018] Vancomycin Trough 09:30 4/10  1 Each Other ONCE    vancomycin (VANCOCIN) 1,750 mg in 0.9% sodium chloride 500 mL IVPB  1,750 mg IntraVENous Q12H    enoxaparin (LOVENOX) injection 40 mg  40 mg SubCUTAneous Q24H    traMADol (ULTRAM) tablet 50 mg  50 mg Oral Q6H PRN    senna-docusate (PERICOLACE) 8.6-50 mg per tablet 1 Tab  1 Tab Oral BID    oxyCODONE-acetaminophen (PERCOCET) 5-325 mg per tablet 1 Tab  1 Tab Oral Q4H PRN    cefepime (MAXIPIME) 2 g in 0.9% sodium chloride (MBP/ADV) 100 mL  2 g IntraVENous Q8H    HYDROmorphone (DILAUDID) injection 1 mg  1 mg IntraVENous Q4H PRN    levothyroxine (SYNTHROID) tablet 125 mcg  125 mcg Oral ACB    sodium chloride (NS) flush 5-10 mL  5-10 mL IntraVENous Q8H    sodium chloride (NS) flush 5-10 mL  5-10 mL IntraVENous PRN    diazePAM (VALIUM) tablet 5 mg  5 mg Oral QHS    diazePAM (VALIUM) tablet 5 mg  5 mg Oral DAILY PRN          Lab Review:     Recent Labs      04/08/18   0129 04/07/18   0524   WBC  3.7*  3.6*   HGB  10.8*  11.3*   HCT  32.2*  34.0*   PLT  201  177     Recent Labs      04/09/18   0525  04/08/18   0129  04/07/18   0524   NA   --   140  138   K   --   3.8  3.9   CL   --   106  106   CO2   --   26  24   GLU   --   96  109*   BUN   --   20  20   CREA  0.91  0.95  1.02   CA   --   8.2*  8.7   MG   --    --   1.8   PHOS   --    --   3.4   ALB   --    --   3.3*   TBILI   --    --   1.0   SGOT   --    --   16   ALT   --    --   22     No results found for: GLUCPOC       Assessment / Plan: Active Problems:    75 yo hx of hypothyroid, presented w/ back pain, L2/3 discitis, osteomyelitis    1) Discitis/osteomyelitis of L2/3: seen on MRI. S/p IR biopsy on 04/06, cultures neg so far. Cont IV Vanc/cefepime. ID and ortho following. Likely will need 6 weeks of IV Abx (defer to ID and ortho)    2) Acute lower back pain: due to above. Will cont tramadol prn, valium prn, IV dilaudid prn    3) Hypothyroid: cont synthroid    4) Leukopenia: due to infection.   Will monitor Total time spent with patient: 20 min                  Care Plan discussed with: Patient, nursing, family    Discussed:  Care Plan    Prophylaxis:  Lovenox    Disposition:  Home w/Family           ___________________________________________________    Attending Physician: Clementina Orourke MD

## 2018-04-09 NOTE — PROGRESS NOTES
Orthopedic & Surgical Nursing Communication Tool      8:05 AM  4/9/2018     Bedside and Verbal shift change report given to Asia Vora RN (incoming nurse) by Maya Mueller RN (outgoing nurse) on Erickson Sarah a 76 y.o. male who was admitted on 4/5/2018  5:30 PM. Report included the following information SBAR, Kardex, Intake/Output, MAR, Recent Results and Med Rec Status. Hourly rounding completed during shift. No acute distress noted         Opportunity for questions and clarifications were given to the incoming nurse. Patient's bed is in low position, side rails x2, door open PRN, call bell within reach and patient not in distress.     Maya Mueller RN

## 2018-04-09 NOTE — PROGRESS NOTES
I spoke with Dr. Bozena Sarkar concerning the patient and lack of growth on aspiration and biopsy. He would prefer to treat patient with 6-8 weeks of empiric antibiotics and not put the patient through a corpectomy for intraoperative cultures. The MRI shows soft tissue extension and he has had elevation of his CRP which are classic for osteomyelitis/disciitis. We will let Dr. Ralf Guan put his recommendations for above treatment. Dr. Bozena Sarkar will see the patient back in his office in 14 days from discharge to check on neurovascular status.         MARYAM Green-C  [facility]opa[facility]  [address] [facility]

## 2018-04-10 ENCOUNTER — APPOINTMENT (OUTPATIENT)
Dept: GENERAL RADIOLOGY | Age: 68
DRG: 478 | End: 2018-04-10
Attending: INTERNAL MEDICINE
Payer: MEDICARE

## 2018-04-10 VITALS
WEIGHT: 235.22 LBS | HEIGHT: 77 IN | TEMPERATURE: 97.4 F | SYSTOLIC BLOOD PRESSURE: 116 MMHG | RESPIRATION RATE: 18 BRPM | BODY MASS INDEX: 27.77 KG/M2 | DIASTOLIC BLOOD PRESSURE: 65 MMHG | HEART RATE: 84 BPM | OXYGEN SATURATION: 98 %

## 2018-04-10 LAB
CREAT SERPL-MCNC: 0.99 MG/DL (ref 0.7–1.3)
DATE LAST DOSE: ABNORMAL
REPORTED DOSE,DOSE: ABNORMAL UNITS
REPORTED DOSE/TIME,TMG: 2200
VANCOMYCIN TROUGH SERPL-MCNC: 19.1 UG/ML (ref 5–10)

## 2018-04-10 PROCEDURE — 74011000258 HC RX REV CODE- 258: Performed by: INTERNAL MEDICINE

## 2018-04-10 PROCEDURE — 80202 ASSAY OF VANCOMYCIN: CPT | Performed by: INTERNAL MEDICINE

## 2018-04-10 PROCEDURE — 82565 ASSAY OF CREATININE: CPT | Performed by: INTERNAL MEDICINE

## 2018-04-10 PROCEDURE — 90686 IIV4 VACC NO PRSV 0.5 ML IM: CPT | Performed by: INTERNAL MEDICINE

## 2018-04-10 PROCEDURE — 74011250636 HC RX REV CODE- 250/636: Performed by: INTERNAL MEDICINE

## 2018-04-10 PROCEDURE — 74011250637 HC RX REV CODE- 250/637: Performed by: INTERNAL MEDICINE

## 2018-04-10 PROCEDURE — 77030018786 HC NDL GD F/USND BARD -B

## 2018-04-10 PROCEDURE — 74011000250 HC RX REV CODE- 250: Performed by: INTERNAL MEDICINE

## 2018-04-10 PROCEDURE — 77030018719 HC DRSG PTCH ANTIMIC J&J -A

## 2018-04-10 PROCEDURE — 90471 IMMUNIZATION ADMIN: CPT

## 2018-04-10 PROCEDURE — 76937 US GUIDE VASCULAR ACCESS: CPT

## 2018-04-10 PROCEDURE — 36415 COLL VENOUS BLD VENIPUNCTURE: CPT | Performed by: INTERNAL MEDICINE

## 2018-04-10 PROCEDURE — C1751 CATH, INF, PER/CENT/MIDLINE: HCPCS

## 2018-04-10 RX ORDER — SODIUM CHLORIDE 0.9 % (FLUSH) 0.9 %
10-30 SYRINGE (ML) INJECTION AS NEEDED
Status: DISCONTINUED | OUTPATIENT
Start: 2018-04-10 | End: 2018-04-10 | Stop reason: HOSPADM

## 2018-04-10 RX ORDER — AMOXICILLIN 250 MG
1 CAPSULE ORAL DAILY
Qty: 30 TAB | Refills: 1 | Status: SHIPPED | OUTPATIENT
Start: 2018-04-10 | End: 2018-08-29

## 2018-04-10 RX ORDER — SODIUM CHLORIDE 0.9 % (FLUSH) 0.9 %
10 SYRINGE (ML) INJECTION AS NEEDED
Status: DISCONTINUED | OUTPATIENT
Start: 2018-04-10 | End: 2018-04-10 | Stop reason: HOSPADM

## 2018-04-10 RX ORDER — SODIUM CHLORIDE 0.9 % (FLUSH) 0.9 %
10-40 SYRINGE (ML) INJECTION EVERY 8 HOURS
Status: DISCONTINUED | OUTPATIENT
Start: 2018-04-10 | End: 2018-04-10 | Stop reason: HOSPADM

## 2018-04-10 RX ORDER — CYCLOBENZAPRINE HCL 5 MG
5 TABLET ORAL
Qty: 30 TAB | Refills: 0 | Status: SHIPPED | OUTPATIENT
Start: 2018-04-10 | End: 2019-09-04

## 2018-04-10 RX ORDER — SODIUM CHLORIDE 0.9 % (FLUSH) 0.9 %
20 SYRINGE (ML) INJECTION EVERY 24 HOURS
Status: DISCONTINUED | OUTPATIENT
Start: 2018-04-10 | End: 2018-04-10 | Stop reason: HOSPADM

## 2018-04-10 RX ORDER — TRAMADOL HYDROCHLORIDE 50 MG/1
50 TABLET ORAL
Qty: 30 TAB | Refills: 0 | Status: SHIPPED | OUTPATIENT
Start: 2018-04-10 | End: 2019-09-04

## 2018-04-10 RX ORDER — SODIUM CHLORIDE 0.9 % (FLUSH) 0.9 %
10 SYRINGE (ML) INJECTION EVERY 24 HOURS
Status: DISCONTINUED | OUTPATIENT
Start: 2018-04-10 | End: 2018-04-10 | Stop reason: HOSPADM

## 2018-04-10 RX ADMIN — INFLUENZA VIRUS VACCINE 0.5 ML: 15; 15; 15; 15 SUSPENSION INTRAMUSCULAR at 13:26

## 2018-04-10 RX ADMIN — CEFTRIAXONE SODIUM 2 G: 2 INJECTION, POWDER, FOR SOLUTION INTRAMUSCULAR; INTRAVENOUS at 12:31

## 2018-04-10 RX ADMIN — CEFEPIME HYDROCHLORIDE 2 G: 2 INJECTION, POWDER, FOR SOLUTION INTRAVENOUS at 04:32

## 2018-04-10 RX ADMIN — DAPTOMYCIN 650 MG: 500 INJECTION, POWDER, LYOPHILIZED, FOR SOLUTION INTRAVENOUS at 12:31

## 2018-04-10 RX ADMIN — Medication 10 ML: at 11:10

## 2018-04-10 RX ADMIN — LEVOTHYROXINE SODIUM 125 MCG: 125 TABLET ORAL at 07:24

## 2018-04-10 RX ADMIN — Medication 10 ML: at 05:19

## 2018-04-10 NOTE — PROGRESS NOTES
Post Discharge PICC and Antibiotic Orders    1. Diagnosis:  Diskitis  Culture negative  2. Antibiotic:  daptomycin 640mg IV daily through 6/2/18                         Ceftriaxone 2gm IV daily through 6/2/18  3. Routine PICC/ Chiara/ Portacath Care including PRN catheter flow management  4. Weekly labs:   _x__CBC/diff/platelets   _xBUN/Creatinine   _x__CPK   _x_AST/TotalBilirubin/AlkalinePhosphatase   _x__CRP   ___Gentamicin level  ___gentamicin peak/trough   Trough Vancomycin level ____goal 10-15 ___goal 15-20  5. Fax lab to [phone]  Call Critical Lab Values to [phone]  6. May send to IR for line evaluation or replacement [phone]  7. Home Health to pull PIC line at end of therapy or send to IR for Chiara removal  8. Allergies:  No Known Allergies  9. Pharmacy Consult for Vancomycin dosing/gentamicin dosing.       Minus Shanks, DO

## 2018-04-10 NOTE — PROGRESS NOTES
[facility] Progress Note           Alex Mathew DO    [phone] Office  [phone]  Fax    4/10/2018      Assessment & Plan:   1. Discitis/osteomyelitis of L2/3: seen on MRI. S/p IR biopsy on . Cultures NGSF. No open biopsy per ortho-spine. Plan 8 weeks dapto/ceftriaxone. Lab to hold cultures additional 7 days. Patient to f/u with me in 4-6 weeks. IV abx orders in chart  2. Leukopenia. Follow            Subjective:     C/o back pain    Objective:     Vitals:   Visit Vitals    /65    Pulse 84    Temp 97.4 °F (36.3 °C)    Resp 18    Ht 6' 5\" (1.956 m)    Wt 235 lb 3.5 oz (106.7 kg)    SpO2 98%    BMI 27.89 kg/m2        Tmax:  Temp (24hrs), Av.8 °F (36.6 °C), Min:97.4 °F (36.3 °C), Max:98.3 °F (36.8 °C)      Exam:   Patient is intubated:  no    Physical Examination:   General:  Alert, cooperative, no distress   Head:  Normocephalic, atraumatic. Eyes:  Conjunctivae clear   Neck:        Lungs:   No distress. Chest wall:     Heart:     Abdomen:      Extremities: Moves all. Skin:  No rashes. RUE PICC in place   Neurologic: CNII-XII intact. Labs:        No lab exists for component: ITNL   No results for input(s): CPK, CKMB, TROIQ in the last 72 hours. Recent Labs      04/10/18   0402  18   0525  18   0129   NA   --    --   140   K   --    --   3.8   CL   --    --   106   CO2   --    --   26   BUN   --    --   20   CREA  0.99  0.91  0.95   GLU   --    --   96   WBC   --    --   3.7*   HGB   --    --   10.8*   HCT   --    --   32.2*   PLT   --    --   201     No results for input(s): INR, PTP, APTT in the last 72 hours. No lab exists for component: INREXT, INREXT  Needs: urine analysis, urine sodium, protein and creatinine  No results found for: YSABEL, CREAU      Cultures:     No results found for: SDES  Lab Results   Component Value Date/Time    Culture result: HOLDING 7 DAYS PER DR. WRAY 2018 10:49 AM    Culture result: NO GROWTH 4 DAYS 04/06/2018 10:49 AM    Culture result: HOLDING 7 DAYS PER DR. WRAY 04/06/2018 09:45 AM    Culture result: NO GROWTH 4 DAYS 04/06/2018 09:45 AM    Culture result: NO FUNGUS ISOLATED 2 DAYS 04/06/2018 09:45 AM       Radiology:     Medications       Current Facility-Administered Medications   Medication Dose Route Frequency Last Dose    cefTRIAXone (ROCEPHIN) 2 g in 0.9% sodium chloride (MBP/ADV) 50 mL  2 g IntraVENous Q24H      DAPTOmycin (CUBICIN) 650 mg in sodium chloride 0.9% 13 mL IV Syringe  650 mg IntraVENous Q24H      alteplase (CATHFLO) 1 mg in sterile water (preservative free) 1 mL injection  1 mg InterCATHeter PRN      sodium chloride (NS) flush 10-30 mL  10-30 mL InterCATHeter PRN      sodium chloride (NS) flush 10 mL  10 mL InterCATHeter Q24H 10 mL at 04/10/18 1110    sodium chloride (NS) flush 10 mL  10 mL InterCATHeter PRN      sodium chloride (NS) flush 10-40 mL  10-40 mL InterCATHeter Q8H      sodium chloride (NS) flush 20 mL  20 mL InterCATHeter Q24H      influenza vaccine 2017-18 (3 yrs+)(PF) (FLUZONE QUAD/FLUARIX QUAD) injection 0.5 mL  0.5 mL IntraMUSCular PRIOR TO DISCHARGE      enoxaparin (LOVENOX) injection 40 mg  40 mg SubCUTAneous Q24H Stopped at 04/10/18 0900    traMADol (ULTRAM) tablet 50 mg  50 mg Oral Q6H PRN 50 mg at 04/08/18 2202    senna-docusate (PERICOLACE) 8.6-50 mg per tablet 1 Tab  1 Tab Oral BID      oxyCODONE-acetaminophen (PERCOCET) 5-325 mg per tablet 1 Tab  1 Tab Oral Q4H PRN      HYDROmorphone (DILAUDID) injection 1 mg  1 mg IntraVENous Q4H PRN      levothyroxine (SYNTHROID) tablet 125 mcg  125 mcg Oral  mcg at 04/10/18 0724    sodium chloride (NS) flush 5-10 mL  5-10 mL IntraVENous Q8H 10 mL at 04/10/18 0519    sodium chloride (NS) flush 5-10 mL  5-10 mL IntraVENous PRN      diazePAM (VALIUM) tablet 5 mg  5 mg Oral QHS 5 mg at 04/09/18 3149    diazePAM (VALIUM) tablet 5 mg  5 mg Oral DAILY PRN 5 mg at 04/08/18 1252           Case discussed with: SANTOS, PICC team      Phu Valerio DO

## 2018-04-10 NOTE — PROGRESS NOTES
Bedside and Verbal shift change report given to [address] [place] [address] (oncoming nurse) by DYLAN Williamson (offgoing nurse). Report given with SBAR, Kardex, OR Summary, Intake/Output, MAR and Recent Results.

## 2018-04-10 NOTE — PROGRESS NOTES
4/10/2018 1:28 PM Pt has been taught iv abx by [facility] rep. Pt is ready for discharge from CM standpoint. 4/10/2018 11:25 AM Picc report sent to [facility] via [employer]. 4/10/2018 10:28 AM Nurse navigator for pt's pcp was notified of pt's discharge home today. 4/10/2018 10:23 AM Discharge iv abx orders sent to [facility] via [employer]. [facility] and [facility] were notified of discharge today. CM will follow up.  JUNE HoodW

## 2018-04-10 NOTE — PROGRESS NOTES
Hospital PCP [facility] follow-up appointment with Dr. Rene Yao on Tuesday April 17, 2018 @ 3:00 p.m.  Added to AVS.   Krysta Hernandez CM Specialist

## 2018-04-10 NOTE — DISCHARGE INSTRUCTIONS
HOSPITALIST DISCHARGE INSTRUCTIONS  NAME: Archana De La Cruz   :  1950   MRN:  [id number]     Date/Time:  4/10/2018 11:57 AM    ADMIT DATE: 2018     DISCHARGE DATE: 4/10/2018     ADMITTING DIAGNOSIS:  Osteomyelitis and discitis of L2/3 area    DISCHARGE DIAGNOSIS:  same    MEDICATIONS:  See after visit summary       · It is important that you take the medication exactly as they are prescribed. · Keep your medication in the bottles provided by the pharmacist and keep a list of the medication names, dosages, and times to be taken in your wallet. · Do not take other medications without consulting your doctor     Pain Management: per above medications    What to do at Home    Recommended diet:  Regular Diet    Recommended activity: Activity as tolerated    1) Return to the hospital if you feel worse    2) If you experience any of the following symptoms then please call your primary care physician or return to the emergency room if you cannot get hold of your doctor:  Fever, chills, nausea, vomiting, diarrhea, change in mentation, falling, bleeding, shortness of breath, chest pain, severe headache, severe abdominal pain,     3) Follows instructions from ID and home health     4) You will need IV antibiotics until     5) Follow up with your doctors    Follow Up: Follow-up Information     Follow up With Details Comments [address] [facility] [address]  [address]  IV antibiotics  [address] [address]  [phone]    Zhao Simms MD Schedule an appointment as soon as possible for a visit in 2 weeks  [facility]  [address]  [phone]      Augustina Hamilton MD Schedule an appointment as soon as possible for a visit in 2 weeks  [address]      Micheline Brunner, DO Schedule an appointment as soon as possible for a visit in 7 weeks  [address] [place] [address]  [phone]              Information obtained by :  I understand that if any problems occur once I am at home I am to contact my physician. I understand and acknowledge receipt of the instructions indicated above. [de-identified] or R.N.'s Signature                                                                  Date/Time                                                                                                                                              Patient or Representative Signature                                                          Date/Time         Osteomyelitis: Care Instructions  Your Care Instructions  Osteomyelitis (say \"nh-xrzl-lh-lc-dn-UP-tus\") is a bone infection. It is caused by bacteria. The bacteria can infect the bone where it has been injured, or they can be carried through the blood from another area in the body. Osteomyelitis can be a short- or long-term problem. It is treated with antibiotics. You may get the antibiotics as pills or through a needle in a vein (IV). You will probably get treatment in the hospital at first. The type of treatment depends on the type of bacteria causing the infection, the bones affected, and how bad the infection is. Sometimes people need surgery to drain pus from bone or to fix damaged bone. Short-term osteomyelitis that is treated right away usually can be cured. But the long-term form sometimes comes back after treatment. You can help your chances of stopping the infection by taking your medicines as directed. Follow-up care is a key part of your treatment and safety. Be sure to make and go to all appointments, and call your doctor if you are having problems.  It's also a good idea to know your test results and keep a list of the medicines you take. How can you care for yourself at home? · Take your antibiotics as directed. Do not stop taking them just because you feel better. You need to take the full course of antibiotics. · Take pain medicines exactly as directed. ¨ If the doctor gave you a prescription medicine for pain, take it as prescribed. ¨ If you are not taking a prescription pain medicine, ask your doctor if you can take an over-the-counter medicine. · Do mild exercise and stretching if your doctor says it is okay. This can help keep your bones and muscles healthy. Avoid strenuous work or exercise until your doctor says you can do it. · Consider physical therapy if your doctor suggests it. Physical therapy may help you have a normal range of movement. · Do not smoke. Smoking can slow healing of the infection. If you need help quitting, talk to your doctor about stop-smoking programs and medicines. These can increase your chances of quitting for good. When should you call for help? Call 911 anytime you think you may need emergency care. For example, call if:  ? · You have severe bone pain. ?Call your doctor now or seek immediate medical care if:  ? · You continue to have bone pain. ? · You have signs of infection, such as:  ¨ Increased pain, swelling, warmth, or redness. ¨ Red streaks leading from a wound. ¨ Pus draining from a wound. ¨ A fever. ? Watch closely for changes in your health, and be sure to contact your doctor if:  ? · You do not get better as expected. Where can you learn more? Go to http://nolberto-roseanna.info/. Enter D735 in the search box to learn more about \"Osteomyelitis: Care Instructions. \"  Current as of: March 20, 2017  Content Version: 11.4  © 9700-9232 [employer]. Care instructions adapted under license by [employer] (which disclaims liability or warranty for this information).  If you have questions about a medical condition or this instruction, always ask your healthcare professional. [place] [address] any warranty or liability for your use of this information.

## 2018-04-10 NOTE — DISCHARGE SUMMARY
Pepe Fleming [facility] 79  [address]  [phone]    Physician Discharge Summary     Patient ID:  Lorelei Galindo  [id number]  52 y.o.  1950    Admit date: 4/5/2018    Discharge date and time: 4/10/2018    Admission Diagnoses: Diskitis  Osteomyelitis [facility])    Discharge Diagnoses:  Principal Diagnosis Discitis                                            Principal Problem:    Discitis (4/5/2018)    Active Problems:    Acquired hypothyroidism (1/11/2016)      Leukopenia (4/5/2018)      Osteomyelitis ([address] (4/5/2018)      Fall (4/5/2018)           Hospital Course:     77 yo hx of hypothyroid, presented w/ back pain, L2/3 discitis, osteomyelitis     1) Discitis/osteomyelitis of L2/3: seen on MRI. S/p IR biopsy on 04/06, cultures neg so far. Patient was on IV Vanc/cefepime. ID and ortho following. Likely need 6 weeks of IV Dapto/CTX until 06/02. Patient will f/u with ortho, Dr. Celia Fernandez and ID, Dr. Laney Harrison     2) Acute lower back pain: due to above. Will cont tramadol prn, flexeril prn     3) Hypothyroid: cont synthroid     4) Leukopenia: due to infection.   stable     PCP: Handy Sapp MD     Consults: ID, ortho    Significant Diagnostic Studies: lumbar MRI    Discharge Exam:  Physical Exam:    Gen: Well-developed, well-nourished, in no acute distress  HEENT:  Pink conjunctivae, PERRL, hearing intact to voice, moist mucous membranes  Neck: Supple, without masses, thyroid non-tender  Resp: No accessory muscle use, clear breath sounds without wheezes rales or rhonchi  Card: No murmurs, normal S1, S2 without thrills, bruits or peripheral edema  Abd:  Soft, non-tender, non-distended, normoactive bowel sounds are present  Lymph:  No cervical or inguinal adenopathy  Musc: No cyanosis or clubbing  Skin: No rashes  Neuro:  Cranial nerves are grossly intact, no focal motor weakness, follows commands appropriately  Psych:  Good insight, oriented to person, place and time, alert    Disposition: home  Discharge Condition: Stable    Patient Instructions:   Current Discharge Medication List      START taking these medications    Details   cefTRIAXone 2 gram 2 g, ADDaptor 1 Device IVPB 2 g by IntraVENous route every twenty-four (24) hours for 53 days. Qty: 53 Dose, Refills: 0      DAPTOmycin (CUBICIN) 50 mg/mL IV Syringe 13 mL by IntraVENous route every twenty-four (24) hours for 53 days. Qty: 390 mL, Refills: 0      senna-docusate (PERICOLACE) 8.6-50 mg per tablet Take 1 Tab by mouth daily. Qty: 30 Tab, Refills: 1      cyclobenzaprine (FLEXERIL) 5 mg tablet Take 1 Tab by mouth three (3) times daily as needed for Muscle Spasm(s). Qty: 30 Tab, Refills: 0         CONTINUE these medications which have CHANGED    Details   traMADol (ULTRAM) 50 mg tablet Take 1 Tab by mouth every six (6) hours as needed for Pain (for moderate to severe pain). Max Daily Amount: 200 mg. Qty: 30 Tab, Refills: 0    Associated Diagnoses: Discitis of lumbar region         CONTINUE these medications which have NOT CHANGED    Details   !! diclofenac EC (VOLTAREN) 75 mg EC tablet Take 75 mg by mouth nightly. !! diclofenac EC (VOLTAREN) 75 mg EC tablet Take 75 mg by mouth daily as needed. !! diazePAM (VALIUM) 5 mg tablet Take 5 mg by mouth nightly. !! diazePAM (VALIUM) 5 mg tablet Take 5 mg by mouth daily as needed for Anxiety. levothyroxine (SYNTHROID) 125 mcg tablet TAKE 1 TABLET BY MOUTH EVERY DAY BEFORE BREAKFAST FOR HYPOTHROIDISM  Qty: 90 Tab, Refills: 1    Associated Diagnoses: Acquired hypothyroidism      ASCORBATE CALCIUM (VITAMIN C PO) Take 1 Tab by mouth daily. triamcinolone acetonide (KENALOG) 0.1 % topical cream APPLY TO BODY ECZEMA UP TO TWICE A DAY AS NEEDED  Refills: 12    Associated Diagnoses: Strain of lumbar region, initial encounter      multivitamin (ONE A DAY) tablet Take 1 Tab by mouth daily. !! - Potential duplicate medications found.  Please discuss with provider. Activity: Activity as tolerated  Diet: Regular Diet  Wound Care: As directed    Follow-up with  Follow-up Information     Follow up With Details Comments [address] [facility] [address]  1st 411 [facility] [address] [address]   [place] [address]  [phone]    Adalgisa Ruiz MD Schedule an appointment as soon as possible for a visit in 2 weeks  [facility] [phone] [facility]  [phone]      Troy Santana MD Schedule an appointment as soon as possible for a visit in 2 weeks  Gian Ervin [address]      Phu Valerio DO Schedule an appointment as soon as possible for a visit in 6 weeks  [address] [place] [address]  [phone]            Follow-up tests/labs: per ID    Signed:  Adrian Hurtado MD  4/10/2018  11:58 AM    I spent 40 min on discharge

## 2018-04-10 NOTE — PROGRESS NOTES
PIV access lost.  Three PIV lost in past 24 hours. PICC team notified patient does not have access at this time.

## 2018-04-11 ENCOUNTER — HOME CARE VISIT (OUTPATIENT)
Dept: SCHEDULING | Facility: HOME HEALTH | Age: 68
End: 2018-04-11
Payer: MEDICARE

## 2018-04-11 ENCOUNTER — PATIENT OUTREACH (OUTPATIENT)
Dept: INTERNAL MEDICINE CLINIC | Age: 68
End: 2018-04-11

## 2018-04-11 LAB
BACTERIA SPEC CULT: NORMAL
BACTERIA SPEC CULT: NORMAL
SERVICE CMNT-IMP: NORMAL
SERVICE CMNT-IMP: NORMAL

## 2018-04-11 PROCEDURE — 3331090001 HH PPS REVENUE CREDIT

## 2018-04-11 PROCEDURE — 400013 HH SOC

## 2018-04-11 PROCEDURE — G0299 HHS/HOSPICE OF RN EA 15 MIN: HCPCS

## 2018-04-11 PROCEDURE — 3331090002 HH PPS REVENUE DEBIT

## 2018-04-11 NOTE — PROGRESS NOTES
[]Hide copied text  []Lebron for attribution information  4/10/2018 1:28 PM Pt has been taught iv abx by [employer] rep. Pt is ready for discharge from CM standpoint.      4/10/2018 11:25 AM Picc report sent to [employer] via [employer].      4/10/2018 10:28 AM Nurse navigator for pt's pcp was notified of pt's discharge home today.      4/10/2018 10:23 AM Discharge iv abx orders sent to [employer] via [employer]. [facility]. [facility] will follow up. [facility]      Date/Time:  2018 4:14 PM    Patient was admitted to [address] [place] [address] on  and discharged on 4/10 for Discitis. The physician discharge summary was available at the time of outreach. Patient was contacted within 1 business days of discharge. Top Challenges reviewed with the provider   Back pain and spasms. Inpatient RRAT score: 15  Was this a readmission? no   Patient stated reason for the readmission: NA    Method of communication with provider :staff message    Nurse Navigator (NN) contacted the patient by telephone to perform post hospital discharge assessment. Verified name and  with family as identifiers. Provided introduction to self, and explanation of the Nurse Navigator role. Reviewed discharge instructions and red flags with  family who verbalized understanding. Patient given an opportunity to ask questions and does not have any further questions or concerns at this time. The patient agrees to contact the PCP office for questions related to their healthcare. NN provided contact information for future reference. Summary of patients top problems:  1.  Back spasms and pain    Home Health orders at discharge: [address]: [facility]   Date of initial visit: 2018    Durable Medical Equipment ordered/company: [facility]   Durable Medical Equipment received: [facility] [employer] [facility]    Barriers to Care: None    Advance Care Planning:   Does patient have an Advance Directive:  not on file       Medication:     Medication reconciliation was performed with family, who verbalizes understanding of administration of home medications. There were no barriers to obtaining medications identified at this time. Referral to Pharm D needed: no     Current Outpatient Prescriptions   Medication Sig    traMADol (ULTRAM) 50 mg tablet Take 1 Tab by mouth every six (6) hours as needed for Pain (for moderate to severe pain). Max Daily Amount: 200 mg.    DAPTOmycin (CUBICIN) 50 mg/mL IV Syringe 13 mL by IntraVENous route every twenty-four (24) hours for 53 days.  cyclobenzaprine (FLEXERIL) 5 mg tablet Take 1 Tab by mouth three (3) times daily as needed for Muscle Spasm(s).  diclofenac EC (VOLTAREN) 75 mg EC tablet Take 75 mg by mouth nightly.  cefTRIAXone 2 gram 2 g, ADDaptor 1 Device IVPB 2 g by IntraVENous route every twenty-four (24) hours for 53 days.  senna-docusate (PERICOLACE) 8.6-50 mg per tablet Take 1 Tab by mouth daily.  diclofenac EC (VOLTAREN) 75 mg EC tablet Take 75 mg by mouth daily as needed.  diazePAM (VALIUM) 5 mg tablet Take 5 mg by mouth nightly.  diazePAM (VALIUM) 5 mg tablet Take 5 mg by mouth daily as needed for Anxiety.  levothyroxine (SYNTHROID) 125 mcg tablet TAKE 1 TABLET BY MOUTH EVERY DAY BEFORE BREAKFAST FOR HYPOTHROIDISM    ASCORBATE CALCIUM (VITAMIN C PO) Take 1 Tab by mouth daily.  triamcinolone acetonide (KENALOG) 0.1 % topical cream APPLY TO BODY ECZEMA UP TO TWICE A DAY AS NEEDED    multivitamin (ONE A DAY) tablet Take 1 Tab by mouth daily. No current facility-administered medications for this visit. There are no discontinued medications.     PCP/Specialist follow up: Future Appointments  Date Time Provider Harshil Leigh Ann   4/17/2018 2:00 PM Shay Maldonado MD [address] [place] [address]   5/16/2018 2:00 PM Shasha Newton DO [facility]     None

## 2018-04-12 ENCOUNTER — PATIENT OUTREACH (OUTPATIENT)
Dept: INTERNAL MEDICINE CLINIC | Age: 68
End: 2018-04-12

## 2018-04-12 ENCOUNTER — DOCUMENTATION ONLY (OUTPATIENT)
Dept: INTERNAL MEDICINE CLINIC | Age: 68
End: 2018-04-12

## 2018-04-12 ENCOUNTER — TELEPHONE (OUTPATIENT)
Dept: INTERNAL MEDICINE CLINIC | Age: 68
End: 2018-04-12

## 2018-04-12 VITALS
TEMPERATURE: 97.8 F | RESPIRATION RATE: 16 BRPM | BODY MASS INDEX: 29.22 KG/M2 | HEIGHT: 75 IN | WEIGHT: 235 LBS | DIASTOLIC BLOOD PRESSURE: 72 MMHG | HEART RATE: 72 BPM | SYSTOLIC BLOOD PRESSURE: 126 MMHG | OXYGEN SATURATION: 98 %

## 2018-04-12 DIAGNOSIS — M54.5 CHRONIC LOW BACK PAIN, UNSPECIFIED BACK PAIN LATERALITY, WITH SCIATICA PRESENCE UNSPECIFIED: Primary | ICD-10-CM

## 2018-04-12 DIAGNOSIS — G89.29 CHRONIC LOW BACK PAIN, UNSPECIFIED BACK PAIN LATERALITY, WITH SCIATICA PRESENCE UNSPECIFIED: Primary | ICD-10-CM

## 2018-04-12 PROCEDURE — 3331090001 HH PPS REVENUE CREDIT

## 2018-04-12 PROCEDURE — 3331090002 HH PPS REVENUE DEBIT

## 2018-04-12 RX ORDER — DIAZEPAM 5 MG/1
5 TABLET ORAL
Qty: 25 TAB | Refills: 0 | Status: SHIPPED | OUTPATIENT
Start: 2018-04-12 | End: 2019-09-04

## 2018-04-12 NOTE — PROGRESS NOTES
Goals      Knowledge and adherence of prescribed medication (ie. action, side effects, missed dose, etc.).            4/12/2018  NN spoke with patient today and reinforced not to take the valium with flexeril. Patient reported feeling well today and moving around his house without problems. PAC       Understands red flags post discharge.

## 2018-04-12 NOTE — TELEPHONE ENCOUNTER
----- Message from Rodolfo Knight RN sent at 4/11/2018  4:24 PM EDT -----  Regarding: medication request and info  ADAN call this afternoon. Was discharged with flexeril by hospitalist. Wife reports Valium is more effective on severe spasms and would like to have a few on hand. You see him on 4/17. The tramadol takes care of the pain but apparently these spasms happen q 2-3 days. Also, they wanted to know if there had been any tests done to rule out Cancer? His brother was just diagnosed with a tumor on the spine a few months ago. They did not relay that information to Ortho or ID.  Thank you - Simona

## 2018-04-13 LAB
BACTERIA SPEC CULT: NORMAL
GRAM STN SPEC: NORMAL
SERVICE CMNT-IMP: NORMAL
SERVICE CMNT-IMP: NORMAL

## 2018-04-13 PROCEDURE — 3331090001 HH PPS REVENUE CREDIT

## 2018-04-13 PROCEDURE — 3331090002 HH PPS REVENUE DEBIT

## 2018-04-14 PROCEDURE — 3331090002 HH PPS REVENUE DEBIT

## 2018-04-14 PROCEDURE — 3331090001 HH PPS REVENUE CREDIT

## 2018-04-15 PROCEDURE — 3331090002 HH PPS REVENUE DEBIT

## 2018-04-15 PROCEDURE — 3331090001 HH PPS REVENUE CREDIT

## 2018-04-16 ENCOUNTER — HOME CARE VISIT (OUTPATIENT)
Dept: SCHEDULING | Facility: HOME HEALTH | Age: 68
End: 2018-04-16
Payer: MEDICARE

## 2018-04-16 VITALS
DIASTOLIC BLOOD PRESSURE: 74 MMHG | TEMPERATURE: 97.8 F | HEART RATE: 77 BPM | SYSTOLIC BLOOD PRESSURE: 130 MMHG | OXYGEN SATURATION: 98 % | RESPIRATION RATE: 16 BRPM

## 2018-04-16 PROCEDURE — G0299 HHS/HOSPICE OF RN EA 15 MIN: HCPCS

## 2018-04-16 PROCEDURE — 3331090001 HH PPS REVENUE CREDIT

## 2018-04-16 PROCEDURE — 3331090002 HH PPS REVENUE DEBIT

## 2018-04-17 ENCOUNTER — OFFICE VISIT (OUTPATIENT)
Dept: INTERNAL MEDICINE CLINIC | Age: 68
End: 2018-04-17

## 2018-04-17 VITALS
OXYGEN SATURATION: 97 % | TEMPERATURE: 97.9 F | HEIGHT: 75 IN | BODY MASS INDEX: 29.9 KG/M2 | RESPIRATION RATE: 18 BRPM | HEART RATE: 74 BPM | WEIGHT: 240.5 LBS | SYSTOLIC BLOOD PRESSURE: 137 MMHG | DIASTOLIC BLOOD PRESSURE: 80 MMHG

## 2018-04-17 DIAGNOSIS — M86.9 OSTEOMYELITIS, UNSPECIFIED SITE, UNSPECIFIED TYPE (HCC): Primary | ICD-10-CM

## 2018-04-17 PROCEDURE — 3331090002 HH PPS REVENUE DEBIT

## 2018-04-17 PROCEDURE — 3331090001 HH PPS REVENUE CREDIT

## 2018-04-17 NOTE — PROGRESS NOTES
HISTORY OF PRESENT ILLNESS  Madelaine Hampton is a 76 y.o. male. HPI  Problem follow up:  Madelaine Hampton returns for follow up visit regarding lumbar discitis and presumed osteomyelitis. he was seen 10 days ago in [facility] host[facility] diagnosed with same and treated with antibiotics. Workup was significant for suggestion of osteo on MRI. Notes, labs, studies, imaging related to this problem during prior visit were available . Since that visit, he has not changed. he has been compliant with prescribed treatment. Residual symptoms include: still having occasional severe spasms and pain at times. Lumbar pain 2-3 /10 at baseline. Occasionally 6-7/10. Using valium, diclofenac, and tramadol. The patient denies fevers, chills or sweats. New issues associated with this problem include: mild headache on L side. Patient Active Problem List   Diagnosis Code    Acquired hypothyroidism E03.9    Vitamin D deficiency E55.9    Skin cancer C44.90    Advanced care planning/counseling discussion Z71.89    Discitis M46.40    Leukopenia D72.819    Osteomyelitis ([address] M86.9    Fall W19. Marlene Shen     Past Medical History:   Diagnosis Date    Family history of skin cancer     brother-BCC    History of vascular access device 04/10/2018    [facility] VAT - 4 FR single, R basilic, 43 cm for LTA    Skin cancer     SCC nose, right eye lid, left eye brow and left cheek    Sun-damaged skin     Thyroid disease      No Known Allergies  Current Outpatient Prescriptions on File Prior to Visit   Medication Sig Dispense Refill    diazePAM (VALIUM) 5 mg tablet Take 1 Tab by mouth daily as needed (muscle spasm). 25 Tab 0    sodium chloride (NORMAL SALINE FLUSH) 5-10 mL by IntraVENous route daily.  heparin, porcine, 100 unit/mL injection 300 Units by IntraVENous route daily.  traMADol (ULTRAM) 50 mg tablet Take 1 Tab by mouth every six (6) hours as needed for Pain (for moderate to severe pain).  Max Daily Amount: 200 mg. 30 Tab 0  cefTRIAXone 2 gram 2 g, ADDaptor 1 Device IVPB 2 g by IntraVENous route every twenty-four (24) hours for 53 days. 53 Dose 0    DAPTOmycin (CUBICIN) 50 mg/mL IV Syringe 13 mL by IntraVENous route every twenty-four (24) hours for 53 days. 390 mL 0    senna-docusate (PERICOLACE) 8.6-50 mg per tablet Take 1 Tab by mouth daily. 30 Tab 1    cyclobenzaprine (FLEXERIL) 5 mg tablet Take 1 Tab by mouth three (3) times daily as needed for Muscle Spasm(s). 30 Tab 0    levothyroxine (SYNTHROID) 125 mcg tablet TAKE 1 TABLET BY MOUTH EVERY DAY BEFORE BREAKFAST FOR HYPOTHROIDISM 90 Tab 1    ASCORBATE CALCIUM (VITAMIN C PO) Take 1 Tab by mouth daily.  triamcinolone acetonide (KENALOG) 0.1 % topical cream APPLY TO BODY ECZEMA UP TO TWICE A DAY AS NEEDED  12    multivitamin (ONE A DAY) tablet Take 1 Tab by mouth daily.  diclofenac EC (VOLTAREN) 75 mg EC tablet Take 75 mg by mouth nightly. No current facility-administered medications on file prior to visit. Social History   Substance Use Topics    Smoking status: Never Smoker    Smokeless tobacco: Never Used    Alcohol use 0.6 - 1.2 oz/week     1 - 2 Cans of beer per week             ROS    Physical Exam   Constitutional: He appears well-developed and well-nourished. No distress. /80 (BP 1 Location: Left arm, BP Patient Position: Sitting)  Pulse 74  Temp 97.9 °F (36.6 °C) (Oral)   Resp 18  Ht 6' 3\" (1.905 m)  Wt 240 lb 8 oz (109.1 kg)  SpO2 97%  BMI 30.06 kg/m2Body mass index is 30.06 kg/(m^2). HENT:   Mouth/Throat: Oropharynx is clear and moist.   Neck: Carotid bruit is not present. Cardiovascular: Normal rate, regular rhythm, normal heart sounds and intact distal pulses. Pulmonary/Chest: Effort normal and breath sounds normal.   Musculoskeletal: He exhibits edema. Lumbar back: He exhibits decreased range of motion and tenderness. Back:    Neurological: He is alert. Skin: Skin is warm and dry.  He is not diaphoretic. Nursing note and vitals reviewed. Visit Vitals    /80 (BP 1 Location: Left arm, BP Patient Position: Sitting)    Pulse 74    Temp 97.9 °F (36.6 °C) (Oral)    Resp 18    Ht 6' 3\" (1.905 m)    Wt 240 lb 8 oz (109.1 kg)    SpO2 97%    BMI 30.06 kg/m2     Special Requests: L3 BONE BIOPSY  Final      GRAM STAIN    Final     PLEASE REFER TO STAT GRAM STAIN  [id number]     Culture result: HOLDING 7 DAYS PER DR. FERNANDEZ  Final     Culture result: NO GROWTH 7 DAYS   Final       ASSESSMENT and PLAN  Diagnoses and all orders for this visit:    1. Osteomyelitis, unspecified site, unspecified type ([facility] - he is on IV antibiotics and now followed by Dr. Jd De León in [facility], Dr. Esther Feng in [facility]. Pain/ spasms are marginally improved. No change in medication regimen. Follow-up Disposition:  Return if symptoms worsen or fail to improve.

## 2018-04-17 NOTE — MR AVS SNAPSHOT
[facility] 
 
 
 [address] [place] [address] [facility] 
[phone] Patient: Thalia Serna MRN: [id number] RRT:4[birth year]202 Visit Information Date & Time Provider Department Dept. Phone Encounter #  
 4/17/2018  2:00 PM Dulce Grissom MD [facility] [id number] Follow-up Instructions Return if symptoms worsen or fail to improve. Your Appointments 5/16/2018  2:00 PM  
New Patient with DO Bright Mccarthy [facility] ([facility]) Appt Note: Np Discharge on 04/10/2018 [facility] dx bone infection [facility]  
[phone]  
  
   
 [address] [place] [address] [address] [address] Upcoming Health Maintenance Date Due  
 MEDICARE YEARLY EXAM 2/1/2019 COLONOSCOPY 4/21/2019 GLAUCOMA SCREENING Q2Y 7/5/2019 DTaP/Tdap/Td series (2 - Td) 1/24/2022 Allergies as of 4/17/2018  Review Complete On: 4/11/2018 By: Renetta Steele RN No Known Allergies Current Immunizations  Reviewed on 1/31/2018 Name Date Hep A Vaccine 11/15/2010 Hep B Vaccine 6/25/2004, 1/16/2004, 12/15/2003 IPV 12/15/2003 Influenza Nasal Vaccine 10/15/2015, 1/9/2008 Influenza Vaccine 9/16/2013, 9/22/2010, 9/24/2009, 12/18/2006, 5/16/2005 Influenza Vaccine (Quad) PF 4/10/2018  1:26 PM  
 Japanese Encephalitis Vaccine 5/23/2005, 5/16/2005 Meningococcal (MCV4) Vaccine 12/15/2003 Novel Influenza-H1N1-09, Injectable 2/20/2010 Pneumococcal Conjugate (PCV-13) 9/15/2016 Pneumococcal Polysaccharide (PPSV-23) 1/31/2018, 1/1/2014 TB Skin Test (PPD) 9/17/2013 Td 12/15/2003 Tdap 1/24/2012 Typhoid Vaccine, Parenteral, Acetone-Killed, Dried ([place] ) 11/12/2010 Zoster Vaccine, Live 1/1/2014 Not reviewed this visit You Were Diagnosed With   
  
 Codes Comments Osteomyelitis, unspecified site, unspecified type ([facility]    -  Primary ICD-10-CM: M86.9 ICD-9-CM: 730.20 Vitals BP Pulse Temp Resp Height(growth percentile) Weight(growth percentile) 137/80 (BP 1 Location: Left arm, BP Patient Position: Sitting) 74 97.9 °F (36.6 °C) (Oral) 18 6' 3\" (1.905 m) 240 lb 8 oz (109.1 kg) SpO2 BMI Smoking Status 97% 30.06 kg/m2 Never Smoker Vitals History BMI and BSA Data Body Mass Index Body Surface Area 30.06 kg/m 2 2.4 m 2 Preferred Pharmacy Pharmacy Name Phone [id number] [address], [address] [phone] Your Updated Medication List  
  
   
This list is accurate as of 4/17/18  2:36 PM.  Always use your most recent med list.  
  
  
  
  
 cefTRIAXone 2 gram 2 g, ADDaptor 1 Device IVPB  
2 g by IntraVENous route every twenty-four (24) hours for 53 days. cyclobenzaprine 5 mg tablet Commonly known as:  FLEXERIL Take 1 Tab by mouth three (3) times daily as needed for Muscle Spasm(s). DAPTOmycin (CUBICIN) 50 mg/mL IV Syringe 13 mL by IntraVENous route every twenty-four (24) hours for 53 days. diazePAM 5 mg tablet Commonly known as:  VALIUM Take 1 Tab by mouth daily as needed (muscle spasm). diclofenac EC 75 mg EC tablet Commonly known as:  VOLTAREN Take 75 mg by mouth nightly. heparin (porcine) 100 unit/mL injection 300 Units by IntraVENous route daily. levothyroxine 125 mcg tablet Commonly known as:  SYNTHROID  
TAKE 1 TABLET BY MOUTH EVERY DAY BEFORE BREAKFAST FOR HYPOTHROIDISM  
  
 multivitamin tablet Commonly known as:  ONE A DAY Take 1 Tab by mouth daily. NORMAL SALINE FLUSH Generic drug:  sodium chloride 5-10 mL by IntraVENous route daily. senna-docusate 8.6-50 mg per tablet Commonly known as:  Davis Akin Take 1 Tab by mouth daily. traMADol 50 mg tablet Commonly known as:  Helen Mcardle  
 Take 1 Tab by mouth every six (6) hours as needed for Pain (for moderate to severe pain). Max Daily Amount: 200 mg.  
  
 triamcinolone acetonide 0.1 % topical cream  
Commonly known as:  KENALOG  
APPLY TO BODY ECZEMA UP TO TWICE A DAY AS NEEDED  
  
 VITAMIN C PO Take 1 Tab by mouth daily. Follow-up Instructions Return if symptoms worsen or fail to improve. To-Do List   
 04/23/2018 To Be Determined Appointment with Rikki Edmond RN at [place] [address]  
  
 04/30/2018 To Be Determined Appointment with Rikki Edmond RN at [place] [address]  
  
 05/07/2018 To Be Determined Appointment with Rikki Edmond RN at [place] [address]  
  
 05/14/2018 To Be Determined Appointment with Rikki Edmond RN at [place] [address]  
  
 05/21/2018 To Be Determined Appointment with Rikki Edmond RN at [place] [address]  
  
 05/28/2018 To Be Determined Appointment with Rikki Edmond RN at [place] [address]  
  
 06/04/2018 To Be Determined Appointment with Rikki Edmond RN at [address] [facility]! Dear Chani Cohen: Thank you for requesting a [employer] account. Our records indicate that you already have an active [employer] account. You can access your account anytime at [email or web address] Did you know that you can access your hospital and ER discharge instructions at any time in [employer]? You can also review all of your test results from your hospital stay or ER visit. Additional Information If you have questions, please visit the Frequently Asked Questions section of the [employer] website at [email or web address] Remember, [employer] is NOT to be used for urgent needs. For medical emergencies, dial 911. Now available from your iPhone and Android! Please provide this summary of care documentation to your next provider. Your primary care clinician is listed as [facility]. If you have any questions after today's visit, please call [phone].

## 2018-04-18 PROCEDURE — 3331090002 HH PPS REVENUE DEBIT

## 2018-04-18 PROCEDURE — 3331090001 HH PPS REVENUE CREDIT

## 2018-04-19 PROCEDURE — 3331090002 HH PPS REVENUE DEBIT

## 2018-04-19 PROCEDURE — 3331090001 HH PPS REVENUE CREDIT

## 2018-04-20 PROCEDURE — 3331090001 HH PPS REVENUE CREDIT

## 2018-04-20 PROCEDURE — 3331090002 HH PPS REVENUE DEBIT

## 2018-04-21 PROCEDURE — 3331090001 HH PPS REVENUE CREDIT

## 2018-04-21 PROCEDURE — 3331090002 HH PPS REVENUE DEBIT

## 2018-04-22 PROCEDURE — 3331090001 HH PPS REVENUE CREDIT

## 2018-04-22 PROCEDURE — 3331090002 HH PPS REVENUE DEBIT

## 2018-04-23 ENCOUNTER — HOSPITAL ENCOUNTER (OUTPATIENT)
Dept: LAB | Age: 68
Discharge: HOME | End: 2018-04-23
Payer: MEDICARE

## 2018-04-23 ENCOUNTER — HOME CARE VISIT (OUTPATIENT)
Dept: SCHEDULING | Facility: HOME HEALTH | Age: 68
End: 2018-04-23
Payer: MEDICARE

## 2018-04-23 PROCEDURE — 3331090001 HH PPS REVENUE CREDIT

## 2018-04-23 PROCEDURE — 82247 BILIRUBIN TOTAL: CPT

## 2018-04-23 PROCEDURE — 84075 ASSAY ALKALINE PHOSPHATASE: CPT

## 2018-04-23 PROCEDURE — 86140 C-REACTIVE PROTEIN: CPT

## 2018-04-23 PROCEDURE — 85025 COMPLETE CBC W/AUTO DIFF WBC: CPT

## 2018-04-23 PROCEDURE — 84520 ASSAY OF UREA NITROGEN: CPT

## 2018-04-23 PROCEDURE — 3331090002 HH PPS REVENUE DEBIT

## 2018-04-23 PROCEDURE — G0300 HHS/HOSPICE OF LPN EA 15 MIN: HCPCS

## 2018-04-23 PROCEDURE — 84450 TRANSFERASE (AST) (SGOT): CPT

## 2018-04-23 PROCEDURE — 82565 ASSAY OF CREATININE: CPT

## 2018-04-24 PROCEDURE — 3331090002 HH PPS REVENUE DEBIT

## 2018-04-24 PROCEDURE — 3331090001 HH PPS REVENUE CREDIT

## 2018-04-25 PROCEDURE — 3331090002 HH PPS REVENUE DEBIT

## 2018-04-25 PROCEDURE — 3331090001 HH PPS REVENUE CREDIT

## 2018-04-26 PROCEDURE — 3331090002 HH PPS REVENUE DEBIT

## 2018-04-26 PROCEDURE — 3331090001 HH PPS REVENUE CREDIT

## 2018-04-27 PROCEDURE — 3331090002 HH PPS REVENUE DEBIT

## 2018-04-27 PROCEDURE — 3331090001 HH PPS REVENUE CREDIT

## 2018-04-28 PROCEDURE — 3331090001 HH PPS REVENUE CREDIT

## 2018-04-28 PROCEDURE — 3331090002 HH PPS REVENUE DEBIT

## 2018-04-29 PROCEDURE — 3331090001 HH PPS REVENUE CREDIT

## 2018-04-29 PROCEDURE — 3331090002 HH PPS REVENUE DEBIT

## 2018-04-30 ENCOUNTER — HOME CARE VISIT (OUTPATIENT)
Dept: SCHEDULING | Facility: HOME HEALTH | Age: 68
End: 2018-04-30
Payer: MEDICARE

## 2018-04-30 PROCEDURE — 3331090002 HH PPS REVENUE DEBIT

## 2018-04-30 PROCEDURE — 3331090001 HH PPS REVENUE CREDIT

## 2018-04-30 PROCEDURE — G0300 HHS/HOSPICE OF LPN EA 15 MIN: HCPCS

## 2018-05-01 PROCEDURE — 3331090001 HH PPS REVENUE CREDIT

## 2018-05-01 PROCEDURE — 3331090002 HH PPS REVENUE DEBIT

## 2018-05-02 PROCEDURE — 3331090002 HH PPS REVENUE DEBIT

## 2018-05-02 PROCEDURE — 3331090001 HH PPS REVENUE CREDIT

## 2018-05-03 PROCEDURE — 3331090002 HH PPS REVENUE DEBIT

## 2018-05-03 PROCEDURE — 3331090001 HH PPS REVENUE CREDIT

## 2018-05-04 PROCEDURE — 3331090002 HH PPS REVENUE DEBIT

## 2018-05-04 PROCEDURE — 3331090001 HH PPS REVENUE CREDIT

## 2018-05-05 PROCEDURE — 3331090001 HH PPS REVENUE CREDIT

## 2018-05-05 PROCEDURE — 3331090002 HH PPS REVENUE DEBIT

## 2018-05-06 PROCEDURE — 3331090001 HH PPS REVENUE CREDIT

## 2018-05-06 PROCEDURE — 3331090002 HH PPS REVENUE DEBIT

## 2018-05-07 ENCOUNTER — HOME CARE VISIT (OUTPATIENT)
Dept: HOME HEALTH SERVICES | Facility: HOME HEALTH | Age: 68
End: 2018-05-07
Payer: MEDICARE

## 2018-05-07 ENCOUNTER — HOME CARE VISIT (OUTPATIENT)
Dept: SCHEDULING | Facility: HOME HEALTH | Age: 68
End: 2018-05-07
Payer: MEDICARE

## 2018-05-07 LAB
BACTERIA SPEC CULT: NORMAL
SERVICE CMNT-IMP: NORMAL

## 2018-05-07 PROCEDURE — G0300 HHS/HOSPICE OF LPN EA 15 MIN: HCPCS

## 2018-05-07 PROCEDURE — 3331090002 HH PPS REVENUE DEBIT

## 2018-05-07 PROCEDURE — 3331090001 HH PPS REVENUE CREDIT

## 2018-05-08 ENCOUNTER — TELEPHONE (OUTPATIENT)
Dept: FAMILY MEDICINE CLINIC | Age: 68
End: 2018-05-08

## 2018-05-08 ENCOUNTER — HOME CARE VISIT (OUTPATIENT)
Dept: HOME HEALTH SERVICES | Facility: HOME HEALTH | Age: 68
End: 2018-05-08
Payer: MEDICARE

## 2018-05-08 PROCEDURE — 3331090001 HH PPS REVENUE CREDIT

## 2018-05-08 PROCEDURE — 3331090002 HH PPS REVENUE DEBIT

## 2018-05-08 NOTE — TELEPHONE ENCOUNTER
Jeanette called from [address] to advise Dr. Michel Diaz they are no longer able to go out to provide wound care, dressing changes for pt's [facility] line or draw labs for pt after finding out he is not homebound. Pt is due for dressing change for his PIC line but was not available when someone tried to go out yesterday and [address] partners can't start care for pt before next week. Please advise.  Cristela

## 2018-05-09 VITALS
HEART RATE: 63 BPM | OXYGEN SATURATION: 98 % | SYSTOLIC BLOOD PRESSURE: 110 MMHG | DIASTOLIC BLOOD PRESSURE: 70 MMHG | TEMPERATURE: 99.3 F | RESPIRATION RATE: 18 BRPM

## 2018-05-09 PROCEDURE — 3331090001 HH PPS REVENUE CREDIT

## 2018-05-09 PROCEDURE — 3331090002 HH PPS REVENUE DEBIT

## 2018-05-10 PROCEDURE — 3331090001 HH PPS REVENUE CREDIT

## 2018-05-10 PROCEDURE — 3331090002 HH PPS REVENUE DEBIT

## 2018-05-11 PROCEDURE — 3331090001 HH PPS REVENUE CREDIT

## 2018-05-11 PROCEDURE — 3331090002 HH PPS REVENUE DEBIT

## 2018-05-12 PROCEDURE — 3331090002 HH PPS REVENUE DEBIT

## 2018-05-12 PROCEDURE — 3331090001 HH PPS REVENUE CREDIT

## 2018-05-13 PROCEDURE — 3331090002 HH PPS REVENUE DEBIT

## 2018-05-13 PROCEDURE — 3331090001 HH PPS REVENUE CREDIT

## 2018-05-14 PROCEDURE — 3331090001 HH PPS REVENUE CREDIT

## 2018-05-14 PROCEDURE — 3331090002 HH PPS REVENUE DEBIT

## 2018-05-15 ENCOUNTER — TELEPHONE (OUTPATIENT)
Dept: FAMILY MEDICINE CLINIC | Age: 68
End: 2018-05-15

## 2018-05-15 PROCEDURE — 3331090002 HH PPS REVENUE DEBIT

## 2018-05-15 PROCEDURE — 3331090001 HH PPS REVENUE CREDIT

## 2018-05-15 NOTE — TELEPHONE ENCOUNTER
Armin Gipson called from [employer], is at pt's home for first visit and notes pt has 7 cm migration to [facility] line, upper right arm present for 2 days per pt. Pt has appointment with Dr. Benja Bond tomorrow at 2 pm with 1:30 pm arrival.     Please advise on what Dr. Benja Bond recommends for pt's [facility] line at [phone]. Pt contact phone number [phone].   [place]

## 2018-05-16 ENCOUNTER — OFFICE VISIT (OUTPATIENT)
Dept: FAMILY MEDICINE CLINIC | Age: 68
End: 2018-05-16

## 2018-05-16 VITALS
DIASTOLIC BLOOD PRESSURE: 70 MMHG | TEMPERATURE: 98 F | RESPIRATION RATE: 20 BRPM | BODY MASS INDEX: 28.74 KG/M2 | HEART RATE: 61 BPM | HEIGHT: 77 IN | SYSTOLIC BLOOD PRESSURE: 116 MMHG | WEIGHT: 243.4 LBS

## 2018-05-16 DIAGNOSIS — M46.46 DISCITIS OF LUMBAR REGION: Primary | ICD-10-CM

## 2018-05-16 PROCEDURE — 3331090001 HH PPS REVENUE CREDIT

## 2018-05-16 PROCEDURE — 3331090002 HH PPS REVENUE DEBIT

## 2018-05-16 NOTE — PROGRESS NOTES
[facility] Infectious Disease Specialists Progress Note           Artur Clemens DO    [phone] Office  [phone]  Fax    5/16/2018      Assessment & Plan:   1. Discitis/osteomyelitis of L2/3: seen on MRI.  S/p IR biopsy on 04/06. Cultures sterile at final reading. No open biopsy per ortho-spine. Plan 8 weeks dapto/ceftriaxone through 6/2. Repeat MRI planned for 6/4 and f/u with ortho-spine 6/7. Will extend abx through 6/10. Will discuss further plans with patient once seen by ortho and MRI completed. CRP trending down  2. Leukopenia. Stable. Follow    3. PICC line displacement. Given number to IR          Subjective: Tolerating abx    Objective:     Vitals:   Visit Vitals    /70 (BP 1 Location: Left arm, BP Patient Position: Sitting)    Pulse 61    Temp 98 °F (36.7 °C) (Oral)    Resp 20    Ht 6' 5\" (1.956 m)    Wt 243 lb 6.4 oz (110.4 kg)    BMI 28.86 kg/m2          Exam:     Physical Examination:   General:  Alert, cooperative, no distress   Head:  Normocephalic, atraumatic. Eyes:  Conjunctivae clear   Neck: Supple       Lungs:   No distress. CTA   Chest wall:  No tenderness or deformity. Heart:  RRR   Abdomen:   Soft, non-tender, non-distended   Extremities: Moves all. Skin:  No rashes   Neurologic: CNII-XII intact. Labs:        No lab exists for component: ITNL   No results for input(s): CPK, CKMB, TROIQ in the last 72 hours. No results for input(s): NA, K, CL, CO2, BUN, CREA, GLU, PHOS, MG, ALB, WBC, HGB, HCT, PLT, HGBEXT, HCTEXT, PLTEXT in the last 72 hours. No lab exists for component:  CA  No results for input(s): INR, PTP, APTT in the last 72 hours. No lab exists for component: INREXT  Needs: urine analysis, urine sodium, protein and creatinine  No results found for: YSABEL, CREAU      Cultures:     No results found for: SDES  Lab Results   Component Value Date/Time    Culture result: HOLDING 7 DAYS PER DR. WRAY 04/06/2018 10:49 AM    Culture result: NO GROWTH 7 DAYS 04/06/2018 10:49 AM    Culture result: HOLDING 7 DAYS PER DR. Joyce Resides 04/06/2018 09:45 AM    Culture result: NO GROWTH 7 DAYS 04/06/2018 09:45 AM    Culture result: NO FUNGUS ISOLATED 30 DAYS 04/06/2018 09:45 AM       Radiology:     Medications       Current Outpatient Prescriptions   Medication Sig Dispense    diazePAM (VALIUM) 5 mg tablet Take 1 Tab by mouth daily as needed (muscle spasm). 25 Tab    sodium chloride (NORMAL SALINE FLUSH) 5-10 mL by IntraVENous route daily.  heparin, porcine, 100 unit/mL injection 300 Units by IntraVENous route daily.  traMADol (ULTRAM) 50 mg tablet Take 1 Tab by mouth every six (6) hours as needed for Pain (for moderate to severe pain). Max Daily Amount: 200 mg. 30 Tab    cefTRIAXone 2 gram 2 g, ADDaptor 1 Device IVPB 2 g by IntraVENous route every twenty-four (24) hours for 53 days. 53 Dose    DAPTOmycin (CUBICIN) 50 mg/mL IV Syringe 13 mL by IntraVENous route every twenty-four (24) hours for 53 days. 390 mL    senna-docusate (PERICOLACE) 8.6-50 mg per tablet Take 1 Tab by mouth daily. 30 Tab    cyclobenzaprine (FLEXERIL) 5 mg tablet Take 1 Tab by mouth three (3) times daily as needed for Muscle Spasm(s). 30 Tab    levothyroxine (SYNTHROID) 125 mcg tablet TAKE 1 TABLET BY MOUTH EVERY DAY BEFORE BREAKFAST FOR HYPOTHROIDISM 90 Tab    ASCORBATE CALCIUM (VITAMIN C PO) Take 1 Tab by mouth daily.  triamcinolone acetonide (KENALOG) 0.1 % topical cream APPLY TO BODY ECZEMA UP TO TWICE A DAY AS NEEDED     multivitamin (ONE A DAY) tablet Take 1 Tab by mouth daily. No current facility-administered medications for this visit.             Case discussed with:      Baltazar Buenrostro DO

## 2018-05-16 NOTE — MR AVS SNAPSHOT
[address] 
[phone] Patient: Sly Dubois MRN: [id number] SWA:[birth year] Visit Information Date & Time Provider Department Dept. Phone Encounter #  
 5/16/2018  2:00 PM Tiny Montero 34 [id number] Upcoming Health Maintenance Date Due Influenza Age 5 to Adult 8/1/2018 MEDICARE YEARLY EXAM 2/1/2019 COLONOSCOPY 4/21/2019 GLAUCOMA SCREENING Q2Y 7/5/2019 DTaP/Tdap/Td series (2 - Td) 1/24/2022 Allergies as of 5/16/2018  Review Complete On: 5/16/2018 By: Marjorie Garcia LPN No Known Allergies Current Immunizations  Reviewed on 1/31/2018 Name Date Hep A Vaccine 11/15/2010 Hep B Vaccine 6/25/2004, 1/16/2004, 12/15/2003 IPV 12/15/2003 Influenza Nasal Vaccine 10/15/2015, 1/9/2008 Influenza Vaccine 9/16/2013, 9/22/2010, 9/24/2009, 12/18/2006, 5/16/2005 Influenza Vaccine (Quad) PF 4/10/2018  1:26 PM  
 Japanese Encephalitis Vaccine 5/23/2005, 5/16/2005 Meningococcal (MCV4) Vaccine 12/15/2003 Novel Influenza-H1N1-09, Injectable 2/20/2010 Pneumococcal Conjugate (PCV-13) 9/15/2016 Pneumococcal Polysaccharide (PPSV-23) 1/31/2018, 1/1/2014 TB Skin Test (PPD) 9/17/2013 Td 12/15/2003 Tdap 1/24/2012 Typhoid Vaccine, Parenteral, Acetone-Killed, Dried ([place] ) 11/12/2010 Zoster Vaccine, Live 1/1/2014 Not reviewed this visit Vitals BP Pulse Temp Resp Height(growth percentile) Weight(growth percentile) 116/70 (BP 1 Location: Left arm, BP Patient Position: Sitting) 61 98 °F (36.7 °C) (Oral) 20 6' 5\" (1.956 m) 243 lb 6.4 oz (110.4 kg) BMI Smoking Status 28.86 kg/m2 Never Smoker Vitals History BMI and BSA Data Body Mass Index Body Surface Area  
 28.86 kg/m 2 2.45 m 2 Preferred Pharmacy Pharmacy Name Phone [facility]/PHARMACY #[id number]- [address], [address] [phone] Your Updated Medication List  
  
   
This list is accurate as of 5/16/18  2:29 PM.  Always use your most recent med list.  
  
  
  
  
 cefTRIAXone 2 gram 2 g, ADDaptor 1 Device IVPB  
2 g by IntraVENous route every twenty-four (24) hours for 53 days. cyclobenzaprine 5 mg tablet Commonly known as:  FLEXERIL Take 1 Tab by mouth three (3) times daily as needed for Muscle Spasm(s). DAPTOmycin (CUBICIN) 50 mg/mL IV Syringe 13 mL by IntraVENous route every twenty-four (24) hours for 53 days. diazePAM 5 mg tablet Commonly known as:  VALIUM Take 1 Tab by mouth daily as needed (muscle spasm). diclofenac EC 75 mg EC tablet Commonly known as:  VOLTAREN Take 75 mg by mouth nightly. heparin (porcine) 100 unit/mL injection 300 Units by IntraVENous route daily. levothyroxine 125 mcg tablet Commonly known as:  SYNTHROID  
TAKE 1 TABLET BY MOUTH EVERY DAY BEFORE BREAKFAST FOR HYPOTHROIDISM  
  
 multivitamin tablet Commonly known as:  ONE A DAY Take 1 Tab by mouth daily. NORMAL SALINE FLUSH Generic drug:  sodium chloride 5-10 mL by IntraVENous route daily. senna-docusate 8.6-50 mg per tablet Commonly known as:  Ena Roller Take 1 Tab by mouth daily. traMADol 50 mg tablet Commonly known as:  ULTRAM  
Take 1 Tab by mouth every six (6) hours as needed for Pain (for moderate to severe pain). Max Daily Amount: 200 mg.  
  
 triamcinolone acetonide 0.1 % topical cream  
Commonly known as:  KENALOG  
APPLY TO BODY ECZEMA UP TO TWICE A DAY AS NEEDED  
  
 VITAMIN C PO Take 1 Tab by mouth daily. To-Do List   
 05/21/2018 To Be Determined Appointment with Mckinley Sacks, RN at [place] [address]  
  
 05/28/2018 To Be Determined   Appointment with Mckinley Sacks, RN at [place] [address]  
  
 06/04/2018 To Be Determined Appointment with Opal Thao RN at [place] [address]  
  
 06/04/2018 9:00 AM  
  Appointment with [facility] MRI 2 at [facility] ([phone]) 1. Please bring a list or a bag of your current medications to your appointment 2. Please be sure to remove ALL hair clips, pins, extensions, etc., prior to arriving for your MRI procedure. 3. If you have any medical implants or devices, please bring associated medical card with you. 4. Bring any non [facility] films or CDs pertaining to the area being imaged with you on the day of appointment. 5. A written order with a valid diagnosis and Physicians  signature is required for all scheduled tests. 6. Check in at registration 30min before your appointment time unless you were instructed to do otherwise. Introducing [facility]! Dear Ulises Petersen: Thank you for requesting a [facility] account. Our records indicate that you already have an active [facility] account. You can access your account anytime at [email or web address] Did you know that you can access your hospital and ER discharge instructions at any time in [facility]? You can also review all of your test results from your hospital stay or ER visit. Additional Information If you have questions, please visit the Frequently Asked Questions section of the [facility] website at [email or web address] Remember, [facility] is NOT to be used for urgent needs. For medical emergencies, dial 911. Now available from your iPhone and Android! Please provide this summary of care documentation to your next provider. Your primary care clinician is listed as Florence Kay. If you have any questions after today's visit, please call [phone].

## 2018-05-17 ENCOUNTER — HOSPITAL ENCOUNTER (OUTPATIENT)
Dept: GENERAL RADIOLOGY | Age: 68
Discharge: HOME | End: 2018-05-17
Attending: INTERNAL MEDICINE
Payer: MEDICARE

## 2018-05-17 DIAGNOSIS — M46.40 DISKITIS: ICD-10-CM

## 2018-05-17 PROCEDURE — C1751 CATH, INF, PER/CENT/MIDLINE: HCPCS

## 2018-05-17 PROCEDURE — 3331090002 HH PPS REVENUE DEBIT

## 2018-05-17 PROCEDURE — 77030018719 HC DRSG PTCH ANTIMIC J&J -A

## 2018-05-17 PROCEDURE — 77030018786 HC NDL GD F/USND BARD -B

## 2018-05-17 PROCEDURE — 3331090001 HH PPS REVENUE CREDIT

## 2018-05-17 PROCEDURE — 76937 US GUIDE VASCULAR ACCESS: CPT

## 2018-05-17 PROCEDURE — 36569 INSJ PICC 5 YR+ W/O IMAGING: CPT | Performed by: INTERNAL MEDICINE

## 2018-05-17 NOTE — PROGRESS NOTES
PICC Placement Note    PRE-PROCEDURE VERIFICATION  Correct Procedure: yes  Correct Site:  yes  Temperature:  , Temperature Source:    No results for input(s): BUN, CREA, PLT, INR, WBC, PLTEXT in the last 72 hours. No lab exists for component: APTHR, INREXT  Allergies: Review of patient's allergies indicates no known allergies. Education materials for PICC Care given: yes. See Patient Education activity for further details. PICC Booklet placed at bedside: yes    Closed Ended PICC Catheters:  Flush Lumens as Follows:  Intermittent Medication:   Flush before and after each medication with 10 ml NS. Unused Ports:  Flush every 8 hours with 10 ml NS.  TPN Ports:  Flush every 24 hours with 20 ml NS prior to hanging new bag. Blood Draws: Stop infusion, draw off and waste 10 ml of blood. Draw sample with 10cc syringe or greater. DO NOT USE VACUTAINER . Transfer with appropriate device to lab  tubes. Flush with 20 ml NS. Dressing Change:  Every 7 days, and PRN using sterile technique if integrity of dressing is compromised. Initial dressing change for central line 24-48 hours post insertion if gauze is used. Apply new dressing per policy. PROCEDURE DETAIL  Consent was obtained and all questions were answered related to risks and benefits. A single lumen PICC line was inserted, as a sterile procedure using ultrasound and modified Seldinger technique for antibiotic therapy. The following documentation is in addition to the PICC properties in the lines/airways flowsheet :  Lot #: [id number]  Lidocaine 1% administered intradermally :yes  Internal Catheter Total Length: 43 (cm)  Vein Selection for PICC:right basilic  Central Line Bundle followed yes  Complication Related to Insertion:no    The placement was verified by EKG, MAX P WAVE @ 43 (cm). PER EKG PICC TIP @ C/A junction.       X-ray: no.   Line is okay to use: yes    Chelsey Campos RN

## 2018-05-18 PROCEDURE — 3331090002 HH PPS REVENUE DEBIT

## 2018-05-18 PROCEDURE — 3331090001 HH PPS REVENUE CREDIT

## 2018-05-19 PROCEDURE — 3331090002 HH PPS REVENUE DEBIT

## 2018-05-19 PROCEDURE — 3331090001 HH PPS REVENUE CREDIT

## 2018-05-20 PROCEDURE — 3331090002 HH PPS REVENUE DEBIT

## 2018-05-20 PROCEDURE — 3331090001 HH PPS REVENUE CREDIT

## 2018-05-21 PROCEDURE — 3331090002 HH PPS REVENUE DEBIT

## 2018-05-21 PROCEDURE — 3331090001 HH PPS REVENUE CREDIT

## 2018-05-22 LAB
ACID FAST STN SPEC: NEGATIVE
MYCOBACTERIUM SPEC QL CULT: NEGATIVE
SPECIMEN PREPARATION: NORMAL
SPECIMEN SOURCE: NORMAL

## 2018-05-22 PROCEDURE — 3331090002 HH PPS REVENUE DEBIT

## 2018-05-22 PROCEDURE — 3331090001 HH PPS REVENUE CREDIT

## 2018-05-23 ENCOUNTER — TELEPHONE (OUTPATIENT)
Dept: FAMILY MEDICINE CLINIC | Age: 68
End: 2018-05-23

## 2018-05-23 PROCEDURE — 3331090001 HH PPS REVENUE CREDIT

## 2018-05-23 PROCEDURE — 3331090002 HH PPS REVENUE DEBIT

## 2018-05-23 NOTE — TELEPHONE ENCOUNTER
Pt seen for appointment 5/16/18 by Dr. Pancho Carr and stated no one returned his call. Pt advised Dr. Pancho Carr was rounding in the hospital and may have not had time to respond to message, but can address at visit. Pt given phone to contact per Dr. Pancho Carr regarding PIC line.  Ldm

## 2018-05-24 PROCEDURE — 3331090002 HH PPS REVENUE DEBIT

## 2018-05-24 PROCEDURE — 3331090001 HH PPS REVENUE CREDIT

## 2018-05-25 PROCEDURE — 3331090001 HH PPS REVENUE CREDIT

## 2018-05-25 PROCEDURE — 3331090002 HH PPS REVENUE DEBIT

## 2018-05-26 PROCEDURE — 3331090002 HH PPS REVENUE DEBIT

## 2018-05-26 PROCEDURE — 3331090001 HH PPS REVENUE CREDIT

## 2018-05-27 PROCEDURE — 3331090002 HH PPS REVENUE DEBIT

## 2018-05-27 PROCEDURE — 3331090001 HH PPS REVENUE CREDIT

## 2018-05-28 PROCEDURE — 3331090002 HH PPS REVENUE DEBIT

## 2018-05-28 PROCEDURE — 3331090001 HH PPS REVENUE CREDIT

## 2018-05-30 ENCOUNTER — TELEPHONE (OUTPATIENT)
Dept: FAMILY MEDICINE CLINIC | Age: 68
End: 2018-05-30

## 2018-05-30 NOTE — TELEPHONE ENCOUNTER
----- Message from Ishan Valenzuela sent at 5/30/2018 12:14 PM EDT -----  Regarding: Test Results Question  Contact: [phone]  I have an appointment with Dr Olinda Lemos, to determine if I need to continue with the treatment for the infection located in my spine. I have been having blood work completed weekly and I need to have those results available to Dr Jimbo Kramer for my 7 June appointment. Dr Sean Rivas told me in my 16 May appointment that he would leave it up to Dr Jimbo Kramer is treatment is still required. I would like to make sure Dr Jimbo Kramer has all the results from all my tests, labs so he can make a valid decision. Thanks for your assistance.     Sugey Angelo

## 2018-06-01 ENCOUNTER — TELEPHONE (OUTPATIENT)
Dept: FAMILY MEDICINE CLINIC | Age: 68
End: 2018-06-01

## 2018-06-01 NOTE — TELEPHONE ENCOUNTER
Lab results have been faxed to Dr. Berhane Penaloza at [phone]. Confirmation has been received that fax has gone through.

## 2018-06-01 NOTE — TELEPHONE ENCOUNTER
----- Message from Adilia Lundberg sent at 6/1/2018  4:36 PM EDT -----  Regarding: Dr. Estella Galo: [phone]  Pt needs lab work sent to Dr. Emily Catherine at [facility]#[phone] Orthopedic Care Team. He will have blood drawn on Tuesday or Wednesday and he needs those results sent to him as well to determine if he still needs his Picc Line.

## 2018-06-04 ENCOUNTER — HOSPITAL ENCOUNTER (OUTPATIENT)
Dept: MRI IMAGING | Age: 68
Discharge: HOME | End: 2018-06-04
Attending: ORTHOPAEDIC SURGERY
Payer: MEDICARE

## 2018-06-04 VITALS — WEIGHT: 233 LBS | BODY MASS INDEX: 27.63 KG/M2

## 2018-06-04 DIAGNOSIS — M54.50 LOW BACK PAIN: ICD-10-CM

## 2018-06-04 PROCEDURE — A9575 INJ GADOTERATE MEGLUMI 0.1ML: HCPCS | Performed by: ORTHOPAEDIC SURGERY

## 2018-06-04 PROCEDURE — 74011250636 HC RX REV CODE- 250/636: Performed by: ORTHOPAEDIC SURGERY

## 2018-06-04 PROCEDURE — 72158 MRI LUMBAR SPINE W/O & W/DYE: CPT

## 2018-06-04 RX ORDER — GADOTERATE MEGLUMINE 376.9 MG/ML
20 INJECTION INTRAVENOUS
Status: COMPLETED | OUTPATIENT
Start: 2018-06-04 | End: 2018-06-04

## 2018-06-04 RX ADMIN — GADOTERATE MEGLUMINE 20 ML: 376.9 INJECTION INTRAVENOUS at 09:00

## 2018-06-04 NOTE — TELEPHONE ENCOUNTER
Lab results were faxed to Dr. Berg Economy office on 06/01/2018. Please order needed labs, and I will fax to Dr. Morena Fisher office.

## 2018-06-06 ENCOUNTER — TELEPHONE (OUTPATIENT)
Dept: FAMILY MEDICINE CLINIC | Age: 68
End: 2018-06-06

## 2018-06-06 NOTE — TELEPHONE ENCOUNTER
As of 1006 on 06/06/2018 no recent lab results have been received on this patient. Pt may be having labs drawn today, due to earlier message stating pt will have labs drawn either Tuesday or Wednesday.

## 2018-06-06 NOTE — TELEPHONE ENCOUNTER
Roel Vega called from [address] to advise Dr. Yajaira Bennett of abnormal white blood cell count of 2.4 today faxed to office this morning and can be reached at [phone] if needed.  Cristela

## 2018-06-07 ENCOUNTER — TELEPHONE (OUTPATIENT)
Dept: FAMILY MEDICINE CLINIC | Age: 68
End: 2018-06-07

## 2018-06-07 NOTE — TELEPHONE ENCOUNTER
Pt stopped by the office to pickup copies of his labs. He is asking to speak with Dr Benja Bond.  Please call [phone]

## 2018-06-07 NOTE — TELEPHONE ENCOUNTER
Pt called back stating he called earlier to request call back from Dr. Delos Kocher and needs to know if he should continue taking antibiotics because Dr. Galina Eckert advised him the infection in his spine is getting worse. Pt aware Dr. Delos Kocher is out of office and rounding at [facility] today, but requests call back at [phone].  m

## 2018-06-08 NOTE — TELEPHONE ENCOUNTER
Cayetano Pink called from [address] stating pt advised her Dr. Vero Clarke wants him to continue IV antibiotics but pt's medication runs out on Sunday and Carter Godoy has not received any orders from Dr. Vero Clarke to continue IV therapy. Please advise on how to proceed at [phone].  Ldm

## 2018-06-08 NOTE — TELEPHONE ENCOUNTER
----- Message from Heather Sena sent at 6/7/2018  6:36 PM EDT -----  Regarding: Update Medical Information  Contact: [phone]  Dr Darrian Montelongo  I would like to see the test results of my blood work from when I was in the hospital through today. Why is this information no available for my review. Please call me right away.     Worthy Collet

## 2018-06-09 ENCOUNTER — HOSPITAL ENCOUNTER (OUTPATIENT)
Dept: CT IMAGING | Age: 68
Discharge: HOME | End: 2018-06-09
Attending: ORTHOPAEDIC SURGERY
Payer: MEDICARE

## 2018-06-09 DIAGNOSIS — M46.46 DISCITIS OF LUMBAR REGION: ICD-10-CM

## 2018-06-09 PROCEDURE — 72131 CT LUMBAR SPINE W/O DYE: CPT

## 2018-06-12 ENCOUNTER — TELEPHONE (OUTPATIENT)
Dept: FAMILY MEDICINE CLINIC | Age: 68
End: 2018-06-12

## 2018-06-12 NOTE — TELEPHONE ENCOUNTER
Pt called requesting call back from Dr. Yara Castillo after visit with Dr. Angela Martinez, stating he was advised to call and [employer] needs order to remove his PIC line today or to continue IV therapy. Please advise at [phone].  Ldm

## 2018-06-13 ENCOUNTER — OFFICE VISIT (OUTPATIENT)
Dept: FAMILY MEDICINE CLINIC | Age: 68
End: 2018-06-13

## 2018-06-13 VITALS
HEIGHT: 77 IN | DIASTOLIC BLOOD PRESSURE: 61 MMHG | WEIGHT: 238.6 LBS | TEMPERATURE: 97.8 F | RESPIRATION RATE: 20 BRPM | SYSTOLIC BLOOD PRESSURE: 110 MMHG | HEART RATE: 61 BPM | BODY MASS INDEX: 28.17 KG/M2

## 2018-06-13 DIAGNOSIS — M46.46 DISCITIS OF LUMBAR REGION: Primary | ICD-10-CM

## 2018-06-13 RX ORDER — DOXYCYCLINE 100 MG/1
100 CAPSULE ORAL
COMMUNITY
Start: 2018-06-12 | End: 2018-08-14 | Stop reason: SDUPTHER

## 2018-06-13 NOTE — MR AVS SNAPSHOT
[address] [phone] Patient: Rhonda Halsted MRN: [id number] FXV:[birth year] Visit Information Date & Time Provider Department Dept. Phone Encounter #  
 6/13/2018  1:30 PM Tiny Astudillo 34 [id number] Upcoming Health Maintenance Date Due Influenza Age 5 to Adult 8/1/2018 MEDICARE YEARLY EXAM 2/1/2019 COLONOSCOPY 4/21/2019 GLAUCOMA SCREENING Q2Y 7/5/2019 DTaP/Tdap/Td series (2 - Td) 1/24/2022 Allergies as of 6/13/2018  Review Complete On: 6/13/2018 By: Mark Henderson LPN No Known Allergies Current Immunizations  Reviewed on 1/31/2018 Name Date Hep A Vaccine 11/15/2010 Hep B Vaccine 6/25/2004, 1/16/2004, 12/15/2003 IPV 12/15/2003 Influenza Nasal Vaccine 10/15/2015, 1/9/2008 Influenza Vaccine 9/16/2013, 9/22/2010, 9/24/2009, 12/18/2006, 5/16/2005 Influenza Vaccine (Quad) PF 4/10/2018  1:26 PM  
 Japanese Encephalitis Vaccine 5/23/2005, 5/16/2005 Meningococcal (MCV4) Vaccine 12/15/2003 Novel Influenza-H1N1-09, Injectable 2/20/2010 Pneumococcal Conjugate (PCV-13) 9/15/2016 Pneumococcal Polysaccharide (PPSV-23) 1/31/2018, 1/1/2014 TB Skin Test (PPD) 9/17/2013 Td 12/15/2003 Tdap 1/24/2012 Typhoid Vaccine, Parenteral, Acetone-Killed, Dried ([place].S. ) 11/12/2010 Zoster Vaccine, Live 1/1/2014 Not reviewed this visit Vitals BP Pulse Temp Resp Height(growth percentile) Weight(growth percentile) 110/61 (BP 1 Location: Left arm, BP Patient Position: Sitting) 61 97.8 °F (36.6 °C) (Oral) 20 6' 5\" (1.956 m) 238 lb 9.6 oz (108.2 kg) BMI Smoking Status 28.29 kg/m2 Never Smoker Vitals History BMI and BSA Data Body Mass Index Body Surface Area  
 28.29 kg/m 2 2.42 m 2 Preferred Pharmacy Pharmacy Name Phone [facility]/PHARMACY #[id number]- [address], [address] [phone] Your Updated Medication List  
  
   
This list is accurate as of 6/13/18  2:10 PM.  Always use your most recent med list.  
  
  
  
  
 cyclobenzaprine 5 mg tablet Commonly known as:  FLEXERIL Take 1 Tab by mouth three (3) times daily as needed for Muscle Spasm(s). diazePAM 5 mg tablet Commonly known as:  VALIUM Take 1 Tab by mouth daily as needed (muscle spasm). doxycycline 100 mg capsule Commonly known as:  VIBRAMYCIN Take 100 mg by mouth.  
  
 levothyroxine 125 mcg tablet Commonly known as:  SYNTHROID  
TAKE 1 TABLET BY MOUTH EVERY DAY BEFORE BREAKFAST FOR HYPOTHROIDISM  
  
 multivitamin tablet Commonly known as:  ONE A DAY Take 1 Tab by mouth daily. senna-docusate 8.6-50 mg per tablet Commonly known as:  Ryder Sermon Take 1 Tab by mouth daily. traMADol 50 mg tablet Commonly known as:  ULTRAM  
Take 1 Tab by mouth every six (6) hours as needed for Pain (for moderate to severe pain). Max Daily Amount: 200 mg.  
  
 triamcinolone acetonide 0.1 % topical cream  
Commonly known as:  KENALOG  
APPLY TO BODY ECZEMA UP TO TWICE A DAY AS NEEDED  
  
 VITAMIN C PO Take 1 Tab by mouth daily. Introducing \[facility]\"" & HEALTH SERVICES! Dear Dwight Berman: Thank you for requesting a [facility] account. Our records indicate that you already have an active [facility] account. You can access your account anytime at [email or web address]. [email or web address] Did you know that you can access your hospital and ER discharge instructions at any time in [facility]? You can also review all of your test results from your hospital stay or ER visit. Additional Information If you have questions, please visit the Frequently Asked Questions section of the [facility] website at [email or web address]. [email or web address] Remember, [facility] is NOT to be used for urgent needs. For medical emergencies, dial 911. Now available from your iPhone and Android! Please provide this summary of care documentation to your next provider. Your primary care clinician is listed as Lyndsay Springer. If you have any questions after today's visit, please call [phone].

## 2018-06-13 NOTE — PROGRESS NOTES
[facility] Infectious Disease Specialists Progress Note           Goldie Montiel DO    [phone] Office  [phone]  Fax    6/13/2018      Assessment & Plan:   1. Discitis/osteomyelitis of L2/3: seen on MRI.  S/p IR biopsy on 04/06.  Cultures sterile at final reading. MRI 6/4/18 shows progressive infection despite 8 weeks IV dapto/ceftriaxone through 6/2. Seen by ortho-spine Dr Jacky Bennett. Plan to deescalate abx to doxycycline. Will check repeat labs in 6 weeks. Discussed at length with patient. 2. Leukopenia. Stable.  Will refer to Hematology if no improvement          Subjective:     Back pain better    Objective:     Vitals:   Visit Vitals    /61 (BP 1 Location: Left arm, BP Patient Position: Sitting)    Pulse 61    Temp 97.8 °F (36.6 °C) (Oral)    Resp 20    Ht 6' 5\" (1.956 m)    Wt 108.2 kg (238 lb 9.6 oz)    BMI 28.29 kg/m2          Exam:     Physical Examination:   General:  Alert, cooperative, no distress   Head:  Normocephalic, atraumatic. Eyes:  Conjunctivae clear   Neck:        Lungs:   No distress. Chest wall:     Heart:     Abdomen:      Extremities: Moves all. No cyanosis or edema. Skin:  No rashes    Neurologic: CNII-XII intact. Normal strength     Labs:        No lab exists for component: ITNL   No results for input(s): CPK, CKMB, TROIQ in the last 72 hours. No results for input(s): NA, K, CL, CO2, BUN, CREA, GLU, PHOS, MG, ALB, WBC, HGB, HCT, PLT, HGBEXT, HCTEXT, PLTEXT in the last 72 hours. No lab exists for component:  CA  No results for input(s): INR, PTP, APTT in the last 72 hours. No lab exists for component: INREXT  Needs: urine analysis, urine sodium, protein and creatinine  No results found for: YSABEL, CREAU      Cultures:     No results found for: SDES  Lab Results   Component Value Date/Time    Culture result: HOLDING 7 DAYS PER DR. WRAY 04/06/2018 10:49 AM    Culture result: NO GROWTH 7 DAYS 04/06/2018 10:49 AM    Culture result: HOLDING 7 DAYS PER Sangeeta Guerra 04/06/2018 09:45 AM    Culture result: NO GROWTH 7 DAYS 04/06/2018 09:45 AM    Culture result: NO FUNGUS ISOLATED 30 DAYS 04/06/2018 09:45 AM       Radiology:     Medications       Current Outpatient Prescriptions   Medication Sig Dispense    doxycycline (VIBRAMYCIN) 100 mg capsule Take 100 mg by mouth.  diazePAM (VALIUM) 5 mg tablet Take 1 Tab by mouth daily as needed (muscle spasm). 25 Tab    traMADol (ULTRAM) 50 mg tablet Take 1 Tab by mouth every six (6) hours as needed for Pain (for moderate to severe pain). Max Daily Amount: 200 mg. 30 Tab    senna-docusate (PERICOLACE) 8.6-50 mg per tablet Take 1 Tab by mouth daily. 30 Tab    cyclobenzaprine (FLEXERIL) 5 mg tablet Take 1 Tab by mouth three (3) times daily as needed for Muscle Spasm(s). 30 Tab    levothyroxine (SYNTHROID) 125 mcg tablet TAKE 1 TABLET BY MOUTH EVERY DAY BEFORE BREAKFAST FOR HYPOTHROIDISM 90 Tab    ASCORBATE CALCIUM (VITAMIN C PO) Take 1 Tab by mouth daily.  triamcinolone acetonide (KENALOG) 0.1 % topical cream APPLY TO BODY ECZEMA UP TO TWICE A DAY AS NEEDED     multivitamin (ONE A DAY) tablet Take 1 Tab by mouth daily. No current facility-administered medications for this visit.             Case discussed with:      Elinor Rowley DO

## 2018-07-19 ENCOUNTER — TELEPHONE (OUTPATIENT)
Dept: FAMILY MEDICINE CLINIC | Age: 68
End: 2018-07-19

## 2018-07-19 NOTE — TELEPHONE ENCOUNTER
Pt came in, signed medical records release forms for lab reports from 4/10/18 through June of this year while on PICC line as discussed via [address] [place] [address] [address] with [facility] [employer]. Pt also notes he just had labs drawn that were ordered in June by Dr. Shana Boudreaux.  Cristela

## 2018-07-20 LAB
ALBUMIN SERPL-MCNC: 4 G/DL (ref 3.6–4.8)
ALBUMIN/GLOB SERPL: 1.7 {RATIO} (ref 1.2–2.2)
ALP SERPL-CCNC: 70 IU/L (ref 39–117)
ALT SERPL-CCNC: 17 IU/L (ref 0–44)
AST SERPL-CCNC: 20 IU/L (ref 0–40)
BASOPHILS # BLD AUTO: 0 X10E3/UL (ref 0–0.2)
BASOPHILS NFR BLD AUTO: 0 %
BILIRUB SERPL-MCNC: 1.1 MG/DL (ref 0–1.2)
BUN SERPL-MCNC: 20 MG/DL (ref 8–27)
BUN/CREAT SERPL: 17 (ref 10–24)
CALCIUM SERPL-MCNC: 9 MG/DL (ref 8.6–10.2)
CHLORIDE SERPL-SCNC: 106 MMOL/L (ref 96–106)
CO2 SERPL-SCNC: 22 MMOL/L (ref 20–29)
CREAT SERPL-MCNC: 1.15 MG/DL (ref 0.76–1.27)
CRP SERPL-MCNC: 11.5 MG/L (ref 0–4.9)
EOSINOPHIL # BLD AUTO: 0.2 X10E3/UL (ref 0–0.4)
EOSINOPHIL NFR BLD AUTO: 6 %
ERYTHROCYTE [DISTWIDTH] IN BLOOD BY AUTOMATED COUNT: 15.9 % (ref 12.3–15.4)
GLOBULIN SER CALC-MCNC: 2.3 G/DL (ref 1.5–4.5)
GLUCOSE SERPL-MCNC: 79 MG/DL (ref 65–99)
HCT VFR BLD AUTO: 42.1 % (ref 37.5–51)
HGB BLD-MCNC: 14 G/DL (ref 13–17.7)
IMM GRANULOCYTES # BLD: 0 X10E3/UL (ref 0–0.1)
IMM GRANULOCYTES NFR BLD: 0 %
LYMPHOCYTES # BLD AUTO: 0.9 X10E3/UL (ref 0.7–3.1)
LYMPHOCYTES NFR BLD AUTO: 28 %
MCH RBC QN AUTO: 29.7 PG (ref 26.6–33)
MCHC RBC AUTO-ENTMCNC: 33.3 G/DL (ref 31.5–35.7)
MCV RBC AUTO: 89 FL (ref 79–97)
MONOCYTES # BLD AUTO: 0.2 X10E3/UL (ref 0.1–0.9)
MONOCYTES NFR BLD AUTO: 5 %
NEUTROPHILS # BLD AUTO: 1.9 X10E3/UL (ref 1.4–7)
NEUTROPHILS NFR BLD AUTO: 61 %
PLATELET # BLD AUTO: 167 X10E3/UL (ref 150–379)
POTASSIUM SERPL-SCNC: 4.5 MMOL/L (ref 3.5–5.2)
PROT SERPL-MCNC: 6.3 G/DL (ref 6–8.5)
RBC # BLD AUTO: 4.72 X10E6/UL (ref 4.14–5.8)
SODIUM SERPL-SCNC: 142 MMOL/L (ref 134–144)
WBC # BLD AUTO: 3.2 X10E3/UL (ref 3.4–10.8)

## 2018-07-25 ENCOUNTER — OFFICE VISIT (OUTPATIENT)
Dept: FAMILY MEDICINE CLINIC | Age: 68
End: 2018-07-25

## 2018-07-25 VITALS
SYSTOLIC BLOOD PRESSURE: 104 MMHG | RESPIRATION RATE: 18 BRPM | HEART RATE: 56 BPM | BODY MASS INDEX: 27.98 KG/M2 | DIASTOLIC BLOOD PRESSURE: 67 MMHG | WEIGHT: 237 LBS | HEIGHT: 77 IN | TEMPERATURE: 98 F

## 2018-07-25 DIAGNOSIS — M46.46 DISCITIS OF LUMBAR REGION: Primary | ICD-10-CM

## 2018-07-25 NOTE — PROGRESS NOTES
[facility] Progress Note           Isabela Leon DO    [phone] Office  [phone]  Fax    7/25/2018      Assessment & Plan:   1. Discitis/osteomyelitis of L2/3: seen on MRI.  S/p IR biopsy on 04/06.  Cultures sterile at final reading.    No open biopsy per ortho-spine. Completed 8 plus weeks IV dapto/ceftriaxone Repeat MRI  6/4 showed progression of infection but not thought to be active per ortho-spine. Stable now on doxycycline. Plan 12 months total treatment. Check labs and RTO in 8 weeks  2. Leukopenia. Refer to hematology  3. Rash on back. Doubt due to doxycycline. Patient to f/u with dermatology next week          Subjective:     No back pain    Objective:     Vitals:   Visit Vitals    /67    Pulse (!) 56    Temp 98 °F (36.7 °C) (Oral)    Resp 18    Ht 6' 5\" (1.956 m)    Wt 107.5 kg (237 lb)    BMI 28.1 kg/m2       Exam:     Physical Examination:   General:  Alert, cooperative, no distress   Head:  Normocephalic, atraumatic. Eyes:  Conjunctivae clear   Neck: supple       Lungs:   No distress. Chest wall:     Heart:     Abdomen:      Extremities: Moves all. No cyanosis or edema. Skin:  Several patchy raised dime size  pruritic areas on back and neck   Neurologic: CNII-XII intact. Normal strength     Labs:        No lab exists for component: ITNL   No results for input(s): CPK, CKMB, TROIQ in the last 72 hours. No results for input(s): NA, K, CL, CO2, BUN, CREA, GLU, PHOS, MG, ALB, WBC, HGB, HCT, PLT, HGBEXT, HCTEXT, PLTEXT in the last 72 hours. No lab exists for component:  CA  No results for input(s): INR, PTP, APTT in the last 72 hours. No lab exists for component: INREXT  Needs: urine analysis, urine sodium, protein and creatinine  No results found for: YSABEL, CREAU      Cultures:     No results found for: SDES  Lab Results   Component Value Date/Time    Culture result: HOLDING 7 DAYS PER DR. WRAY 04/06/2018 10:49 AM    Culture result: NO GROWTH 7 DAYS 04/06/2018 10:49 AM    Culture result: HOLDING 7 DAYS PER DR. Abilio Malave 04/06/2018 09:45 AM    Culture result: NO GROWTH 7 DAYS 04/06/2018 09:45 AM    Culture result: NO FUNGUS ISOLATED 30 DAYS 04/06/2018 09:45 AM       Radiology:     Medications       Current Outpatient Prescriptions   Medication Sig Dispense    diazePAM (VALIUM) 5 mg tablet Take 1 Tab by mouth daily as needed (muscle spasm). 25 Tab    traMADol (ULTRAM) 50 mg tablet Take 1 Tab by mouth every six (6) hours as needed for Pain (for moderate to severe pain). Max Daily Amount: 200 mg. 30 Tab    senna-docusate (PERICOLACE) 8.6-50 mg per tablet Take 1 Tab by mouth daily. 30 Tab    cyclobenzaprine (FLEXERIL) 5 mg tablet Take 1 Tab by mouth three (3) times daily as needed for Muscle Spasm(s). 30 Tab    levothyroxine (SYNTHROID) 125 mcg tablet TAKE 1 TABLET BY MOUTH EVERY DAY BEFORE BREAKFAST FOR HYPOTHROIDISM 90 Tab    ASCORBATE CALCIUM (VITAMIN C PO) Take 1 Tab by mouth daily.  triamcinolone acetonide (KENALOG) 0.1 % topical cream APPLY TO BODY ECZEMA UP TO TWICE A DAY AS NEEDED     multivitamin (ONE A DAY) tablet Take 1 Tab by mouth daily. No current facility-administered medications for this visit.             Case discussed with:      Ame Feng DO

## 2018-07-25 NOTE — MR AVS SNAPSHOT
[address] [id number] Patient: Obdulio Heredia MRN: [id number] JUX:[birth year] Visit Information Date & Time Provider Department Dept. Phone Encounter #  
 7/25/2018  3:45 PM Tiny Mckenna 34 [id number] Upcoming Health Maintenance Date Due Influenza Age 5 to Adult 8/1/2018 MEDICARE YEARLY EXAM 2/1/2019 COLONOSCOPY 4/21/2019 GLAUCOMA SCREENING Q2Y 6/29/2020 DTaP/Tdap/Td series (2 - Td) 1/24/2022 Allergies as of 7/25/2018  Review Complete On: 7/25/2018 By: Tamanna Powers No Known Allergies Current Immunizations  Reviewed on 1/31/2018 Name Date Hep A Vaccine 11/15/2010 Hep B Vaccine 6/25/2004, 1/16/2004, 12/15/2003 IPV 12/15/2003 Influenza Nasal Vaccine 10/15/2015, 1/9/2008 Influenza Vaccine 9/16/2013, 9/22/2010, 9/24/2009, 12/18/2006, 5/16/2005 Influenza Vaccine (Quad) PF 4/10/2018  1:26 PM  
 Japanese Encephalitis Vaccine 5/23/2005, 5/16/2005 Meningococcal (MCV4) Vaccine 12/15/2003 Novel Influenza-H1N1-09, Injectable 2/20/2010 Pneumococcal Conjugate (PCV-13) 9/15/2016 Pneumococcal Polysaccharide (PPSV-23) 1/31/2018, 1/1/2014 TB Skin Test (PPD) 9/17/2013 Td 12/15/2003 Tdap 1/24/2012 Typhoid Vaccine, Parenteral, Acetone-Killed, Dried ([place] ) 11/12/2010 Zoster Vaccine, Live 1/1/2014 Not reviewed this visit Vitals BP Pulse Temp Resp Height(growth percentile) Weight(growth percentile) 104/67 (!) 56 98 °F (36.7 °C) (Oral) 18 6' 5\" (1.956 m) 237 lb (107.5 kg) BMI Smoking Status 28.1 kg/m2 Never Smoker Vitals History BMI and BSA Data Body Mass Index Body Surface Area  
 28.1 kg/m 2 2.42 m 2 Preferred Pharmacy Pharmacy Name Phone [id number] [phone], [address] [phone] Your Updated Medication List  
  
   
This list is accurate as of 7/25/18  4:33 PM.  Always use your most recent med list.  
  
  
  
  
 cyclobenzaprine 5 mg tablet Commonly known as:  FLEXERIL Take 1 Tab by mouth three (3) times daily as needed for Muscle Spasm(s). diazePAM 5 mg tablet Commonly known as:  VALIUM Take 1 Tab by mouth daily as needed (muscle spasm). levothyroxine 125 mcg tablet Commonly known as:  SYNTHROID  
TAKE 1 TABLET BY MOUTH EVERY DAY BEFORE BREAKFAST FOR HYPOTHROIDISM  
  
 multivitamin tablet Commonly known as:  ONE A DAY Take 1 Tab by mouth daily. senna-docusate 8.6-50 mg per tablet Commonly known as:  Radha Noemi Take 1 Tab by mouth daily. traMADol 50 mg tablet Commonly known as:  ULTRAM  
Take 1 Tab by mouth every six (6) hours as needed for Pain (for moderate to severe pain). Max Daily Amount: 200 mg.  
  
 triamcinolone acetonide 0.1 % topical cream  
Commonly known as:  KENALOG  
APPLY TO BODY ECZEMA UP TO TWICE A DAY AS NEEDED  
  
 VITAMIN C PO Take 1 Tab by mouth daily. Introducing [facility] & HE[facility] SERVICES! Dear Renzo Cancer: Thank you for requesting a [employer] account. Our records indicate that you already have an active [employer] account. You can access your account anytime at [email or web address] Did you know that you can access your hospital and ER discharge instructions at any time in [employer]? You can also review all of your test results from your hospital stay or ER visit. Additional Information If you have questions, please visit the Frequently Asked Questions section of the [employer] website at [email or web address] Remember, [employer] is NOT to be used for urgent needs. For medical emergencies, dial 911. Now available from your iPhone and Android! Please provide this summary of care documentation to your next provider. Your primary care clinician is listed as Kali Taveras. If you have any questions after today's visit, please call [phone].

## 2018-07-25 NOTE — PROGRESS NOTES
Chief Complaint   Patient presents with    Labs     6 week recheck after seeing orthopedic spine provider     Rash     on back that itches since starting antobotic

## 2018-08-09 DIAGNOSIS — E03.9 ACQUIRED HYPOTHYROIDISM: Chronic | ICD-10-CM

## 2018-08-09 RX ORDER — LEVOTHYROXINE SODIUM 125 UG/1
TABLET ORAL
Qty: 90 TAB | Refills: 1 | Status: SHIPPED | OUTPATIENT
Start: 2018-08-09 | End: 2019-02-10 | Stop reason: SDUPTHER

## 2018-08-13 NOTE — TELEPHONE ENCOUNTER
PT STATES HE WAS TOLD TO TAKE  DOXYCLINE FOR 6 MONTHS AND HE WILL BE OUT OF HIS MED TODAY.  PT REQUESTING MEDICATION TO BE SENT TO [facility] AT [address]  PT# [phone]

## 2018-08-14 ENCOUNTER — PATIENT MESSAGE (OUTPATIENT)
Dept: FAMILY MEDICINE CLINIC | Age: 68
End: 2018-08-14

## 2018-08-14 RX ORDER — DOXYCYCLINE 100 MG/1
100 CAPSULE ORAL 2 TIMES DAILY
Qty: 15 CAP | Refills: 0 | Status: SHIPPED | OUTPATIENT
Start: 2018-08-14 | End: 2018-08-22 | Stop reason: SDUPTHER

## 2018-08-14 NOTE — TELEPHONE ENCOUNTER
Pt called again to check on status of refill requested for Doxycycline and was advised message is still pending for Dr. Gracie Kaiser to reconsider since Dr. Rosa Roblero declined refill. Pt aware that office closes at 5 pm and message may not be handled today, but should receive call back tomorrow to advise of outcome. Pt agrees to plan.  Cristela

## 2018-08-14 NOTE — TELEPHONE ENCOUNTER
Giuseppe, but wanted you to be aware since Dr. Elias Costa is out on vacation and physician covering for him declined to fill it, referring him back to his Pcp who also refused to fill it.  Cristela

## 2018-08-14 NOTE — TELEPHONE ENCOUNTER
Pt called back stating his refill for doxycycline was refused by Dr. Joselyn Eugene and Dr. Romario Joyce, noting he was advised to have Dr. Romario Joyce refill it since he's done so previously, but Dr. Romario Joyce refused and pt was told to see Dr. Katie Lam for appointment to have it refilled. Pt is now completely out of medication.  Lisandrom

## 2018-08-15 NOTE — TELEPHONE ENCOUNTER
From: Sandy Nicholas  Sent: 8/14/2018 6:52 PM EDT  Subject: Non-Urgent Medical Question    Thank you for your assistance and taking care of my problems.

## 2018-08-22 RX ORDER — DOXYCYCLINE 100 MG/1
100 CAPSULE ORAL 2 TIMES DAILY
Qty: 60 CAP | Refills: 2 | Status: SHIPPED | OUTPATIENT
Start: 2018-08-22 | End: 2018-11-12 | Stop reason: SDUPTHER

## 2018-08-22 NOTE — TELEPHONE ENCOUNTER
Pt called requesting refill on his Doxycycline stating he took his las pill last night and wants to take his next dose this morning since he takes it twice daily. Pt advised Dr. Garfield Funez is in hospital making rounds, will be in this afternoon, but message will be sent to him and his nurse.  Cristela

## 2018-08-29 ENCOUNTER — OFFICE VISIT (OUTPATIENT)
Dept: ONCOLOGY | Age: 68
End: 2018-08-29

## 2018-08-29 VITALS
WEIGHT: 242.6 LBS | HEIGHT: 77 IN | SYSTOLIC BLOOD PRESSURE: 129 MMHG | BODY MASS INDEX: 28.64 KG/M2 | TEMPERATURE: 97.5 F | HEART RATE: 65 BPM | OXYGEN SATURATION: 95 % | DIASTOLIC BLOOD PRESSURE: 69 MMHG | RESPIRATION RATE: 18 BRPM

## 2018-08-29 DIAGNOSIS — D72.819 LEUKOPENIA, UNSPECIFIED TYPE: Primary | ICD-10-CM

## 2018-09-06 ENCOUNTER — HOSPITAL ENCOUNTER (OUTPATIENT)
Dept: LAB | Age: 68
Discharge: HOME | End: 2018-09-06
Payer: MEDICARE

## 2018-09-06 PROCEDURE — 88331 PATH CONSLTJ SURG 1 BLK 1SPC: CPT | Performed by: OTOLARYNGOLOGY

## 2018-09-06 PROCEDURE — 88305 TISSUE EXAM BY PATHOLOGIST: CPT | Performed by: OTOLARYNGOLOGY

## 2018-09-20 LAB
ALBUMIN SERPL-MCNC: 4.5 G/DL (ref 3.6–4.8)
ALBUMIN/GLOB SERPL: 2.3 {RATIO} (ref 1.2–2.2)
ALP SERPL-CCNC: 67 IU/L (ref 39–117)
ALT SERPL-CCNC: 14 IU/L (ref 0–44)
AST SERPL-CCNC: 23 IU/L (ref 0–40)
BASOPHILS # BLD AUTO: 0 X10E3/UL (ref 0–0.2)
BASOPHILS NFR BLD AUTO: 1 %
BILIRUB SERPL-MCNC: 1.4 MG/DL (ref 0–1.2)
BUN SERPL-MCNC: 18 MG/DL (ref 8–27)
BUN/CREAT SERPL: 17 (ref 10–24)
CALCIUM SERPL-MCNC: 9 MG/DL (ref 8.6–10.2)
CHLORIDE SERPL-SCNC: 106 MMOL/L (ref 96–106)
CO2 SERPL-SCNC: 21 MMOL/L (ref 20–29)
CREAT SERPL-MCNC: 1.09 MG/DL (ref 0.76–1.27)
CRP SERPL-MCNC: 3.8 MG/L (ref 0–4.9)
EOSINOPHIL # BLD AUTO: 0.1 X10E3/UL (ref 0–0.4)
EOSINOPHIL NFR BLD AUTO: 3 %
ERYTHROCYTE [DISTWIDTH] IN BLOOD BY AUTOMATED COUNT: 16.6 % (ref 12.3–15.4)
GLOBULIN SER CALC-MCNC: 2 G/DL (ref 1.5–4.5)
GLUCOSE SERPL-MCNC: 84 MG/DL (ref 65–99)
HCT VFR BLD AUTO: 42.3 % (ref 37.5–51)
HGB BLD-MCNC: 13.7 G/DL (ref 13–17.7)
IMM GRANULOCYTES # BLD: 0 X10E3/UL (ref 0–0.1)
IMM GRANULOCYTES NFR BLD: 0 %
LYMPHOCYTES # BLD AUTO: 0.9 X10E3/UL (ref 0.7–3.1)
LYMPHOCYTES NFR BLD AUTO: 22 %
MCH RBC QN AUTO: 29.6 PG (ref 26.6–33)
MCHC RBC AUTO-ENTMCNC: 32.4 G/DL (ref 31.5–35.7)
MCV RBC AUTO: 91 FL (ref 79–97)
MONOCYTES # BLD AUTO: 0.2 X10E3/UL (ref 0.1–0.9)
MONOCYTES NFR BLD AUTO: 5 %
NEUTROPHILS # BLD AUTO: 2.9 X10E3/UL (ref 1.4–7)
NEUTROPHILS NFR BLD AUTO: 69 %
PLATELET # BLD AUTO: 171 X10E3/UL (ref 150–379)
POTASSIUM SERPL-SCNC: 4.5 MMOL/L (ref 3.5–5.2)
PROT SERPL-MCNC: 6.5 G/DL (ref 6–8.5)
RBC # BLD AUTO: 4.63 X10E6/UL (ref 4.14–5.8)
SODIUM SERPL-SCNC: 141 MMOL/L (ref 134–144)
WBC # BLD AUTO: 4.2 X10E3/UL (ref 3.4–10.8)

## 2018-09-26 ENCOUNTER — OFFICE VISIT (OUTPATIENT)
Dept: FAMILY MEDICINE CLINIC | Age: 68
End: 2018-09-26

## 2018-09-26 VITALS
DIASTOLIC BLOOD PRESSURE: 70 MMHG | HEIGHT: 77 IN | SYSTOLIC BLOOD PRESSURE: 112 MMHG | TEMPERATURE: 97.8 F | BODY MASS INDEX: 28.57 KG/M2 | WEIGHT: 242 LBS | RESPIRATION RATE: 20 BRPM | HEART RATE: 67 BPM

## 2018-09-26 DIAGNOSIS — M46.46 DISCITIS OF LUMBAR REGION: Primary | ICD-10-CM

## 2018-09-26 NOTE — MR AVS SNAPSHOT
[address] 
[phone] Patient: Madelaine De La Fuente MRN: [id number] LTD:[birth year] Visit Information Date & Time Provider Department Dept. Phone Encounter #  
 9/26/2018  1:00 PM Isabela Leon, [address] [id number] Your Appointments 11/28/2018  1:30 PM  
ROUTINE CARE with DO Bright Whitaker [facility] ([address]) Appt Note: 8 week follow up labs, medications [address] [facility] [address] [facility]  
[phone]  
  
   
 [address] [address] Upcoming Health Maintenance Date Due Shingrix Vaccine Age 50> (1 of 2) 1/6/2000 Influenza Age 5 to Adult 8/1/2018 MEDICARE YEARLY EXAM 2/1/2019 COLONOSCOPY 4/21/2019 GLAUCOMA SCREENING Q2Y 6/29/2020 DTaP/Tdap/Td series (2 - Td) 1/24/2022 Allergies as of 9/26/2018  Review Complete On: 9/26/2018 By: America Disla No Known Allergies Current Immunizations  Reviewed on 1/31/2018 Name Date Hep A Vaccine 11/15/2010 Hep B Vaccine 6/25/2004, 1/16/2004, 12/15/2003 IPV 12/15/2003 Influenza Nasal Vaccine 10/15/2015, 1/9/2008 Influenza Vaccine 9/16/2013, 9/22/2010, 9/24/2009, 12/18/2006, 5/16/2005 Influenza Vaccine (Quad) PF 4/10/2018  1:26 PM  
 Japanese Encephalitis Vaccine 5/23/2005, 5/16/2005 Meningococcal (MCV4) Vaccine 12/15/2003 Novel Influenza-H1N1-09, Injectable 2/20/2010 Pneumococcal Conjugate (PCV-13) 9/15/2016 Pneumococcal Polysaccharide (PPSV-23) 1/31/2018, 1/1/2014 TB Skin Test (PPD) 9/17/2013 Td 12/15/2003 Tdap 1/24/2012 Typhoid Vaccine, Parenteral, Acetone-Killed, Dried ([place] ) 11/12/2010 Zoster Vaccine, Live 1/1/2014 Not reviewed this visit Vitals BP Pulse Temp Resp Height(growth percentile) Weight(growth percentile) 112/70 67 97.8 °F (36.6 °C) (Oral) 20 6' 5\" (1.956 m) 242 lb (109.8 kg) BMI Smoking Status 28.7 kg/m2 Never Smoker Vitals History BMI and BSA Data Body Mass Index Body Surface Area 28.7 kg/m 2 2.44 m 2 Preferred Pharmacy Pharmacy Name Phone [id number] [address] [facility], [address] [phone] Your Updated Medication List  
  
   
This list is accurate as of 9/26/18  1:23 PM.  Always use your most recent med list.  
  
  
  
  
 cyclobenzaprine 5 mg tablet Commonly known as:  FLEXERIL Take 1 Tab by mouth three (3) times daily as needed for Muscle Spasm(s). diazePAM 5 mg tablet Commonly known as:  VALIUM Take 1 Tab by mouth daily as needed (muscle spasm). levothyroxine 125 mcg tablet Commonly known as:  SYNTHROID  
TAKE 1 TABLET BY MOUTH EVERY DAY BEFORE BREAKFAST FOR HYPOTHROIDISM  
  
 multivitamin tablet Commonly known as:  ONE A DAY Take 1 Tab by mouth daily. traMADol 50 mg tablet Commonly known as:  ULTRAM  
Take 1 Tab by mouth every six (6) hours as needed for Pain (for moderate to severe pain). Max Daily Amount: 200 mg.  
  
 triamcinolone acetonide 0.1 % topical cream  
Commonly known as:  KENALOG  
APPLY TO BODY ECZEMA UP TO TWICE A DAY AS NEEDED Introducing [facility] & [facility]! Dear Jaimee Belcher: Thank you for requesting a [employer] account. Our records indicate that you already have an active [employer] account. You can access your account anytime at [email or web address] Did you know that you can access your hospital and ER discharge instructions at any time in [employer]? You can also review all of your test results from your hospital stay or ER visit. Additional Information If you have questions, please visit the Frequently Asked Questions section of the [employer] website at [email or web address] Remember, MyChart is NOT to be used for urgent needs. For medical emergencies, dial 911. Now available from your iPhone and Android! Please provide this summary of care documentation to your next provider. Your primary care clinician is listed as Kali Taveras. If you have any questions after today's visit, please call [phone].

## 2018-09-27 NOTE — PROGRESS NOTES
[facility] Progress Note Leila Sims DO 
  [phone] Office [phone]  Fax 
 
9/26/2018 Assessment & Plan: 1. Discitis/osteomyelitis of L2/3: seen on MRI.  S/p IR biopsy on 04/06.  Cultures sterile at final reading.    No open biopsy per ortho-spine. Completed 8 plus weeks IV dapto/ceftriaxone Repeat MRI  6/4 showed progression of infection but not thought to be active per ortho-spine. Stable now on doxycycline. Plan 12 months total treatment. CRP has normalized. Check labs and RTO in 8 weeks 2. Leukopenia. Resolved. Thought to be related to infection per hematology Subjective: No complaints. Able to bowl and golf Objective:  
 
Vitals:  
Visit Vitals  /70  Pulse 67  Temp 97.8 °F (36.6 °C) (Oral)  Resp 20  
 Ht 6' 5\" (1.956 m)  Wt 109.8 kg (242 lb)  BMI 28.7 kg/m2 Exam:  
 
Physical Examination:  
General:  Alert, cooperative, no distress Head:  Normocephalic, atraumatic. Eyes:  Conjunctivae clear Neck: Supple Lungs:   No distress. Chest wall:    
Heart:    
Abdomen:     
Extremities: Moves all. Skin:  No rash Neurologic: CNII-XII intact. Normal strength Labs:     
 
No lab exists for component: ITNL No results for input(s): CPK, CKMB, TROIQ in the last 72 hours. No results for input(s): NA, K, CL, CO2, BUN, CREA, GLU, PHOS, MG, ALB, WBC, HGB, HCT, PLT, HGBEXT, HCTEXT, PLTEXT in the last 72 hours. No lab exists for component:  CA No results for input(s): INR, PTP, APTT in the last 72 hours. No lab exists for component: INREXT Needs: urine analysis, urine sodium, protein and creatinine No results found for: Shila Hodge Cultures: No results found for: MARTIN Lab Results Component Value Date/Time Culture result: HOLDING 7 DAYS PER DR. WRAY 04/06/2018 10:49 AM  
 Culture result: NO GROWTH 7 DAYS 04/06/2018 10:49 AM  
 Culture result: HOLDING 7 DAYS PER DR. Ammon Juares 04/06/2018 09:45 AM  
 Culture result: NO GROWTH 7 DAYS 04/06/2018 09:45 AM  
 Culture result: NO FUNGUS ISOLATED 30 DAYS 04/06/2018 09:45 AM  
 
 
Radiology:  
 
Medications Current Outpatient Prescriptions Medication Sig Dispense  levothyroxine (SYNTHROID) 125 mcg tablet TAKE 1 TABLET BY MOUTH EVERY DAY BEFORE BREAKFAST FOR HYPOTHROIDISM 90 Tab  diazePAM (VALIUM) 5 mg tablet Take 1 Tab by mouth daily as needed (muscle spasm). 25 Tab  traMADol (ULTRAM) 50 mg tablet Take 1 Tab by mouth every six (6) hours as needed for Pain (for moderate to severe pain). Max Daily Amount: 200 mg. 30 Tab  cyclobenzaprine (FLEXERIL) 5 mg tablet Take 1 Tab by mouth three (3) times daily as needed for Muscle Spasm(s). (Patient taking differently: Take 10 mg by mouth three (3) times daily as needed for Muscle Spasm(s).) 30 Tab  triamcinolone acetonide (KENALOG) 0.1 % topical cream APPLY TO BODY ECZEMA UP TO TWICE A DAY AS NEEDED   
 multivitamin (ONE A DAY) tablet Take 1 Tab by mouth daily. No current facility-administered medications for this visit.    
 
 
 
 
Case discussed with: 
 
 
Destin Pittman DO

## 2018-10-30 ENCOUNTER — OFFICE VISIT (OUTPATIENT)
Dept: INTERNAL MEDICINE CLINIC | Age: 68
End: 2018-10-30

## 2018-10-30 VITALS
OXYGEN SATURATION: 93 % | TEMPERATURE: 97.3 F | SYSTOLIC BLOOD PRESSURE: 104 MMHG | HEIGHT: 77 IN | DIASTOLIC BLOOD PRESSURE: 61 MMHG | WEIGHT: 247.6 LBS | HEART RATE: 65 BPM | BODY MASS INDEX: 29.24 KG/M2

## 2018-10-30 DIAGNOSIS — Z23 ENCOUNTER FOR IMMUNIZATION: ICD-10-CM

## 2018-10-30 DIAGNOSIS — B35.4 TINEA CORPORIS: Primary | ICD-10-CM

## 2018-10-30 RX ORDER — DOXYCYCLINE 100 MG/1
CAPSULE ORAL
Refills: 2 | COMMUNITY
Start: 2018-10-15 | End: 2019-02-05 | Stop reason: SDUPTHER

## 2018-10-30 RX ORDER — CHLORPHENIRAMINE MALEATE 4 MG
TABLET ORAL 2 TIMES DAILY
Qty: 30 G | Refills: 1 | Status: SHIPPED | OUTPATIENT
Start: 2018-10-30 | End: 2018-11-19

## 2018-10-30 NOTE — PATIENT INSTRUCTIONS
Vaccine Information Statement Influenza (Flu) Vaccine (Inactivated or Recombinant): What you need to know Many Vaccine Information Statements are available in [place] and other languages. See www.immunize.org/vis Hojas de Información Sobre Vacunas están disponibles en Español y en muchos otros idiomas. Visite www.immunize.org/vis 1. Why get vaccinated? Influenza (flu) is a contagious disease that spreads around the [place] every year, usually between October and May. Flu is caused by influenza viruses, and is spread mainly by coughing, sneezing, and close contact. Anyone can get flu. Flu strikes suddenly and can last several days. Symptoms vary by age, but can include: 
 fever/chills  sore throat  muscle aches  fatigue  cough  headache  runny or stuffy nose Flu can also lead to pneumonia and blood infections, and cause diarrhea and seizures in children. If you have a medical condition, such as heart or lung disease, flu can make it worse. Flu is more dangerous for some people. Infants and young children, people 72years of age and older, pregnant women, and people with certain health conditions or a weakened immune system are at greatest risk. Each year thousands of people in the [facility] die from flu, and many more are hospitalized. Flu vaccine can: 
 keep you from getting flu, 
 make flu less severe if you do get it, and 
 keep you from spreading flu to your family and other people. 2. Inactivated and recombinant flu vaccines A dose of flu vaccine is recommended every flu season. Children 6 months through 6years of age may need two doses during the same flu season. Everyone else needs only one dose each flu season.   
 
 
Some inactivated flu vaccines contain a very small amount of a mercury-based preservative called thimerosal. Studies have not shown thimerosal in vaccines to be harmful, but flu vaccines that do not contain thimerosal are available. There is no live flu virus in flu shots. They cannot cause the flu. There are many flu viruses, and they are always changing. Each year a new flu vaccine is made to protect against three or four viruses that are likely to cause disease in the upcoming flu season. But even when the vaccine doesnt exactly match these viruses, it may still provide some protection Flu vaccine cannot prevent: 
 flu that is caused by a virus not covered by the vaccine, or 
 illnesses that look like flu but are not. It takes about 2 weeks for protection to develop after vaccination, and protection lasts through the flu season. 3. Some people should not get this vaccine Tell the person who is giving you the vaccine:  If you have any severe, life-threatening allergies. If you ever had a life-threatening allergic reaction after a dose of flu vaccine, or have a severe allergy to any part of this vaccine, you may be advised not to get vaccinated. Most, but not all, types of flu vaccine contain a small amount of egg protein.  If you ever had Guillain-Barré Syndrome (also called GBS). Some people with a history of GBS should not get this vaccine. This should be discussed with your doctor.  If you are not feeling well. It is usually okay to get flu vaccine when you have a mild illness, but you might be asked to come back when you feel better. 4. Risks of a vaccine reaction With any medicine, including vaccines, there is a chance of reactions. These are usually mild and go away on their own, but serious reactions are also possible. Most people who get a flu shot do not have any problems with it. Minor problems following a flu shot include:  
 soreness, redness, or swelling where the shot was given  hoarseness  sore, red or itchy eyes  cough  fever  aches  headache  itching  fatigue If these problems occur, they usually begin soon after the shot and last 1 or 2 days. More serious problems following a flu shot can include the following:  There may be a small increased risk of Guillain-Barré Syndrome (GBS) after inactivated flu vaccine. This risk has been estimated at 1 or 2 additional cases per million people vaccinated. This is much lower than the risk of severe complications from flu, which can be prevented by flu vaccine.  Young children who get the flu shot along with pneumococcal vaccine (PCV13) and/or DTaP vaccine at the same time might be slightly more likely to have a seizure caused by fever. Ask your doctor for more information. Tell your doctor if a child who is getting flu vaccine has ever had a seizure. Problems that could happen after any injected vaccine:  People sometimes faint after a medical procedure, including vaccination. Sitting or lying down for about 15 minutes can help prevent fainting, and injuries caused by a fall. Tell your doctor if you feel dizzy, or have vision changes or ringing in the ears.  Some people get severe pain in the shoulder and have difficulty moving the arm where a shot was given. This happens very rarely.  Any medication can cause a severe allergic reaction. Such reactions from a vaccine are very rare, estimated at about 1 in a million doses, and would happen within a few minutes to a few hours after the vaccination. As with any medicine, there is a very remote chance of a vaccine causing a serious injury or death. The safety of vaccines is always being monitored. For more information, visit: www.cdc.gov/vaccinesafety/ 
 
5. What if there is a serious reaction? What should I look for?  Look for anything that concerns you, such as signs of a severe allergic reaction, very high fever, or unusual behavior.  
 
Signs of a severe allergic reaction can include hives, swelling of the face and throat, difficulty breathing, a fast heartbeat, dizziness, and weakness  usually within a few minutes to a few hours after the vaccination. What should I do?  If you think it is a severe allergic reaction or other emergency that cant wait, call 9-1-1 and get the person to the nearest hospital. Otherwise, call your doctor.  Reactions should be reported to the Vaccine Adverse Event Reporting System (VAERS). Your doctor should file this report, or you can do it yourself through  the VAERS web site at www.vaers. [facility].gov, or by calling 9-367.956.5260. VAERS does not give medical advice. 6. The National Vaccine Injury Compensation Program 
 
The [facility] Vaccine Injury Compensation Program (VICP) is a federal program that was created to compensate people who may have been injured by certain vaccines. Persons who believe they may have been injured by a vaccine can learn about the program and about filing a claim by calling 9-437.685.8938 or visiting the [employer] website at www.[facility].gov/vaccinecompensation. There is a time limit to file a claim for compensation. 7. How can I learn more?  Ask your healthcare provider. He or she can give you the vaccine package insert or suggest other sources of information.  Call your local or state health department.  Contact the Centers for Disease Control and Prevention (CDC): 
- Call 8-674.153.4444 (1-800-CDC-INFO) or 
- Visit CDCs website at www.cdc.gov/flu Vaccine Information Statement Inactivated Influenza Vaccine 8/7/2015 
42 KELLEN Siddiqui [id number] [facility] and [employer] Centers for Disease Control and Prevention Office Use Only Ringworm: Care Instructions Your Care Instructions Ringworm is a fungus infection of the skin. It is not caused by a worm. Ringworm causes a round, scaly rash that may crack and itch. The rash can spread over a wide area.  One type of fungus that causes ringworm is often found in locker rooms and swimming pools. It grows well in warm, moist areas of the skin, such as in skin folds. You can get ringworm by sharing towels, clothing, and sports equipment. You can also get it by touching someone who has ringworm. Ringworm is treated with cream that kills the fungus. If the rash is widespread, you may need pills to get rid of it. Ringworm often comes back after treatment. If the rash becomes infected with bacteria, you may need antibiotics. Follow-up care is a key part of your treatment and safety. Be sure to make and go to all appointments, and call your doctor if you are having problems. It's also a good idea to know your test results and keep a list of the medicines you take. How can you care for yourself at home? · Take your medicines exactly as prescribed. Call your doctor if you have any problems with your medicine. · Wash the rash with soap and water, remove flaky skin, and dry thoroughly. · Try an over-the-counter cream with clotrimazole or miconazole in it. Brand names include Lotrimin, Micatin, and Tinactin. Terbinafine cream (Lamisil) is also available without a prescription. Spread the cream beyond the edge or border of the rash. Follow the directions on the package. Do not stop using the medicine just because your skin clears up. You will probably need to continue treatment for 2 to 4 weeks. · To keep from getting another infection: ? Do not go barefoot in public places such as gyms or locker rooms. Avoid sharing towels and clothes. Use flip-flops or some other type of shoe in the shower. ? Do not wear tight clothes or let your skin stay damp for long periods, such as by staying in a wet bathing suit or sweaty clothes. When should you call for help? Call your doctor now or seek immediate medical care if: 
  · You have signs of infection such as: 
? Pain, warmth, or swelling in your skin. ? Red streaks near a wound in the skin. ? Pus coming from the rash on your skin. ? A fever.  
 Watch closely for changes in your health, and be sure to contact your doctor if: 
  · Your ringworm does not improve after 2 weeks of treatment.  
  · You do not get better as expected. Where can you learn more? Go to http://nolberto-roseanna.info/. Enter C068 in the search box to learn more about \"Ringworm: Care Instructions. \" Current as of: April 18, 2018 Content Version: 11.8 © 4785-0166 [employer]. Care instructions adapted under license by [employer] (which disclaims liability or warranty for this information). If you have questions about a medical condition or this instruction, always ask your healthcare professional. [address] any warranty or liability for your use of this information.

## 2018-10-30 NOTE — PROGRESS NOTES
Chief Complaint Patient presents with  Rash  
  left hip 1. Have you been to the ER, urgent care clinic since your last visit? Hospitalized since your last visit? No 
 
2. Have you seen or consulted any other health care providers outside of the [address] [place] [address] since your last visit? Include any pap smears or colon screening.  No

## 2018-10-30 NOTE — PROGRESS NOTES
HISTORY OF PRESENT ILLNESS Alvaro Jean-Baptiste is a 76 y.o. male. HPI Rash: 
he presents with rash/ skin problem located on L lower abdomen . Onset of skin problem was 2 weeks ago. Itching: minimal. Flaking or scaling:no. Pain: no but rash is sensitive to clothing  . Weeping/ draining:  no.   Exposures: no.  No tick exposures. The patient denies fevers, chills or sweats. .  No myalgias Medications tried include:  OTC herbal remedies and partly effective. he does not have a history of eczema He is on chronic doxycycline for osteomyelitis/ discitis of lumbar spine gradually resolving now. ROS Physical Exam  
Abdominal:  
 
 
Erythematous irregular dry patch 6-7 cm with several annular patches with central clearing / advancing erythema medially No pustules, vesicles. Visit Vitals /61 (BP 1 Location: Left arm, BP Patient Position: Sitting) Pulse 65 Temp 97.3 °F (36.3 °C) (Oral) Ht 6' 5\" (1.956 m) Wt 247 lb 9.6 oz (112.3 kg) SpO2 93% BMI 29.36 kg/m² ASSESSMENT and PLAN Diagnoses and all orders for this visit: 
 
1. Tinea corporis -     clotrimazole (LOTRIMIN) 1 % topical cream; Apply  to affected area two (2) times a day for 20 days. The patient was given written instructions detailing his diagnoses and recommendations made today at the visit. Call if not improving over 2 weeks. 2. Encounter for immunization 
-     INFLUENZA VACCINE INACTIVATED (IIV), SUBUNIT, ADJUVANTED, IM Follow-up Disposition: 
Return if symptoms worsen or fail to improve.

## 2018-11-12 RX ORDER — DOXYCYCLINE 100 MG/1
CAPSULE ORAL
Qty: 60 CAP | Refills: 2 | Status: SHIPPED | OUTPATIENT
Start: 2018-11-12 | End: 2019-02-05 | Stop reason: SDUPTHER

## 2018-11-22 LAB
ALBUMIN SERPL-MCNC: 4.7 G/DL (ref 3.6–4.8)
ALBUMIN/GLOB SERPL: 2.9 {RATIO} (ref 1.2–2.2)
ALP SERPL-CCNC: 65 IU/L (ref 39–117)
ALT SERPL-CCNC: 15 IU/L (ref 0–44)
AST SERPL-CCNC: 22 IU/L (ref 0–40)
BASOPHILS # BLD AUTO: 0 X10E3/UL (ref 0–0.2)
BASOPHILS NFR BLD AUTO: 1 %
BILIRUB SERPL-MCNC: 1.2 MG/DL (ref 0–1.2)
BUN SERPL-MCNC: 23 MG/DL (ref 8–27)
BUN/CREAT SERPL: 23 (ref 10–24)
CALCIUM SERPL-MCNC: 9 MG/DL (ref 8.6–10.2)
CHLORIDE SERPL-SCNC: 104 MMOL/L (ref 96–106)
CO2 SERPL-SCNC: 26 MMOL/L (ref 20–29)
CREAT SERPL-MCNC: 1.01 MG/DL (ref 0.76–1.27)
CRP SERPL-MCNC: 4.6 MG/L (ref 0–4.9)
EOSINOPHIL # BLD AUTO: 0.1 X10E3/UL (ref 0–0.4)
EOSINOPHIL NFR BLD AUTO: 2 %
ERYTHROCYTE [DISTWIDTH] IN BLOOD BY AUTOMATED COUNT: 15.2 % (ref 12.3–15.4)
GLOBULIN SER CALC-MCNC: 1.6 G/DL (ref 1.5–4.5)
GLUCOSE SERPL-MCNC: 74 MG/DL (ref 65–99)
HCT VFR BLD AUTO: 40.3 % (ref 37.5–51)
HGB BLD-MCNC: 13.4 G/DL (ref 13–17.7)
IMM GRANULOCYTES # BLD: 0 X10E3/UL (ref 0–0.1)
IMM GRANULOCYTES NFR BLD: 1 %
LYMPHOCYTES # BLD AUTO: 1 X10E3/UL (ref 0.7–3.1)
LYMPHOCYTES NFR BLD AUTO: 24 %
MCH RBC QN AUTO: 30.7 PG (ref 26.6–33)
MCHC RBC AUTO-ENTMCNC: 33.3 G/DL (ref 31.5–35.7)
MCV RBC AUTO: 92 FL (ref 79–97)
MONOCYTES # BLD AUTO: 0.2 X10E3/UL (ref 0.1–0.9)
MONOCYTES NFR BLD AUTO: 6 %
NEUTROPHILS # BLD AUTO: 2.9 X10E3/UL (ref 1.4–7)
NEUTROPHILS NFR BLD AUTO: 66 %
PLATELET # BLD AUTO: 183 X10E3/UL (ref 150–379)
POTASSIUM SERPL-SCNC: 4.4 MMOL/L (ref 3.5–5.2)
PROT SERPL-MCNC: 6.3 G/DL (ref 6–8.5)
RBC # BLD AUTO: 4.37 X10E6/UL (ref 4.14–5.8)
SODIUM SERPL-SCNC: 142 MMOL/L (ref 134–144)
WBC # BLD AUTO: 4.3 X10E3/UL (ref 3.4–10.8)

## 2018-11-28 ENCOUNTER — OFFICE VISIT (OUTPATIENT)
Dept: FAMILY MEDICINE CLINIC | Age: 68
End: 2018-11-28

## 2018-11-28 VITALS
TEMPERATURE: 97.5 F | RESPIRATION RATE: 18 BRPM | HEART RATE: 65 BPM | HEIGHT: 77 IN | BODY MASS INDEX: 29.24 KG/M2 | WEIGHT: 247.6 LBS | DIASTOLIC BLOOD PRESSURE: 65 MMHG | SYSTOLIC BLOOD PRESSURE: 101 MMHG

## 2018-11-28 DIAGNOSIS — M46.45 DISCITIS OF THORACOLUMBAR REGION: Primary | ICD-10-CM

## 2018-11-28 RX ORDER — HALOBETASOL PROPIONATE 0.5 MG/G
CREAM TOPICAL
Refills: 1 | COMMUNITY
Start: 2018-09-10 | End: 2022-07-12

## 2018-11-28 NOTE — PROGRESS NOTES
Chief Complaint Patient presents with  Follow-up Labs and med review 1. Have you been to the ER, urgent care clinic since your last visit? Hospitalized since your last visit? No 
 
2. Have you seen or consulted any other health care providers outside of the [facility] since your last visit? Include any pap smears or colon screening.  No

## 2018-11-29 NOTE — PROGRESS NOTES
[facility] Progress Note Gaby Mathis DO 
  [phone] Office [phone]  Fax 11/28/2018 Assessment & Plan: 1. Discitis/osteomyelitis of L2/3: seen on MRI.  S/p IR biopsy on 04/06.  Cultures sterile at final reading.    No open biopsy per ortho-spine.  Completed 8 plus weeks IV dapto/ceftriaxone Repeat MRI  6/4 showed progression of infection but not thought to be active per ortho-spine. Stable now on doxycycline.  Plan 12 months total treatment.  CRP has normalized. Check labs and RTO in [facility] 2018. Continue doxy through April 2. Headaches. Associated with increased ICP??  D/w neurology. Not thought to be related to abx 3. Leukopenia. Resolved. Thought to be related to infection per hematology 4. Intermittent rash. Followed by dermatology. Doubt related to abx Subjective: No complaints other than intermittent headaches Objective:  
 
Vitals:  
Visit Vitals /65 (BP 1 Location: Left arm, BP Patient Position: Sitting) Pulse 65 Temp 97.5 °F (36.4 °C) (Oral) Resp 18 Ht 6' 5\" (1.956 m) Wt 112.3 kg (247 lb 9.6 oz) BMI 29.36 kg/m² Exam:  
 
 
Physical Examination:  
General:  Alert, cooperative, no distress Head:  Normocephalic, atraumatic. Eyes:  Conjunctivae clear Neck: Supple Lungs:   No distress. Clear to auscultation bilaterally. Chest wall:    
Heart:  Regular rate and rhythm Abdomen:     
Extremities: Moves all. Skin: L flank scaly erythematous rash Neurologic: CNII-XII intact. Normal strength Labs:     
 
No lab exists for component: ITNL No results for input(s): CPK, CKMB, TROIQ in the last 72 hours. No results for input(s): NA, K, CL, CO2, BUN, CREA, GLU, PHOS, MG, ALB, WBC, HGB, HCT, PLT, HGBEXT, HCTEXT, PLTEXT in the last 72 hours. No lab exists for component:  CA No results for input(s): INR, PTP, APTT in the last 72 hours.  
 
No lab exists for component: INREXT 
 Needs: urine analysis, urine sodium, protein and creatinine No results found for: Angie Guardado Cultures: No results found for: MARTIN Lab Results Component Value Date/Time Culture result: HOLDING 7 DAYS PER DR. WRAY 04/06/2018 10:49 AM  
 Culture result: NO GROWTH 7 DAYS 04/06/2018 10:49 AM  
 Culture result: HOLDING 7 DAYS PER DR. Rogena Galeazzi 04/06/2018 09:45 AM  
 Culture result: NO GROWTH 7 DAYS 04/06/2018 09:45 AM  
 Culture result: NO FUNGUS ISOLATED 30 DAYS 04/06/2018 09:45 AM  
 
 
Radiology:  
 
Medications Current Outpatient Medications Medication Sig Dispense  halobetasol (ULTRAVATE) 0.05 % topical cream APPLY TO RASH ON BACK AND NECK TWICE A DAY  doxycycline (VIBRAMYCIN) 100 mg capsule TAKE 1 CAPSULE BY MOUTH TWICE A DAY 60 Cap  doxycycline (VIBRAMYCIN) 100 mg capsule TAKE 1 CAPSULE BY MOUTH TWICE A DAY  levothyroxine (SYNTHROID) 125 mcg tablet TAKE 1 TABLET BY MOUTH EVERY DAY BEFORE BREAKFAST FOR HYPOTHROIDISM 90 Tab  triamcinolone acetonide (KENALOG) 0.1 % topical cream APPLY TO BODY ECZEMA UP TO TWICE A DAY AS NEEDED   
 multivitamin (ONE A DAY) tablet Take 1 Tab by mouth daily.  diazePAM (VALIUM) 5 mg tablet Take 1 Tab by mouth daily as needed (muscle spasm). 25 Tab  traMADol (ULTRAM) 50 mg tablet Take 1 Tab by mouth every six (6) hours as needed for Pain (for moderate to severe pain). Max Daily Amount: 200 mg. 30 Tab  cyclobenzaprine (FLEXERIL) 5 mg tablet Take 1 Tab by mouth three (3) times daily as needed for Muscle Spasm(s). (Patient taking differently: Take 10 mg by mouth three (3) times daily as needed for Muscle Spasm(s).) 30 Tab No current facility-administered medications for this visit.    
 
 
 
 
Case discussed with: 
 
 
Dereck Ambrose,

## 2019-02-05 RX ORDER — DOXYCYCLINE 100 MG/1
CAPSULE ORAL
Qty: 60 CAP | Refills: 2 | Status: SHIPPED | OUTPATIENT
Start: 2019-02-05 | End: 2019-09-04

## 2019-03-22 LAB
ALBUMIN SERPL-MCNC: 4.1 G/DL (ref 3.6–4.8)
ALBUMIN/GLOB SERPL: 2.2 {RATIO} (ref 1.2–2.2)
ALP SERPL-CCNC: 67 IU/L (ref 39–117)
ALT SERPL-CCNC: 18 IU/L (ref 0–44)
AST SERPL-CCNC: 20 IU/L (ref 0–40)
BASOPHILS # BLD AUTO: 0 X10E3/UL (ref 0–0.2)
BASOPHILS NFR BLD AUTO: 1 %
BILIRUB SERPL-MCNC: 1.4 MG/DL (ref 0–1.2)
BUN SERPL-MCNC: 18 MG/DL (ref 8–27)
BUN/CREAT SERPL: 15 (ref 10–24)
CALCIUM SERPL-MCNC: 9 MG/DL (ref 8.6–10.2)
CHLORIDE SERPL-SCNC: 106 MMOL/L (ref 96–106)
CO2 SERPL-SCNC: 23 MMOL/L (ref 20–29)
CREAT SERPL-MCNC: 1.22 MG/DL (ref 0.76–1.27)
CRP SERPL-MCNC: 6.5 MG/L (ref 0–4.9)
EOSINOPHIL # BLD AUTO: 0.2 X10E3/UL (ref 0–0.4)
EOSINOPHIL NFR BLD AUTO: 8 %
ERYTHROCYTE [DISTWIDTH] IN BLOOD BY AUTOMATED COUNT: 15.3 % (ref 12.3–15.4)
GLOBULIN SER CALC-MCNC: 1.9 G/DL (ref 1.5–4.5)
GLUCOSE SERPL-MCNC: 82 MG/DL (ref 65–99)
HCT VFR BLD AUTO: 42 % (ref 37.5–51)
HGB BLD-MCNC: 13.8 G/DL (ref 13–17.7)
IMM GRANULOCYTES # BLD AUTO: 0 X10E3/UL (ref 0–0.1)
IMM GRANULOCYTES NFR BLD AUTO: 0 %
LYMPHOCYTES # BLD AUTO: 0.7 X10E3/UL (ref 0.7–3.1)
LYMPHOCYTES NFR BLD AUTO: 31 %
MCH RBC QN AUTO: 30.3 PG (ref 26.6–33)
MCHC RBC AUTO-ENTMCNC: 32.9 G/DL (ref 31.5–35.7)
MCV RBC AUTO: 92 FL (ref 79–97)
MONOCYTES # BLD AUTO: 0.2 X10E3/UL (ref 0.1–0.9)
MONOCYTES NFR BLD AUTO: 7 %
NEUTROPHILS # BLD AUTO: 1.2 X10E3/UL (ref 1.4–7)
NEUTROPHILS NFR BLD AUTO: 53 %
PLATELET # BLD AUTO: 153 X10E3/UL (ref 150–379)
POTASSIUM SERPL-SCNC: 5.3 MMOL/L (ref 3.5–5.2)
PROT SERPL-MCNC: 6 G/DL (ref 6–8.5)
RBC # BLD AUTO: 4.56 X10E6/UL (ref 4.14–5.8)
SODIUM SERPL-SCNC: 144 MMOL/L (ref 134–144)
WBC # BLD AUTO: 2.3 X10E3/UL (ref 3.4–10.8)

## 2019-03-27 ENCOUNTER — OFFICE VISIT (OUTPATIENT)
Dept: FAMILY MEDICINE CLINIC | Age: 69
End: 2019-03-27

## 2019-03-27 VITALS
BODY MASS INDEX: 29.16 KG/M2 | DIASTOLIC BLOOD PRESSURE: 72 MMHG | HEART RATE: 59 BPM | RESPIRATION RATE: 18 BRPM | SYSTOLIC BLOOD PRESSURE: 113 MMHG | TEMPERATURE: 97.5 F | WEIGHT: 247 LBS | HEIGHT: 77 IN

## 2019-03-27 DIAGNOSIS — M46.46 DISCITIS OF LUMBAR REGION: Primary | ICD-10-CM

## 2019-03-27 NOTE — PROGRESS NOTES
Chief Complaint Patient presents with  Medication Evaluation Doxycycline PO- Discitis/Osteomyelitis

## 2019-03-28 NOTE — PROGRESS NOTES
[facility] Progress Note Quinton Pride DO 
  [phone] Office [phone]  Fax 3/27/2019 Assessment & Plan: 1. Discitis/osteomyelitis of L2/3: seen on MRI.  S/p IR biopsy on 04/06.  Cultures sterile at final reading.    No open biopsy per ortho-spine.  Completed 8 plus weeks IV dapto/ceftriaxone Repeat MRI  6/4 showed progression of infection but not thought to be active per ortho-spine. Stable now on doxycycline.  Will complete 12 months total treatment this month. Stop antibiotics and monitor. Follow-up as needed 2. Leukopenia.  Initially resolved but white count is low again. Will repeat by his PCP. Recommended follow-up with hematology. Previously thought to be related to infection per hematology 3. Mildly elevated potassium. Potassium was 5.3. This will also be repeated by his PCP creatinine was normal  
  
 
 
Subjective: No complaints. Tolerating antibiotics Objective:  
 
Vitals:  
Visit Vitals /72 Pulse (!) 59 Temp 97.5 °F (36.4 °C) (Oral) Resp 18 Ht 6' 5\" (1.956 m) Wt 112 kg (247 lb) BMI 29.29 kg/m² Exam:  
 
 
Physical Examination:  
General:  Alert, cooperative, no distress Head:  Normocephalic, atraumatic. Eyes:  Conjunctivae clear Neck: Supple Lungs:   No distress. Clear to auscultation bilaterally. Chest wall:  No tenderness or deformity. Heart:  Regular rate and rhythm Abdomen:   Soft, non-tender, non-distended Extremities: Moves all. Skin:  No rash Neurologic: CNII-XII intact. Normal strength Labs:     
 
No lab exists for component: ITNL No results for input(s): CPK, CKMB, TROIQ in the last 72 hours. No results for input(s): NA, K, CL, CO2, BUN, CREA, GLU, PHOS, MG, ALB, WBC, HGB, HCT, PLT, HGBEXT, HCTEXT, PLTEXT in the last 72 hours. No lab exists for component:  CA No results for input(s): INR, PTP, APTT in the last 72 hours. No lab exists for component: INREXT Needs: urine analysis, urine sodium, protein and creatinine No results found for: Manpreet Gates Cultures: No results found for: MARTIN Lab Results Component Value Date/Time Culture result: HOLDING 7 DAYS PER DR. WRAY 04/06/2018 10:49 AM  
 Culture result: NO GROWTH 7 DAYS 04/06/2018 10:49 AM  
 Culture result: HOLDING 7 DAYS PER DR. Annette Briggs 04/06/2018 09:45 AM  
 Culture result: NO GROWTH 7 DAYS 04/06/2018 09:45 AM  
 Culture result: NO FUNGUS ISOLATED 30 DAYS 04/06/2018 09:45 AM  
 
 
Radiology:  
 
Medications Current Outpatient Medications Medication Sig Dispense  levothyroxine (SYNTHROID) 125 mcg tablet TAKE 1 TABLET BY MOUTH EVERY DAY BEFORE BREAKFAST FOR HYPOTHROIDISM 30 Tab  doxycycline (VIBRAMYCIN) 100 mg capsule TAKE 1 CAPSULE BY MOUTH TWICE A DAY 60 Cap  halobetasol (ULTRAVATE) 0.05 % topical cream APPLY TO RASH ON BACK AND NECK TWICE A DAY  diazePAM (VALIUM) 5 mg tablet Take 1 Tab by mouth daily as needed (muscle spasm). 25 Tab  traMADol (ULTRAM) 50 mg tablet Take 1 Tab by mouth every six (6) hours as needed for Pain (for moderate to severe pain). Max Daily Amount: 200 mg. 30 Tab  cyclobenzaprine (FLEXERIL) 5 mg tablet Take 1 Tab by mouth three (3) times daily as needed for Muscle Spasm(s). (Patient taking differently: Take 10 mg by mouth three (3) times daily as needed for Muscle Spasm(s).) 30 Tab  triamcinolone acetonide (KENALOG) 0.1 % topical cream APPLY TO BODY ECZEMA UP TO TWICE A DAY AS NEEDED   
 multivitamin (ONE A DAY) tablet Take 1 Tab by mouth daily. No current facility-administered medications for this visit.    
 
 
 
 
Case discussed with: 
 
 
Dina Agarwal DO

## 2019-04-19 ENCOUNTER — OFFICE VISIT (OUTPATIENT)
Dept: FAMILY MEDICINE CLINIC | Age: 69
End: 2019-04-19

## 2019-04-19 VITALS
HEART RATE: 52 BPM | WEIGHT: 246.6 LBS | HEIGHT: 77 IN | TEMPERATURE: 98.1 F | DIASTOLIC BLOOD PRESSURE: 72 MMHG | RESPIRATION RATE: 18 BRPM | SYSTOLIC BLOOD PRESSURE: 114 MMHG | OXYGEN SATURATION: 99 % | BODY MASS INDEX: 29.12 KG/M2

## 2019-04-19 DIAGNOSIS — E87.5 HYPERKALEMIA: ICD-10-CM

## 2019-04-19 DIAGNOSIS — E55.9 VITAMIN D DEFICIENCY: ICD-10-CM

## 2019-04-19 DIAGNOSIS — Z13.220 SCREENING FOR LIPID DISORDERS: ICD-10-CM

## 2019-04-19 DIAGNOSIS — Z12.11 SCREENING FOR COLON CANCER: ICD-10-CM

## 2019-04-19 DIAGNOSIS — I87.2 VENOUS INSUFFICIENCY OF BOTH LOWER EXTREMITIES: ICD-10-CM

## 2019-04-19 DIAGNOSIS — Z12.5 SCREENING FOR PROSTATE CANCER: ICD-10-CM

## 2019-04-19 DIAGNOSIS — M86.9 OSTEOMYELITIS, UNSPECIFIED SITE, UNSPECIFIED TYPE (HCC): ICD-10-CM

## 2019-04-19 DIAGNOSIS — M46.45 DISCITIS OF THORACOLUMBAR REGION: ICD-10-CM

## 2019-04-19 DIAGNOSIS — E03.9 ACQUIRED HYPOTHYROIDISM: Primary | Chronic | ICD-10-CM

## 2019-04-19 DIAGNOSIS — D72.819 LEUKOPENIA, UNSPECIFIED TYPE: ICD-10-CM

## 2019-04-19 DIAGNOSIS — I87.2 STASIS DERMATITIS OF BOTH LEGS: ICD-10-CM

## 2019-04-19 NOTE — PROGRESS NOTES
Chief Complaint Patient presents with [facility] Establish Care 1. Have you been to the ER, urgent care clinic since your last visit? Hospitalized since your last visit? No 
 
2. Have you seen or consulted any other health care providers outside of the [facility] since your last visit? Include any pap smears or colon screening.  No

## 2019-04-19 NOTE — PROGRESS NOTES
Family Medicine Initial Office Visit Patient: Gerhardt Needy 1950, 71 y.o., male Encounter Date: 4/19/2019 ASSESSMENT & PLAN 
  ICD-10-CM ICD-9-CM 1. Acquired hypothyroidism W81.4 796.2 METABOLIC PANEL, COMPREHENSIVE  
   C REACTIVE PROTEIN, QT  
   TSH RFX ON ABNORMAL TO FREE T4  
   LIPID PANEL 2. Osteomyelitis, unspecified site, unspecified type ([facility] [address] M86.9 730.20 C REACTIVE PROTEIN, QT  
3. Discitis of thoracolumbar region M46.45 722.92 C REACTIVE PROTEIN, QT 4. Leukopenia, unspecified type D72.819 288.50 CBC WITH AUTOMATED DIFF 5. Vitamin D deficiency E55.9 268.9 VITAMIN D, 25 HYDROXY 6. Hyperkalemia A40.7 246.1 METABOLIC PANEL, COMPREHENSIVE 7. Stasis dermatitis of both legs I87.2 454.1 8. Venous insufficiency of both lower extremities H59.8 350.43 METABOLIC PANEL, COMPREHENSIVE  
   LIPID PANEL 9. Screening for colon cancer Z12.11 V76.51 REFERRAL TO GASTROENTEROLOGY 10. Screening for lipid disorders Z13.220 V77.91 LIPID PANEL 11. Screening for prostate cancer Z12.5 V76.44 PSA SCREENING (SCREENING) Patient Instructions Hypothyroid: has been stable for some time, will check labs and continue medication Hx of osteo and Discitis: will recheck labs but per ID notes appears to have completed appropriate course of abx, pain improved Leukopenia with mild neutropenia: return to Dr Greer Fermin, rechek labs Vit D Def: recheck labs, consider adding supplementation if necessary Hyperkalemia: lab recheck Due for dm and lipid screening this year Due for PSA screening Supportive care for venous stasis/stasis derm for now CHIEF COMPLAINT Chief Complaint Patient presents with [facility] Care SUBJECTIVE Gerhardt Needy is a 71 y.o. male presenting today for establishing care. Prior patient of Dr Randy Francois Patient works as RETIRED since 2014, previously worked for [employer]. Ran a physical fitness program for 18 years for the [employer] Patient lives in a house with wife. Have 3 children, all grown (44, 42, 40), 6 grandkids, some live nearby. Patient was last seen by primary care within the last year Patient last saw a dentist 6 m ago, plan to see again next week. Patient last had eye exam about a year ago. Exercise: Periodically has been reintegrating exercise into day to day. Pain better but still some MSK issues after being less active d/t injury. Going to gym 2x weekly, playing golf, bowling Diet / Anna Shames pretty healthy, weight is relatively stable, working on losing some weight Tobacco: no 
EtOH: sometimes Illicit Substances: no 
 
Has hypothyroid, has been stable on his dose for a long time History of discitis and osteomyelitis at L2-L3, was following with Dr. Yossi Huff, status post 1 year of antibiotic treatment Recently leukopenic with mild neutropenia on labs Hyperkalemia noted recently along with elevated CRP Had seen Dr. Lety Lugo from hematology in the past, was not quite as leukopenic as he is now and at that time it seemed to be associated with his acute infection which we now suspect to be resolved based on treatment course and review of infectious disease notes. Lived in [place] for almost 30 years Played [employer] at [employer] Review of Systems A 12 point review of systems was negative except as noted here or in the HPI. OBJECTIVE Visit Vitals /72 (BP 1 Location: Left arm, BP Patient Position: Sitting) Pulse (!) 52 Temp 98.1 °F (36.7 °C) (Oral) Resp 18 Ht 6' 5\" (1.956 m) Wt 246 lb 9.6 oz (111.9 kg) SpO2 99% BMI 29.24 kg/m² Physical Exam  
Constitutional: He is oriented to person, place, and time. He appears well-developed and well-nourished. No distress. NAD, Nontoxic, Appears Stated Age, tall, well appearing HENT:  
Head: Normocephalic and atraumatic. Mouth/Throat: Oropharynx is clear and moist.  
Hearing aids noted Eyes: Conjunctivae and EOM are normal. Right eye exhibits no discharge. Left eye exhibits no discharge. No scleral icterus. Neck: Neck supple. Cardiovascular: Normal rate, regular rhythm and normal heart sounds. No murmur heard. Except: corona on exam  
Pulmonary/Chest: Effort normal and breath sounds normal. No stridor. No respiratory distress. He has no wheezes. He has no rales. Abdominal: Soft. Bowel sounds are normal. He exhibits no distension. There is no tenderness. Musculoskeletal: He exhibits edema (trace bilateral LE). He exhibits no tenderness. Neurological: He is alert and oriented to person, place, and time. Grossly intact CN Skin: Skin is warm and dry. No rash noted. He is not diaphoretic. Stasis derm noted b/l lower extremities with hairlessness of ant shin to mid shin Psychiatric: He has a normal mood and affect. His behavior is normal.  
Nursing note and vitals reviewed. No results found for any visits on 04/19/19. HISTORICAL Reviewed and updated today, and as noted below: 
 
Past Medical History:  
Diagnosis Date  Bone infection ([address] L3 spine  Family history of skin cancer   
 brother-BCC  History of vascular access device 04/10/2018 [facility] VAT - 4 FR single, R basilic, 43 cm for LTA  Skin cancer SCC nose, right eye lid, left eye brow and left cheek  Sun-damaged skin  Thyroid disease Past Surgical History:  
Procedure Laterality Date  HX COLONOSCOPY    
 HX MOHS PROCEDURES  01/22/2018 SCC L cheek by Dr. Wei Posada  HX OTHER SURGICAL  2012  
 colonoscopy Family History Problem Relation Age of Onset 24 [facility] Arthritis-osteo Mother  Diabetes Father  Heart Disease Father  Arthritis-osteo Brother  Osteoporosis Brother  No Known Problems Son  No Known Problems Son  No Known Problems Daughter  Cancer Neg Hx  Hypertension Neg Hx  Thyroid Disease Neg Hx Social History Tobacco Use  
 Smoking Status Never Smoker Smokeless Tobacco Never Used Social History Socioeconomic History  Marital status:  Spouse name: Not on file  Number of children: Not on file  Years of education: Not on file  Highest education level: Not on file Tobacco Use  Smoking status: Never Smoker  Smokeless tobacco: Never Used Substance and Sexual Activity  Alcohol use: Yes Alcohol/week: 0.6 oz Types: 1 Cans of beer per week  Drug use: No  
 Sexual activity: Yes  
  Partners: Female No Known Allergies No visits with results within 3 Month(s) from this visit. Latest known visit with results is:  
Office Visit on 11/28/2018 Component Date Value Ref Range Status  C-Reactive Protein, Qt 03/21/2019 6.5* 0.0 - 4.9 mg/L Final  
 WBC 03/21/2019 2.3* 3.4 - 10.8 x10E3/uL Final  
 RBC 03/21/2019 4.56  4.14 - 5.80 x10E6/uL Final  
 HGB 03/21/2019 13.8  13.0 - 17.7 g/dL Final  
 HCT 03/21/2019 42.0  37.5 - 51.0 % Final  
 MCV 03/21/2019 92  79 - 97 fL Final  
 MCH 03/21/2019 30.3  26.6 - 33.0 pg Final  
 MCHC 03/21/2019 32.9  31.5 - 35.7 g/dL Final  
 RDW 03/21/2019 15.3  12.3 - 15.4 % Final  
 PLATELET 30/36/8202 436  150 - 379 x10E3/uL Final  
 NEUTROPHILS 03/21/2019 53  Not Estab. % Final  
 Lymphocytes 03/21/2019 31  Not Estab. % Final  
 MONOCYTES 03/21/2019 7  Not Estab. % Final  
 EOSINOPHILS 03/21/2019 8  Not Estab. % Final  
 BASOPHILS 03/21/2019 1  Not Estab. % Final  
 ABS. NEUTROPHILS 03/21/2019 1.2* 1.4 - 7.0 x10E3/uL Final  
 Abs Lymphocytes 03/21/2019 0.7  0.7 - 3.1 x10E3/uL Final  
 ABS. MONOCYTES 03/21/2019 0.2  0.1 - 0.9 x10E3/uL Final  
 ABS. EOSINOPHILS 03/21/2019 0.2  0.0 - 0.4 x10E3/uL Final  
 ABS. BASOPHILS 03/21/2019 0.0  0.0 - 0.2 x10E3/uL Final  
 IMMATURE GRANULOCYTES 03/21/2019 0  Not Estab. % Final  
 ABS. IMM.  GRANS. 03/21/2019 0.0  0.0 - 0.1 x10E3/uL Final  
 Glucose 03/21/2019 82  65 - 99 mg/dL Final  
  BUN 03/21/2019 18  8 - 27 mg/dL Final  
 Creatinine 03/21/2019 1.22  0.76 - 1.27 mg/dL Final  
 GFR est non-AA 03/21/2019 60  >59 mL/min/1.73 Final  
 GFR est AA 03/21/2019 69  >59 mL/min/1.73 Final  
 BUN/Creatinine ratio 03/21/2019 15  10 - 24 Final  
 Sodium 03/21/2019 144  134 - 144 mmol/L Final  
 Potassium 03/21/2019 5.3* 3.5 - 5.2 mmol/L Final  
 Chloride 03/21/2019 106  96 - 106 mmol/L Final  
 CO2 03/21/2019 23  20 - 29 mmol/L Final  
 Calcium 03/21/2019 9.0  8.6 - 10.2 mg/dL Final  
 Protein, total 03/21/2019 6.0  6.0 - 8.5 g/dL Final  
 Albumin 03/21/2019 4.1  3.6 - 4.8 g/dL Final  
 GLOBULIN, TOTAL 03/21/2019 1.9  1.5 - 4.5 g/dL Final  
 A-G Ratio 03/21/2019 2.2  1.2 - 2.2 Final  
 Bilirubin, total 03/21/2019 1.4* 0.0 - 1.2 mg/dL Final  
 Alk. phosphatase 03/21/2019 67  39 - 117 IU/L Final  
 AST (SGOT) 03/21/2019 20  0 - 40 IU/L Final  
 ALT (SGPT) 03/21/2019 18  0 - 44 IU/L Final  
 
 
 
Brianda Castillo MD 
[id number] 04/19/19 9:12 AM 
 
Portions of this note may have been populated using smart dictation software and may have \"sounds-like\" errors present.

## 2019-04-20 LAB
25(OH)D3+25(OH)D2 SERPL-MCNC: 25.1 NG/ML (ref 30–100)
ALBUMIN SERPL-MCNC: 4.3 G/DL (ref 3.6–4.8)
ALBUMIN/GLOB SERPL: 2.4 {RATIO} (ref 1.2–2.2)
ALP SERPL-CCNC: 69 IU/L (ref 39–117)
ALT SERPL-CCNC: 16 IU/L (ref 0–44)
AST SERPL-CCNC: 21 IU/L (ref 0–40)
BASOPHILS # BLD AUTO: 0 X10E3/UL (ref 0–0.2)
BASOPHILS NFR BLD AUTO: 1 %
BILIRUB SERPL-MCNC: 1.1 MG/DL (ref 0–1.2)
BUN SERPL-MCNC: 16 MG/DL (ref 8–27)
BUN/CREAT SERPL: 15 (ref 10–24)
CALCIUM SERPL-MCNC: 8.6 MG/DL (ref 8.6–10.2)
CHLORIDE SERPL-SCNC: 106 MMOL/L (ref 96–106)
CHOLEST SERPL-MCNC: 217 MG/DL (ref 100–199)
CO2 SERPL-SCNC: 22 MMOL/L (ref 20–29)
CREAT SERPL-MCNC: 1.04 MG/DL (ref 0.76–1.27)
CRP SERPL-MCNC: 3.9 MG/L (ref 0–4.9)
EOSINOPHIL # BLD AUTO: 0.1 X10E3/UL (ref 0–0.4)
EOSINOPHIL NFR BLD AUTO: 5 %
ERYTHROCYTE [DISTWIDTH] IN BLOOD BY AUTOMATED COUNT: 15.9 % (ref 12.3–15.4)
GLOBULIN SER CALC-MCNC: 1.8 G/DL (ref 1.5–4.5)
GLUCOSE SERPL-MCNC: 94 MG/DL (ref 65–99)
HCT VFR BLD AUTO: 41.5 % (ref 37.5–51)
HDLC SERPL-MCNC: 60 MG/DL
HGB BLD-MCNC: 13.9 G/DL (ref 13–17.7)
IMM GRANULOCYTES # BLD AUTO: 0 X10E3/UL (ref 0–0.1)
IMM GRANULOCYTES NFR BLD AUTO: 0 %
INTERPRETATION, 910389: NORMAL
LDLC SERPL CALC-MCNC: 145 MG/DL (ref 0–99)
LYMPHOCYTES # BLD AUTO: 0.7 X10E3/UL (ref 0.7–3.1)
LYMPHOCYTES NFR BLD AUTO: 29 %
MCH RBC QN AUTO: 30.1 PG (ref 26.6–33)
MCHC RBC AUTO-ENTMCNC: 33.5 G/DL (ref 31.5–35.7)
MCV RBC AUTO: 90 FL (ref 79–97)
MONOCYTES # BLD AUTO: 0.2 X10E3/UL (ref 0.1–0.9)
MONOCYTES NFR BLD AUTO: 7 %
NEUTROPHILS # BLD AUTO: 1.5 X10E3/UL (ref 1.4–7)
NEUTROPHILS NFR BLD AUTO: 58 %
PLATELET # BLD AUTO: 161 X10E3/UL (ref 150–379)
POTASSIUM SERPL-SCNC: 4.9 MMOL/L (ref 3.5–5.2)
PROT SERPL-MCNC: 6.1 G/DL (ref 6–8.5)
PSA SERPL-MCNC: 1.4 NG/ML (ref 0–4)
RBC # BLD AUTO: 4.62 X10E6/UL (ref 4.14–5.8)
SODIUM SERPL-SCNC: 142 MMOL/L (ref 134–144)
TRIGL SERPL-MCNC: 62 MG/DL (ref 0–149)
TSH SERPL DL<=0.005 MIU/L-ACNC: 3.85 UIU/ML (ref 0.45–4.5)
VLDLC SERPL CALC-MCNC: 12 MG/DL (ref 5–40)
WBC # BLD AUTO: 2.6 X10E3/UL (ref 3.4–10.8)

## 2019-04-23 DIAGNOSIS — D72.819 LEUKOPENIA, UNSPECIFIED TYPE: Primary | ICD-10-CM

## 2019-04-23 NOTE — PROGRESS NOTES
White count is persistently low but neutrophils have come back to the normal range, could have been reactive. I'd say it's ok to recheck cbc in one month before we make any referrals and see if the blood counts normalize. Electrolytes, kidney and liver fxn all look good Inflammatory marker resolved to normal range Prostate screening normal 
Vitamin d is persistently low, recommend Supplementation with at least 2000 international units  Vit d daily TSH is normal, continue current mgmt Mild elevation in cholesterol but better than one year ago

## 2019-05-18 LAB
BASOPHILS # BLD AUTO: 0 X10E3/UL (ref 0–0.2)
BASOPHILS NFR BLD AUTO: 1 %
EOSINOPHIL # BLD AUTO: 0.2 X10E3/UL (ref 0–0.4)
EOSINOPHIL NFR BLD AUTO: 5 %
ERYTHROCYTE [DISTWIDTH] IN BLOOD BY AUTOMATED COUNT: 15.5 % (ref 12.3–15.4)
HCT VFR BLD AUTO: 41.2 % (ref 37.5–51)
HGB BLD-MCNC: 13.8 G/DL (ref 13–17.7)
IMM GRANULOCYTES # BLD AUTO: 0 X10E3/UL (ref 0–0.1)
IMM GRANULOCYTES NFR BLD AUTO: 1 %
LYMPHOCYTES # BLD AUTO: 0.7 X10E3/UL (ref 0.7–3.1)
LYMPHOCYTES NFR BLD AUTO: 26 %
MCH RBC QN AUTO: 30.1 PG (ref 26.6–33)
MCHC RBC AUTO-ENTMCNC: 33.5 G/DL (ref 31.5–35.7)
MCV RBC AUTO: 90 FL (ref 79–97)
MONOCYTES # BLD AUTO: 0.3 X10E3/UL (ref 0.1–0.9)
MONOCYTES NFR BLD AUTO: 9 %
NEUTROPHILS # BLD AUTO: 1.7 X10E3/UL (ref 1.4–7)
NEUTROPHILS NFR BLD AUTO: 58 %
PLATELET # BLD AUTO: 162 X10E3/UL (ref 150–379)
RBC # BLD AUTO: 4.58 X10E6/UL (ref 4.14–5.8)
WBC # BLD AUTO: 2.9 X10E3/UL (ref 3.4–10.8)

## 2019-05-20 NOTE — PROGRESS NOTES
Blood count persistently low but coming up slowly--lets recheck in 6 weeks. If still low I think returning to hematology just for completeness sake is a good idea. No neutropenia (good!), I'll order follow up lab for 6 wk from now.

## 2019-05-23 DIAGNOSIS — D72.819 LEUKOPENIA, UNSPECIFIED TYPE: ICD-10-CM

## 2019-05-28 ENCOUNTER — OFFICE VISIT (OUTPATIENT)
Dept: FAMILY MEDICINE CLINIC | Age: 69
End: 2019-05-28

## 2019-05-28 VITALS
HEIGHT: 77 IN | RESPIRATION RATE: 18 BRPM | WEIGHT: 246 LBS | DIASTOLIC BLOOD PRESSURE: 63 MMHG | BODY MASS INDEX: 29.05 KG/M2 | SYSTOLIC BLOOD PRESSURE: 105 MMHG | TEMPERATURE: 97.1 F | HEART RATE: 58 BPM

## 2019-05-28 DIAGNOSIS — Z71.89 ADVANCED CARE PLANNING/COUNSELING DISCUSSION: ICD-10-CM

## 2019-05-28 DIAGNOSIS — E55.9 VITAMIN D DEFICIENCY: Primary | ICD-10-CM

## 2019-05-28 DIAGNOSIS — D72.819 LEUKOPENIA, UNSPECIFIED TYPE: ICD-10-CM

## 2019-05-28 DIAGNOSIS — E03.9 ACQUIRED HYPOTHYROIDISM: Chronic | ICD-10-CM

## 2019-05-28 DIAGNOSIS — Z13.31 SCREENING FOR DEPRESSION: ICD-10-CM

## 2019-05-28 DIAGNOSIS — Z00.00 MEDICARE ANNUAL WELLNESS VISIT, SUBSEQUENT: ICD-10-CM

## 2019-05-28 DIAGNOSIS — Z13.39 SCREENING FOR ALCOHOLISM: ICD-10-CM

## 2019-05-28 PROBLEM — M46.40 DISCITIS: Status: RESOLVED | Noted: 2018-04-05 | Resolved: 2019-05-28

## 2019-05-28 PROBLEM — M86.9 OSTEOMYELITIS (HCC): Status: RESOLVED | Noted: 2018-04-05 | Resolved: 2019-05-28

## 2019-05-28 RX ORDER — LEVOTHYROXINE SODIUM 125 UG/1
125 TABLET ORAL
Qty: 90 TAB | Refills: 1 | Status: SHIPPED | OUTPATIENT
Start: 2019-05-28 | End: 2019-11-22 | Stop reason: SDUPTHER

## 2019-05-28 NOTE — PROGRESS NOTES
Chief Complaint   Patient presents with    Annual Wellness Visit     discuss 90 day of levothyroxine     1. Have you been to the ER, urgent care clinic since your last visit? Hospitalized since your last visit? No     2. Have you seen or consulted any other health care providers outside of the [address] [place] [address] since your last visit? Include any pap smears or colon screening.  No

## 2019-05-28 NOTE — PROGRESS NOTES
This is the Subsequent Medicare Annual Wellness Exam, performed 12 months or more after the Initial AWV or the last Subsequent AWV    I have reviewed the patient's medical history in detail and updated the computerized patient record. History     Past Medical History:   Diagnosis Date    Bone infection ([address]     L3 spine    Family history of skin cancer     brother-BCC    History of vascular access device 04/10/2018    [facility] VAT - 4 FR single, R basilic, 43 cm for LTA    Skin cancer     SCC nose, right eye lid, left eye brow and left cheek    Sun-damaged skin     Thyroid disease       Past Surgical History:   Procedure Laterality Date    HX COLONOSCOPY      HX MOHS PROCEDURES  01/22/2018    SCC L cheek by Dr. Jake Castro  2012    colonoscopy     Current Outpatient Medications   Medication Sig Dispense Refill    levothyroxine (SYNTHROID) 125 mcg tablet Take 1 Tab by mouth Daily (before breakfast). 90 Tab 1    halobetasol (ULTRAVATE) 0.05 % topical cream APPLY TO RASH ON BACK AND NECK TWICE A DAY  1    triamcinolone acetonide (KENALOG) 0.1 % topical cream APPLY TO BODY ECZEMA UP TO TWICE A DAY AS NEEDED  12    multivitamin (ONE A DAY) tablet Take 1 Tab by mouth daily.  doxycycline (VIBRAMYCIN) 100 mg capsule TAKE 1 CAPSULE BY MOUTH TWICE A DAY 60 Cap 2    diazePAM (VALIUM) 5 mg tablet Take 1 Tab by mouth daily as needed (muscle spasm). 25 Tab 0    traMADol (ULTRAM) 50 mg tablet Take 1 Tab by mouth every six (6) hours as needed for Pain (for moderate to severe pain). Max Daily Amount: 200 mg. 30 Tab 0    cyclobenzaprine (FLEXERIL) 5 mg tablet Take 1 Tab by mouth three (3) times daily as needed for Muscle Spasm(s).  (Patient taking differently: Take 10 mg by mouth three (3) times daily as needed for Muscle Spasm(s).) 30 Tab 0     No Known Allergies  Family History   Problem Relation Age of Onset    Arthritis-osteo Mother     Diabetes Father     Heart Disease Father    [employer] Arthritis-osteo Brother     Osteoporosis Brother     No Known Problems Son     No Known Problems Son     No Known Problems Daughter     Cancer Neg Hx     Hypertension Neg Hx     Thyroid Disease Neg Hx      Social History     Tobacco Use    Smoking status: Never Smoker    Smokeless tobacco: Never Used   Substance Use Topics    Alcohol use: Yes     Alcohol/week: 0.6 oz     Types: 1 Cans of beer per week     Patient Active Problem List   Diagnosis Code    Acquired hypothyroidism E03.9    Vitamin D deficiency E55.9    Skin cancer C44.90    Advanced care planning/counseling discussion Z71.89    Leukopenia D72.819    Fall W19. XXXA       Depression Risk Factor Screening:     3 most recent PHQ Screens 5/28/2019   Little interest or pleasure in doing things Not at all   Feeling down, depressed, irritable, or hopeless Not at all   Total Score PHQ 2 0     Alcohol Risk Factor Screening: You do not drink alcohol or very rarely. Functional Ability and Level of Safety:   Hearing Loss  The patient wears hearing aids. Activities of Daily Living  The home contains: handrails, rugs and good lighting  Patient does total self care    Fall Risk  Fall Risk Assessment, last 12 mths 5/28/2019   Able to walk? Yes   Fall in past 12 months?  No   Fall with injury? -   Number of falls in past 12 months -   Fall Risk Score -       Abuse Screen  Patient is not abused    Cognitive Screening   Evaluation of Cognitive Function:  Has your family/caregiver stated any concerns about your memory: no  Normal, Clock Drawing test    Patient Care Team   Patient Care Team:  Herminia Beard MD as PCP - General (Family Practice)    Assessment/Plan   Education and counseling provided:  Are appropriate based on today's review and evaluation  End-of-Life planning (with patient's consent)  Prostate cancer screening tests (PSA, covered annually)  Colorectal cancer screening tests  Cardiovascular screening blood test  Screening for glaucoma  Diabetes screening test    Diagnoses and all orders for this visit:    1. Vitamin D deficiency    2. Acquired hypothyroidism  -     levothyroxine (SYNTHROID) 125 mcg tablet; Take 1 Tab by mouth Daily (before breakfast). 3. Medicare annual wellness visit, subsequent    4. Screening for alcoholism  -     [place] ANNUAL ALCOHOL SCREEN 15 MIN    5. Screening for depression  -     DEPRESSION SCREEN ANNUAL    6. Advanced care planning/counseling discussion    7. Leukopenia, unspecified type        Health Maintenance Due   Topic Date Due    Shingrix Vaccine Age 49> (1 of 2) 01/06/2000    MEDICARE YEARLY EXAM  02/01/2019     Family Medicine Follow-Up Progress Note  Patient: Olaf Briscoe  1950, 71 y.o., male  Encounter Date: 5/28/2019    ASSESSMENT & PLAN    ICD-10-CM ICD-9-CM    1. Vitamin D deficiency E55.9 268.9    2. Acquired hypothyroidism E03.9 244.9 levothyroxine (SYNTHROID) 125 mcg tablet   3. Medicare annual wellness visit, subsequent Z00.00 V70.0    4. Screening for alcoholism Z13.39 V79.1 IA ANNUAL ALCOHOL SCREEN 15 MIN   5. Screening for depression Z13.31 V79.0 DEPRESSION SCREEN ANNUAL   6. Advanced care planning/counseling discussion Z71.89 V65.49    7. Leukopenia, unspecified type D72.819 288.50        Orders Placed This Encounter    Depression Screen Annual    Annual  Alcohol Screen 15 min ()    levothyroxine (SYNTHROID) 125 mcg tablet     Sig: Take 1 Tab by mouth Daily (before breakfast).      Dispense:  90 Tab     Refill:  1       Patient Instructions   Synthroid 3year-old place  I encouraged the patient on healthy diet and exercise  Encouraged the patient to continue to have lab work done as ordered, if persistent low white blood cell count or recurrence of neutropenia we will send back to Dr. Mario Dennis, hematology, for reevaluation  The patient is agreeable  A WV done today, patient has taken ACP paperwork home to review and he will bring back to the office and we will scanned to chart      CHIEF COMPLAINT  Chief Complaint   Patient presents with    Annual Wellness Visit     discuss 90 day of levothyroxine       SUBJECTIVE  Colin Reyna is a 71 y.o. male presenting today for AWV as noted and also for a follow up visit to discuss his labs  His cbc showed improving low wbc with no more neutropenia as was noted initially in February on abnormal labs. Pt is overall doing well, going to travel to [place] this fall  Thinks he has had his shingles vaccine through [facility], will check his records at home  Req refill on levothyroxine, reports feeling stable, the patient is tolerating medication with no side effects. Labs reviewed  He is exercising as much as he is able and is otherwise denying all complaints. Weight stable  No depression, no anxiety      Review of Systems  A 12 point review of systems was negative except as noted here or in the HPI. OBJECTIVE  Visit Vitals  /63   Pulse (!) 58   Temp 97.1 °F (36.2 °C) (Oral)   Resp 18   Ht 6' 5\" (1.956 m)   Wt 246 lb (111.6 kg)   BMI 29.17 kg/m²       Physical Exam   Constitutional: He is oriented to person, place, and time. He appears well-developed and well-nourished. No distress. NAD, Nontoxic, Appears Stated Age, tall, well appearing   HENT:   Head: Normocephalic and atraumatic. Mouth/Throat: Oropharynx is clear and moist.   Hearing aids noted   Eyes: Conjunctivae and EOM are normal. Right eye exhibits no discharge. Left eye exhibits no discharge. No scleral icterus. Neck: Neck supple. Cardiovascular: Normal rate, regular rhythm and normal heart sounds. No murmur heard. Except: corona on exam   Pulmonary/Chest: Effort normal and breath sounds normal. No stridor. No respiratory distress. He has no wheezes. He has no rales. Abdominal: Soft. Bowel sounds are normal. He exhibits no distension. There is no tenderness. Musculoskeletal: He exhibits edema (trace bilateral LE). He exhibits no tenderness.    Neurological: He is alert and oriented to person, place, and time. Grossly intact CN   Skin: Skin is warm and dry. No rash noted. He is not diaphoretic. Stasis derm noted b/l lower extremities with hairlessness of ant shin to mid shin   Psychiatric: He has a normal mood and affect. His behavior is normal.   Nursing note and vitals reviewed. No results found for any visits on 05/28/19. HISTORICAL  Reviewed and updated today, and as noted below:    Past Medical History:   Diagnosis Date    Bone infection ([address]     L3 spine    Family history of skin cancer     brother-BCC    History of vascular access device 04/10/2018    [facility] VAT - 4 FR single, R basilic, 43 cm for LTA    Skin cancer     SCC nose, right eye lid, left eye brow and left cheek    Sun-damaged skin     Thyroid disease      Past Surgical History:   Procedure Laterality Date    HX COLONOSCOPY      HX MOHS PROCEDURES  01/22/2018    SCC L cheek by Dr. Zhong Ours  2012    colonoscopy     Family History   Problem Relation Age of Onset    Arthritis-osteo Mother     Diabetes Father     Heart Disease Father     Arthritis-osteo Brother     Osteoporosis Brother     No Known Problems Son     No Known Problems Son     No Known Problems Daughter     Cancer Neg Hx     Hypertension Neg Hx     Thyroid Disease Neg Hx      Social History     Tobacco Use   Smoking Status Never Smoker   Smokeless Tobacco Never Used     Social History     Socioeconomic History    Marital status:      Spouse name: Not on file    Number of children: Not on file    Years of education: Not on file    Highest education level: Not on file   Tobacco Use    Smoking status: Never Smoker    Smokeless tobacco: Never Used   Substance and Sexual Activity    Alcohol use:  Yes     Alcohol/week: 0.6 oz     Types: 1 Cans of beer per week    Drug use: No    Sexual activity: Yes     Partners: Female     No Known Allergies    Orders Only on 05/23/2019   Component Date Value Ref Range Status    WBC 05/17/2019 2.9* 3.4 - 10.8 x10E3/uL Final    RBC 05/17/2019 4.58  4.14 - 5.80 x10E6/uL Final    HGB 05/17/2019 13.8  13.0 - 17.7 g/dL Final    HCT 05/17/2019 41.2  37.5 - 51.0 % Final    MCV 05/17/2019 90  79 - 97 fL Final    MCH 05/17/2019 30.1  26.6 - 33.0 pg Final    MCHC 05/17/2019 33.5  31.5 - 35.7 g/dL Final    RDW 05/17/2019 15.5* 12.3 - 15.4 % Final    PLATELET 18/69/6176 983  150 - 379 x10E3/uL Final    NEUTROPHILS 05/17/2019 58  Not Estab. % Final    Lymphocytes 05/17/2019 26  Not Estab. % Final    MONOCYTES 05/17/2019 9  Not Estab. % Final    EOSINOPHILS 05/17/2019 5  Not Estab. % Final    BASOPHILS 05/17/2019 1  Not Estab. % Final    ABS. NEUTROPHILS 05/17/2019 1.7  1.4 - 7.0 x10E3/uL Final    Abs Lymphocytes 05/17/2019 0.7  0.7 - 3.1 x10E3/uL Final    ABS. MONOCYTES 05/17/2019 0.3  0.1 - 0.9 x10E3/uL Final    ABS. EOSINOPHILS 05/17/2019 0.2  0.0 - 0.4 x10E3/uL Final    ABS. BASOPHILS 05/17/2019 0.0  0.0 - 0.2 x10E3/uL Final    IMMATURE GRANULOCYTES 05/17/2019 1  Not Estab. % Final    ABS. IMM. GRANS. 05/17/2019 0.0  0.0 - 0.1 x10E3/uL Final   Office Visit on 04/19/2019   Component Date Value Ref Range Status    WBC 04/19/2019 2.6* 3.4 - 10.8 x10E3/uL Final    RBC 04/19/2019 4.62  4.14 - 5.80 x10E6/uL Final    HGB 04/19/2019 13.9  13.0 - 17.7 g/dL Final    HCT 04/19/2019 41.5  37.5 - 51.0 % Final    MCV 04/19/2019 90  79 - 97 fL Final    MCH 04/19/2019 30.1  26.6 - 33.0 pg Final    MCHC 04/19/2019 33.5  31.5 - 35.7 g/dL Final    RDW 04/19/2019 15.9* 12.3 - 15.4 % Final    PLATELET 60/29/3099 220  150 - 379 x10E3/uL Final    NEUTROPHILS 04/19/2019 58  Not Estab. % Final    Lymphocytes 04/19/2019 29  Not Estab. % Final    MONOCYTES 04/19/2019 7  Not Estab. % Final    EOSINOPHILS 04/19/2019 5  Not Estab. % Final    BASOPHILS 04/19/2019 1  Not Estab. % Final    ABS.  NEUTROPHILS 04/19/2019 1.5  1.4 - 7.0 x10E3/uL Final    Abs Lymphocytes 04/19/2019 0.7  0.7 - 3.1 x10E3/uL Final    ABS. MONOCYTES 04/19/2019 0.2  0.1 - 0.9 x10E3/uL Final    ABS. EOSINOPHILS 04/19/2019 0.1  0.0 - 0.4 x10E3/uL Final    ABS. BASOPHILS 04/19/2019 0.0  0.0 - 0.2 x10E3/uL Final    IMMATURE GRANULOCYTES 04/19/2019 0  Not Estab. % Final    ABS. IMM. GRANS. 04/19/2019 0.0  0.0 - 0.1 x10E3/uL Final    Glucose 04/19/2019 94  65 - 99 mg/dL Final    BUN 04/19/2019 16  8 - 27 mg/dL Final    Creatinine 04/19/2019 1.04  0.76 - 1.27 mg/dL Final    GFR est non-AA 04/19/2019 73  >59 mL/min/1.73 Final    GFR est AA 04/19/2019 84  >59 mL/min/1.73 Final    BUN/Creatinine ratio 04/19/2019 15  10 - 24 Final    Sodium 04/19/2019 142  134 - 144 mmol/L Final    Potassium 04/19/2019 4.9  3.5 - 5.2 mmol/L Final    Chloride 04/19/2019 106  96 - 106 mmol/L Final    CO2 04/19/2019 22  20 - 29 mmol/L Final    Calcium 04/19/2019 8.6  8.6 - 10.2 mg/dL Final    Protein, total 04/19/2019 6.1  6.0 - 8.5 g/dL Final    Albumin 04/19/2019 4.3  3.6 - 4.8 g/dL Final    GLOBULIN, TOTAL 04/19/2019 1.8  1.5 - 4.5 g/dL Final    A-G Ratio 04/19/2019 2.4* 1.2 - 2.2 Final    Bilirubin, total 04/19/2019 1.1  0.0 - 1.2 mg/dL Final    Alk. phosphatase 04/19/2019 69  39 - 117 IU/L Final    AST (SGOT) 04/19/2019 21  0 - 40 IU/L Final    ALT (SGPT) 04/19/2019 16  0 - 44 IU/L Final    C-Reactive Protein, Qt 04/19/2019 3.9  0.0 - 4.9 mg/L Final    Prostate Specific Ag 04/19/2019 1.4  0.0 - 4.0 ng/mL Final    Comment: Roche ECLIA methodology. According to the American Urological Association, Serum PSA should  decrease and remain at undetectable levels after radical  prostatectomy. The AUA defines biochemical recurrence as an initial  PSA value 0.2 ng/mL or greater followed by a subsequent confirmatory  PSA value 0.2 ng/mL or greater. Values obtained with different assay methods or kits cannot be used  interchangeably.  Results cannot be interpreted as absolute evidence  of the presence or absence of malignant disease.  VITAMIN D, 25-HYDROXY 04/19/2019 25.1* 30.0 - 100.0 ng/mL Final    Comment: Vitamin D deficiency has been defined by the [address] practice guideline as a  level of serum 25-OH vitamin D less than 20 ng/mL (1,2). The Endocrine Society went on to further define vitamin D  insufficiency as a level between 21 and 29 ng/mL (2). 1. IOM ([place] [employer]). 2010. Dietary reference     intakes for calcium and D. [facility]: The     [place]. 2. Alec MF, Kamlesh ERNST, Tony CASTRO, et al.     Evaluation, treatment, and prevention of vitamin D     deficiency: an Endocrine Society clinical practice     guideline. JCEM. 2011 Jul; 96(7):1911-30.  TSH 04/19/2019 3.850  0.450 - 4.500 uIU/mL Final    Cholesterol, total 04/19/2019 217* 100 - 199 mg/dL Final    Triglyceride 04/19/2019 62  0 - 149 mg/dL Final    HDL Cholesterol 04/19/2019 60  >39 mg/dL Final    VLDL, calculated 04/19/2019 12  5 - 40 mg/dL Final    LDL, calculated 04/19/2019 145* 0 - 99 mg/dL Final    INTERPRETATION 04/19/2019 Note   Final    Supplemental report is available. Enrique Voss MD  [facility]  05/28/19 1:50 PM    Portions of this note may have been populated using smart dictation software and may have \"sounds-like\" errors present. Pt was counseled on risks, benefits and alternatives of treatment options. All questions were asked and answered and the patient was agreeable with the treatment plan as outlined.

## 2019-05-28 NOTE — PATIENT INSTRUCTIONS
Synthroid 3year-old place I encouraged the patient on healthy diet and exercise Encouraged the patient to continue to have lab work done as ordered, if persistent low white blood cell count or recurrence of neutropenia we will send back to Dr. Michel Stewart, hematology, for reevaluation The patient is agreeable A WV done today, patient has taken ACP paperwork home to review and he will bring back to the office and we will scanned to chart

## 2019-06-24 LAB
BASOPHILS # BLD AUTO: 0 X10E3/UL (ref 0–0.2)
BASOPHILS NFR BLD AUTO: 0 %
EOSINOPHIL # BLD AUTO: 0.1 X10E3/UL (ref 0–0.4)
EOSINOPHIL NFR BLD AUTO: 5 %
ERYTHROCYTE [DISTWIDTH] IN BLOOD BY AUTOMATED COUNT: 15.4 % (ref 12.3–15.4)
HCT VFR BLD AUTO: 41.9 % (ref 37.5–51)
HGB BLD-MCNC: 13.7 G/DL (ref 13–17.7)
IMM GRANULOCYTES # BLD AUTO: 0 X10E3/UL (ref 0–0.1)
IMM GRANULOCYTES NFR BLD AUTO: 0 %
LYMPHOCYTES # BLD AUTO: 0.8 X10E3/UL (ref 0.7–3.1)
LYMPHOCYTES NFR BLD AUTO: 29 %
MCH RBC QN AUTO: 29.9 PG (ref 26.6–33)
MCHC RBC AUTO-ENTMCNC: 32.7 G/DL (ref 31.5–35.7)
MCV RBC AUTO: 92 FL (ref 79–97)
MONOCYTES # BLD AUTO: 0.2 X10E3/UL (ref 0.1–0.9)
MONOCYTES NFR BLD AUTO: 6 %
NEUTROPHILS # BLD AUTO: 1.7 X10E3/UL (ref 1.4–7)
NEUTROPHILS NFR BLD AUTO: 60 %
PATH REV BLD -IMP: ABNORMAL
PATHOLOGIST NAME: ABNORMAL
PLATELET # BLD AUTO: 171 X10E3/UL (ref 150–450)
RBC # BLD AUTO: 4.58 X10E6/UL (ref 4.14–5.8)
WBC # BLD AUTO: 2.9 X10E3/UL (ref 3.4–10.8)

## 2019-07-01 DIAGNOSIS — D72.819 LEUKOPENIA, UNSPECIFIED TYPE: ICD-10-CM

## 2019-07-01 NOTE — PROGRESS NOTES
White blood cell is low but absolutely low, I believe because treatment has been concluded and white blood cells are persistently low it is a good idea to [place] back with hematology. Likely they will say that no further work-up is needed but I believe that the caution is appropriate in this circumstance.

## 2019-09-04 ENCOUNTER — OFFICE VISIT (OUTPATIENT)
Dept: ONCOLOGY | Age: 69
End: 2019-09-04

## 2019-09-04 VITALS
WEIGHT: 246.6 LBS | TEMPERATURE: 97.3 F | SYSTOLIC BLOOD PRESSURE: 115 MMHG | HEIGHT: 77 IN | RESPIRATION RATE: 18 BRPM | DIASTOLIC BLOOD PRESSURE: 66 MMHG | OXYGEN SATURATION: 97 % | BODY MASS INDEX: 29.12 KG/M2 | HEART RATE: 59 BPM

## 2019-09-04 DIAGNOSIS — D72.819 LEUKOPENIA, UNSPECIFIED TYPE: Primary | ICD-10-CM

## 2019-09-04 NOTE — PROGRESS NOTES
[facility] [place] at [address], [address] [facility]  Loida Imbler: [phone]  F: [phone]      Reason for Visit:   Tarun Starkey is a 71 y.o. male who is seen in consultation at the request of Dr. Neva Monaco for evaluation of leukopenia        History of Present Illness:   Was seeing Dr. Chris Kincaid for discitis of L2/3, s/p 8 weeks of IV dapto/ctx, and planning for 12 months total treatment, on doxycycline. S/p IR biopsy on 4/6/18. Finished doxy in 3/2019    7/19/18 wbc 3.2, normal indices  5/22/18 wbc 2.2; alc 0.6  4/23/18 wbc 3.2, normal indices  4/19/19 wbc 2.6, normal indicies  5/17/19 wbc 2.9, normal indices    Normal wbc on 2/1/18    Interval History: Grade 1 insomnia. Past Medical History:   Diagnosis Date    Bone infection ([address]     L3 spine    Family history of skin cancer     brother-BCC    History of vascular access device 04/10/2018    [facility] VAT - 4 FR single, R basilic, 43 cm for LTA    Skin cancer     SCC nose, right eye lid, left eye brow and left cheek    Sun-damaged skin     Thyroid disease       Past Surgical History:   Procedure Laterality Date    HX COLONOSCOPY      HX MOHS PROCEDURES  01/22/2018    SCC L cheek by Dr. Gerardo Morales  2012    colonoscopy      Social History     Tobacco Use    Smoking status: Never Smoker    Smokeless tobacco: Never Used   Substance Use Topics    Alcohol use:  Yes     Alcohol/week: 1.0 standard drinks     Types: 1 Cans of beer per week      Family History   Problem Relation Age of Onset    Arthritis-osteo Mother     Diabetes Father     Heart Disease Father     Arthritis-osteo Brother     Osteoporosis Brother     No Known Problems Son     No Known Problems Son     No Known Problems Daughter     Cancer Neg Hx     Hypertension Neg Hx     Thyroid Disease Neg Hx      Current Outpatient Medications   Medication Sig    levothyroxine (SYNTHROID) 125 mcg tablet Take 1 Tab by mouth Daily (before breakfast).  multivitamin (ONE A DAY) tablet Take 1 Tab by mouth daily.  halobetasol (ULTRAVATE) 0.05 % topical cream APPLY TO RASH ON BACK AND NECK TWICE A DAY    triamcinolone acetonide (KENALOG) 0.1 % topical cream APPLY TO BODY ECZEMA UP TO TWICE A DAY AS NEEDED     No current facility-administered medications for this visit. No Known Allergies     Review of Systems: A complete review of systems was obtained, negative except as described above. Physical Exam:     Visit Vitals  /66   Pulse (!) 59   Temp 97.3 °F (36.3 °C) (Temporal)   Resp 18   Ht 6' 5\" (1.956 m)   Wt 246 lb 9.6 oz (111.9 kg)   SpO2 97%   BMI 29.24 kg/m²     ECOG PS: 0  General: No distress  Respiratory: Normal respiratory effort  CV: No peripheral edema  Skin: No rashes, ecchymoses, or petechiae  Psych: Alert, oriented, normal mood/affect      Results:     Lab Results   Component Value Date/Time    WBC 2.9 (L) 06/20/2019 09:34 AM    HGB 13.7 06/20/2019 09:34 AM    HCT 41.9 06/20/2019 09:34 AM    PLATELET 316 14/68/0028 09:34 AM    MCV 92 06/20/2019 09:34 AM    ABS. NEUTROPHILS 1.7 06/20/2019 09:34 AM     Lab Results   Component Value Date/Time    Sodium 142 04/19/2019 09:57 AM    Potassium 4.9 04/19/2019 09:57 AM    Chloride 106 04/19/2019 09:57 AM    CO2 22 04/19/2019 09:57 AM    Glucose 94 04/19/2019 09:57 AM    BUN 16 04/19/2019 09:57 AM    Creatinine 1.04 04/19/2019 09:57 AM    GFR est AA 84 04/19/2019 09:57 AM    GFR est non-AA 73 04/19/2019 09:57 AM    Calcium 8.6 04/19/2019 09:57 AM     Lab Results   Component Value Date/Time    Bilirubin, total 1.1 04/19/2019 09:57 AM    ALT (SGPT) 16 04/19/2019 09:57 AM    AST (SGOT) 21 04/19/2019 09:57 AM    Alk. phosphatase 69 04/19/2019 09:57 AM    Protein, total 6.1 04/19/2019 09:57 AM    Albumin 4.3 04/19/2019 09:57 AM    Globulin 3.3 04/07/2018 05:24 AM         Records reviewed and summarized above. Assessment/plan:   1.  Leukopenia: normal in Feb 2018 prior to infection; indices normal.  Neutrophils always normal.  Timing suggests due to infection and antibiotics. hgb and plts normal.  Would not suggest further work up at this time. Will repeat cbc with diff today. If he becomes neutropenic, with ANC < 1, would have him return, otherwise would expect his wbc to stabilize or even normalize over time. Thank you for this consult. All of the patient's questions were answered today. > 15 min were spent with this patient with > 50% of that time spent in face to face counseling      I appreciate the opportunity to participate [facility].     Signed By: Kusum Diaz MD

## 2019-09-05 ENCOUNTER — HOSPITAL ENCOUNTER (OUTPATIENT)
Dept: LAB | Age: 69
Discharge: HOME | End: 2019-09-05

## 2019-09-05 PROCEDURE — 88305 TISSUE EXAM BY PATHOLOGIST: CPT

## 2019-09-07 LAB
BASOPHILS # BLD AUTO: 0 X10E3/UL (ref 0–0.2)
BASOPHILS NFR BLD AUTO: 0 %
EOSINOPHIL # BLD AUTO: 0.1 X10E3/UL (ref 0–0.4)
EOSINOPHIL NFR BLD AUTO: 5 %
ERYTHROCYTE [DISTWIDTH] IN BLOOD BY AUTOMATED COUNT: 15 % (ref 12.3–15.4)
HCT VFR BLD AUTO: 40 % (ref 37.5–51)
HGB BLD-MCNC: 13.6 G/DL (ref 13–17.7)
IMM GRANULOCYTES # BLD AUTO: 0 X10E3/UL (ref 0–0.1)
IMM GRANULOCYTES NFR BLD AUTO: 0 %
LYMPHOCYTES # BLD AUTO: 0.7 X10E3/UL (ref 0.7–3.1)
LYMPHOCYTES NFR BLD AUTO: 27 %
MCH RBC QN AUTO: 31.4 PG (ref 26.6–33)
MCHC RBC AUTO-ENTMCNC: 34 G/DL (ref 31.5–35.7)
MCV RBC AUTO: 92 FL (ref 79–97)
MONOCYTES # BLD AUTO: 0.2 X10E3/UL (ref 0.1–0.9)
MONOCYTES NFR BLD AUTO: 7 %
NEUTROPHILS # BLD AUTO: 1.5 X10E3/UL (ref 1.4–7)
NEUTROPHILS NFR BLD AUTO: 61 %
PLATELET # BLD AUTO: 155 X10E3/UL (ref 150–450)
RBC # BLD AUTO: 4.33 X10E6/UL (ref 4.14–5.8)
WBC # BLD AUTO: 2.6 X10E3/UL (ref 3.4–10.8)

## 2019-11-22 DIAGNOSIS — E03.9 ACQUIRED HYPOTHYROIDISM: Chronic | ICD-10-CM

## 2019-11-22 RX ORDER — LEVOTHYROXINE SODIUM 125 UG/1
TABLET ORAL
Qty: 90 TAB | Refills: 1 | Status: SHIPPED | OUTPATIENT
Start: 2019-11-22 | End: 2020-05-26

## 2019-11-22 NOTE — PROGRESS NOTES
PICC Placement Note    PRE-PROCEDURE VERIFICATION  Correct Procedure: yes  Correct Site:  yes  Temperature: Temp: 97.4 °F (36.3 °C), Temperature Source: Temp Source: Oral  Recent Labs      04/10/18   0402   04/08/18   0129   BUN   --    --   20   CREA  0.99   < >  0.95   PLT   --    --   201   WBC   --    --   3.7*    < > = values in this interval not displayed. Allergies: Review of patient's allergies indicates no known allergies. Education materials for PICC Care given: yes. See Patient Education activity for further details. PICC Booklet placed at bedside: yes    Closed Ended PICC Catheters:  Flush Lumens as Follows:  Intermittent Medication:   Flush before and after each medication with 10 ml NS. Unused Ports:  Flush every 8 hours with 10 ml NS.  TPN Ports:  Flush every 24 hours with 20 ml NS prior to hanging new bag. Blood Draws: Stop infusion, draw off and waste 10 ml of blood. Draw sample with 10cc syringe or greater. DO NOT USE VACUTAINER . Transfer with appropriate device to lab  tubes. Flush with 20 ml NS. Dressing Change:  Every 7 days, and PRN using sterile technique if integrity of dressing is compromised. Initial dressing change for central line 24-48 hours post insertion if gauze is used. Apply new dressing per policy. PROCEDURE DETAIL  Consent was obtained and all questions were answered related to risks and benefits. A single lumen PICC line was inserted, as a sterile procedure using ultrasound and modified Seldinger technique for Home IV Therapy. The following documentation is in addition to the PICC properties in the lines/airways flowsheet :  Lot #: [id number]  Lidocaine 1% administered intradermally :yes  Internal Catheter Total Length: 43 (cm)  Vein Selection for PICC:right basilic  Central Line Bundle followed yes  Complication Related to Insertion:no    The placement was verified by EKG, MAX P WAVE @ 43 (cm). PER EKG PICC TIP @ C/A junction.      Line is okay to use: yes    Areli Reid Ankit Brandt RN Pt does not remember

## 2019-12-10 ENCOUNTER — HOSPITAL ENCOUNTER (OUTPATIENT)
Dept: LAB | Age: 69
Discharge: HOME | End: 2019-12-10

## 2019-12-10 ENCOUNTER — OFFICE VISIT (OUTPATIENT)
Dept: FAMILY MEDICINE CLINIC | Age: 69
End: 2019-12-10

## 2019-12-10 VITALS
BODY MASS INDEX: 29.52 KG/M2 | TEMPERATURE: 97.1 F | HEIGHT: 77 IN | SYSTOLIC BLOOD PRESSURE: 125 MMHG | RESPIRATION RATE: 18 BRPM | HEART RATE: 60 BPM | DIASTOLIC BLOOD PRESSURE: 74 MMHG | WEIGHT: 250 LBS

## 2019-12-10 DIAGNOSIS — E03.9 ACQUIRED HYPOTHYROIDISM: Primary | ICD-10-CM

## 2019-12-10 DIAGNOSIS — E55.9 VITAMIN D DEFICIENCY: ICD-10-CM

## 2019-12-10 DIAGNOSIS — E03.9 ACQUIRED HYPOTHYROIDISM: ICD-10-CM

## 2019-12-10 DIAGNOSIS — D72.819 LEUKOPENIA, UNSPECIFIED TYPE: ICD-10-CM

## 2019-12-10 LAB
25(OH)D3 SERPL-MCNC: 22.5 NG/ML (ref 30–100)
BASOPHILS # BLD: 0 K/UL (ref 0–0.1)
BASOPHILS NFR BLD: 1 % (ref 0–1)
DIFFERENTIAL METHOD BLD: ABNORMAL
EOSINOPHIL # BLD: 0.1 K/UL (ref 0–0.4)
EOSINOPHIL NFR BLD: 3 % (ref 0–7)
ERYTHROCYTE [DISTWIDTH] IN BLOOD BY AUTOMATED COUNT: 14.6 % (ref 11.5–14.5)
HCT VFR BLD AUTO: 43.6 % (ref 36.6–50.3)
HGB BLD-MCNC: 14.1 G/DL (ref 12.1–17)
IMM GRANULOCYTES # BLD AUTO: 0 K/UL (ref 0–0.04)
IMM GRANULOCYTES NFR BLD AUTO: 0 % (ref 0–0.5)
LYMPHOCYTES # BLD: 1 K/UL (ref 0.8–3.5)
LYMPHOCYTES NFR BLD: 27 % (ref 12–49)
MCH RBC QN AUTO: 30.9 PG (ref 26–34)
MCHC RBC AUTO-ENTMCNC: 32.3 G/DL (ref 30–36.5)
MCV RBC AUTO: 95.6 FL (ref 80–99)
MONOCYTES # BLD: 0.3 K/UL (ref 0–1)
MONOCYTES NFR BLD: 7 % (ref 5–13)
NEUTS SEG # BLD: 2.2 K/UL (ref 1.8–8)
NEUTS SEG NFR BLD: 62 % (ref 32–75)
NRBC # BLD: 0 K/UL (ref 0–0.01)
NRBC BLD-RTO: 0 PER 100 WBC
PLATELET # BLD AUTO: 181 K/UL (ref 150–400)
PMV BLD AUTO: 11.1 FL (ref 8.9–12.9)
RBC # BLD AUTO: 4.56 M/UL (ref 4.1–5.7)
TSH SERPL DL<=0.05 MIU/L-ACNC: 6.88 UIU/ML (ref 0.36–3.74)
WBC # BLD AUTO: 3.5 K/UL (ref 4.1–11.1)

## 2019-12-10 NOTE — PATIENT INSTRUCTIONS
1. Acquired hypothyroidism Due for a thyroid recheck, labs as ordered, continue to take medication and we will titrate as necessary 
- TSH 3RD GENERATION; Future 2. Leukopenia, unspecified type Reviewed last labs, due for recheck and then we will continue to screen periodically, last ANC was normal and WBC appear to be stable 
- CBC WITH AUTOMATED DIFF; Future 3. Vitamin D deficiency Due for recheck and then if less than 20 we will go ahead and replace with vitamin D 50,000 units once weekly for 14 weeks. If above 20 but less than 30 I recommend 2000 to 4000 international units of vitamin D daily 
- VITAMIN D, 25 HYDROXY; Future

## 2019-12-10 NOTE — PROGRESS NOTES
Family Medicine Follow-Up Progress Note  Patient: Marah Pool  1950, 71 y.o., male  Encounter Date: 12/10/2019    ASSESSMENT & PLAN    ICD-10-CM ICD-9-CM    1. Acquired hypothyroidism E03.9 244.9 TSH 3RD GENERATION   2. Leukopenia, unspecified type D72.819 288.50 CBC WITH AUTOMATED DIFF   3. Vitamin D deficiency E55.9 268.9 VITAMIN D, 25 HYDROXY       Orders Placed This Encounter    CBC WITH AUTOMATED DIFF     Standing Status:   Future     Number of Occurrences:   1     Standing Expiration Date:   12/10/2020    TSH 3RD GENERATION     Standing Status:   Future     Number of Occurrences:   1     Standing Expiration Date:   12/10/2020    VITAMIN D, 25 HYDROXY     Standing Status:   Future     Number of Occurrences:   1     Standing Expiration Date:   12/10/2020       Patient Instructions   1. Acquired hypothyroidism  Due for a thyroid recheck, labs as ordered, continue to take medication and we will titrate as necessary  - TSH 3RD GENERATION; Future    2. Leukopenia, unspecified type  Reviewed last labs, due for recheck and then we will continue to screen periodically, last ANC was normal and WBC appear to be stable  - CBC WITH AUTOMATED DIFF; Future    3. Vitamin D deficiency  Due for recheck and then if less than 20 we will go ahead and replace with vitamin D 50,000 units once weekly for 14 weeks. If above 20 but less than 30 I recommend 2000 to 4000 international units of vitamin D daily  - VITAMIN D, 25 HYDROXY; Future        CHIEF COMPLAINT  Chief Complaint   Patient presents with    Labs     WBC, VIt D, and Thyroid        SUBJECTIVE  Marah Pool is a 71 y.o. male presenting today for routine follow up  Hypothyroidism: Reports symptoms are stable, does not report any increase in fatigue, weight, skin changes, memory troubles, hair changes.   Reports compliance with medication with no side effects  Low white blood count: Not feeling ill, has been seen by hematology since last time he was seen here, is simply to monitor his blood count as his last ANC was normal.  Patient is asymptomatic  Vitamin D deficiency: In the past has had decreased vitamin D, takes a multivitamin daily, due for lab recheck  Otherwise the patient is feeling well, recently traveled to [place] by car, he reports that they got out and walked every 100 miles. He did not have any significant swelling in his lower extremities  Review of Systems  A 12 point review of systems was negative except as noted here or in the HPI. OBJECTIVE  Visit Vitals  /74   Pulse 60   Temp 97.1 °F (36.2 °C) (Oral)   Resp 18   Ht 6' 5\" (1.956 m)   Wt 250 lb (113.4 kg)   BMI 29.65 kg/m²       Physical Exam  Vitals signs and nursing note reviewed. Constitutional:       General: He is not in acute distress. Appearance: He is well-developed. He is not diaphoretic. Comments: NAD, Nontoxic, Appears Stated Age, tall, well appearing   HENT:      Head: Normocephalic and atraumatic. Right Ear: External ear normal.      Left Ear: External ear normal.      Ears:      Comments: Hearing aides present     Nose: Nose normal. No congestion. Mouth/Throat:      Mouth: Mucous membranes are moist.      Pharynx: Oropharynx is clear. No oropharyngeal exudate or posterior oropharyngeal erythema. Eyes:      General: No scleral icterus. Right eye: No discharge. Left eye: No discharge. Conjunctiva/sclera: Conjunctivae normal.   Neck:      Musculoskeletal: Neck supple. Cardiovascular:      Rate and Rhythm: Normal rate and regular rhythm. Heart sounds: Normal heart sounds. No murmur. Comments: Except: corona on exam  Pulmonary:      Effort: Pulmonary effort is normal. No respiratory distress. Breath sounds: Normal breath sounds. No stridor. No wheezing or rales. Abdominal:      General: Bowel sounds are normal. There is no distension. Palpations: Abdomen is soft. Tenderness: There is no tenderness. Musculoskeletal:         General: No tenderness. Right lower leg: No edema. Left lower leg: No edema. Skin:     General: Skin is warm and dry. Capillary Refill: Capillary refill takes less than 2 seconds. Findings: No rash. Comments: Stasis derm noted b/l lower extremities with hairlessness of ant shin to mid shin   Neurological:      General: No focal deficit present. Mental Status: He is alert and oriented to person, place, and time. Gait: Gait normal.      Comments: Grossly intact CN   Psychiatric:         Mood and Affect: Mood normal.         Behavior: Behavior normal.         Thought Content: Thought content normal.         Judgment: Judgment normal.         No results found for any visits on 12/10/19.     HISTORICAL  Reviewed and updated today, and as noted below:    Past Medical History:   Diagnosis Date    Bone infection ([address]     L3 spine    Family history of skin cancer     brother-BCC    History of vascular access device 04/10/2018    [facility] VAT - 4 FR single, R basilic, 43 cm for LTA    Skin cancer     SCC nose, right eye lid, left eye brow and left cheek    Sun-damaged skin     Thyroid disease      Past Surgical History:   Procedure Laterality Date    HX COLONOSCOPY      HX MOHS PROCEDURES  01/22/2018    SCC L cheek by Dr. Song Patient  2012    colonoscopy     Family History   Problem Relation Age of Onset    Arthritis-osteo Mother     Diabetes Father     Heart Disease Father     Arthritis-osteo Brother     Osteoporosis Brother     No Known Problems Son     No Known Problems Son     No Known Problems Daughter     Cancer Neg Hx     Hypertension Neg Hx     Thyroid Disease Neg Hx      Social History     Tobacco Use   Smoking Status Never Smoker   Smokeless Tobacco Never Used     Social History     Socioeconomic History    Marital status:      Spouse name: Not on file    Number of children: Not on file    Years of education: Not on file    Highest education level: Not on file   Tobacco Use    Smoking status: Never Smoker    Smokeless tobacco: Never Used   Substance and Sexual Activity    Alcohol use: Yes     Alcohol/week: 1.0 standard drinks     Types: 1 Cans of beer per week    Drug use: No    Sexual activity: Yes     Partners: Female     No Known Allergies    No visits with results within 3 Month(s) from this visit. Latest known visit with results is:   Office Visit on 09/04/2019   Component Date Value Ref Range Status    WBC 09/06/2019 2.6* 3.4 - 10.8 x10E3/uL Final    RBC 09/06/2019 4.33  4.14 - 5.80 x10E6/uL Final    HGB 09/06/2019 13.6  13.0 - 17.7 g/dL Final    HCT 09/06/2019 40.0  37.5 - 51.0 % Final    MCV 09/06/2019 92  79 - 97 fL Final    MCH 09/06/2019 31.4  26.6 - 33.0 pg Final    MCHC 09/06/2019 34.0  31.5 - 35.7 g/dL Final    RDW 09/06/2019 15.0  12.3 - 15.4 % Final    PLATELET 22/75/3204 195  150 - 450 x10E3/uL Final    NEUTROPHILS 09/06/2019 61  Not Estab. % Final    Lymphocytes 09/06/2019 27  Not Estab. % Final    MONOCYTES 09/06/2019 7  Not Estab. % Final    EOSINOPHILS 09/06/2019 5  Not Estab. % Final    BASOPHILS 09/06/2019 0  Not Estab. % Final    ABS. NEUTROPHILS 09/06/2019 1.5  1.4 - 7.0 x10E3/uL Final    Abs Lymphocytes 09/06/2019 0.7  0.7 - 3.1 x10E3/uL Final    ABS. MONOCYTES 09/06/2019 0.2  0.1 - 0.9 x10E3/uL Final    ABS. EOSINOPHILS 09/06/2019 0.1  0.0 - 0.4 x10E3/uL Final    ABS. BASOPHILS 09/06/2019 0.0  0.0 - 0.2 x10E3/uL Final    IMMATURE GRANULOCYTES 09/06/2019 0  Not Estab. % Final    ABS. IMM. GRANS. 09/06/2019 0.0  0.0 - 0.1 x10E3/uL Final         Kacy Bustamante MD  [facility]  12/10/19 3:04 PM    Portions of this note may have been populated using smart dictation software and may have \"sounds-like\" errors present. Pt was counseled on risks, benefits and alternatives of treatment options.  All questions were asked and answered and the patient was agreeable with the treatment plan as outlined.

## 2019-12-11 NOTE — PROGRESS NOTES
Mr Maldonado Greggiesha  Your thyroid control is not adequate--your TSH has jumped from being stable to being almost double what it was. Lets recheck it in one month (We can mail this to your house) and if still low we will increase synthroid dose. Vitamin D can be replaced with over the counter 1747-8114 units daily  Blood counts improved from last check!   Donna Remy, Dr Nestor Monroe mail order to patient's home, message conveyed through [employer]

## 2020-01-15 LAB — TSH SERPL DL<=0.005 MIU/L-ACNC: 3.93 UIU/ML (ref 0.45–4.5)

## 2020-05-26 DIAGNOSIS — E03.9 ACQUIRED HYPOTHYROIDISM: Chronic | ICD-10-CM

## 2020-05-26 RX ORDER — LEVOTHYROXINE SODIUM 125 UG/1
TABLET ORAL
Qty: 90 TAB | Refills: 1 | Status: SHIPPED | OUTPATIENT
Start: 2020-05-26 | End: 2020-06-08 | Stop reason: SDUPTHER

## 2020-06-02 ENCOUNTER — TELEPHONE (OUTPATIENT)
Dept: FAMILY MEDICINE CLINIC | Age: 70
End: 2020-06-02

## 2020-06-02 DIAGNOSIS — E03.9 ACQUIRED HYPOTHYROIDISM: Primary | ICD-10-CM

## 2020-06-02 DIAGNOSIS — E55.9 VITAMIN D DEFICIENCY: ICD-10-CM

## 2020-06-02 DIAGNOSIS — D72.819 LEUKOPENIA, UNSPECIFIED TYPE: ICD-10-CM

## 2020-06-02 DIAGNOSIS — Z12.5 SCREENING FOR PROSTATE CANCER: ICD-10-CM

## 2020-06-02 DIAGNOSIS — Z13.220 SCREENING FOR LIPID DISORDERS: ICD-10-CM

## 2020-06-02 DIAGNOSIS — Z13.1 SCREENING FOR DIABETES MELLITUS: ICD-10-CM

## 2020-06-02 NOTE — TELEPHONE ENCOUNTER
Pt has a virtual Medicare Wellness visit 6/8/20. He would like to get his labs done prior to visit. Specifically asking for HbA1C, Vit D.   Please call  [phone]

## 2020-06-03 ENCOUNTER — TELEPHONE (OUTPATIENT)
Dept: FAMILY MEDICINE CLINIC | Age: 70
End: 2020-06-03

## 2020-06-03 NOTE — TELEPHONE ENCOUNTER
No indication for a1c (all fasting sugars normal)--insurance doesn't cover in this case, other labs ordered. Fasting blood sugar will be used for screening for diabetes.

## 2020-06-03 NOTE — TELEPHONE ENCOUNTER
Called pt, and left a voice message, asking that he call the office back in regards to his labs. Lab slips have been mailed to pt.

## 2020-06-04 ENCOUNTER — HOSPITAL ENCOUNTER (OUTPATIENT)
Dept: LAB | Age: 70
Discharge: HOME | End: 2020-06-04

## 2020-06-04 DIAGNOSIS — Z12.5 SCREENING FOR PROSTATE CANCER: ICD-10-CM

## 2020-06-04 DIAGNOSIS — Z13.220 SCREENING FOR LIPID DISORDERS: ICD-10-CM

## 2020-06-04 DIAGNOSIS — E03.9 ACQUIRED HYPOTHYROIDISM: ICD-10-CM

## 2020-06-04 DIAGNOSIS — D72.819 LEUKOPENIA, UNSPECIFIED TYPE: ICD-10-CM

## 2020-06-04 DIAGNOSIS — E55.9 VITAMIN D DEFICIENCY: ICD-10-CM

## 2020-06-04 LAB
25(OH)D3 SERPL-MCNC: 62.8 NG/ML (ref 30–100)
ALBUMIN SERPL-MCNC: 3.8 G/DL (ref 3.5–5)
ALBUMIN/GLOB SERPL: 1.5 {RATIO} (ref 1.1–2.2)
ALP SERPL-CCNC: 70 U/L (ref 45–117)
ALT SERPL-CCNC: 23 U/L (ref 12–78)
ANION GAP SERPL CALC-SCNC: 6 MMOL/L (ref 5–15)
AST SERPL-CCNC: 21 U/L (ref 15–37)
BILIRUB SERPL-MCNC: 1.3 MG/DL (ref 0.2–1)
BUN SERPL-MCNC: 24 MG/DL (ref 6–20)
BUN/CREAT SERPL: 24 (ref 12–20)
CALCIUM SERPL-MCNC: 8.3 MG/DL (ref 8.5–10.1)
CHLORIDE SERPL-SCNC: 108 MMOL/L (ref 97–108)
CHOLEST SERPL-MCNC: 214 MG/DL
CO2 SERPL-SCNC: 27 MMOL/L (ref 21–32)
CREAT SERPL-MCNC: 0.98 MG/DL (ref 0.7–1.3)
ERYTHROCYTE [DISTWIDTH] IN BLOOD BY AUTOMATED COUNT: 14.6 % (ref 11.5–14.5)
GLOBULIN SER CALC-MCNC: 2.6 G/DL (ref 2–4)
GLUCOSE SERPL-MCNC: 99 MG/DL (ref 65–100)
HCT VFR BLD AUTO: 41.2 % (ref 36.6–50.3)
HDLC SERPL-MCNC: 59 MG/DL
HDLC SERPL: 3.6 {RATIO} (ref 0–5)
HGB BLD-MCNC: 13.3 G/DL (ref 12.1–17)
LDLC SERPL CALC-MCNC: 142.2 MG/DL (ref 0–100)
LIPID PROFILE,FLP: ABNORMAL
MCH RBC QN AUTO: 30.8 PG (ref 26–34)
MCHC RBC AUTO-ENTMCNC: 32.3 G/DL (ref 30–36.5)
MCV RBC AUTO: 95.4 FL (ref 80–99)
NRBC # BLD: 0 K/UL (ref 0–0.01)
NRBC BLD-RTO: 0 PER 100 WBC
PLATELET # BLD AUTO: 143 K/UL (ref 150–400)
PMV BLD AUTO: 11.1 FL (ref 8.9–12.9)
POTASSIUM SERPL-SCNC: 4.6 MMOL/L (ref 3.5–5.1)
PROT SERPL-MCNC: 6.4 G/DL (ref 6.4–8.2)
PSA SERPL-MCNC: 1.4 NG/ML (ref 0.01–4)
RBC # BLD AUTO: 4.32 M/UL (ref 4.1–5.7)
SODIUM SERPL-SCNC: 141 MMOL/L (ref 136–145)
TRIGL SERPL-MCNC: 64 MG/DL (ref ?–150)
TSH SERPL DL<=0.05 MIU/L-ACNC: 5.74 UIU/ML (ref 0.36–3.74)
VLDLC SERPL CALC-MCNC: 12.8 MG/DL
WBC # BLD AUTO: 2.6 K/UL (ref 4.1–11.1)

## 2020-06-04 NOTE — TELEPHONE ENCOUNTER
Pt has been made aware of where to go and have labs drawn via [employer]. Pt advises he is going today to have labs drawn.

## 2020-06-04 NOTE — PROGRESS NOTES
We can review labs at upcoming appointment  VItamin D normal  PSA normal  TSH not in range, under corrected right now, would recommend we adjust medications  Electrolytes normal, kidney function normal, bilirubin stable in the just slightly elevated range, liver functions are normal  Cholesterol stable from last year  White blood cells are low again, you've seen hematology for this without any significant findings int he past, we will monitor without any aggressive intervention

## 2020-06-08 ENCOUNTER — VIRTUAL VISIT (OUTPATIENT)
Dept: FAMILY MEDICINE CLINIC | Age: 70
End: 2020-06-08

## 2020-06-08 DIAGNOSIS — Z13.39 SCREENING FOR ALCOHOLISM: ICD-10-CM

## 2020-06-08 DIAGNOSIS — E03.9 ACQUIRED HYPOTHYROIDISM: Chronic | ICD-10-CM

## 2020-06-08 DIAGNOSIS — E66.3 OVERWEIGHT: ICD-10-CM

## 2020-06-08 DIAGNOSIS — Z00.00 MEDICARE ANNUAL WELLNESS VISIT, SUBSEQUENT: Primary | ICD-10-CM

## 2020-06-08 DIAGNOSIS — E78.5 DYSLIPIDEMIA: ICD-10-CM

## 2020-06-08 DIAGNOSIS — R35.1 BPH ASSOCIATED WITH NOCTURIA: ICD-10-CM

## 2020-06-08 DIAGNOSIS — E55.9 VITAMIN D DEFICIENCY: ICD-10-CM

## 2020-06-08 DIAGNOSIS — N40.1 BPH ASSOCIATED WITH NOCTURIA: ICD-10-CM

## 2020-06-08 DIAGNOSIS — D72.819 LEUKOPENIA, UNSPECIFIED TYPE: ICD-10-CM

## 2020-06-08 DIAGNOSIS — Z71.89 ADVANCED DIRECTIVES, COUNSELING/DISCUSSION: ICD-10-CM

## 2020-06-08 DIAGNOSIS — Z13.31 SCREENING FOR DEPRESSION: ICD-10-CM

## 2020-06-08 RX ORDER — TAMSULOSIN HYDROCHLORIDE 0.4 MG/1
0.4 CAPSULE ORAL DAILY
COMMUNITY

## 2020-06-08 RX ORDER — LEVOTHYROXINE SODIUM 137 UG/1
137 TABLET ORAL
Qty: 90 TAB | Refills: 0 | Status: SHIPPED | OUTPATIENT
Start: 2020-06-08 | End: 2020-08-29

## 2020-06-08 NOTE — PROGRESS NOTES
Keysha Bradshaw is a 79 y.o. male who was seen by synchronous (real-time) audio-video technology on 6/8/2020. Consent: Keysha Bradshaw, who was seen by synchronous (real-time) audio-video technology, and/or his healthcare decision maker, is aware that this patient-initiated, Telehealth encounter on 6/8/2020 is a billable service, with coverage as determined by his insurance carrier. He is aware that he may receive a bill and has provided verbal consent to proceed: Yes. Assessment & Plan:   1. Medicare annual wellness visit, subsequent  Done per protocol    2. Screening for depression  Done per protocol  - DEPRESSION SCREEN ANNUAL    3. Screening for alcoholism  Done per protocol  - [place] ANNUAL ALCOHOL SCREEN 15 MIN    4. Advanced directives, counseling/discussion  Reviewed, see note  - ADVANCE CARE PLANNING FIRST 30 MINS    5. Acquired hypothyroidism  incr thyroid dosing and recheck in 6 wk  - levothyroxine (SYNTHROID) 137 mcg tablet; Take 137 mcg by mouth Daily (before breakfast). Dispense: 90 Tab; Refill: 0  - TSH 3RD GENERATION; Future    6. Overweight  Lost weight! Great work    7. BPH associated with nocturia  Following with uro, doing well    8. Leukopenia, unspecified type  drowpped again, recheck 6 wks  - CBC WITH AUTOMATED DIFF; Future    9. Vitamin D deficiency  Stable, continue with dosing  - VITAMIN D, 25 HYDROXY; Future    10. Dyslipidemia  Reviewed today, doing well. Pt was counseled on risks, benefits and alternatives of treatment options. All questions were asked and answered and the patient was agreeable with the treatment plan as outlined. Encounter time today was 45 minutes and more than 50% of this encounter was spent in counseling face-to-face regarding Diagnosis, Patient Education, Medication Management, Compliance and Impressions. Time documented includes face to face time, documentation and chart review if applicable except as noted.   Subjective:   Keysha Bradshaw is a 79 y.o. male who was seen for Annual Wellness Visit      Doing well overall  Last lipids were relatively stable  Lab Results   Component Value Date/Time    Cholesterol, total 214 (H) 06/04/2020 09:21 AM    HDL Cholesterol 59 06/04/2020 09:21 AM    LDL, calculated 142.2 (H) 06/04/2020 09:21 AM    VLDL, calculated 12.8 06/04/2020 09:21 AM    Triglyceride 64 06/04/2020 09:21 AM    CHOL/HDL Ratio 3.6 06/04/2020 09:21 AM     Hypothyroidism is not quite as controlled as previously  Lab Results   Component Value Date/Time    TSH 5.74 (H) 06/04/2020 09:21 AM     Not particularly symptomatic from this but he feels he's doing ok--would be willing to titrate medication    Vitamin D is at goal, patient has been taking 8000 international units  Daily and last vit d level is stable and in range    Saw urology for BPH with nocturia, PSA stable, doing well with flomax now, decrease in nocturia. Wife was diagnosed with diabetes and he and she have been working on losing weight and he has almost gotten to his goal of 225. He's lost almost 25 lbs and is diligently exercising and eating healthy. Medications, allergies, PMH, PSH, SOCH, [facility] reviewed and updated per routine protocol, see chart for review and changes if not noted here. ROS  A 12 point review of systems was negative except as noted here or in the HPI. Objective:   Vital Signs: (As obtained by patient/caregiver at home)  There were no vitals taken for this visit.      [INSTRUCTIONS:  \"[x]\" Indicates a positive item  \"[]\" Indicates a negative item  -- DELETE ALL ITEMS NOT EXAMINED]    Constitutional: [x] Appears well-developed and well-nourished [x] No apparent distress      [] Abnormal -     Mental status: [x] Alert and awake  [x] Oriented to person/place/time [x] Able to follow commands    [] Abnormal -     Eyes:   EOM    [x]  Normal    [] Abnormal -   Sclera  [x]  Normal    [] Abnormal -          Discharge [x]  None visible   [] Abnormal -     HENT: [x] Normocephalic, atraumatic  [] Abnormal -   [x] Mouth/Throat: Mucous membranes are moist    External Ears [x] Normal  [] Abnormal -    Neck: [x] No visualized mass [] Abnormal -     Pulmonary/Chest: [x] Respiratory effort normal   [x] No visualized signs of difficulty breathing or respiratory distress        [] Abnormal -      Musculoskeletal:   [x] Normal gait with no signs of ataxia         [x] Normal range of motion of neck        [] Abnormal -     Neurological:        [x] No Facial Asymmetry (Cranial nerve 7 motor function) (limited exam due to video visit)          [x] No gaze palsy        [] Abnormal -          Skin:        [x] No significant exanthematous lesions or discoloration noted on facial skin         [] Abnormal -            Psychiatric:       [x] Normal Affect [] Abnormal -        [x] No Hallucinations    Other pertinent observable physical exam findings:none apparent    We discussed the expected course, resolution and complications of the diagnosis(es) in detail. Medication risks, benefits, costs, interactions, and alternatives were discussed as indicated. I advised him to contact the office if his condition worsens, changes or fails to improve as anticipated. He expressed understanding with the diagnosis(es) and plan. Los Ortiz is a 79 y.o. male who was evaluated by a video visit encounter for concerns as above. Patient identification was verified prior to start of the visit. A caregiver was present when appropriate. Due to this being a TeleHealth encounter (During HWAUF-29 public health emergency), evaluation of the following organ systems was limited: Vitals/Constitutional/EENT/Resp/CV/GI//MS/Neuro/Skin/Heme-Lymph-Imm.   Pursuant to the emergency declaration under the [facility], [facility] waiver authority and the [employer] and [employer] Act, this Virtual  Visit was conducted, with patient's (and/or legal guardian's) consent, to reduce the patient's risk of exposure to COVID-19 and provide necessary medical care. Services were provided through a video synchronous discussion virtually to substitute for in-person clinic visit. Patient and provider were located at their individual homes. Cecelia Ross MD  [facility]  06/08/20 9:03 AM     Portions of this note may have been populated using smart dictation software and may have \"sounds-like\" errors present.

## 2020-06-08 NOTE — ACP (ADVANCE CARE PLANNING)
Advance Care Planning       Advance Care Planning (ACP) Physician/NP/PA (Provider) Conversation        Date of ACP Conversation: 6/8/2020    Conversation Conducted with:   Patient with Decision Making Christie Torres Maker:    Current Designated [place]:   (If there is a [address] named in the [address] [facility] [address]" box in the ACP activity, but it is not visible above, be sure to open that field and then select the health care decision maker relationship (ie \"primary\") in the blank space to the right of the name.)    Note: Assess and validate information in current ACP documents, as indicated. If no Authorized Decision Maker has previously been identified, then patient chooses [place]:  \"Who would you like to name as your primary health care decision-maker? \"    Name: Zhane Sneed   Relationship: wife Phone number: see chart  \"Can this person be reached easily? \" YES  \"Who would you like to name as your back-up decision maker? \"   Name: Amol Desir  Relationship: Daughter Phone number: [phone]  Loy Simmonds this person be reached easily? \" YES    Note: If the relationship of these Decision-Makers to the patient does NOT follow your state's Next of Kin hierarchy, recommend that patient complete ACP document that meets state-specific requirements to allow them to act on the patient's behalf when appropriate. Care Preferences:    Hospitalization: \"If your health worsens and it becomes clear that your chance of recovery is unlikely, what would your preference be regarding hospitalization? \"  If the patient would want hospitalization, answer \"yes\". If the patient would prefer comfort-focused treatment without hospitalization, answer \"no\". he would like to be taken for eval    Ventilation:   \"If you were in your present state of health and suddenly became very ill and were unable to breathe on your own, what would your preference be about the use of a ventilator (breathing machine) if it was available to you? \"    If patient would desire the use of a ventilator (breathing machine), answer \"yes\", if not answer \"no\":yes    \"If your health worsens and it becomes clear that your chance of recovery is unlikely, what would your preference be about the use of a ventilator (breathing machine) if it was available to you? \"   if not hope for meaningful survival, ok with withdrawal of care or comfort measures      Resuscitation:  \"CPR works best to restart the heart when there is a sudden event, like a heart attack, in someone who is otherwise healthy. Unfortunately, CPR does not typically restart the heart for people who have serious health conditions or who are very sick. \"    \"In the event your heart stopped as a result of an underlying serious health condition, would you want attempts to be made to restart your heart (answer \"yes\" for attempt to resuscitate) or would you prefer a natural death (answer \"no\" for do not attempt to resuscitate)? \"   yes, but if efforts are futile ok to cease care    NOTE: If the patient has a valid advance directive AND provides care preference(s) that are inconsistent with that prior directive, advise the patient to consider either: creating a new advance directive that complies with state-specific requirements; or, if that is not possible, orally revoking that prior directive in accordance with state-specific requirements, which must be documented in the EHR.     Conversation Outcomes / Follow-Up Plan:   Recommended completion of Advance Directive      Length of Voluntary ACP Conversation in minutes:  16 minutes      Luc Davis MD

## 2020-06-08 NOTE — PATIENT INSTRUCTIONS
Medicare Wellness Visit, Male The best way to live healthy is to have a lifestyle where you eat a well-balanced diet, exercise regularly, limit alcohol use, and quit all forms of tobacco/nicotine, if applicable. Regular preventive services are another way to keep healthy. Preventive services (vaccines, screening tests, monitoring & exams) can help personalize your care plan, which helps you manage your own care. Screening tests can find health problems at the earliest stages, when they are easiest to treat. [facility] follows the current, evidence-based guidelines published by the [facility] ([facility]) when recommending preventive services for our patients. Because we follow these guidelines, sometimes recommendations change over time as research supports it. (For example, a prostate screening blood test is no longer routinely recommended for men with no symptoms). Of course, you and your doctor may decide to screen more often for some diseases, based on your risk and co-morbidities (chronic disease you are already diagnosed with). Preventive services for you include: - Medicare offers their members a free annual wellness visit, which is time for you and your primary care provider to discuss and plan for your preventive service needs. Take advantage of this benefit every year! 
-All adults over age 72 should receive the recommended pneumonia vaccines. Current USPSTF guidelines recommend a series of two vaccines for the best pneumonia protection.  
-All adults should have a flu vaccine yearly and tetanus vaccine every 10 years. 
-All adults age 48 and older should receive the shingles vaccines (series of two vaccines).       
-All adults age 38-68 who are overweight should have a diabetes screening test once every three years.  
-Other screening tests & preventive services for persons with diabetes include: an eye exam to screen for diabetic retinopathy, a kidney function test, a foot exam, and stricter control over your cholesterol.  
-Cardiovascular screening for adults with routine risk involves an electrocardiogram (ECG) at intervals determined by the provider.  
-Colorectal cancer screening should be done for adults age 54-65 with no increased risk factors for colorectal cancer. There are a number of acceptable methods of screening for this type of cancer. Each test has its own benefits and drawbacks. Discuss with your provider what is most appropriate for you during your annual wellness visit. The different tests include: colonoscopy (considered the best screening method), a fecal occult blood test, a fecal DNA test, and sigmoidoscopy. 
-All adults born between [facility] should be screened once for Hepatitis C. 
-An Abdominal Aortic Aneurysm (AAA) Screening is recommended for men age 73-68 who has ever smoked in their lifetime. Here is a list of your current Health Maintenance items (your personalized list of preventive services) with a due date: 
Health Maintenance Due Topic Date Due  Shingles Vaccine (1 of 2) 01/06/2000 [address] [place] [address] Annual Well Visit  05/28/2020 Medicare Wellness Visit, Male The best way to live healthy is to have a lifestyle where you eat a well-balanced diet, exercise regularly, limit alcohol use, and quit all forms of tobacco/nicotine, if applicable. Regular preventive services are another way to keep healthy. Preventive services (vaccines, screening tests, monitoring & exams) can help personalize your care plan, which helps you manage your own care. Screening tests can find health problems at the earliest stages, when they are easiest to treat. [facility] follows the current, evidence-based guidelines published by the [facility] (USPSTF) when recommending preventive services for our patients.  Because we follow these guidelines, sometimes recommendations change over time as research supports it. (For example, a prostate screening blood test is no longer routinely recommended for men with no symptoms). Of course, you and your doctor may decide to screen more often for some diseases, based on your risk and co-morbidities (chronic disease you are already diagnosed with). Preventive services for you include: - Medicare offers their members a free annual wellness visit, which is time for you and your primary care provider to discuss and plan for your preventive service needs. Take advantage of this benefit every year! 
-All adults over age 72 should receive the recommended pneumonia vaccines. Current USPSTF guidelines recommend a series of two vaccines for the best pneumonia protection.  
-All adults should have a flu vaccine yearly and tetanus vaccine every 10 years. 
-All adults age 48 and older should receive the shingles vaccines (series of two vaccines). -All adults age 38-68 who are overweight should have a diabetes screening test once every three years.  
-Other screening tests & preventive services for persons with diabetes include: an eye exam to screen for diabetic retinopathy, a kidney function test, a foot exam, and stricter control over your cholesterol.  
-Cardiovascular screening for adults with routine risk involves an electrocardiogram (ECG) at intervals determined by the provider.  
-Colorectal cancer screening should be done for adults age 54-65 with no increased risk factors for colorectal cancer. There are a number of acceptable methods of screening for this type of cancer. Each test has its own benefits and drawbacks. Discuss with your provider what is most appropriate for you during your annual wellness visit. The different tests include: colonoscopy (considered the best screening method), a fecal occult blood test, a fecal DNA test, and sigmoidoscopy. -All adults born between [birth year] and 1965 should be screened once for Hepatitis C. 
-An Abdominal Aortic Aneurysm (AAA) Screening is recommended for men age 73-68 who has ever smoked in their lifetime. Here is a list of your current Health Maintenance items (your personalized list of preventive services) with a due date: 
Health Maintenance Due Topic Date Due  Shingles Vaccine (1 of 2) 01/06/2000 [address] [place] [address] Annual Well Visit  05/28/2020

## 2020-06-08 NOTE — PROGRESS NOTES
This is the Subsequent Medicare Annual Wellness Exam, performed 12 months or more after the Initial AWV or the last Subsequent AWV    Consent: George Rinaldi, who was seen by synchronous (real-time) audio-video technology, and/or his healthcare decision maker, is aware that this patient-initiated, Telehealth encounter on 6/8/2020 is a billable service. While AWVs are fully covered by Medicare, any services rendered on this date that are not included in an AWV are subject to additional billing, with coverage as determined by his insurance carrier. He is aware that he may receive a bill for any such additional services and has provided verbal consent to proceed: Yes. I have reviewed the patient's medical history in detail and updated the computerized patient record. History     Patient Active Problem List   Diagnosis Code    Acquired hypothyroidism E03.9    Vitamin D deficiency E55.9    Skin cancer C44.90    Advanced care planning/counseling discussion Z71.89    Leukopenia D72.819    Fall W19. Miguel Angel Benavides     Past Medical History:   Diagnosis Date    Bone infection ([address]     L3 spine    Family history of skin cancer     brother-BCC    History of vascular access device 04/10/2018    [facility] VAT - 4 FR single, R basilic, 43 cm for LTA    Skin cancer     SCC nose, right eye lid, left eye brow and left cheek    Sun-damaged skin     Thyroid disease       Past Surgical History:   Procedure Laterality Date    HX COLONOSCOPY      HX MOHS PROCEDURES  01/22/2018    SCC L cheek by Dr. Eros Bang  2012    colonoscopy     Current Outpatient Medications   Medication Sig Dispense Refill    varicella-zoster recombinant, PF, (SHINGRIX) 50 mcg/0.5 mL susr injection 0.5 mL by IntraMUSCular route once for 1 dose. 0.5 mL 0    tamsulosin (FLOMAX) 0.4 mg capsule Take 0.4 mg by mouth daily.  levothyroxine (SYNTHROID) 137 mcg tablet Take 137 mcg by mouth Daily (before breakfast).  90 Tab 0    halobetasol (ULTRAVATE) 0.05 % topical cream APPLY TO RASH ON BACK AND NECK TWICE A DAY  1    triamcinolone acetonide (KENALOG) 0.1 % topical cream APPLY TO BODY ECZEMA UP TO TWICE A DAY AS NEEDED  12    multivitamin (ONE A DAY) tablet Take 1 Tab by mouth daily. No Known Allergies    Family History   Problem Relation Age of Onset    Arthritis-osteo Mother     Diabetes Father     Heart Disease Father     Arthritis-osteo Brother     Osteoporosis Brother     No Known Problems Son     No Known Problems Son     No Known Problems Daughter     Cancer Neg Hx     Hypertension Neg Hx     Thyroid Disease Neg Hx      Social History     Tobacco Use    Smoking status: Never Smoker    Smokeless tobacco: Never Used   Substance Use Topics    Alcohol use: Yes     Alcohol/week: 1.0 standard drinks     Types: 1 Cans of beer per week       Depression Risk Factor Screening:     3 most recent PHQ Screens 12/10/2019   Little interest or pleasure in doing things Not at all   Feeling down, depressed, irritable, or hopeless Not at all   Total Score PHQ 2 0       Alcohol Risk Factor Screening (MALE > 65): Do you average more 1 drink per night or more than 7 drinks a week: Yes    In the past three months have you have had more than 4 drinks containing alcohol on one occasion: No  AUDIT Screen Score: AUDIT Score: 3      Functional Ability and Level of Safety:   Hearing: Hearing is good. Activities of Daily Living: The home contains: handrails  Patient does total self care    Ambulation: with no difficulty    Fall Risk:  Fall Risk Assessment, last 12 mths 12/10/2019   Able to walk? Yes   Fall in past 12 months?  No   Fall with injury? -   Number of falls in past 12 months -   Fall Risk Score -       Abuse Screen:  Patient is not abused    Cognitive Screening   Has your family/caregiver stated any concerns about your memory: no  Cognitive Screening: Normal - Verbal Fluency Test  Purple  Pick  Purpose  Party  Presume  Paint  Pride  Particular  Perfect  Picture  Plane  Pinkie    Patient Care Team   Patient Care Team:  Darian Akers MD as PCP - General (Family Practice)  D[facility] Akers [facility] as PCP [facility]  Lori Dee MD (Ophthalmology)  Bashir Jade MD (Urology)    Assessment/Plan   Education and counseling provided:  Are appropriate based on today's review and evaluation  End-of-Life planning (with patient's consent)  Pneumococcal Vaccine  Influenza Vaccine  Prostate cancer screening tests (PSA, covered annually)  Colorectal cancer screening tests  Cardiovascular screening blood test  Screening for glaucoma  Diabetes screening test    Diagnoses and all orders for this visit:    1. Advanced directives, counseling/discussion  -     ADVANCE CARE PLANNING FIRST 30 MINS    2. Medicare annual wellness visit, subsequent    3. Screening for depression  Comments:  0/2  Orders:  -     Augustina Dumont    4. Screening for alcoholism  -     RI ANNUAL ALCOHOL SCREEN 15 MIN    5. Acquired hypothyroidism  -     levothyroxine (SYNTHROID) 137 mcg tablet; Take 137 mcg by mouth Daily (before breakfast). 6. Overweight    7. BPH associated with nocturia    8. Leukopenia, unspecified type    9. Vitamin D deficiency    10. Dyslipidemia    Other orders  -     varicella-zoster recombinant, PF, (SHINGRIX) 50 mcg/0.5 mL susr injection; 0.5 mL by IntraMUSCular route once for 1 dose. Health Maintenance Due   Topic Date Due    Shingrix Vaccine Age 49> (1 of 2) 01/06/2000    Medicare Yearly Exam  05/28/2020       Keysha Bradshaw is a 79 y.o. male who was evaluated by an audio-video encounter for concerns as above. Patient identification was verified prior to start of the visit. A caregiver was present when appropriate.  Due to this being a TeleHealth encounter (During [facility] health [facility]), evaluation of the following organ systems was limited: Vitals/Constitutional/EENT/Resp/CV/GI//MS/Neuro/Skin/Heme-Lymph-Imm. Pursuant to the emergency declaration under the [address] [place] [address], [facility] waiver authority and the [employer] Act, this Virtual Visit was conducted, with patient's (and/or legal guardian's) consent, to reduce the patient's risk of exposure to COVID-19 and provide necessary medical care. Services were provided through a synchronous discussion virtually to substitute for in-person clinic visit. I was at home. The patient was at home.       Stefan Smith MD

## 2020-08-06 ENCOUNTER — HOSPITAL ENCOUNTER (OUTPATIENT)
Dept: LAB | Age: 70
Discharge: HOME | End: 2020-08-06

## 2020-08-06 DIAGNOSIS — E03.9 ACQUIRED HYPOTHYROIDISM: Chronic | ICD-10-CM

## 2020-08-06 DIAGNOSIS — E55.9 VITAMIN D DEFICIENCY: ICD-10-CM

## 2020-08-06 DIAGNOSIS — D72.819 LEUKOPENIA, UNSPECIFIED TYPE: ICD-10-CM

## 2020-08-06 LAB
25(OH)D3 SERPL-MCNC: 60 NG/ML (ref 30–100)
BASOPHILS # BLD: 0 K/UL (ref 0–0.1)
BASOPHILS NFR BLD: 0 % (ref 0–1)
DIFFERENTIAL METHOD BLD: ABNORMAL
EOSINOPHIL # BLD: 0.1 K/UL (ref 0–0.4)
EOSINOPHIL NFR BLD: 3 % (ref 0–7)
ERYTHROCYTE [DISTWIDTH] IN BLOOD BY AUTOMATED COUNT: 13.9 % (ref 11.5–14.5)
HCT VFR BLD AUTO: 40.3 % (ref 36.6–50.3)
HGB BLD-MCNC: 13.3 G/DL (ref 12.1–17)
IMM GRANULOCYTES # BLD AUTO: 0 K/UL (ref 0–0.04)
IMM GRANULOCYTES NFR BLD AUTO: 0 % (ref 0–0.5)
LYMPHOCYTES # BLD: 0.8 K/UL (ref 0.8–3.5)
LYMPHOCYTES NFR BLD: 28 % (ref 12–49)
MCH RBC QN AUTO: 30.5 PG (ref 26–34)
MCHC RBC AUTO-ENTMCNC: 33 G/DL (ref 30–36.5)
MCV RBC AUTO: 92.4 FL (ref 80–99)
MONOCYTES # BLD: 0.2 K/UL (ref 0–1)
MONOCYTES NFR BLD: 7 % (ref 5–13)
NEUTS SEG # BLD: 1.6 K/UL (ref 1.8–8)
NEUTS SEG NFR BLD: 62 % (ref 32–75)
NRBC # BLD: 0 K/UL (ref 0–0.01)
NRBC BLD-RTO: 0 PER 100 WBC
PLATELET # BLD AUTO: 145 K/UL (ref 150–400)
PMV BLD AUTO: 10.2 FL (ref 8.9–12.9)
RBC # BLD AUTO: 4.36 M/UL (ref 4.1–5.7)
RBC MORPH BLD: ABNORMAL
TSH SERPL DL<=0.05 MIU/L-ACNC: 0.87 UIU/ML (ref 0.36–3.74)
WBC # BLD AUTO: 2.7 K/UL (ref 4.1–11.1)

## 2020-08-06 NOTE — PROGRESS NOTES
Great normal vitamin d--this has come up well  Normal thyroid test now  Persistent low wbc in the same range as before  Stable low platelets, very close to low normal range

## 2020-08-29 DIAGNOSIS — E03.9 ACQUIRED HYPOTHYROIDISM: Chronic | ICD-10-CM

## 2020-08-29 RX ORDER — LEVOTHYROXINE SODIUM 137 UG/1
TABLET ORAL
Qty: 90 TAB | Refills: 0 | Status: SHIPPED | OUTPATIENT
Start: 2020-08-29 | End: 2020-08-31 | Stop reason: SDUPTHER

## 2021-01-12 ENCOUNTER — OFFICE VISIT (OUTPATIENT)
Dept: FAMILY MEDICINE CLINIC | Age: 71
End: 2021-01-12
Payer: MEDICARE

## 2021-01-12 VITALS
SYSTOLIC BLOOD PRESSURE: 119 MMHG | RESPIRATION RATE: 20 BRPM | HEIGHT: 77 IN | WEIGHT: 230 LBS | TEMPERATURE: 96 F | DIASTOLIC BLOOD PRESSURE: 72 MMHG | BODY MASS INDEX: 27.16 KG/M2 | HEART RATE: 74 BPM

## 2021-01-12 DIAGNOSIS — R06.09 DOE (DYSPNEA ON EXERTION): ICD-10-CM

## 2021-01-12 DIAGNOSIS — B35.1 TOENAIL FUNGUS: ICD-10-CM

## 2021-01-12 DIAGNOSIS — M79.672 PAIN IN BOTH FEET: ICD-10-CM

## 2021-01-12 DIAGNOSIS — D72.819 LEUKOPENIA, UNSPECIFIED TYPE: ICD-10-CM

## 2021-01-12 DIAGNOSIS — E03.9 ACQUIRED HYPOTHYROIDISM: Chronic | ICD-10-CM

## 2021-01-12 DIAGNOSIS — E78.5 HYPERLIPIDEMIA, UNSPECIFIED: ICD-10-CM

## 2021-01-12 DIAGNOSIS — M25.50 POLYARTHRALGIA: ICD-10-CM

## 2021-01-12 DIAGNOSIS — C44.90 SKIN CANCER: ICD-10-CM

## 2021-01-12 DIAGNOSIS — M79.10 MYALGIA: ICD-10-CM

## 2021-01-12 DIAGNOSIS — M79.89 FOOT SWELLING: ICD-10-CM

## 2021-01-12 DIAGNOSIS — R21 RASH: Primary | ICD-10-CM

## 2021-01-12 DIAGNOSIS — M79.671 PAIN IN BOTH FEET: ICD-10-CM

## 2021-01-12 DIAGNOSIS — I87.2 STASIS DERMATITIS OF BOTH LEGS: ICD-10-CM

## 2021-01-12 PROCEDURE — 99214 OFFICE O/P EST MOD 30 MIN: CPT | Performed by: FAMILY MEDICINE

## 2021-01-12 PROCEDURE — G8510 SCR DEP NEG, NO PLAN REQD: HCPCS | Performed by: FAMILY MEDICINE

## 2021-01-12 PROCEDURE — G8427 DOCREV CUR MEDS BY ELIG CLIN: HCPCS | Performed by: FAMILY MEDICINE

## 2021-01-12 PROCEDURE — 1101F PT FALLS ASSESS-DOCD LE1/YR: CPT | Performed by: FAMILY MEDICINE

## 2021-01-12 PROCEDURE — G8419 CALC BMI OUT NRM PARAM NOF/U: HCPCS | Performed by: FAMILY MEDICINE

## 2021-01-12 PROCEDURE — 3017F COLORECTAL CA SCREEN DOC REV: CPT | Performed by: FAMILY MEDICINE

## 2021-01-12 PROCEDURE — G8536 NO DOC ELDER MAL SCRN: HCPCS | Performed by: FAMILY MEDICINE

## 2021-01-12 NOTE — PROGRESS NOTES
Chief Complaint   Patient presents with    Foot Swelling     top of feet swelling     Back Pain     started 11/2020 after drive to [employer] helping son move - has been to [employer]     Shoulder Pain     started 11/2020 after drive to [employer] helping son move

## 2021-01-12 NOTE — PATIENT INSTRUCTIONS
1. Rash Unclear etiology Pt worried related to constelation of symptoms Recommend workup as ordered Recommend derm eval (pt would like to switch providers if possible - METABOLIC PANEL, COMPREHENSIVE; Future - IDALMIS COMPREHENSIVE PANEL; Future - SED RATE (ESR); Future - CRP, HIGH SENSITIVITY; Future 
- REFERRAL TO DERMATOLOGY 
- ECHO ADULT COMPLETE; Future 2. Myalgia As above - METABOLIC PANEL, COMPREHENSIVE; Future - IDALMIS COMPREHENSIVE PANEL; Future - SED RATE (ESR); Future - CRP, HIGH SENSITIVITY; Future 
- ECHO ADULT COMPLETE; Future 3. Polyarthralgia As above - METABOLIC PANEL, COMPREHENSIVE; Future - IDALMIS COMPREHENSIVE PANEL; Future - SED RATE (ESR); Future - CRP, HIGH SENSITIVITY; Future 
- ECHO ADULT COMPLETE; Future 4. Acquired hypothyroidism Recheck tsh, would recommend workup for others as above - METABOLIC PANEL, COMPREHENSIVE; Future 
- TSH 3RD GENERATION; Future 
- ECHO ADULT COMPLETE; Future 5. Leukopenia, unspecified type Chronic, has been stable, will recheck - METABOLIC PANEL, COMPREHENSIVE; Future - CBC WITH AUTOMATED DIFF; Future 
- ECHO ADULT COMPLETE; Future 6. Foot swelling See podiatry, labs and echo per orders - IDALMIS COMPREHENSIVE PANEL; Future - SED RATE (ESR); Future - CRP, HIGH SENSITIVITY; Future 
- REFERRAL TO PODIATRY 
- ECHO ADULT COMPLETE; Future 7. Pain in both feet As above 
- REFERRAL TO PODIATRY 
- ECHO ADULT COMPLETE; Future 8. Toenail fungus See podiatry 
- REFERRAL TO PODIATRY 
- ECHO ADULT COMPLETE; Future 9. Skin cancer Historical, pt desires to see derm, will make referral 
- REFERRAL TO DERMATOLOGY 
- ECHO ADULT COMPLETE; Future 10. Stasis dermatitis of both legs As above 
- ECHO ADULT COMPLETE; Future 11. Hyperlipidemia, unspecified Labs and echo 
- CRP, HIGH SENSITIVITY; Future 
- ECHO ADULT COMPLETE; Future 12. ACEVEDO (dyspnea on exertion) Echo per order 
- ECHO ADULT COMPLETE; Future

## 2021-01-12 NOTE — PROGRESS NOTES
Family Medicine Acute Visit Progress Note  Patient: Tiny Hatfield  1950, 70 y.o., male  Encounter Date: 1/12/2021    ASSESSMENT & PLAN    ICD-10-CM ICD-9-CM    1. Rash  A25 205.6 METABOLIC PANEL, COMPREHENSIVE      IDALMIS COMPREHENSIVE PANEL      SED RATE (ESR)      CRP, HIGH SENSITIVITY      REFERRAL TO DERMATOLOGY      ECHO ADULT COMPLETE   2. Myalgia  Z68.67 272.5 METABOLIC PANEL, COMPREHENSIVE      IDALMIS COMPREHENSIVE PANEL      SED RATE (ESR)      CRP, HIGH SENSITIVITY      ECHO ADULT COMPLETE   3. Polyarthralgia  H25.60 057.92 METABOLIC PANEL, COMPREHENSIVE      IDALMIS COMPREHENSIVE PANEL      SED RATE (ESR)      CRP, HIGH SENSITIVITY      ECHO ADULT COMPLETE   4. Acquired hypothyroidism  L05.5 310.5 METABOLIC PANEL, COMPREHENSIVE      TSH 3RD GENERATION      ECHO ADULT COMPLETE   5. Leukopenia, unspecified type  J18.924 446.64 METABOLIC PANEL, COMPREHENSIVE      CBC WITH AUTOMATED DIFF      ECHO ADULT COMPLETE   6. Foot swelling  M79.89 729.81 IDALMIS COMPREHENSIVE PANEL      SED RATE (ESR)      CRP, HIGH SENSITIVITY      REFERRAL TO PODIATRY      ECHO ADULT COMPLETE   7. Pain in both feet  M79.671 729.5 REFERRAL TO PODIATRY    M79.672  ECHO ADULT COMPLETE   8. Toenail fungus  B35.1 110.1 REFERRAL TO PODIATRY      ECHO ADULT COMPLETE   9. Skin cancer  C44.90 173.90 REFERRAL TO DERMATOLOGY      ECHO ADULT COMPLETE   10. Stasis dermatitis of both legs  I87.2 454.1 ECHO ADULT COMPLETE   11. Hyperlipidemia, unspecified   E78.5 272.4 CRP, HIGH SENSITIVITY      ECHO ADULT COMPLETE   12.  ACEVEDO (dyspnea on exertion)  R06.00 786.09 ECHO ADULT COMPLETE       Orders Placed This Encounter    METABOLIC PANEL, COMPREHENSIVE     Standing Status:   Future     Standing Expiration Date:   1/12/2022    TSH 3RD GENERATION     Standing Status:   Future     Standing Expiration Date:   1/12/2022    IDALMIS COMPREHENSIVE PANEL     Standing Status:   Future     Standing Expiration Date:   1/12/2022    SED RATE (ESR)     Standing Status: Future     Standing Expiration Date:   1/12/2022    CRP, HIGH SENSITIVITY     Standing Status:   Future     Standing Expiration Date:   1/12/2022    CBC WITH AUTOMATED DIFF     Standing Status:   Future     Standing Expiration Date:   1/12/2022    REFERRAL TO PODIATRY     Referral Priority:   Routine     Referral Type:   Consultation     Referral Reason:   Specialty Services Required     Referred to Provider:   Aziza Bush DPM     Requested Specialty:   Podiatry     Number of Visits Requested:   1    REFERRAL TO DERMATOLOGY     Referral Priority:   Routine     Referral Type:   Consultation     Referral Reason:   Specialty Services Required     Referred to Provider:   Reymundo Owens DO     Number of Visits Requested:   1       Patient Instructions   1. Rash  Unclear etiology  Pt worried related to constelation of symptoms  Recommend workup as ordered  Recommend derm eval (pt would like to switch providers if possible  - METABOLIC PANEL, COMPREHENSIVE; Future  - IDALMIS COMPREHENSIVE PANEL; Future  - SED RATE (ESR); Future  - CRP, HIGH SENSITIVITY; Future  - REFERRAL TO DERMATOLOGY  - ECHO ADULT COMPLETE; Future    2. Myalgia  As above  - METABOLIC PANEL, COMPREHENSIVE; Future  - IDALMIS COMPREHENSIVE PANEL; Future  - SED RATE (ESR); Future  - CRP, HIGH SENSITIVITY; Future  - ECHO ADULT COMPLETE; Future    3. Polyarthralgia  As above  - METABOLIC PANEL, COMPREHENSIVE; Future  - IDALMIS COMPREHENSIVE PANEL; Future  - SED RATE (ESR); Future  - CRP, HIGH SENSITIVITY; Future  - ECHO ADULT COMPLETE; Future    4. Acquired hypothyroidism  Recheck tsh, would recommend workup for others as above  - METABOLIC PANEL, COMPREHENSIVE; Future  - TSH 3RD GENERATION; Future  - ECHO ADULT COMPLETE; Future    5. Leukopenia, unspecified type  Chronic, has been stable, will recheck  - METABOLIC PANEL, COMPREHENSIVE; Future  - CBC WITH AUTOMATED DIFF; Future  - ECHO ADULT COMPLETE; Future    6.  Foot swelling  See podiatry, labs and echo per orders  - IDALMIS COMPREHENSIVE PANEL; Future  - SED RATE (ESR); Future  - CRP, HIGH SENSITIVITY; Future  - REFERRAL TO PODIATRY  - ECHO ADULT COMPLETE; Future    7. Pain in both feet  As above  - REFERRAL TO PODIATRY  - ECHO ADULT COMPLETE; Future    8. Toenail fungus  See podiatry  - REFERRAL TO PODIATRY  - ECHO ADULT COMPLETE; Future    9. Skin cancer  Historical, pt desires to see derm, will make referral  - REFERRAL TO DERMATOLOGY  - ECHO ADULT COMPLETE; Future    10. Stasis dermatitis of both legs  As above  - ECHO ADULT COMPLETE; Future    11. Hyperlipidemia, unspecified   Labs and echo  - CRP, HIGH SENSITIVITY; Future  - ECHO ADULT COMPLETE; Future    12.  ACEVEDO (dyspnea on exertion)  Echo per order  - ECHO ADULT COMPLETE; Future        CHIEF COMPLAINT  Chief Complaint   Patient presents with    Foot Swelling     top of feet swelling     Back Pain     started 11/2020 after drive to [employer] helping son move - has been to [email or web address]     Shoulder Pain     started 11/2020 after drive to [employer] helping son move        Jessi Matute is a 70 y.o. male presenting today for follow up  Wt Readings from Last 3 Encounters:   01/12/21 230 lb (104.3 kg)   12/10/19 250 lb (113.4 kg)   09/04/19 246 lb 9.6 oz (111.9 kg)   has been working to lose some weight  He's been doing more walking, his feet were sore after this long walk in November and it seems to be ongoing but not improving  Between toe 3 and 4 on both feet he has sore points  He reports that they also feel like they're swelling on the top and the bottom    He's having some persisting soreness that isn't getting recovered as quickly as he would expect it to  His hips are sore if he sits for a long time  If he stands he has to wait for his body to [employer] and he feels like an old man    When to a chiro in [facility] it did help some and he got his lower left back worked on, he was seen 4 times while he was out there, that seemed to help him somewhat    He's done myofascial release  He's gone to acupuncture    He is taking some advil because it was giving him a little bit of relief, he slept poorly unless he was     Pain with external rotation of his shoulders, R worse than left    He is Right handed    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 OBJECTIVE  Visit Vitals  /72   Pulse 74   Temp (!) 96 °F (35.6 °C) (Temporal)   Resp 20   Ht 6' 5\" (1.956 m)   Wt 230 lb (104.3 kg)   BMI 27.27 kg/m²       Physical Exam  Vitals signs and nursing note reviewed. Constitutional:       General: He is not in acute distress. Appearance: Normal appearance. He is well-developed and normal weight. He is not diaphoretic. Comments: NAD, Nontoxic, Appears Stated Age, tall, well appearing   HENT:      Head: Normocephalic and atraumatic. Right Ear: External ear normal.      Left Ear: External ear normal.      Ears:      Comments: Hearing aides present     Nose: Nose normal. No congestion. Mouth/Throat:      Mouth: Mucous membranes are moist.      Pharynx: Oropharynx is clear. No oropharyngeal exudate or posterior oropharyngeal erythema. Eyes:      General: No scleral icterus. Right eye: No discharge. Left eye: No discharge. Conjunctiva/sclera: Conjunctivae normal.   Neck:      Musculoskeletal: Neck supple. Cardiovascular:      Rate and Rhythm: Normal rate and regular rhythm. Heart sounds: Normal heart sounds. No murmur. Comments: Hr 70s on exam  Pulmonary:      Effort: Pulmonary effort is normal. No respiratory distress. Breath sounds: Normal breath sounds. No stridor. No wheezing or rales. Abdominal:      General: Bowel sounds are normal. There is no distension. Palpations: Abdomen is soft. Tenderness: There is no abdominal tenderness.    Musculoskeletal:         General: No tenderness. Right lower leg: No edema. Left lower leg: No edema. Skin:     General: Skin is warm and dry. Capillary Refill: Capillary refill takes less than 2 seconds. Findings: No rash. Comments: Spider veins noted with some stasis derm bilat ankles  No foot swelling pitting notable today  Patient has some tender points on foot  Fine pinprick discrimation 6/6 tested   Neurological:      General: No focal deficit present. Mental Status: He is alert and oriented to person, place, and time. Gait: Gait normal.      Comments: Grossly intact CN   Psychiatric:         Mood and Affect: Mood normal.         Behavior: Behavior normal.         Thought Content: Thought content normal.         Judgment: Judgment normal.         No results found for any visits on 01/12/21. HISTORICAL  PMH, PSH, FHX, SOCHX, ALLERGIES and MEDS were reviewed and updated today. Abby Arriaza MD  [facility]  01/12/21 9:15 AM    Portions of this note may have been populated using smart dictation software and may have \"sounds-like\" errors present. Pt was counseled on risks, benefits and alternatives of treatment options. All questions were asked and answered and the patient was agreeable with the treatment plan as outlined.

## 2021-01-13 RX ORDER — LEVOTHYROXINE SODIUM 150 UG/1
137 TABLET ORAL
Qty: 90 TAB | Refills: 0 | Status: SHIPPED | OUTPATIENT
Start: 2021-01-13 | End: 2021-04-12 | Stop reason: SDUPTHER

## 2021-01-19 ENCOUNTER — PATIENT MESSAGE (OUTPATIENT)
Dept: FAMILY MEDICINE CLINIC | Age: 71
End: 2021-01-19

## 2021-01-19 DIAGNOSIS — E80.6 HYPERBILIRUBINEMIA: ICD-10-CM

## 2021-01-19 DIAGNOSIS — D72.819 LEUKOPENIA, UNSPECIFIED TYPE: Primary | ICD-10-CM

## 2021-01-20 ENCOUNTER — HOSPITAL ENCOUNTER (OUTPATIENT)
Dept: NON INVASIVE DIAGNOSTICS | Age: 71
Discharge: HOME | End: 2021-01-20
Attending: FAMILY MEDICINE
Payer: MEDICARE

## 2021-01-20 VITALS
WEIGHT: 230 LBS | SYSTOLIC BLOOD PRESSURE: 133 MMHG | BODY MASS INDEX: 27.16 KG/M2 | HEIGHT: 77 IN | DIASTOLIC BLOOD PRESSURE: 66 MMHG

## 2021-01-20 DIAGNOSIS — M79.672 PAIN IN BOTH FEET: ICD-10-CM

## 2021-01-20 DIAGNOSIS — M25.50 POLYARTHRALGIA: ICD-10-CM

## 2021-01-20 DIAGNOSIS — M79.89 FOOT SWELLING: ICD-10-CM

## 2021-01-20 DIAGNOSIS — E03.9 ACQUIRED HYPOTHYROIDISM: ICD-10-CM

## 2021-01-20 DIAGNOSIS — D72.819 LEUKOPENIA, UNSPECIFIED TYPE: ICD-10-CM

## 2021-01-20 DIAGNOSIS — C44.90 SKIN CANCER: ICD-10-CM

## 2021-01-20 DIAGNOSIS — I87.2 STASIS DERMATITIS OF BOTH LEGS: ICD-10-CM

## 2021-01-20 DIAGNOSIS — M79.10 MYALGIA: ICD-10-CM

## 2021-01-20 DIAGNOSIS — E78.5 HYPERLIPIDEMIA, UNSPECIFIED: ICD-10-CM

## 2021-01-20 DIAGNOSIS — B35.1 TOENAIL FUNGUS: ICD-10-CM

## 2021-01-20 DIAGNOSIS — R21 RASH: ICD-10-CM

## 2021-01-20 DIAGNOSIS — R06.09 DOE (DYSPNEA ON EXERTION): ICD-10-CM

## 2021-01-20 DIAGNOSIS — M79.671 PAIN IN BOTH FEET: ICD-10-CM

## 2021-01-20 LAB
ECHO AO ROOT DIAM: 3.97 CM
ECHO AR MAX VEL PISA: 325.05 CENTIMETER/SECOND
ECHO AV AREA PEAK VELOCITY: 2.86 CM2
ECHO AV AREA/BSA PEAK VELOCITY: 1.2 CM2/M2
ECHO AV PEAK GRADIENT: 8.16 MMHG
ECHO AV PEAK VELOCITY: 142.85 CM/S
ECHO AV REGURGITANT PHT: 914.77 MS
ECHO EST RA PRESSURE: 3 MMHG
ECHO LA AREA 4C: 17.38 CM2
ECHO LA MAJOR AXIS: 3.9 CM
ECHO LA MINOR AXIS: 1.64 CM
ECHO LA VOL 2C: 69.77 ML (ref 18–58)
ECHO LA VOL 4C: 47.07 ML (ref 18–58)
ECHO LA VOL BP: 64.55 ML (ref 18–58)
ECHO LA VOL/BSA BIPLANE: 27.2 ML/M2 (ref 16–28)
ECHO LA VOLUME INDEX A2C: 29.4 ML/M2 (ref 16–28)
ECHO LA VOLUME INDEX A4C: 19.83 ML/M2 (ref 16–28)
ECHO LV E' LATERAL VELOCITY: 8.82 CENTIMETER/SECOND
ECHO LV E' SEPTAL VELOCITY: 6.14 CENTIMETER/SECOND
ECHO LV INTERNAL DIMENSION DIASTOLIC: 5.5 CM (ref 4.2–5.9)
ECHO LV INTERNAL DIMENSION SYSTOLIC: 3.57 CM
ECHO LV IVSD: 1.09 CM (ref 0.6–1)
ECHO LV MASS 2D: 211.8 G (ref 88–224)
ECHO LV MASS INDEX 2D: 89.2 G/M2 (ref 49–115)
ECHO LV POSTERIOR WALL DIASTOLIC: 0.9 CM (ref 0.6–1)
ECHO LVOT DIAM: 1.94 CM
ECHO LVOT PEAK GRADIENT: 7.61 MMHG
ECHO LVOT PEAK VELOCITY: 137.96 CM/S
ECHO MV A VELOCITY: 69.56 CENTIMETER/SECOND
ECHO MV E DECELERATION TIME (DT): 297.99 MS
ECHO MV E VELOCITY: 68.51 CENTIMETER/SECOND
ECHO PV PEAK INSTANTANEOUS GRADIENT SYSTOLIC: 2.7 MMHG
ECHO PV REGURGITANT MAX VELOCITY: 82.2 CM/S
ECHO RIGHT VENTRICULAR SYSTOLIC PRESSURE (RVSP): 27.78 MMHG
ECHO RV INTERNAL DIMENSION: 3.8 CM
ECHO RV TAPSE: 2.24 CM (ref 1.5–2)
ECHO TV REGURGITANT MAX VELOCITY: 248.91 CM/S
ECHO TV REGURGITANT PEAK GRADIENT: 24.78 MMHG
LA VOL DISK BP: 59.42 ML (ref 18–58)

## 2021-01-20 PROCEDURE — 93306 TTE W/DOPPLER COMPLETE: CPT | Performed by: STUDENT IN AN ORGANIZED HEALTH CARE EDUCATION/TRAINING PROGRAM

## 2021-01-20 PROCEDURE — 93306 TTE W/DOPPLER COMPLETE: CPT

## 2021-01-21 ENCOUNTER — TELEPHONE (OUTPATIENT)
Dept: FAMILY MEDICINE CLINIC | Age: 71
End: 2021-01-21

## 2021-01-21 ENCOUNTER — PATIENT MESSAGE (OUTPATIENT)
Dept: FAMILY MEDICINE CLINIC | Age: 71
End: 2021-01-21

## 2021-01-21 DIAGNOSIS — R93.1 ABNORMAL ECHOCARDIOGRAM: ICD-10-CM

## 2021-01-21 DIAGNOSIS — I34.0 MITRAL VALVE INSUFFICIENCY, UNSPECIFIED ETIOLOGY: Primary | ICD-10-CM

## 2021-01-21 NOTE — TELEPHONE ENCOUNTER
I did not know if pt needs an insurance referral.     Provider referral already placed.      If not I will fax provider referral.

## 2021-01-21 NOTE — TELEPHONE ENCOUNTER
[employer] referral submitted for Dr. Tata Riggs and no further action required per payor as member's plan does not require referrals. Provider referral faxed with this documentation and office notes to Dr. Esteban Carlin office. Pt advised and states he called this morning to make appointment but no referral had been sent and was unable to schedule. Pt advised referral sent and agrees to call to schedule appointment.  Cristela

## 2021-01-21 NOTE — TELEPHONE ENCOUNTER
----- Message from Radha Linda sent at 1/21/2021 12:20 PM EST -----  Regarding: /Telephone  Contact: [phone]  Referral    Caller (first and last name if not the patient or from practice):N/A      Caller's relationship to patient (if not from a practice):N/A      Name of caller (if calling from a practice):N/A      Name of David Olsen 9      Specialist's title, first, and last name: [address] [place] [address] [employer] Phone [phone]      Fax number:N/A      Date and time of appointment: N/A      Reason for appointment:N/A      Details to clarify the request: N/A      Radha Linda

## 2021-01-25 NOTE — TELEPHONE ENCOUNTER
From: George Rinaldi  To: Germain Taylor MD  Sent: 1/21/2021 3:24 PM EST  Subject: Non-Urgent Medical Question    Dr Ángela Chris  My shoulders are not getting any better and I am thinking it could possibly be a rotator cuff issue, especially with my right arm. Can I get an MRI on my shoulders? Would I have to go through an Orthopedic Dr to get an MRI? Thanks for your assistance.     Emily Valdovinos

## 2021-01-27 ENCOUNTER — TELEPHONE (OUTPATIENT)
Dept: FAMILY MEDICINE CLINIC | Age: 71
End: 2021-01-27

## 2021-01-27 NOTE — TELEPHONE ENCOUNTER
Referral coordinator from Dr Ras Gonzalez called stating they received the records but only received one set of labs and they require at least one additional.   Please fax to  [phone] for Nicolasa Kevin 22 can be reached at [phone]

## 2021-01-27 NOTE — LETTER
1/27/2021 3:25 PM 
 
Mr. Marbella Serna. [address] [facility] [id number] Lab Results Component Value Date/Time  Sodium 139 01/12/2021 10:08 AM  
 Sodium 141 06/04/2020 09:21 AM  
 Sodium 142 04/19/2019 09:57 AM  
 Sodium 144 03/21/2019 11:34 AM  
 Sodium 142 11/21/2018 04:34 PM  
 Potassium 4.8 01/12/2021 10:08 AM  
 Potassium 4.6 06/04/2020 09:21 AM  
 Potassium 4.9 04/19/2019 09:57 AM  
 Potassium 5.3 (H) 03/21/2019 11:34 AM  
 Potassium 4.4 11/21/2018 04:34 PM  
 Chloride 107 01/12/2021 10:08 AM  
 Chloride 108 06/04/2020 09:21 AM  
 Chloride 106 04/19/2019 09:57 AM  
 Chloride 106 03/21/2019 11:34 AM  
 Chloride 104 11/21/2018 04:34 PM  
 CO2 27 01/12/2021 10:08 AM  
 CO2 27 06/04/2020 09:21 AM  
 CO2 22 04/19/2019 09:57 AM  
 CO2 23 03/21/2019 11:34 AM  
 CO2 26 11/21/2018 04:34 PM  
 Anion gap 5 01/12/2021 10:08 AM  
 Anion gap 6 06/04/2020 09:21 AM  
 Anion gap 8 04/08/2018 01:29 AM  
 Anion gap 8 04/07/2018 05:24 AM  
 Anion gap 7 04/06/2018 04:22 AM  
 Glucose 100 01/12/2021 10:08 AM  
 Glucose 99 06/04/2020 09:21 AM  
 Glucose 94 04/19/2019 09:57 AM  
 Glucose 82 03/21/2019 11:34 AM  
 Glucose 74 11/21/2018 04:34 PM  
 BUN 23 (H) 01/12/2021 10:08 AM  
 BUN 24 (H) 06/04/2020 09:21 AM  
 BUN 16 04/19/2019 09:57 AM  
 BUN 18 03/21/2019 11:34 AM  
 BUN 23 11/21/2018 04:34 PM  
 Creatinine 1.10 01/12/2021 10:08 AM  
 Creatinine 0.98 06/04/2020 09:21 AM  
 Creatinine 1.04 04/19/2019 09:57 AM  
 Creatinine 1.22 03/21/2019 11:34 AM  
 Creatinine 1.01 11/21/2018 04:34 PM  
 BUN/Creatinine ratio 21 (H) 01/12/2021 10:08 AM  
 BUN/Creatinine ratio 24 (H) 06/04/2020 09:21 AM  
 BUN/Creatinine ratio 15 04/19/2019 09:57 AM  
 BUN/Creatinine ratio 15 03/21/2019 11:34 AM  
 BUN/Creatinine ratio 23 11/21/2018 04:34 PM  
 GFR est AA >60 01/12/2021 10:08 AM  
 GFR est AA >60 06/04/2020 09:21 AM  
 GFR est AA 84 04/19/2019 09:57 AM  
 GFR est AA 69 03/21/2019 11:34 AM  
 GFR est AA 88 11/21/2018 04:34 PM  
 GFR est non-AA >60 01/12/2021 10:08 AM  
 GFR est non-AA >60 06/04/2020 09:21 AM  
 GFR est non-AA 73 04/19/2019 09:57 AM  
 GFR est non-AA 60 03/21/2019 11:34 AM  
 GFR est non-AA 76 11/21/2018 04:34 PM  
 Calcium 9.4 01/12/2021 10:08 AM  
 Calcium 8.3 (L) 06/04/2020 09:21 AM  
 Calcium 8.6 04/19/2019 09:57 AM  
 Calcium 9.0 03/21/2019 11:34 AM  
 Calcium 9.0 11/21/2018 04:34 PM  
 Bilirubin, total 1.2 (H) 01/12/2021 10:08 AM  
 Bilirubin, total 1.3 (H) 06/04/2020 09:21 AM  
 Bilirubin, total 1.1 04/19/2019 09:57 AM  
 Bilirubin, total 1.4 (H) 03/21/2019 11:34 AM  
 Bilirubin, total 1.2 11/21/2018 04:34 PM  
 Alk. phosphatase 109 01/12/2021 10:08 AM  
 Alk. phosphatase 70 06/04/2020 09:21 AM  
 Alk. phosphatase 69 04/19/2019 09:57 AM  
 Alk. phosphatase 67 03/21/2019 11:34 AM  
 Alk. phosphatase 65 11/21/2018 04:34 PM  
 Protein, total 7.2 01/12/2021 10:08 AM  
 Protein, total 6.4 06/04/2020 09:21 AM  
 Protein, total 6.1 04/19/2019 09:57 AM  
 Protein, total 6.0 03/21/2019 11:34 AM  
 Protein, total 6.3 11/21/2018 04:34 PM  
 Albumin 4.1 01/12/2021 10:08 AM  
 Albumin 3.8 06/04/2020 09:21 AM  
 Albumin 4.3 04/19/2019 09:57 AM  
 Albumin 4.1 03/21/2019 11:34 AM  
 Albumin 4.7 11/21/2018 04:34 PM  
 Globulin 3.1 01/12/2021 10:08 AM  
 Globulin 2.6 06/04/2020 09:21 AM  
 Globulin 3.3 04/07/2018 05:24 AM  
 Globulin 3.4 04/05/2018 07:46 PM  
 A-G Ratio 1.3 01/12/2021 10:08 AM  
 A-G Ratio 1.5 06/04/2020 09:21 AM  
 A-G Ratio 2.4 (H) 04/19/2019 09:57 AM  
 A-G Ratio 2.2 03/21/2019 11:34 AM  
 A-G Ratio 2.9 (H) 11/21/2018 04:34 PM  
 ALT (SGPT) 23 01/12/2021 10:08 AM  
 ALT (SGPT) 23 06/04/2020 09:21 AM  
 ALT (SGPT) 16 04/19/2019 09:57 AM  
 ALT (SGPT) 18 03/21/2019 11:34 AM  
 ALT (SGPT) 15 11/21/2018 04:34 PM  
 
Results for Ene Palencia (MRN [id number]) as of 1/27/2021 15:29 Ref.  Range 5/17/2019 09:38 6/20/2019 09:34 9/6/2019 09:54 12/10/2019 15:35 6/4/2020 09:21 8/6/2020 08:54 1/12/2021 10:08  
 WBC Latest Ref Range: 4.1 - 11.1 K/uL 2.9 (L) 2.9 (L) 2.6 (L) 3.5 (L) 2.6 (L) 2.7 (L) 3.8 (L) NRBC Latest Ref Range: 0  WBC    0.0 0.0 0.0 0.0  
RBC Latest Ref Range: 4.10 - 5.70 M/uL 4.58 4.58 4.33 4.56 4.32 4.36 4.59 HGB Latest Ref Range: 12.1 - 17.0 g/dL 13.8 13.7 13.6 14.1 13.3 13.3 13.6 HCT Latest Ref Range: 36.6 - 50.3 % 41.2 41.9 40.0 43.6 41.2 40.3 42.1 MCV Latest Ref Range: 80.0 - 99.0 FL 90 92 92 95.6 95.4 92.4 91.7 MCH Latest Ref Range: 26.0 - 34.0 PG 30.1 29.9 31.4 30.9 30.8 30.5 29.6 MCHC Latest Ref Range: 30.0 - 36.5 g/dL 33.5 32.7 34.0 32.3 32.3 33.0 32.3 RDW Latest Ref Range: 11.5 - 14.5 % 15.5 (H) 15.4 15.0 14.6 (H) 14.6 (H) 13.9 15.2 (H) PLATELET Latest Ref Range: 150 - 400 K/uL 162 171 155 181 143 (L) 145 (L) 209 MPV Latest Ref Range: 8.9 - 12.9 FL    11.1 11.1 10.2 10.6 NEUTROPHILS Latest Ref Range: 32 - 75 % 58 60 61 62  62 67 LYMPHOCYTES Latest Ref Range: 12 - 49 %    27  28 20 Lymphocytes Latest Ref Range: Not Estab. % 26 29 27 MONOCYTES Latest Ref Range: 5 - 13 % 9 6 7 7  7 7 EOSINOPHILS Latest Ref Range: 0 - 7 % 5 5 5 3  3 4 BASOPHILS Latest Ref Range: 0 - 1 % 1 0 0 1  0 1 IMMATURE GRANULOCYTES Latest Ref Range: 0.0 - 0.5 % 1 0 0 0  0 1 (H) DF Latest Units:      AUTOMATED  SMEAR SCANNED SMEAR SCANNED ABSOLUTE NRBC Latest Ref Range: 0.00 - 0.01 K/uL    0.00 0.00 0.00 0.00  
ABS. NEUTROPHILS Latest Ref Range: 1.8 - 8.0 K/UL 1.7 1.7 1.5 2.2  1.6 (L) 2.5  
ABS. IMM. GRANS. Latest Ref Range: 0.00 - 0.04 K/UL 0.0 0.0 0.0 0.0  0.0 0.0  
ABS. LYMPHOCYTES Latest Ref Range: 0.8 - 3.5 K/UL    1.0  0.8 0.8 Abs Lymphocytes Latest Ref Range: 0.7 - 3.1 x10E3/uL 0.7 0.8 0.7 ABS. MONOCYTES Latest Ref Range: 0.0 - 1.0 K/UL 0.3 0.2 0.2 0.3  0.2 0.3  
ABS. EOSINOPHILS Latest Ref Range: 0.0 - 0.4 K/UL 0.2 0.1 0.1 0.1  0.1 0.2  
ABS. BASOPHILS Latest Ref Range: 0.0 - 0.1 K/UL 0.0 0.0 0.0 0.0  0.0 0.0 Sed rate, automated Latest Ref Range: 0 - 20 mm/hr       28 (H) Sincerely, 
 
 
Denisse Tyler MD

## 2021-01-27 NOTE — TELEPHONE ENCOUNTER
----- Message from Gilma Vega sent at 1/27/2021 11:23 AM EST -----  Regarding: Dr. Yoseph Ziegler: [phone]  General Message/Vendor Calls    Caller's first and last name: pt      Reason for call: Labs to be sent to Dr. Smiley Come required yes/no and why:yes      Best contact number(s):[phone]      Details to clarify the request: Pt was referred 2 weeks ago to Dr. Marguerite Simmons, and they have called themselves twice and still have not received the labs. The New Pt coordinator is Nicolasa Kevin 22  [phone]. Pt is requesting a call once this has been complete.       Gilma Vega

## 2021-02-10 ENCOUNTER — IMMUNIZATION (OUTPATIENT)
Dept: INTERNAL MEDICINE CLINIC | Age: 71
End: 2021-02-10
Payer: MEDICARE

## 2021-02-10 ENCOUNTER — OFFICE VISIT (OUTPATIENT)
Dept: CARDIOLOGY CLINIC | Age: 71
End: 2021-02-10
Payer: MEDICARE

## 2021-02-10 VITALS
HEART RATE: 72 BPM | OXYGEN SATURATION: 96 % | SYSTOLIC BLOOD PRESSURE: 114 MMHG | HEIGHT: 77 IN | DIASTOLIC BLOOD PRESSURE: 68 MMHG | BODY MASS INDEX: 28.74 KG/M2 | WEIGHT: 243.4 LBS

## 2021-02-10 DIAGNOSIS — R06.09 DOE (DYSPNEA ON EXERTION): ICD-10-CM

## 2021-02-10 DIAGNOSIS — I34.0 NONRHEUMATIC MITRAL VALVE REGURGITATION: ICD-10-CM

## 2021-02-10 DIAGNOSIS — Z23 ENCOUNTER FOR IMMUNIZATION: Primary | ICD-10-CM

## 2021-02-10 DIAGNOSIS — E78.5 HYPERLIPIDEMIA, UNSPECIFIED HYPERLIPIDEMIA TYPE: Primary | ICD-10-CM

## 2021-02-10 PROCEDURE — 91300 COVID-19, MRNA, LNP-S, PF, 30MCG/0.3ML DOSE(PFIZER): CPT | Performed by: FAMILY MEDICINE

## 2021-02-10 PROCEDURE — 93010 ELECTROCARDIOGRAM REPORT: CPT | Performed by: INTERNAL MEDICINE

## 2021-02-10 PROCEDURE — 99203 OFFICE O/P NEW LOW 30 MIN: CPT | Performed by: INTERNAL MEDICINE

## 2021-02-10 PROCEDURE — 0001A COVID-19, MRNA, LNP-S, PF, 30MCG/0.3ML DOSE(PFIZER): CPT | Performed by: FAMILY MEDICINE

## 2021-02-10 PROCEDURE — 93005 ELECTROCARDIOGRAM TRACING: CPT | Performed by: INTERNAL MEDICINE

## 2021-02-10 NOTE — PROGRESS NOTES
All orders entered per VO of Dr. Calderon Conception:        See Dr. Calderon Conception in 1 year with same day Echo for Mitral regurgitation.

## 2021-02-10 NOTE — PROGRESS NOTES
Coty Calixto is a 70 y.o. male    Chief Complaint   Patient presents with    Cholesterol Problem    New Patient     ACEVEDO       Chest pain No    SOB No    Dizziness No    Swelling pt states swelling in the feet    Refills No    Visit Vitals  /68 (BP 1 Location: Left upper arm, BP Patient Position: Sitting)   Pulse 72 Comment: skipping beats   Ht 6' 5\" (1.956 m)   Wt 243 lb 6.4 oz (110.4 kg)   SpO2 96%   BMI 28.86 kg/m²       1. Have you been to the ER, urgent care clinic since your last visit? Hospitalized since your last visit? No    2. Have you seen or consulted any other health care providers outside of the [address] [place] [address] since your last visit? Include any pap smears or colon screening.   No None

## 2021-02-10 NOTE — PROGRESS NOTES
Reason for Consult: LE edema, Mitral regurgitation. Referring: Johanna Mathew MD    HPI: Coty Calixto is a 70 y.o. male with past medical history significant for L3 osteomyelitis diagnosed in 2018, is here for evaluation of lower extremity edema. He has been having the symptoms for past few months but the most recent days he had seen more ankle edema right more than the left side. He had an echocardiogram performed as a part of evaluation and echocardiogram demonstrated normal LV function with moderate mitral regurgitation with posterior mitral valve leaflet prolapse and mild aortic regurgitation. No symptoms of chest pain, shortness of breath, lightheadedness or dizziness or presyncope or syncope on exertion. Does not smoke tobacco.  No previous cardiac history. Lab results from January 12, 2021 were personally reviewed. CBC, CMP were normal.  C-reactive protein is elevated at 9.5. EKG in our office today demonstrated normal sinus rhythm with nonspecific QRS widening with normal ST segment with normal T waves. Plan:    1. Lower extremity edema: This is mild and asymmetrical.  From cardiac standpoint LV function is normal on recent echocardiogram with only moderate mitral regurgitation. Likely this edema is noncardiac in origin possibly vericose veins or dependent edema. No other cardiac symptoms. 2.  Moderate mitral regurgitation: The etiology of mitral regurgitation is mitral valve prolapse. This is only moderate grade. At this time there is no symptoms. Pulmonary pressures are normal.  We will continue to do surveillance echo in a year. 3.  Mild aortic regurgitation: This is likely degenerative cause. Continue to observe. 4. See Dr. Any Oliver in 1 year with same day Echo. ATTENTION:   This medical record was transcribed using an electronic medical records/speech recognition system. Although proofread, it may and can contain electronic, spelling and other errors. Corrections may be executed at a later time. Please feel free to contact us for any clarifications as needed. Past Medical History:   Diagnosis Date    Bone infection ([address]     L3 spine    Family history of skin cancer     brother-BCC    History of vascular access device 04/10/2018    [facility] VAT - 4 FR single, R basilic, 43 cm for LTA    Skin cancer     SCC nose, right eye lid, left eye brow and left cheek    Sun-damaged skin     Thyroid disease             Past Surgical History:   Procedure Laterality Date    HX COLONOSCOPY      HX MOHS PROCEDURES  01/22/2018    SCC L cheek by Dr. Mejia Ford  2012    colonoscopy             Family History   Problem Relation Age of Onset    Arthritis-osteo Mother     Diabetes Father     Heart Disease Father     Arthritis-osteo Brother     Osteoporosis Brother     No Known Problems Son     No Known Problems Son     No Known Problems Daughter     Cancer Neg Hx     Hypertension Neg Hx     Thyroid Disease Neg Hx            Social History     Socioeconomic History    Marital status:      Spouse name: Not on file    Number of children: Not on file    Years of education: Not on file    Highest education level: Not on file   Occupational History    Not on file   Social Needs    Financial resource strain: Not on file    Food insecurity     Worry: Not on file     Inability: Not on file    Transportation needs     Medical: Not on file     Non-medical: Not on file   Tobacco Use    Smoking status: Never Smoker    Smokeless tobacco: Never Used   Substance and Sexual Activity    Alcohol use:  Yes     Alcohol/week: 1.0 standard drinks     Types: 1 Cans of beer per week    Drug use: No    Sexual activity: Yes     Partners: Female   Lifestyle    Physical activity     Days per week: Not on file     Minutes per session: Not on file    Stress: Not on file   Relationships    Social connections     Talks on phone: Not on file     Gets together: Not on file     Attends Holiness service: Not on file     Active member of club or organization: Not on file     Attends meetings of clubs or organizations: Not on file     Relationship status: Not on file    Intimate partner violence     Fear of current or ex partner: Not on file     Emotionally abused: Not on file     Physically abused: Not on file     Forced sexual activity: Not on file   Other Topics Concern    Not on file   Social History Narrative    Not on file         No Known Allergies         Current Outpatient Medications   Medication Sig Dispense Refill    levothyroxine (SYNTHROID) 150 mcg tablet Take 1 Tab by mouth Daily (before breakfast). 90 Tab 0    tamsulosin (FLOMAX) 0.4 mg capsule Take 0.4 mg by mouth daily.  halobetasol (ULTRAVATE) 0.05 % topical cream APPLY TO RASH ON BACK AND NECK TWICE A DAY  1    triamcinolone acetonide (KENALOG) 0.1 % topical cream APPLY TO BODY ECZEMA UP TO TWICE A DAY AS NEEDED  12    multivitamin (ONE A DAY) tablet Take 1 Tab by mouth daily. ROS:  12 point review of systems was performed.  All negative except for HPI     Physical Exam:  Visit Vitals  /68 (BP 1 Location: Left upper arm, BP Patient Position: Sitting)   Pulse 72 Comment: skipping beats   Ht 6' 5\" (1.956 m)   Wt 243 lb 6.4 oz (110.4 kg)   SpO2 96%   BMI 28.86 kg/m²       Gen:  Well-developed, well-nourished, in no acute distress  HEENT:  Pink conjunctivae, PERRL, hearing intact to voice, moist mucous membranes  Neck:  Supple, without masses, thyroid non-tender  Resp:  No accessory muscle use, clear breath sounds without wheezes rales or rhonchi  Card:  No murmurs, normal S1, S2 without thrills, bruits or peripheral edema  Abd:  Soft, non-tender, non-distended, normoactive bowel sounds are present, no palpable organomegaly and no detectable hernias  Lymph:  No cervical or inguinal adenopathy  Musc:  No cyanosis or clubbing  Skin:  No rashes or ulcers, skin turgor is good  Neuro:  Cranial nerves are grossly intact, no focal motor weakness, follows commands appropriately  Psych:  Good insight, oriented to person, place and time, alert     Labs:     Lab Results   Component Value Date/Time    WBC 3.8 (L) 01/12/2021 10:08 AM    HGB 13.6 01/12/2021 10:08 AM    HCT 42.1 01/12/2021 10:08 AM    PLATELET 379 52/99/4348 10:08 AM    MCV 91.7 01/12/2021 10:08 AM     Lab Results   Component Value Date/Time    Glucose 100 01/12/2021 10:08 AM    LDL, calculated 142.2 (H) 06/04/2020 09:21 AM    Creatinine 1.10 01/12/2021 10:08 AM      Lab Results   Component Value Date/Time    Cholesterol, total 214 (H) 06/04/2020 09:21 AM    HDL Cholesterol 59 06/04/2020 09:21 AM    LDL, calculated 142.2 (H) 06/04/2020 09:21 AM    Triglyceride 64 06/04/2020 09:21 AM    CHOL/HDL Ratio 3.6 06/04/2020 09:21 AM     Lab Results   Component Value Date/Time    ALT (SGPT) 23 01/12/2021 10:08 AM    Alk.  phosphatase 109 01/12/2021 10:08 AM    Bilirubin, direct 0.2 04/07/2018 05:24 AM    Bilirubin, total 1.2 (H) 01/12/2021 10:08 AM    Albumin 4.1 01/12/2021 10:08 AM    Protein, total 7.2 01/12/2021 10:08 AM    PLATELET 408 93/70/9328 10:08 AM     No results found for: INR, INREXT, PTMR, PTP, PT1, PT2, INREXT   Lab Results   Component Value Date/Time    GFR est non-AA >60 01/12/2021 10:08 AM    GFR est AA >60 01/12/2021 10:08 AM    Creatinine 1.10 01/12/2021 10:08 AM    BUN 23 (H) 01/12/2021 10:08 AM    Sodium 139 01/12/2021 10:08 AM    Potassium 4.8 01/12/2021 10:08 AM    Chloride 107 01/12/2021 10:08 AM    CO2 27 01/12/2021 10:08 AM    Magnesium 1.8 04/07/2018 05:24 AM    Phosphorus 3.4 04/07/2018 05:24 AM     Lab Results   Component Value Date/Time    Prostate Specific Ag 1.4 06/04/2020 09:21 AM    Prostate Specific Ag 1.4 04/19/2019 09:57 AM    Prostate Specific Ag 1.9 02/01/2018 10:22 AM    Prostate Specific Ag 1.3 01/11/2016 12:02 PM     Lab Results   Component Value Date/Time    TSH 5.62 (H) 01/12/2021 10:08 AM Lab Results   Component Value Date/Time    Glucose 100 01/12/2021 10:08 AM      No results found for: CPK, RCK1, RCK2, RCK3, RCK4, CKMB, CKNDX, CKND1, TROPT, TROIQ, BNPP, BNP   No results found for: BNP, BNPP, BNPPPOC, XBNPT, BNPNT   Lab Results   Component Value Date/Time    Sodium 139 01/12/2021 10:08 AM    Potassium 4.8 01/12/2021 10:08 AM    Chloride 107 01/12/2021 10:08 AM    CO2 27 01/12/2021 10:08 AM    Anion gap 5 01/12/2021 10:08 AM    Glucose 100 01/12/2021 10:08 AM    BUN 23 (H) 01/12/2021 10:08 AM    Creatinine 1.10 01/12/2021 10:08 AM    BUN/Creatinine ratio 21 (H) 01/12/2021 10:08 AM    GFR est AA >60 01/12/2021 10:08 AM    GFR est non-AA >60 01/12/2021 10:08 AM    Calcium 9.4 01/12/2021 10:08 AM      Lab Results   Component Value Date/Time    Sodium 139 01/12/2021 10:08 AM    Potassium 4.8 01/12/2021 10:08 AM    Chloride 107 01/12/2021 10:08 AM    CO2 27 01/12/2021 10:08 AM    Anion gap 5 01/12/2021 10:08 AM    Glucose 100 01/12/2021 10:08 AM    BUN 23 (H) 01/12/2021 10:08 AM    Creatinine 1.10 01/12/2021 10:08 AM    BUN/Creatinine ratio 21 (H) 01/12/2021 10:08 AM    GFR est AA >60 01/12/2021 10:08 AM    GFR est non-AA >60 01/12/2021 10:08 AM    Calcium 9.4 01/12/2021 10:08 AM    Bilirubin, total 1.2 (H) 01/12/2021 10:08 AM    ALT (SGPT) 23 01/12/2021 10:08 AM    Alk. phosphatase 109 01/12/2021 10:08 AM    Protein, total 7.2 01/12/2021 10:08 AM    Albumin 4.1 01/12/2021 10:08 AM    Globulin 3.1 01/12/2021 10:08 AM    A-G Ratio 1.3 01/12/2021 10:08 AM      No results found for: HBA1C, CID8UWDA, HGBE8, KVR7IBJO, LAP2INZZ      No results for input(s): CPK, CKMB, TROIQ in the last 72 hours. No lab exists for component: CKQMB, CPKMB        Problem List:     Problem List  Date Reviewed: 1/12/2021          Codes Class Noted    Leukopenia ICD-10-CM: D72.819  ICD-9-CM: 288.50  4/5/2018        Fall ICD-10-CM: V52. [facility]  ICD-9-CM: N631.7  4/5/2018        Skin cancer ICD-10-CM: C44.90  ICD-9-CM: 173.90 8/30/2016        Advanced care planning/counseling discussion ICD-10-CM: Z71.89  ICD-9-CM: V65.49  8/30/2016    Overview Signed 8/30/2016 12:47 PM by ARABELLA Gomez discussed with patient about advanced medical directive. Provided patient blank AMD and Your Right to Decide Booklet. Requested that if completed to provide a copy of AMD to PCP office. Patient verbalized understanding. Acquired hypothyroidism (Chronic) ICD-10-CM: E03.9  ICD-9-CM: 244.9  1/11/2016        Vitamin D deficiency ICD-10-CM: E55.9  ICD-9-CM: 268.9  1/11/2016                Warner Meek MD, [facility] [place]

## 2021-03-03 ENCOUNTER — IMMUNIZATION (OUTPATIENT)
Dept: INTERNAL MEDICINE CLINIC | Age: 71
End: 2021-03-03
Payer: MEDICARE

## 2021-03-03 DIAGNOSIS — Z23 ENCOUNTER FOR IMMUNIZATION: Primary | ICD-10-CM

## 2021-03-03 PROCEDURE — 0002A COVID-19, MRNA, LNP-S, PF, 30MCG/0.3ML DOSE(PFIZER): CPT | Performed by: FAMILY MEDICINE

## 2021-03-03 PROCEDURE — 91300 COVID-19, MRNA, LNP-S, PF, 30MCG/0.3ML DOSE(PFIZER): CPT | Performed by: FAMILY MEDICINE

## 2021-03-05 ENCOUNTER — OFFICE VISIT (OUTPATIENT)
Dept: FAMILY MEDICINE CLINIC | Age: 71
End: 2021-03-05
Payer: MEDICARE

## 2021-03-05 VITALS
TEMPERATURE: 97.1 F | BODY MASS INDEX: 28.46 KG/M2 | RESPIRATION RATE: 20 BRPM | HEART RATE: 85 BPM | WEIGHT: 241 LBS | SYSTOLIC BLOOD PRESSURE: 114 MMHG | DIASTOLIC BLOOD PRESSURE: 65 MMHG | HEIGHT: 77 IN

## 2021-03-05 DIAGNOSIS — M79.10 MYALGIA: ICD-10-CM

## 2021-03-05 DIAGNOSIS — M35.3 PMR (POLYMYALGIA RHEUMATICA) (HCC): ICD-10-CM

## 2021-03-05 DIAGNOSIS — M79.89 FOOT SWELLING: Primary | ICD-10-CM

## 2021-03-05 DIAGNOSIS — M25.512 CHRONIC PAIN OF BOTH SHOULDERS: ICD-10-CM

## 2021-03-05 DIAGNOSIS — E03.9 ACQUIRED HYPOTHYROIDISM: Chronic | ICD-10-CM

## 2021-03-05 DIAGNOSIS — M25.511 CHRONIC PAIN OF BOTH SHOULDERS: ICD-10-CM

## 2021-03-05 DIAGNOSIS — G89.29 CHRONIC PAIN OF BOTH SHOULDERS: ICD-10-CM

## 2021-03-05 PROCEDURE — 3017F COLORECTAL CA SCREEN DOC REV: CPT | Performed by: FAMILY MEDICINE

## 2021-03-05 PROCEDURE — 1101F PT FALLS ASSESS-DOCD LE1/YR: CPT | Performed by: FAMILY MEDICINE

## 2021-03-05 PROCEDURE — 99214 OFFICE O/P EST MOD 30 MIN: CPT | Performed by: FAMILY MEDICINE

## 2021-03-05 PROCEDURE — G8427 DOCREV CUR MEDS BY ELIG CLIN: HCPCS | Performed by: FAMILY MEDICINE

## 2021-03-05 PROCEDURE — G8536 NO DOC ELDER MAL SCRN: HCPCS | Performed by: FAMILY MEDICINE

## 2021-03-05 PROCEDURE — G8419 CALC BMI OUT NRM PARAM NOF/U: HCPCS | Performed by: FAMILY MEDICINE

## 2021-03-05 PROCEDURE — G8510 SCR DEP NEG, NO PLAN REQD: HCPCS | Performed by: FAMILY MEDICINE

## 2021-03-05 RX ORDER — FUROSEMIDE 20 MG/1
TABLET ORAL
Qty: 90 TAB | Refills: 0 | Status: SHIPPED | OUTPATIENT
Start: 2021-03-05 | End: 2021-05-28

## 2021-03-05 RX ORDER — CHOLECALCIFEROL (VITAMIN D3) 125 MCG
CAPSULE ORAL
COMMUNITY

## 2021-03-05 RX ORDER — CLOBETASOL PROPIONATE 0.5 MG/G
OINTMENT TOPICAL 2 TIMES DAILY
COMMUNITY
End: 2022-07-12

## 2021-03-05 RX ORDER — POTASSIUM CHLORIDE 750 MG/1
10 CAPSULE, EXTENDED RELEASE ORAL 2 TIMES DAILY
COMMUNITY

## 2021-03-05 RX ORDER — PREDNISONE 5 MG/1
TABLET ORAL
Qty: 84 TAB | Refills: 0 | Status: SHIPPED | OUTPATIENT
Start: 2021-03-05 | End: 2021-04-12 | Stop reason: DRUGHIGH

## 2021-03-05 NOTE — PROGRESS NOTES
Chief Complaint   Patient presents with    Shoulder Pain     upper shoulder - stiff - hurts to raise both arms x3-4months     Foot Swelling     both feet are swelling

## 2021-03-05 NOTE — PATIENT INSTRUCTIONS
Lab recheck per ord3rs Appreciate rheum input Presume PMR and trial steroid, start at 15, slow taper over 6 wk, pt agreeable, discussed r/b/a No features of GCA Will rehceck 6 wk Call with concerns

## 2021-03-05 NOTE — PROGRESS NOTES
Family Medicine Follow-Up Progress Note  Patient: Erica Desai  1950, 70 y.o., male  Encounter Date: 3/5/2021    ASSESSMENT & PLAN    ICD-10-CM ICD-9-CM    1. Foot swelling  M79.89 729.81 REFERRAL TO RHEUMATOLOGY      METABOLIC PANEL, BASIC      furosemide (LASIX) 20 mg tablet   2. Myalgia  M79.10 729.1 REFERRAL TO RHEUMATOLOGY      LYME AB, IGG & IGM BY WB      predniSONE (DELTASONE) 5 mg tablet   3. Chronic pain of both shoulders  M25.511 719.41 REFERRAL TO RHEUMATOLOGY    G89.29 338.29 LYME AB, IGG & IGM BY WB    M25.512  predniSONE (DELTASONE) 5 mg tablet   4. PMR (polymyalgia rheumatica) ([facility])  M35.3 725 REFERRAL TO RHEUMATOLOGY      predniSONE (DELTASONE) 5 mg tablet   5. Acquired hypothyroidism  E03.9 244.9 TSH 3RD GENERATION       Orders Placed This Encounter    METABOLIC PANEL, BASIC     Standing Status:   Future     Standing Expiration Date:   3/5/2022    TSH 3RD GENERATION     Standing Status:   Future     Standing Expiration Date:   3/5/2022    LYME AB, IGG & IGM BY WB     Standing Status:   Future     Standing Expiration Date:   3/5/2022    REFERRAL TO RHEUMATOLOGY     Referral Priority:   Routine     Referral Type:   Consultation     Referral Reason:   Specialty Services Required     Referred to Provider:   Chucho Thacker MD     Requested Specialty:   Rheumatology     Number of Visits Requested:   1    clobetasoL (TEMOVATE) 0.05 % ointment     Sig: Apply  to affected area two (2) times a day.  TURMERIC PO     Sig: Take  by mouth.  potassium chloride SA (MICRO-K) 10 mEq capsule     Sig: Take 10 mEq by mouth two (2) times a day.  cholecalciferol, vitamin D3, (Vitamin D3) 50 mcg (2,000 unit) tab     Sig: Take  by mouth.  OTHER     Sig: C-MAX- 1500MG    predniSONE (DELTASONE) 5 mg tablet     Sig: Take 3 Tabs by mouth daily for 14 days, THEN 2 Tabs daily for 14 days, THEN 1 Tab daily for 14 days.  Indications: muscle pain and stiffness in the shoulder, neck and pelvis     Dispense: 84 Tab     Refill:  0    furosemide (LASIX) 20 mg tablet     Sig: Take one tablet daily as needed for foot swelling  Indications: visible water retention     Dispense:  90 Tab     Refill:  0       There are no Patient Instructions on file for this visit. CHIEF COMPLAINT  Chief Complaint   Patient presents with    Shoulder Pain     upper shoulder - stiff - hurts to raise both arms x3-4months     Foot Swelling     both feet are swelling        SUBJECTIVE  Ifeanyi Adames is a 70 y.o. male presenting today for follow up  Since last visit saw Dr Desire Solis  His wbc vaccilates between 2.4 and 3.8 periodically, he'd originally had discitis and been on IV abx and had attributed the lows to that but with his symptoms of fatigue and leg swelling and polyarthraliga we had though diligent to have hematology weigh in    His thyroid was out of range, he has changed med, no significant change in symptoms with control now    He's had elev ESR and CRP with neg romain and he has had tick bites before, he'd like to have lyme drawn in addition to just be certain    I did message him that I think based on the bilateral shoulder pain and the way his onset was I think this is PMR and we could consider steroids, eh's apprehensive but willing to discuss    ROS  Review of Systems  A 12 point review of systems was negative except as noted here or in the HPI. OBJECTIVE  Visit Vitals  /65   Pulse 85   Temp 97.1 °F (36.2 °C) (Temporal)   Resp 20   Ht 6' 5\" (1.956 m)   Wt 241 lb (109.3 kg)   BMI 28.58 kg/m²       Physical Exam  Vitals signs and nursing note reviewed. Constitutional:       General: He is not in acute distress. Appearance: Normal appearance. He is well-developed and normal weight. He is not diaphoretic. Comments: NAD, Nontoxic, Appears Stated Age, tall, well appearing   HENT:      Head: Normocephalic and atraumatic.       Right Ear: External ear normal.      Left Ear: External ear normal.      Ears:      Comments: Hearing aides present     Nose: Nose normal. No congestion. Mouth/Throat:      Comments: Mask not removed  Eyes:      General: No scleral icterus. Right eye: No discharge. Left eye: No discharge. Conjunctiva/sclera: Conjunctivae normal.   Neck:      Musculoskeletal: Neck supple. Cardiovascular:      Rate and Rhythm: Normal rate and regular rhythm. Heart sounds: Normal heart sounds. No murmur. Pulmonary:      Effort: Pulmonary effort is normal. No respiratory distress. Breath sounds: Normal breath sounds. No stridor. No wheezing or rales. Abdominal:      General: Bowel sounds are normal. There is no distension. Palpations: Abdomen is soft. Tenderness: There is no abdominal tenderness. Musculoskeletal:         General: No tenderness. Right lower leg: No edema. Left lower leg: No edema. Comments: decr rom both shoulders  1+ LE edema bilaterally   Skin:     General: Skin is warm and dry. Capillary Refill: Capillary refill takes less than 2 seconds. Findings: No rash. Comments: Spider veins noted with some stasis derm bilat ankles  Patient has some tender points on foot   Neurological:      General: No focal deficit present. Mental Status: He is alert and oriented to person, place, and time. Gait: Gait normal.      Comments: Grossly intact CN   Psychiatric:         Mood and Affect: Mood normal.         Behavior: Behavior normal.         Thought Content: Thought content normal.         Judgment: Judgment normal.         No results found for any visits on 03/05/21.     HISTORICAL  Reviewed and updated today, and as noted below:    Past Medical History:   Diagnosis Date    Bone infection ([facility] [address]     L3 spine    Family history of skin cancer     brother-BCC    History of vascular access device 04/10/2018    [facility] VAT - 4 FR single, R basilic, 43 cm for LTA    Skin cancer     SCC nose, right eye lid, left eye brow and left cheek    Sun-damaged skin     Thyroid disease      Past Surgical History:   Procedure Laterality Date    HX COLONOSCOPY      HX MOHS PROCEDURES  01/22/2018    SCC L cheek by Dr. Anjelica Leon [facility]  2012    colonoscopy     Family History   Problem Relation Age of Onset    Arthritis-osteo Mother     Diabetes Father     Heart Disease Father     Arthritis-osteo Brother     Osteoporosis Brother     No Known Problems Son     No Known Problems Son     No Known Problems Daughter     Cancer Neg Hx     Hypertension Neg Hx     Thyroid Disease Neg Hx      Social History     Tobacco Use   Smoking Status Never Smoker   Smokeless Tobacco Never Used     Social History     Socioeconomic History    Marital status:      Spouse name: Not on file    Number of children: Not on file    Years of education: Not on file    Highest education level: Not on file   Tobacco Use    Smoking status: Never Smoker    Smokeless tobacco: Never Used   Substance and Sexual Activity    Alcohol use: Yes     Alcohol/week: 1.0 standard drinks     Types: 1 Cans of beer per week    Drug use: No    Sexual activity: Yes     Partners: Female     No Known Allergies    LAB REVIEW  Lab Results   Component Value Date/Time    Sodium 139 01/12/2021 10:08 AM    Potassium 4.8 01/12/2021 10:08 AM    Chloride 107 01/12/2021 10:08 AM    CO2 27 01/12/2021 10:08 AM    Anion gap 5 01/12/2021 10:08 AM    Glucose 100 01/12/2021 10:08 AM    BUN 23 (H) 01/12/2021 10:08 AM    Creatinine 1.10 01/12/2021 10:08 AM    BUN/Creatinine ratio 21 (H) 01/12/2021 10:08 AM    GFR est AA >60 01/12/2021 10:08 AM    GFR est non-AA >60 01/12/2021 10:08 AM    Calcium 9.4 01/12/2021 10:08 AM    Bilirubin, total 1.2 (H) 01/12/2021 10:08 AM    Alk.  phosphatase 109 01/12/2021 10:08 AM    Protein, total 7.2 01/12/2021 10:08 AM    Albumin 4.1 01/12/2021 10:08 AM    Globulin 3.1 01/12/2021 10:08 AM    A-G Ratio 1.3 01/12/2021 10:08 AM    ALT (SGPT) 23 01/12/2021 10:08 AM Lab Results   Component Value Date/Time    WBC 3.8 (L) 01/12/2021 10:08 AM    HGB 13.6 01/12/2021 10:08 AM    HCT 42.1 01/12/2021 10:08 AM    PLATELET 274 89/40/8132 10:08 AM    MCV 91.7 01/12/2021 10:08 AM     No results found for: HBA1C, HGBE8, EBE8ECKG, MDR4BMFM  Lab Results   Component Value Date/Time    Cholesterol, total 214 (H) 06/04/2020 09:21 AM    HDL Cholesterol 59 06/04/2020 09:21 AM    LDL, calculated 142.2 (H) 06/04/2020 09:21 AM    VLDL, calculated 12.8 06/04/2020 09:21 AM    Triglyceride 64 06/04/2020 09:21 AM    CHOL/HDL Ratio 3.6 06/04/2020 09:21 AM           Kylah Figueredo MD  [facility]  03/05/21 9:39 AM    Portions of this note may have been populated using smart dictation software and may have \"sounds-like\" errors present. Pt was counseled on risks, benefits and alternatives of treatment options. All questions were asked and answered and the patient was agreeable with the treatment plan as outlined.

## 2021-03-26 ENCOUNTER — TELEPHONE (OUTPATIENT)
Dept: RHEUMATOLOGY | Age: 71
End: 2021-03-26

## 2021-03-26 NOTE — TELEPHONE ENCOUNTER
Called patient on 3/26/2021 left voice message for upcoming appt on 4/30/2021 has to be reschedule due to a provider cancellation please call back into the office to reschedule appt. sdh

## 2021-04-09 DIAGNOSIS — M79.10 MYALGIA: ICD-10-CM

## 2021-04-09 DIAGNOSIS — M25.511 CHRONIC PAIN OF BOTH SHOULDERS: ICD-10-CM

## 2021-04-09 DIAGNOSIS — G89.29 CHRONIC PAIN OF BOTH SHOULDERS: ICD-10-CM

## 2021-04-09 DIAGNOSIS — M25.512 CHRONIC PAIN OF BOTH SHOULDERS: ICD-10-CM

## 2021-04-09 DIAGNOSIS — M35.3 PMR (POLYMYALGIA RHEUMATICA) (HCC): ICD-10-CM

## 2021-04-09 RX ORDER — PREDNISONE 5 MG/1
TABLET ORAL
Qty: 84 TAB | Refills: 0 | OUTPATIENT
Start: 2021-04-09

## 2021-04-12 ENCOUNTER — VIRTUAL VISIT (OUTPATIENT)
Dept: FAMILY MEDICINE CLINIC | Age: 71
End: 2021-04-12
Payer: MEDICARE

## 2021-04-12 DIAGNOSIS — M35.3 PMR (POLYMYALGIA RHEUMATICA) (HCC): Primary | ICD-10-CM

## 2021-04-12 DIAGNOSIS — E03.9 ACQUIRED HYPOTHYROIDISM: Chronic | ICD-10-CM

## 2021-04-12 PROCEDURE — G8510 SCR DEP NEG, NO PLAN REQD: HCPCS | Performed by: FAMILY MEDICINE

## 2021-04-12 PROCEDURE — G8427 DOCREV CUR MEDS BY ELIG CLIN: HCPCS | Performed by: FAMILY MEDICINE

## 2021-04-12 PROCEDURE — 1101F PT FALLS ASSESS-DOCD LE1/YR: CPT | Performed by: FAMILY MEDICINE

## 2021-04-12 PROCEDURE — 99214 OFFICE O/P EST MOD 30 MIN: CPT | Performed by: FAMILY MEDICINE

## 2021-04-12 PROCEDURE — 3017F COLORECTAL CA SCREEN DOC REV: CPT | Performed by: FAMILY MEDICINE

## 2021-04-12 RX ORDER — PREDNISONE 5 MG/1
10 TABLET ORAL DAILY
Qty: 60 TAB | Refills: 1 | Status: SHIPPED | OUTPATIENT
Start: 2021-04-12 | End: 2021-06-04 | Stop reason: SDUPTHER

## 2021-04-12 RX ORDER — LEVOTHYROXINE SODIUM 150 UG/1
137 TABLET ORAL
Qty: 90 TAB | Refills: 0 | Status: ON HOLD | OUTPATIENT
Start: 2021-04-12 | End: 2021-07-06

## 2021-04-12 NOTE — PROGRESS NOTES
Chief Complaint   Patient presents with    Medication Evaluation    Medication Refill     1. Have you been to the ER, urgent care clinic since your last visit? Hospitalized since your last visit? No    2. Have you seen or consulted any other health care providers outside of the [address] [place] [address] since your last visit? Include any pap smears or colon screening.  No

## 2021-04-12 NOTE — PROGRESS NOTES
Maribel Velez is a 70 y.o. male who was seen by synchronous (real-time) audio-video technology on 4/12/2021. Consent: Maribel Velez, who was seen by synchronous (real-time) audio-video technology, and/or his healthcare decision maker, is aware that this patient-initiated, Telehealth encounter on 4/12/2021 is a billable service, with coverage as determined by his insurance carrier. He is aware that he may receive a bill and has provided verbal consent to proceed: Yes. Assessment & Plan:   1. Acquired hypothyroidism  Due for refill, last tsh in range, will send  - levothyroxine (SYNTHROID) 150 mcg tablet; Take 1 Tab by mouth Daily (before breakfast). Dispense: 90 Tab; Refill: 0    2. PMR (polymyalgia rheumatica) (HCC)  Tapering to 5 did not tolerate as well as taper from 15 to 10, may have gone too fast  Go back up to 10mg per day, stay for 1 m, ok then to try tapering to 7.5 (1.5 pills) and by then hopefully Rheum can help us make sure we are on the right track  Pleased with so much clinical improvement at 15 and 10mg, this is promising. Patient was able to get out and golf and go on spring break with family  Take medication with food  - predniSONE (DELTASONE) 5 mg tablet; Take 2 Tabs by mouth daily. Dispense: 60 Tab; Refill: 1          Pt was counseled on risks, benefits and alternatives of treatment options. All questions were asked and answered and the patient was agreeable with the treatment plan as outlined.     Subjective:   Maribel Velez is a 70 y.o. male who was seen for Medication Evaluation and Medication Refill      When on 5mg daily for the steroid he notices it doesn't \"kick in\" quite as well for his aches/pains and he feels stiff  This is better from before we started the steroids  Has been going to chiropractor and that has been helping he things  Has been going to massage therapy as well  Does have an upcoming appointment with rheumatology    Medications, allergies, PMH, PSH, [facility] reviewed and updated per routine protocol, see chart for review and changes if not noted here. ROS  A 12 point review of systems was negative except as noted here or in the HPI. Objective:   Vital Signs: (As obtained by patient/caregiver at home)  Patient-Reported Vitals 4/12/2021   Patient-Reported Weight 235lb   Patient-Reported Height -   Patient-Reported Pulse -   Patient-Reported Temperature -   Patient-Reported Systolic  -        [INSTRUCTIONS:  \"[x]\" Indicates a positive item  \"[]\" Indicates a negative item  -- DELETE ALL ITEMS NOT EXAMINED]    Constitutional: [x] Appears well-developed and well-nourished [x] No apparent distress      [] Abnormal -     Mental status: [x] Alert and awake  [x] Oriented to person/place/time [x] Able to follow commands    [] Abnormal -     Eyes:   EOM    [x]  Normal    [] Abnormal -   Sclera  [x]  Normal    [] Abnormal -          Discharge [x]  None visible   [] Abnormal -     HENT: [x] Normocephalic, atraumatic  [] Abnormal -   [x] Mouth/Throat: Mucous membranes are moist    External Ears [x] Normal  [] Abnormal -    Neck: [x] No visualized mass [] Abnormal -     Pulmonary/Chest: [x] Respiratory effort normal   [x] No visualized signs of difficulty breathing or respiratory distress        [] Abnormal -      Musculoskeletal:   [] Normal gait with no signs of ataxia         [x] Normal range of motion of neck        [] Abnormal -     Neurological:        [x] No Facial Asymmetry (Cranial nerve 7 motor function) (limited exam due to video visit)          [x] No gaze palsy        [] Abnormal -          Skin:        [x] No significant exanthematous lesions or discoloration noted on facial skin         [] Abnormal -            Psychiatric:       [x] Normal Affect [] Abnormal -        [x] No Hallucinations    Other pertinent observable physical exam findings:seated, wearing glasses    We discussed the expected course, resolution and complications of the diagnosis(es) in detail. Medication risks, benefits, costs, interactions, and alternatives were discussed as indicated. I advised him to contact the office if his condition worsens, changes or fails to improve as anticipated. He expressed understanding with the diagnosis(es) and plan. Kenna Malin is a 70 y.o. male who was evaluated by a video visit encounter for concerns as above. Patient identification was verified prior to start of the visit. A caregiver was present when appropriate. Due to this being a TeleHealth encounter (During [facility]- public health emergency), evaluation of the following organ systems was limited: Vitals/Constitutional/EENT/Resp/CV/GI//MS/Neuro/Skin/Heme-Lymph-Imm. Pursuant to the emergency declaration under the [facility] waiver authority and the [employer] Act, this Virtual  Visit was conducted, with patient's (and/or legal guardian's) consent, to reduce the patient's risk of exposure to COVID-19 and provide necessary medical care. Services were provided through a video synchronous discussion virtually to substitute for in-person clinic visit. Patient and provider were located at their individual homes. José Antonio Segundo MD  [facility]  04/12/21 10:35 AM     Portions of this note may have been populated using smart dictation software and may have \"sounds-like\" errors present.

## 2021-05-27 DIAGNOSIS — M79.89 FOOT SWELLING: ICD-10-CM

## 2021-05-28 RX ORDER — FUROSEMIDE 20 MG/1
TABLET ORAL
Qty: 90 TABLET | Refills: 0 | Status: SHIPPED | OUTPATIENT
Start: 2021-05-28 | End: 2021-12-07

## 2021-06-04 ENCOUNTER — OFFICE VISIT (OUTPATIENT)
Dept: RHEUMATOLOGY | Age: 71
End: 2021-06-04
Payer: MEDICARE

## 2021-06-04 VITALS
HEART RATE: 68 BPM | TEMPERATURE: 97.8 F | DIASTOLIC BLOOD PRESSURE: 77 MMHG | BODY MASS INDEX: 28.1 KG/M2 | WEIGHT: 238 LBS | OXYGEN SATURATION: 96 % | HEIGHT: 77 IN | SYSTOLIC BLOOD PRESSURE: 119 MMHG | RESPIRATION RATE: 16 BRPM

## 2021-06-04 DIAGNOSIS — Z79.899 ONGOING USE OF POSSIBLY TOXIC MEDICATION: ICD-10-CM

## 2021-06-04 DIAGNOSIS — M35.3 PMR (POLYMYALGIA RHEUMATICA) (HCC): ICD-10-CM

## 2021-06-04 DIAGNOSIS — M79.89 LEG SWELLING: ICD-10-CM

## 2021-06-04 DIAGNOSIS — M35.3 POLYMYALGIA RHEUMATICA (HCC): Primary | ICD-10-CM

## 2021-06-04 PROCEDURE — 3017F COLORECTAL CA SCREEN DOC REV: CPT | Performed by: INTERNAL MEDICINE

## 2021-06-04 PROCEDURE — G8419 CALC BMI OUT NRM PARAM NOF/U: HCPCS | Performed by: INTERNAL MEDICINE

## 2021-06-04 PROCEDURE — G8536 NO DOC ELDER MAL SCRN: HCPCS | Performed by: INTERNAL MEDICINE

## 2021-06-04 PROCEDURE — G8510 SCR DEP NEG, NO PLAN REQD: HCPCS | Performed by: INTERNAL MEDICINE

## 2021-06-04 PROCEDURE — 1101F PT FALLS ASSESS-DOCD LE1/YR: CPT | Performed by: INTERNAL MEDICINE

## 2021-06-04 PROCEDURE — G0463 HOSPITAL OUTPT CLINIC VISIT: HCPCS | Performed by: INTERNAL MEDICINE

## 2021-06-04 PROCEDURE — G8427 DOCREV CUR MEDS BY ELIG CLIN: HCPCS | Performed by: INTERNAL MEDICINE

## 2021-06-04 PROCEDURE — 99205 OFFICE O/P NEW HI 60 MIN: CPT | Performed by: INTERNAL MEDICINE

## 2021-06-04 RX ORDER — FOLIC ACID 1 MG/1
1 TABLET ORAL DAILY
Qty: 90 TABLET | Refills: 5 | Status: SHIPPED | OUTPATIENT
Start: 2021-06-04 | End: 2022-08-30

## 2021-06-04 RX ORDER — METHOTREXATE 2.5 MG/1
7.5 TABLET ORAL
Qty: 15 TABLET | Refills: 2 | Status: SHIPPED | OUTPATIENT
Start: 2021-06-05 | End: 2021-07-07

## 2021-06-04 RX ORDER — PREDNISONE 5 MG/1
7.5 TABLET ORAL DAILY
Qty: 45 TABLET | Refills: 1 | Status: SHIPPED | OUTPATIENT
Start: 2021-06-04 | End: 2021-08-27

## 2021-06-04 NOTE — PATIENT INSTRUCTIONS
1. I agree that you most likely have polymyalgia rheumatica (PMR). I am checking you for some other possible contributions to your small joint pain, your foot rash, and your leg swelling; psoriatic arthritis, coccidiomycosis, and Irina's syndrome can all behave similarly. 2. I'm starting you on methotrexate, take 3 tabs (7.5mg) once a week. To prevent side effects, take folic acid 1mg daily. We'll follow labs monthly initially while you're on this. Ariadne Cruz let me know if you develop nausea/stomach upset with taking this. Continue prednisone 7.5mg daily, and daily vitamin D supplement. 3. Labs ASAP    4. Xrays before you leave    5. Return in 1 months.

## 2021-06-04 NOTE — PROGRESS NOTES
REASON FOR VISIT:   Brandy Funez is a 70 y.o. male with history of L3 osteomyelitis who is being referred to [facility] at the request of Dr. Lindy Montgomery, regarding a diagnosis of polymyalgia rheumatica. HISTORY OF PRESENT ILLNESS    November 2020 developed shoulder pain and gelling while helping son in [place] with laying carpet/remodeling. Would take 15 seconds after standing to get moving. Returned to [place] in January, saw Dr. Lindy Montgomery; ESR was 28, started prednisone 15mg daily with marked improvement, but with reduction to 5mg daily aches returned. Increased back to 7.5mg daily (5mg in the morning and 2.5mg at night) which has been tolerable. Now, once he gets out of bed he can get moving within a couple of minutes without difficulty. He is stiff after any yard work, hard to get moving once sitting. Played college basketball, had been active his whole life until the 2018 osteomyelitis. Still able to work around the house and in the yard. Treated with dapto/ceftriaxone x 8 weeks followed by a year of doxycycline. Saw Dr. Oneil Head 8/2018, at which time normal WBC noted before infection, and felt WBC would improve with more time from acute infection. . reassured him no need for bone marrow biopsy. Sees a chiropractor regularly which helps. Has had punctate psoriasis on chest and legs, treated by his dermatologist with topical steroids with good control. Denies headaches or acute visual changes. Occasional nondrenching night sweats, no marked weight changes (down about 5 pounds from February). Has colonoscopy upcoming next month, has been OK in the past, gets these every 5 years. Follows with Dermatology for recurrent BCC's, has thyroid nodules he is following. REVIEW OF SYSTEMS  A comprehensive review of systems was negative except for that written in the HPI.   A 10-point review of systems is per the new patient questionnaire, which has been reviewed extensively and scanned into the electronic medical record for future reference. Review of systems is as above and is otherwise negative. ALLERGIES  Patient has no known allergies. MEDICATIONS  Current Outpatient Medications   Medication Sig    furosemide (LASIX) 20 mg tablet TAKE ONE TABLET DAILY AS NEEDED FOR FOOT SWELLING INDICATIONS: VISIBLE WATER RETENTION    levothyroxine (SYNTHROID) 150 mcg tablet Take 1 Tab by mouth Daily (before breakfast).  predniSONE (DELTASONE) 5 mg tablet Take 2 Tabs by mouth daily.  clobetasoL (TEMOVATE) 0.05 % ointment Apply  to affected area two (2) times a day.  TURMERIC PO Take  by mouth.  potassium chloride SA (MICRO-K) 10 mEq capsule Take 10 mEq by mouth two (2) times a day.  cholecalciferol, vitamin D3, (Vitamin D3) 50 mcg (2,000 unit) tab Take  by mouth.  OTHER C-MAX- 1500MG    tamsulosin (FLOMAX) 0.4 mg capsule Take 0.4 mg by mouth daily.  halobetasol (ULTRAVATE) 0.05 % topical cream APPLY TO RASH ON BACK AND NECK TWICE A DAY    triamcinolone acetonide (KENALOG) 0.1 % topical cream APPLY TO BODY ECZEMA UP TO TWICE A DAY AS NEEDED    multivitamin (ONE A DAY) tablet Take 1 Tab by mouth daily. No current facility-administered medications for this visit. PAST MEDICAL HISTORY  Past Medical History:   Diagnosis Date    Bone infection ([address]     L3 spine    Family history of skin cancer     brother-BCC    History of vascular access device 04/10/2018    [facility] VAT - 4 FR single, R basilic, 43 cm for LTA    Skin cancer     SCC nose, right eye lid, left eye brow and left cheek    Sun-damaged skin     Thyroid disease        FAMILY HISTORY  family history includes Arthritis-osteo in his brother and mother; Diabetes in his father; Heart Disease in his father; No Known Problems in his daughter, son, and son; Osteoporosis in his brother. No rheumatoid arthritis. SOCIAL HISTORY  He  reports that he has never smoked.  He has never used smokeless tobacco. He reports current alcohol use of about 1.0 standard drinks of alcohol per week. He reports that he does not use drugs. 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 DATA  Visit Vitals  /77 (BP 1 Location: Left upper arm, BP Patient Position: Sitting)   Pulse 68   Temp 97.8 °F (36.6 °C) (Oral)   Resp 16   Ht 6' 5\" (1.956 m)   Wt 238 lb (108 kg)   SpO2 96%   BMI 28.22 kg/m²    Body mass index is 28.22 kg/m². No flowsheet data found. General:  The patient is well developed, well nourished, alert, and in no apparent distress. Eyes: Sclera are anicteric. No conjunctival injection. HEENT:  Oropharynx is clear. No oral ulcers. Adequate salivary pooling. No cervical or supraclavicular lymphadenopathy. Lungs:  Clear to auscultation bilaterally, without wheeze or stridor. Normal respiratory effort. Cor:  Regular rate and rhythm. No murmur rub or gallop. Abdomen: Soft, non-tender, without hepatomegaly or masses. Extremities: No calf tenderness. 1+ B LE edema distal to mid-shin, postinflammatory hyperpigmentation c/w chronic venous stasis. Warm and well perfused. Skin:  No significant abnormalities. Chronic venous stasis changes as below. Photodistributed poikiloderma. Yellowing of toenails with plantar hyperkeratosis. No other psoriaform skin lesions. Neuro: Nonfocal, no foot or wrist drop. Musculoskeletal:    A comprehensive musculoskeletal exam was performed for all joints of each upper and lower extremity and assessed for swelling, tenderness and range of motion. Results are documented as below:  Bilateral deltoid-predominant shoulder soreness, intact passive ROM. Left 1st MCP synovitis. Bony prominence of bilateral knees, trace cool right knee effusion  No evidence of synovitis in the wrists, shoulders, elbows, hips, knees or ankles.      Labs:    3/5/21: Cr 0.96, TSH WNL, Lyme WB negative    Results kellee Arrington" (MRN [id number]) as of 2021 07:57   Ref. Range 2018 11:05 2018 11:37 2018 16:34 3/21/2019 11:34 2019 09:57 2019 09:38 2019 09:34 2019 09:54 12/10/2019 15:35 2020 09:21 2020 08:54 2021 10:08   WBC Latest Ref Range: 4.1 - 11.1 K/uL 3.2 (L) 4.2 4.3 2.3 (LL) 2.6 (L) 2.9 (L) 2.9 (L) 2.6 (L) 3.5 (L) 2.6 (L) 2.7 (L) 3.8 (L)      Ref. Range 2018 10:21 2018 11:05 2018 11:37 2018 16:34 3/21/2019 11:34 2019 09:57 2019 09:38 2019 09:34 2019 09:54   Abs Lymphocytes Latest Ref Range: 0.7 - 3.1 x10E3/uL 0.8 0.9 0.9 1.0 0.7 0.7 0.7 0.8 0.7      Ref. Range 2018 19:46 2021 10:08   Sed rate, automated Latest Ref Range: 0 - 20 mm/hr 48 (H) 28 (H)     21 CRP >9.5mg/L; Cr 1.10, Tbili 1.2, AST 16, ALT 23, AlkP 109; IDALMIS comprehensive negative including SSA, SSB;   20 Vit D 60    Imagin18 CT Lspine:  Findings related to discitis/osteomyelitis at L2-L3. Osseous changes are most  pronounced at L3. Minimal paraspinal soft tissue abnormality/inflammatory  change. 18 MR Lspine:  Impression:   1. Discitis L2-3 again demonstrated. There is increasing endplate enhancement   and irregularity L2. There is enhancing material extending 3 defect within the   superior endplate of L3 into the adjacent disc space. There is no epidural   collection. The degree of paraspinous soft tissue enhancement has not   significantly changed   2. Enhancement of the bilateral L3-4 facets unchanged    Pathology:  18 L3 bone, biopsy:   Marrow fibrosis with inflammation and hemosiderin deposition. A neoplastic process is not identified. ASSESSMENT AND PLAN  Mr. Yana Paula is a 70 y.o. male who presents for evaluation of prednisone-responsive proximal arthralgia and myalgia consistent with polymyalgia rheumatica (PMR), possibly 2/2 early RA given small joint synovitis today.  Given difficulty weaning below 7.5mg daily prednisone, adding methotrexate for steroid sparing. 1. Polymyalgia rheumatica (HCC)  - C REACTIVE PROTEIN, QT; Future  - SED RATE (ESR); Future  - RHEUMATOID FACTOR BY TURB (RDL); Future  - CYCLIC CITRUL PEPTIDE AB, IGG; Future  - CHRONIC HEPATITIS PANEL; Future  - QUANTIFERON-TB PLUS(CLIENT INCUB.); Future  - XR HAND RT MIN 3 V; Future  - XR HAND LT MIN 3 V; Future  - XR CHEST PA LAT; Future  - methotrexate (RHEUMATREX) 2.5 mg tablet; Take 3 Tablets by mouth Every Saturday. Dispense: 15 Tablet; Refill: 2  - folic acid (FOLVITE) 1 mg tablet; Take 1 Tablet by mouth daily. Dispense: 90 Tablet; Refill: 5    2. Leg swelling  - QUANTIFERON-TB PLUS(CLIENT INCUB.); Future  - XR CHEST PA LAT; Future  - QUANTIFERON-TB PLUS(CLIENT INCUB.)    3. Ongoing use of possibly toxic medication  - CHRONIC HEPATITIS PANEL; Future  - QUANTIFERON-TB PLUS(CLIENT INCUB.); Future    4. PMR (polymyalgia rheumatica) (HCC)  - predniSONE (DELTASONE) 5 mg tablet; Take 1.5 Tablets by mouth daily. Dispense: 45 Tablet; Refill: 1    Patient Instructions   1. I agree that you most likely have polymyalgia rheumatica (PMR). I am checking you for some other possible contributions to your small joint pain, your foot rash, and your leg swelling; psoriatic arthritis, coccidiomycosis, and Irina's syndrome can all behave similarly. 2. I'm starting you on methotrexate, take 3 tabs (7.5mg) once a week. To prevent side effects, take folic acid 1mg daily. We'll follow labs monthly initially while you're on this. Ishmael Rivera let me know if you develop nausea/stomach upset with taking this. Continue prednisone 7.5mg daily, and daily vitamin D supplement. 3. Labs ASAP    4. Xrays before you leave    5. Return in 1 months.         Orders Placed This Encounter    QUANTIFERON-TB GOLD PLUS    XR HAND RT MIN 3 V    XR HAND LT MIN 3 V    XR CHEST PA LAT    C REACTIVE PROTEIN, QT    SED RATE (ESR)    RHEUMATOID FACTOR BY TURB (RDL)    CYCLIC CITRUL PEPTIDE AB, IGG    CHRONIC HEPATITIS PANEL    QUANTIFERON-TB PLUS(CLIENT INCUB.)    COMMENT    methotrexate (RHEUMATREX) 2.5 mg tablet    folic acid (FOLVITE) 1 mg tablet    predniSONE (DELTASONE) 5 mg tablet       Medications: I have changed Braeden Eliezer \"Micah\"'s predniSONE. I am also having him start on methotrexate and folic acid. Additionally, I am having him maintain his multivitamin, triamcinolone acetonide, halobetasol, tamsulosin, clobetasoL, TURMERIC PO, potassium chloride SA, cholecalciferol (vitamin D3), OTHER, levothyroxine, and furosemide. Follow up: Return in about 4 weeks (around 7/2/2021).     Face to face time: 45 minutes  Note preparation and records review day of service: 20 minutes  Total provider time day of service: 65 minutes    Loren Reddy MD    Adult Rheumatology   [address], [address]   Phone [phone]  Fax [phone]

## 2021-06-08 LAB
CCP IGA+IGG SERPL IA-ACNC: 19 UNITS (ref 0–19)
COMMENT, 144067: NORMAL
CRP SERPL-MCNC: 15 MG/L (ref 0–10)
ERYTHROCYTE [SEDIMENTATION RATE] IN BLOOD BY WESTERGREN METHOD: 10 MM/HR (ref 0–30)
GAMMA INTERFERON BACKGROUND BLD IA-ACNC: 0 IU/ML
HBV CORE AB SERPL QL IA: NEGATIVE
HBV CORE IGM SERPL QL IA: NEGATIVE
HBV E AB SERPL QL IA: NEGATIVE
HBV E AG SERPL QL IA: NEGATIVE
HBV SURFACE AB SER QL: NON REACTIVE
HBV SURFACE AG SERPL QL IA: NEGATIVE
HCV AB S/CO SERPL IA: <0.1 S/CO RATIO (ref 0–0.9)
M TB IFN-G BLD-IMP: NEGATIVE
M TB IFN-G CD4+ BCKGRND COR BLD-ACNC: 0 IU/ML
MITOGEN IGNF BLD-ACNC: 9.8 IU/ML
QUANTIFERON INCUBATION, QF1T: NORMAL
QUANTIFERON TB2 AG: 0 IU/ML
RHEUMATOID FACT SERPL-ACNC: <15 IU/ML (ref 0–15)
SERVICE CMNT-IMP: NORMAL

## 2021-07-02 NOTE — PERIOP NOTES
Pt is bringing his covid vaccine card. Given instructions for 2nd prep dose to start at 0430. NPO at 0930. Bringing . Will update med list and medical history on Tuesday.

## 2021-07-02 NOTE — PERIOP NOTES
Left message for pt to bring vaccine card or negative test. Arrive at noon, bring , med list. Any questions about prep bring to Dr Kiersten Langford.

## 2021-07-04 DIAGNOSIS — E03.9 ACQUIRED HYPOTHYROIDISM: Chronic | ICD-10-CM

## 2021-07-06 ENCOUNTER — HOSPITAL ENCOUNTER (OUTPATIENT)
Age: 71
Setting detail: OUTPATIENT SURGERY
Discharge: HOME | End: 2021-07-06
Attending: INTERNAL MEDICINE | Admitting: INTERNAL MEDICINE
Payer: MEDICARE

## 2021-07-06 ENCOUNTER — ANESTHESIA (OUTPATIENT)
Dept: ENDOSCOPY | Age: 71
End: 2021-07-06
Payer: MEDICARE

## 2021-07-06 ENCOUNTER — ANESTHESIA EVENT (OUTPATIENT)
Dept: ENDOSCOPY | Age: 71
End: 2021-07-06
Payer: MEDICARE

## 2021-07-06 VITALS
SYSTOLIC BLOOD PRESSURE: 113 MMHG | BODY MASS INDEX: 27.91 KG/M2 | HEIGHT: 77 IN | TEMPERATURE: 97.6 F | DIASTOLIC BLOOD PRESSURE: 73 MMHG | RESPIRATION RATE: 21 BRPM | OXYGEN SATURATION: 100 % | HEART RATE: 46 BPM | WEIGHT: 236.33 LBS

## 2021-07-06 PROCEDURE — 74011000250 HC RX REV CODE- 250: Performed by: NURSE ANESTHETIST, CERTIFIED REGISTERED

## 2021-07-06 PROCEDURE — 76040000019: Performed by: INTERNAL MEDICINE

## 2021-07-06 PROCEDURE — 76060000031 HC ANESTHESIA FIRST 0.5 HR: Performed by: INTERNAL MEDICINE

## 2021-07-06 PROCEDURE — 77030013992 HC SNR POLYP ENDOSC BSC -B: Performed by: INTERNAL MEDICINE

## 2021-07-06 PROCEDURE — 2709999900 HC NON-CHARGEABLE SUPPLY: Performed by: INTERNAL MEDICINE

## 2021-07-06 PROCEDURE — 88305 TISSUE EXAM BY PATHOLOGIST: CPT

## 2021-07-06 PROCEDURE — 74011250636 HC RX REV CODE- 250/636: Performed by: NURSE ANESTHETIST, CERTIFIED REGISTERED

## 2021-07-06 RX ORDER — LIDOCAINE HYDROCHLORIDE 20 MG/ML
INJECTION, SOLUTION EPIDURAL; INFILTRATION; INTRACAUDAL; PERINEURAL AS NEEDED
Status: DISCONTINUED | OUTPATIENT
Start: 2021-07-06 | End: 2021-07-26 | Stop reason: HOSPADM

## 2021-07-06 RX ORDER — MIDAZOLAM HYDROCHLORIDE 1 MG/ML
.25-5 INJECTION, SOLUTION INTRAMUSCULAR; INTRAVENOUS
Status: DISCONTINUED | OUTPATIENT
Start: 2021-07-06 | End: 2021-07-06 | Stop reason: HOSPADM

## 2021-07-06 RX ORDER — DEXTROMETHORPHAN/PSEUDOEPHED 2.5-7.5/.8
1.2 DROPS ORAL
Status: DISCONTINUED | OUTPATIENT
Start: 2021-07-06 | End: 2021-07-06 | Stop reason: HOSPADM

## 2021-07-06 RX ORDER — NALOXONE HYDROCHLORIDE 0.4 MG/ML
0.4 INJECTION, SOLUTION INTRAMUSCULAR; INTRAVENOUS; SUBCUTANEOUS
Status: DISCONTINUED | OUTPATIENT
Start: 2021-07-06 | End: 2021-07-06 | Stop reason: HOSPADM

## 2021-07-06 RX ORDER — PROPOFOL 10 MG/ML
INJECTION, EMULSION INTRAVENOUS AS NEEDED
Status: DISCONTINUED | OUTPATIENT
Start: 2021-07-06 | End: 2021-07-26 | Stop reason: HOSPADM

## 2021-07-06 RX ORDER — SODIUM CHLORIDE 9 MG/ML
50 INJECTION, SOLUTION INTRAVENOUS CONTINUOUS
Status: DISCONTINUED | OUTPATIENT
Start: 2021-07-06 | End: 2021-07-06 | Stop reason: HOSPADM

## 2021-07-06 RX ORDER — PROPOFOL 10 MG/ML
INJECTION, EMULSION INTRAVENOUS
Status: DISCONTINUED | OUTPATIENT
Start: 2021-07-06 | End: 2021-07-26 | Stop reason: HOSPADM

## 2021-07-06 RX ORDER — ATROPINE SULFATE 0.1 MG/ML
0.4 INJECTION INTRAVENOUS
Status: DISCONTINUED | OUTPATIENT
Start: 2021-07-06 | End: 2021-07-06 | Stop reason: HOSPADM

## 2021-07-06 RX ORDER — SODIUM CHLORIDE 9 MG/ML
INJECTION, SOLUTION INTRAVENOUS
Status: DISCONTINUED | OUTPATIENT
Start: 2021-07-06 | End: 2021-07-26 | Stop reason: HOSPADM

## 2021-07-06 RX ORDER — LEVOTHYROXINE SODIUM 150 UG/1
TABLET ORAL
Qty: 90 TABLET | Refills: 0 | Status: SHIPPED | OUTPATIENT
Start: 2021-07-06 | End: 2021-09-28

## 2021-07-06 RX ORDER — FLUMAZENIL 0.1 MG/ML
0.2 INJECTION INTRAVENOUS
Status: DISCONTINUED | OUTPATIENT
Start: 2021-07-06 | End: 2021-07-06 | Stop reason: HOSPADM

## 2021-07-06 RX ORDER — EPINEPHRINE 0.1 MG/ML
1 INJECTION INTRACARDIAC; INTRAVENOUS
Status: DISCONTINUED | OUTPATIENT
Start: 2021-07-06 | End: 2021-07-06 | Stop reason: HOSPADM

## 2021-07-06 RX ADMIN — PROPOFOL INJECTABLE EMULSION 100 MG: 10 INJECTION, EMULSION INTRAVENOUS at 13:29

## 2021-07-06 RX ADMIN — LIDOCAINE HYDROCHLORIDE 40 MG: 20 INJECTION, SOLUTION INTRAVENOUS at 13:29

## 2021-07-06 RX ADMIN — SODIUM CHLORIDE: 9 INJECTION, SOLUTION INTRAVENOUS at 13:25

## 2021-07-06 RX ADMIN — PROPOFOL 120 MCG/KG/MIN: 10 INJECTION, EMULSION INTRAVENOUS at 13:29

## 2021-07-06 NOTE — PROGRESS NOTES

## 2021-07-06 NOTE — ANESTHESIA PREPROCEDURE EVALUATION
Anesthetic History   No history of anesthetic complications            Review of Systems / Medical History  Patient summary reviewed and nursing notes reviewed    Pulmonary  Within defined limits                 Neuro/Psych   Within defined limits           Cardiovascular                  Exercise tolerance: >4 METS     GI/Hepatic/Renal                Endo/Other      Hypothyroidism: well controlled       Other Findings   Comments: Polymyalgia rheumatica           Physical Exam    Airway  Mallampati: II    Neck ROM: normal range of motion   Mouth opening: Normal     Cardiovascular    Rhythm: regular           Dental    Dentition: Caps/crowns     Pulmonary  Breath sounds clear to auscultation               Abdominal         Other Findings            Anesthetic Plan    ASA: 2  Anesthesia type: MAC            Anesthetic plan and risks discussed with: Patient

## 2021-07-06 NOTE — PERIOP NOTES
Meenakshi Roles  1950  [id number]    Situation:  Verbal report received from: Loren Calderon RN  Procedure: Procedure(s):  COLONOSCOPY  ENDOSCOPIC POLYPECTOMY    Background:    Preoperative diagnosis: HISTORY POLYPS  Postoperative diagnosis: 1.- Cecal Polyps  2.- Internal Hemorrhoids  3.- Diverticulosis    :  Dr. Bessie Ruiz  Assistant(s): Endoscopy Technician-1: Panda De La Paz  Endoscopy RN-1: Doris Schultz RN    Specimens:   ID Type Source Tests Collected by Time Destination   1 : Verónica Alcazar MD 7/6/2021 1336 Pathology   2 : Transverse Colon Polyp Preservative   Rosa Maria Cavazos MD 7/6/2021 1340 Pathology     H. Pylori  no    Assessment:    Anesthesia gave intra-procedure sedation and medications, see anesthesia flow sheet yes    Intravenous fluids: NS@ KVO     Vital signs stable yes    Abdominal assessment: round and soft yes    Recommendation:  Discharge patient per MD order, yes.   Return to floor n/a  Family or Friend yes  Permission to share finding with family or friend yes

## 2021-07-06 NOTE — H&P
Marcin Reed M.D.  [phone]            History and Physical       NAME:  Uvaldo Maguire   :   1950   MRN:   [id number]       Referring Physician:  Dr. Molly Clark Date: 2021 1:21 PM    Chief Complaint:  Colon cancer screening and colon polyp surveillance    History of Present Illness:  Patient is a 70 y.o. who is seen for colon cancer screening. Denies any ongoing GI complaints. PMH:  Past Medical History:   Diagnosis Date    Bone infection ([address]     L3 spine    Family history of skin cancer     brother-BCC    History of vascular access device 04/10/2018    [facility] VAT - 4 FR single, R basilic, 43 cm for LTA    Skin cancer     SCC nose, right eye lid, left eye brow and left cheek    Sun-damaged skin     Thyroid disease        PSH:  Past Surgical History:   Procedure Laterality Date    HX COLONOSCOPY      HX MOHS PROCEDURES  2018    SCC L cheek by Dr. Noah Mariscal      colonoscopy       Allergies:  No Known Allergies    Home Medications:  Prior to Admission Medications   Prescriptions Last Dose Informant Patient Reported? Taking? OTHER 2021 at Unknown time  Yes Yes   Sig: C-MAX- 1500MG   TURMERIC PO Unknown at Unknown time  Yes No   Sig: Take  by mouth. cholecalciferol, vitamin D3, (Vitamin D3) 50 mcg (2,000 unit) tab 2021 at Unknown time  Yes Yes   Sig: Take  by mouth.   clobetasoL (TEMOVATE) 0.05 % ointment 2021  Yes No   Sig: Apply  to affected area two (2) times a day. folic acid (FOLVITE) 1 mg tablet 2021 at Unknown time  No Yes   Sig: Take 1 Tablet by mouth daily.    furosemide (LASIX) 20 mg tablet 2021 at Unknown time  No Yes   Sig: TAKE ONE TABLET DAILY AS NEEDED FOR FOOT SWELLING INDICATIONS: VISIBLE WATER RETENTION   halobetasol (ULTRAVATE) 0.05 % topical cream 2021 at Unknown time  Yes Yes   Sig: APPLY TO RASH ON BACK AND NECK TWICE A DAY   levothyroxine (SYNTHROID) 150 mcg tablet 7/6/2021 at Unknown time  No Yes   Sig: TAKE 1 TABLET BY MOUTH EVERY DAY BEFORE BREAKFAST   methotrexate (RHEUMATREX) 2.5 mg tablet 7/4/2021  No No   Sig: Take 3 Tablets by mouth Every Saturday. multivitamin (ONE A DAY) tablet 7/4/2021 Self Yes No   Sig: Take 1 Tab by mouth daily. potassium chloride SA (MICRO-K) 10 mEq capsule 7/5/2021 at Unknown time  Yes Yes   Sig: Take 10 mEq by mouth two (2) times a day. predniSONE (DELTASONE) 5 mg tablet 7/6/2021 at Unknown time  No Yes   Sig: Take 1.5 Tablets by mouth daily. tamsulosin (FLOMAX) 0.4 mg capsule 7/4/2021  Yes No   Sig: Take 0.4 mg by mouth daily. triamcinolone acetonide (KENALOG) 0.1 % topical cream Unknown at Unknown time Self Yes No   Sig: APPLY TO BODY ECZEMA UP TO TWICE A DAY AS NEEDED      Facility-Administered Medications: None       Hospital Medications:  No current facility-administered medications for this encounter. Social History:  Social History     Tobacco Use    Smoking status: Never Smoker    Smokeless tobacco: Never Used   Substance Use Topics    Alcohol use:  Yes     Alcohol/week: 1.0 standard drinks     Types: 1 Cans of beer per week       Family History:  Family History   Problem Relation Age of Onset    Arthritis-osteo Mother     Diabetes Father     Heart Disease Father     Arthritis-osteo Brother     Osteoporosis Brother     No Known Problems Son     No Known Problems Son     No Known Problems Daughter     Cancer Neg Hx     Hypertension Neg Hx     Thyroid Disease Neg Hx              Review of Systems:      Constitutional: negative fever, negative chills, negative weight loss  Eyes:   negative visual changes  ENT:   negative sore throat, tongue or lip swelling  Respiratory:  negative cough, negative dyspnea  Cards:  negative for chest pain, palpitations, lower extremity edema  GI:   See HPI  :  negative for frequency, dysuria  Integument:  negative for rash and pruritus  Heme:  negative for easy bruising and gum/nose bleeding  Musculoskel: negative for myalgias,  back pain and muscle weakness  Neuro: negative for headaches, dizziness, vertigo  Psych:  negative for feelings of anxiety, depression       Objective:     Patient Vitals for the past 8 hrs:   BP Temp Pulse Resp SpO2 Height Weight   07/06/21 1309 122/73 97.4 °F (36.3 °C) (!) 57 17 97 % 6' 5\" (1.956 m) 107.2 kg (236 lb 5.3 oz)     No intake/output data recorded. No intake/output data recorded. EXAM:     NEURO-a&o   HEENT-wnl   LUNGS-clear    COR-regular rate and rhythym     ABD-soft , no tenderness, no rebound, good bs     EXT-no edema     Data Review     No results for input(s): WBC, HGB, HCT, PLT, HGBEXT, HCTEXT, PLTEXT in the last 72 hours. No results for input(s): NA, K, CL, CO2, BUN, CREA, GLU, PHOS, CA in the last 72 hours. No results for input(s): AP, TBIL, TP, ALB, GLOB, GGT, AML, LPSE in the last 72 hours. No lab exists for component: SGOT, GPT, AMYP, HLPSE  No results for input(s): INR, PTP, APTT, INREXT in the last 72 hours. Patient Active Problem List   Diagnosis Code    Acquired hypothyroidism E03.9    Vitamin D deficiency E55.9    Skin cancer C44.90    Advanced care planning/counseling discussion Z71.89    Leukopenia D72.819    Fall W19. XXXA      Assessment:   · Colon cancer screening   Plan:   · Colonoscopy today.      Signed By: Cris Muller MD     7/6/2021  1:21 PM

## 2021-07-06 NOTE — DISCHARGE INSTRUCTIONS
[facility]. Viviana Kearns M.D.  [phone]            COLON DISCHARGE INSTRUCTIONS       2021    Jeanne Addison  :  1950  [facility] Medical Record Number:  [id number]      COLONOSCOPY FINDINGS:  Your colonoscopy showed two polyps that were removed and sent to pathology, diverticulosis and internal hemorrhoids. DISCOMFORT:  Redness at IV site- apply warm compress to area; if redness or soreness persist- contact your physician  There may be a slight amount of blood passed from the rectum  Gaseous discomfort- walking, belching will help relieve any discomfort  You may not operate a vehicle for 12 hours  You may not engage in an occupation involving machinery or appliances for rest of today  You may not drink alcoholic beverages for at least 12 hours  Avoid making any critical decisions for at least 24 hour  DIET:   High fiber diet. - however -  remember your colon is empty and a heavy meal will produce gas. Avoid these foods:  vegetables, fried / greasy foods, carbonated drinks for today     ACTIVITY:  You may resume your normal daily activities it is recommended that you spend the remainder of the day resting -  avoid any strenuous activity. CALL M.D. ANY SIGN OF:   Increasing pain, nausea, vomiting  Abdominal distension (swelling)  New increased bleeding (oral or rectal)  Fever (chills)  Pain in chest area  Bloody discharge from nose or mouth   Shortness of breath    Follow-up Instructions:   Call Dr. Anamika Rivero if any questions or problems. Telephone # [phone]  Biopsy results will be available in  5 to 7 days  Should have a repeat colonoscopy in 5 years.

## 2021-07-06 NOTE — PROCEDURES
Man Colón M.D.  [phone]            2021          Colonoscopy Operative Report  Soco Mccullough  :  1950  [facility] Medical Record Number:  [id number]      Indications:    Personal history of colon polyps (screening only)     :  Vanna Alexander MD    Referring Provider: Jasmeet Kraus MD    Sedation:  MAC anesthesia    Pre-Procedural Exam:      Airway: clear,  No airway problems anticipated  Heart: RRR, without gallops or rubs  Lungs: clear bilaterally without wheezes, crackles, or rhonchi  Abdomen: soft, nontender, nondistended, bowel sounds present  Mental Status: awake, alert and oriented to person, place and time     Procedure Details:  After informed consent was obtained with all risks and benefits of procedure explained and preoperative exam completed, the patient was taken to the endoscopy suite and placed in the left lateral decubitus position. Upon sequential sedation as per above, a digital rectal exam was performed. The Olympus videocolonoscope  was inserted in the rectum and carefully advanced to the cecum, which was identified by the ileocecal valve and appendiceal orifice. The quality of preparation was good. The colonoscope was slowly withdrawn with careful inspection and evaluation between folds. Retroflexion in the rectum was performed. Findings:   Terminal Ileum: not intubated  Cecum: 1  Sessile polyp(s), the largest 2 mm in size;  Ascending Colon: normal  Transverse Colon: 1  Sessile polyp(s), the largest 4 mm in size; Descending Colon: normal  Sigmoid: no mucosal lesion appreciated  moderate diverticulosis; Rectum: no mucosal lesion appreciated  Grade 1 internal hemorrhoid(s);     Interventions:  2 complete polypectomy were performed using cold snare  and cold biopsy the polyps were  retrieved    Specimen Removed:  specimen #1, 2 mm in size, located in the cecum removed by cold biopsy and sent for pathology  #2, 4 mm in size, located in the transverse colon removed by cold snare and retrieved for pathology    Complications: None. EBL:  None. Impression:  Two polyps removed and sent to pathology, otherwise mucosa within normal                          Sigmoid diverticulosis and internal hemorrhoids    Recommendations:  -Repeat colonoscopy in 5 years.   -High fiber diet.    -Resume normal medication(s). Discharge Disposition:  Home in the company of a  when able to ambulate.     Zain Putnam MD  7/6/2021  1:50 PM

## 2021-07-06 NOTE — PROGRESS NOTES
Endoscopy discharge instructions have been reviewed and given to patient. The patient verbalized understanding and acceptance of instructions. Dr. Meseret Can discussed with patient and spouse procedure findings and next steps.

## 2021-07-07 ENCOUNTER — OFFICE VISIT (OUTPATIENT)
Dept: RHEUMATOLOGY | Age: 71
End: 2021-07-07
Payer: MEDICARE

## 2021-07-07 VITALS
HEIGHT: 77 IN | BODY MASS INDEX: 28.22 KG/M2 | HEART RATE: 86 BPM | DIASTOLIC BLOOD PRESSURE: 68 MMHG | OXYGEN SATURATION: 97 % | WEIGHT: 239 LBS | TEMPERATURE: 98 F | RESPIRATION RATE: 18 BRPM | SYSTOLIC BLOOD PRESSURE: 99 MMHG

## 2021-07-07 DIAGNOSIS — M35.3 POLYMYALGIA RHEUMATICA (HCC): Primary | ICD-10-CM

## 2021-07-07 DIAGNOSIS — Z79.899 ONGOING USE OF POSSIBLY TOXIC MEDICATION: ICD-10-CM

## 2021-07-07 DIAGNOSIS — M06.00 SERONEGATIVE RHEUMATOID ARTHRITIS (HCC): ICD-10-CM

## 2021-07-07 PROCEDURE — G8510 SCR DEP NEG, NO PLAN REQD: HCPCS | Performed by: INTERNAL MEDICINE

## 2021-07-07 PROCEDURE — G8536 NO DOC ELDER MAL SCRN: HCPCS | Performed by: INTERNAL MEDICINE

## 2021-07-07 PROCEDURE — G8427 DOCREV CUR MEDS BY ELIG CLIN: HCPCS | Performed by: INTERNAL MEDICINE

## 2021-07-07 PROCEDURE — G0463 HOSPITAL OUTPT CLINIC VISIT: HCPCS | Performed by: INTERNAL MEDICINE

## 2021-07-07 PROCEDURE — 99215 OFFICE O/P EST HI 40 MIN: CPT | Performed by: INTERNAL MEDICINE

## 2021-07-07 PROCEDURE — G8419 CALC BMI OUT NRM PARAM NOF/U: HCPCS | Performed by: INTERNAL MEDICINE

## 2021-07-07 PROCEDURE — 1101F PT FALLS ASSESS-DOCD LE1/YR: CPT | Performed by: INTERNAL MEDICINE

## 2021-07-07 PROCEDURE — 3017F COLORECTAL CA SCREEN DOC REV: CPT | Performed by: INTERNAL MEDICINE

## 2021-07-07 RX ORDER — LEFLUNOMIDE 20 MG/1
20 TABLET ORAL DAILY
Qty: 30 TABLET | Refills: 3 | Status: SHIPPED | OUTPATIENT
Start: 2021-07-07 | End: 2021-08-24 | Stop reason: ALTCHOICE

## 2021-07-07 NOTE — PATIENT INSTRUCTIONS
1. When you feel ready over the next 2 weeks, reduce prednisone to 5mg daily. If you feel substantially more achy and limited on that dose after 3 days, then go back to 7.5mg daily. 2. I'm changing your methotrexate to leflunomide. Let me know if you have a high copay with this, Daniela has this available for $25/mo through [employer]. 3. Repeat labs 1 month after starting leflunomide. 4. Return in 2 months.

## 2021-07-07 NOTE — PROGRESS NOTES
REASON FOR VISIT:   Del Ghosh is a 70 y.o. male with history of L3 osteomyelitis who is returning for followup of polymyalgia rheumatica 2/2 seronegative rheumatoid arthritis. HISTORY OF PRESENT ILLNESS    Has been lightheaded, has affected golf game. Takes methotrexate 7.5mg Sunday and then worse for first few days. Still taking 7.5mg daily prednisone (5mg in the morning and 2.5mg at night). This prednisone dose seems to allow him to sleep. Still has gelling in shoulders and knees, but not to the extent. Some tightness and aching in hands and knees. Hasn't gone back to exercising. Underwent colonoscopy yesterday. With prep felt he had eliminated         Disease History:  Initial visit 6/4/2021. 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 Would take 15 seconds after standing to get moving. Returned to [place] in January, saw Dr. Kimberly Jamison; ESR was 28, started prednisone 15mg daily with marked improvement, but with reduction to 5mg daily aches returned. Increased back to 7.5mg daily (5mg in the morning and 2.5mg at night) which has been tolerable. Now, once he gets out of bed he can get moving within a couple of minutes without difficulty. He is stiff after any yard work, hard to get moving once sitting. Played college basketball, had been active his whole life until the 2018 osteomyelitis. Still able to work around the house and in the yard. Treated with dapto/ceftriaxone x 8 weeks followed by a year of doxycycline. Saw Dr. Patrick Shea 8/2018, at which time normal WBC noted before infection, and felt WBC would improve with more time from acute infection. . reassured him no need for bone marrow biopsy. Sees a chiropractor regularly which helps. Has had punctate psoriasis on chest and legs, treated by his dermatologist with topical steroids with good control. Denies headaches or acute visual changes.  Occasional nondrenching night sweats, no marked weight changes (down about 5 pounds from February). Has colonoscopy upcoming next month, has been OK in the past, gets these every 5 years. Follows with Dermatology for recurrent BCC's, has thyroid nodules he is following. Initial visit left 1st MCP synovitis noted. Labs with negative RF, CCP. ESR borderline at 28. REVIEW OF SYSTEMS  A comprehensive review of systems was negative except for that written in the HPI. A 10-point review of systems is per the new patient questionnaire, which has been reviewed extensively and scanned into the electronic medical record for future reference. Review of systems is as above and is otherwise negative. ALLERGIES  Patient has no known allergies. MEDICATIONS  Current Outpatient Medications   Medication Sig    levothyroxine (SYNTHROID) 150 mcg tablet TAKE 1 TABLET BY MOUTH EVERY DAY BEFORE BREAKFAST    methotrexate (RHEUMATREX) 2.5 mg tablet Take 3 Tablets by mouth Every Saturday.  folic acid (FOLVITE) 1 mg tablet Take 1 Tablet by mouth daily.  predniSONE (DELTASONE) 5 mg tablet Take 1.5 Tablets by mouth daily.  furosemide (LASIX) 20 mg tablet TAKE ONE TABLET DAILY AS NEEDED FOR FOOT SWELLING INDICATIONS: VISIBLE WATER RETENTION    clobetasoL (TEMOVATE) 0.05 % ointment Apply  to affected area two (2) times a day.  TURMERIC PO Take  by mouth.  potassium chloride SA (MICRO-K) 10 mEq capsule Take 10 mEq by mouth two (2) times a day.  cholecalciferol, vitamin D3, (Vitamin D3) 50 mcg (2,000 unit) tab Take  by mouth.  OTHER C-MAX- 1500MG    tamsulosin (FLOMAX) 0.4 mg capsule Take 0.4 mg by mouth daily.  halobetasol (ULTRAVATE) 0.05 % topical cream APPLY TO RASH ON BACK AND NECK TWICE A DAY    triamcinolone acetonide (KENALOG) 0.1 % topical cream APPLY TO BODY ECZEMA UP TO TWICE A DAY AS NEEDED    multivitamin (ONE A DAY) tablet Take 1 Tab by mouth daily. No current facility-administered medications for this visit. Facility-Administered Medications Ordered in Other Visits   Medication    0.9% sodium chloride infusion    propofoL (DIPRIVAN) 10 mg/mL injection    propofoL (DIPRIVAN) 10 mg/mL injection    lidocaine (PF) (XYLOCAINE) 20 mg/mL (2 %) injection       PAST MEDICAL HISTORY  Past Medical History:   Diagnosis Date    Bone infection ([address]     L3 spine    Family history of skin cancer     brother-BCC    History of vascular access device 04/10/2018    [facility] VAT - 4 FR single, R basilic, 43 cm for LTA    Skin cancer     SCC nose, right eye lid, left eye brow and left cheek    Sun-damaged skin     Thyroid disease        FAMILY HISTORY  family history includes Arthritis-osteo in his brother and mother; Diabetes in his father; Heart Disease in his father; No Known Problems in his daughter, son, and son; Osteoporosis in his brother. No rheumatoid arthritis. SOCIAL HISTORY  He  reports that he has never smoked. He has never used smokeless tobacco. He reports current alcohol use of about 1.0 standard drinks of alcohol per week. He reports that he does not use drugs. 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 DATA  There were no vitals taken for this visit. There is no height or weight on file to calculate BMI. No flowsheet data found. General:  The patient is well developed, well nourished, alert, and in no apparent distress. Eyes: Sclera are anicteric. No conjunctival injection. HEENT:  Oropharynx is clear. No oral ulcers. Adequate salivary pooling. No cervical or supraclavicular lymphadenopathy. Lungs:  Clear to auscultation bilaterally, without wheeze or stridor. Normal respiratory effort. Cor:  Regular rate and rhythm. No murmur rub or gallop. Abdomen: Soft, non-tender, without hepatomegaly or masses. Extremities: No calf tenderness.  1+ B LE edema distal to mid-shin slightly more prominent in RLE (longstanding per patient), postinflammatory hyperpigmentation c/w chronic venous stasis. Warm and well perfused. Skin:  No significant abnormalities. Chronic venous stasis changes as below. Photodistributed poikiloderma. Yellowing of toenails with plantar hyperkeratosis not reexamined today. No other psoriaform skin lesions. Neuro: Nonfocal, no foot or wrist drop. Musculoskeletal:    A comprehensive musculoskeletal exam was performed for all joints of each upper and lower extremity and assessed for swelling, tenderness and range of motion. Results are documented as below:  Bilateral deltoid-predominant shoulder soreness at extremes of ROM, intact passive ROM. Trace residual left 1st MCP synovitis. Bony prominence of bilateral knees with bilateral crepitus, trace cool right knee effusion  No evidence of synovitis in the wrists, shoulders, elbows, hips, knees or ankles. Labs:  6/4/21: CMP WNL, CBC WNL, CCP neg, RF neg, Hep B cAb-, SAb-, SAg-; Hep C Ab-; QFN gold negative; ESR 10, CRP   3/5/21: Cr 0.96, TSH WNL, Lyme WB negative    Results for Luis Pock" (MRN [id number]) as of 6/4/2021 07:57   Ref. Range 7/19/2018 11:05 9/19/2018 11:37 11/21/2018 16:34 3/21/2019 11:34 4/19/2019 09:57 5/17/2019 09:38 6/20/2019 09:34 9/6/2019 09:54 12/10/2019 15:35 6/4/2020 09:21 8/6/2020 08:54 1/12/2021 10:08   WBC Latest Ref Range: 4.1 - 11.1 K/uL 3.2 (L) 4.2 4.3 2.3 (LL) 2.6 (L) 2.9 (L) 2.9 (L) 2.6 (L) 3.5 (L) 2.6 (L) 2.7 (L) 3.8 (L)      Ref. Range 2/1/2018 10:21 7/19/2018 11:05 9/19/2018 11:37 11/21/2018 16:34 3/21/2019 11:34 4/19/2019 09:57 5/17/2019 09:38 6/20/2019 09:34 9/6/2019 09:54   Abs Lymphocytes Latest Ref Range: 0.7 - 3.1 x10E3/uL 0.8 0.9 0.9 1.0 0.7 0.7 0.7 0.8 0.7      Ref. Range 4/5/2018 19:46 1/12/2021 10:08   Sed rate, automated Latest Ref Range: 0 - 20 mm/hr 48 (H) 28 (H)     1/12/21 CRP >9.5mg/L; Cr 1.10, Tbili 1.2, AST 16, ALT 23, AlkP 109;  IDALMIS comprehensive negative including SSA, SSB;   20 Vit D 60    Imagin18 CT Lspine:  Findings related to discitis/osteomyelitis at L2-L3. Osseous changes are most  pronounced at L3. Minimal paraspinal soft tissue abnormality/inflammatory  change. 18 MR Lspine:  Impression:   1. Discitis L2-3 again demonstrated. There is increasing endplate enhancement   and irregularity L2. There is enhancing material extending 3 defect within the   superior endplate of L3 into the adjacent disc space. There is no epidural   collection. The degree of paraspinous soft tissue enhancement has not   significantly changed   2. Enhancement of the bilateral L3-4 facets unchanged    Pathology:  18 L3 bone, biopsy:   Marrow fibrosis with inflammation and hemosiderin deposition. A neoplastic process is not identified. ASSESSMENT AND PLAN  Mr. Mitch Taylor is a 70 y.o. male who presents for evaluation of prednisone-responsive proximal arthralgia and myalgia consistent with polymyalgia rheumatica (PMR), possibly 2/2 early seronegative RA given small joint synovitis. He has been poorly tolerant of low-dose methotrexate 2/2 lightheadedness, switching to leflunomide. Tapering prednisone as tolerated to 5mg daily. 1. Polymyalgia rheumatica (HCC)  - C REACTIVE PROTEIN, QT; Future  - CBC WITH AUTOMATED DIFF; Future  - METABOLIC PANEL, COMPREHENSIVE; Future  - SED RATE (ESR); Future  - leflunomide (ARAVA) 20 mg tablet; Take 1 Tablet by mouth daily. Take half tab daily for 7 days and then one tab daily if tolerated  Dispense: 30 Tablet; Refill: 3    2. Seronegative rheumatoid arthritis (HCC)  - C REACTIVE PROTEIN, QT; Future  - CBC WITH AUTOMATED DIFF; Future  - METABOLIC PANEL, COMPREHENSIVE; Future  - SED RATE (ESR); Future  - leflunomide (ARAVA) 20 mg tablet; Take 1 Tablet by mouth daily. Take half tab daily for 7 days and then one tab daily if tolerated  Dispense: 30 Tablet; Refill: 3    3.  Ongoing use of possibly toxic medication  - CBC WITH AUTOMATED DIFF; Future  - METABOLIC PANEL, COMPREHENSIVE; Future      Patient Instructions   1. When you feel ready over the next 2 weeks, reduce prednisone to 5mg daily. If you feel substantially more achy and limited on that dose after 3 days, then go back to 7.5mg daily. 2. I'm changing your methotrexate to leflunomide. Let me know if you have a high copay with this, Daniela has this available for $25/mo through [email or web address]. 3. Repeat labs 1 month after starting leflunomide. 4. Return in 2 months. Orders Placed This Encounter    C REACTIVE PROTEIN, QT    CBC WITH AUTOMATED DIFF    METABOLIC PANEL, COMPREHENSIVE    SED RATE (ESR)    leflunomide (ARAVA) 20 mg tablet       Medications: I have discontinued Fior Ray \"Micah\"'s methotrexate. I am also having him start on leflunomide. Additionally, I am having him maintain his multivitamin, triamcinolone acetonide, halobetasol, tamsulosin, clobetasoL, TURMERIC PO, potassium chloride SA, cholecalciferol (vitamin D3), OTHER, furosemide, folic acid, predniSONE, and levothyroxine. Follow up: Return in about 2 months (around 9/7/2021).     Face to face time: 25 minutes  Note preparation and records review day of service: 20 minutes  Total provider time day of service: 45 minutes    Reid Bingham MD    Adult Rheumatology   [address], [address] [facility], [address] [place] [address] Road   Phone [phone]  Fax [phone]

## 2021-07-07 NOTE — LETTER
7/7/2021    Patient: Raquel Wright   YOB: 1950   Date of Visit: 7/7/2021     Trevor Gilbert, [address] [place] [address] [address]  [place] [address]    Dear Trevor Gilbert MD,      Thank you for referring Mr. Francisco Thompson to [facility] for evaluation. My notes for this consultation are attached. If you have questions, please do not hesitate to call me. I look forward to following your patient along with you.       Sincerely,    Kimberly Jeffrey MD

## 2021-07-07 NOTE — PROGRESS NOTES
Chief Complaint   Patient presents with    Joint Pain     sholuders     1. Have you been to the ER, urgent care clinic since your last visit? Hospitalized since your last visit? No    2. Have you seen or consulted any other health care providers outside of the [address] [place] [address] since your last visit? Include any pap smears or colon screening.  No

## 2021-08-02 NOTE — ADDENDUM NOTE
Addendum  created 08/02/21 0810 by Nya Marcus MD    Attestation recorded in [facility] filed

## 2021-08-21 LAB
ALBUMIN SERPL-MCNC: 4 G/DL (ref 3.7–4.7)
ALBUMIN/GLOB SERPL: 1.9 {RATIO} (ref 1.2–2.2)
ALP SERPL-CCNC: 75 IU/L (ref 48–121)
ALT SERPL-CCNC: 18 IU/L (ref 0–44)
AST SERPL-CCNC: 21 IU/L (ref 0–40)
BASOPHILS # BLD AUTO: 0 X10E3/UL (ref 0–0.2)
BASOPHILS NFR BLD AUTO: 1 %
BILIRUB SERPL-MCNC: 1.4 MG/DL (ref 0–1.2)
BUN SERPL-MCNC: 23 MG/DL (ref 8–27)
BUN/CREAT SERPL: 23 (ref 10–24)
CALCIUM SERPL-MCNC: 8.8 MG/DL (ref 8.6–10.2)
CHLORIDE SERPL-SCNC: 106 MMOL/L (ref 96–106)
CO2 SERPL-SCNC: 23 MMOL/L (ref 20–29)
CREAT SERPL-MCNC: 0.98 MG/DL (ref 0.76–1.27)
CRP SERPL-MCNC: 8 MG/L (ref 0–10)
EOSINOPHIL # BLD AUTO: 0.1 X10E3/UL (ref 0–0.4)
EOSINOPHIL NFR BLD AUTO: 4 %
ERYTHROCYTE [DISTWIDTH] IN BLOOD BY AUTOMATED COUNT: 15.5 % (ref 11.6–15.4)
ERYTHROCYTE [SEDIMENTATION RATE] IN BLOOD BY WESTERGREN METHOD: 11 MM/HR (ref 0–30)
GLOBULIN SER CALC-MCNC: 2.1 G/DL (ref 1.5–4.5)
GLUCOSE SERPL-MCNC: 89 MG/DL (ref 65–99)
HCT VFR BLD AUTO: 39 % (ref 37.5–51)
HGB BLD-MCNC: 12.5 G/DL (ref 13–17.7)
IMM GRANULOCYTES # BLD AUTO: 0 X10E3/UL (ref 0–0.1)
IMM GRANULOCYTES NFR BLD AUTO: 1 %
LYMPHOCYTES # BLD AUTO: 0.5 X10E3/UL (ref 0.7–3.1)
LYMPHOCYTES NFR BLD AUTO: 28 %
MCH RBC QN AUTO: 29.4 PG (ref 26.6–33)
MCHC RBC AUTO-ENTMCNC: 32.1 G/DL (ref 31.5–35.7)
MCV RBC AUTO: 92 FL (ref 79–97)
MONOCYTES # BLD AUTO: 0.2 X10E3/UL (ref 0.1–0.9)
MONOCYTES NFR BLD AUTO: 9 %
NEUTROPHILS # BLD AUTO: 1.1 X10E3/UL (ref 1.4–7)
NEUTROPHILS NFR BLD AUTO: 57 %
PLATELET # BLD AUTO: 183 X10E3/UL (ref 150–450)
POTASSIUM SERPL-SCNC: 4.3 MMOL/L (ref 3.5–5.2)
PROT SERPL-MCNC: 6.1 G/DL (ref 6–8.5)
RBC # BLD AUTO: 4.25 X10E6/UL (ref 4.14–5.8)
SODIUM SERPL-SCNC: 142 MMOL/L (ref 134–144)
WBC # BLD AUTO: 1.9 X10E3/UL (ref 3.4–10.8)

## 2021-08-22 DIAGNOSIS — Z79.899 ONGOING USE OF POSSIBLY TOXIC MEDICATION: ICD-10-CM

## 2021-08-22 DIAGNOSIS — D72.818 OTHER DECREASED WHITE BLOOD CELL (WBC) COUNT: Primary | ICD-10-CM

## 2021-08-22 DIAGNOSIS — M06.00 SERONEGATIVE RHEUMATOID ARTHRITIS (HCC): ICD-10-CM

## 2021-08-27 DIAGNOSIS — M35.3 PMR (POLYMYALGIA RHEUMATICA) (HCC): ICD-10-CM

## 2021-08-27 RX ORDER — PREDNISONE 5 MG/1
TABLET ORAL
Qty: 45 TABLET | Refills: 1 | Status: SHIPPED | OUTPATIENT
Start: 2021-08-27 | End: 2021-10-25

## 2021-09-13 ENCOUNTER — OFFICE VISIT (OUTPATIENT)
Dept: RHEUMATOLOGY | Age: 71
End: 2021-09-13
Payer: MEDICARE

## 2021-09-13 VITALS
DIASTOLIC BLOOD PRESSURE: 66 MMHG | BODY MASS INDEX: 27.11 KG/M2 | HEART RATE: 74 BPM | HEIGHT: 77 IN | RESPIRATION RATE: 18 BRPM | OXYGEN SATURATION: 96 % | WEIGHT: 229.6 LBS | TEMPERATURE: 97.8 F | SYSTOLIC BLOOD PRESSURE: 109 MMHG

## 2021-09-13 DIAGNOSIS — M35.3 PMR (POLYMYALGIA RHEUMATICA) (HCC): Primary | ICD-10-CM

## 2021-09-13 DIAGNOSIS — D72.810 LYMPHOPENIA: ICD-10-CM

## 2021-09-13 DIAGNOSIS — Z79.899 ONGOING USE OF POSSIBLY TOXIC MEDICATION: ICD-10-CM

## 2021-09-13 PROCEDURE — G8427 DOCREV CUR MEDS BY ELIG CLIN: HCPCS | Performed by: INTERNAL MEDICINE

## 2021-09-13 PROCEDURE — G8510 SCR DEP NEG, NO PLAN REQD: HCPCS | Performed by: INTERNAL MEDICINE

## 2021-09-13 PROCEDURE — 3017F COLORECTAL CA SCREEN DOC REV: CPT | Performed by: INTERNAL MEDICINE

## 2021-09-13 PROCEDURE — G8536 NO DOC ELDER MAL SCRN: HCPCS | Performed by: INTERNAL MEDICINE

## 2021-09-13 PROCEDURE — 1101F PT FALLS ASSESS-DOCD LE1/YR: CPT | Performed by: INTERNAL MEDICINE

## 2021-09-13 PROCEDURE — G0463 HOSPITAL OUTPT CLINIC VISIT: HCPCS | Performed by: INTERNAL MEDICINE

## 2021-09-13 PROCEDURE — G8419 CALC BMI OUT NRM PARAM NOF/U: HCPCS | Performed by: INTERNAL MEDICINE

## 2021-09-13 PROCEDURE — 99215 OFFICE O/P EST HI 40 MIN: CPT | Performed by: INTERNAL MEDICINE

## 2021-09-13 NOTE — PROGRESS NOTES
Chief Complaint   Patient presents with    Arthritis     1. Have you been to the ER, urgent care clinic since your last visit? Hospitalized since your last visit? No    2. Have you seen or consulted any other health care providers outside of the [address] [place] [address] since your last visit? Include any pap smears or colon screening.  No

## 2021-09-13 NOTE — LETTER
9/13/2021    Patient: Mert Sanches   YOB: 1950   Date of Visit: 9/13/2021     Crista Alejandre, [address]  [address] [place] [address]  [address] [facility] [address]  Via Fax: [phone]    Dear MD Carlyle Yeung MD,      We recently saw Mr. Nigel Mcbride in the [facility] for evaluation. My notes for this consultation are attached. If you have questions, please do not hesitate to call me. I look forward to following your patient along with you.       Sincerely,    Rufus Casey MD [facility]  Cell: [phone]

## 2021-09-13 NOTE — PATIENT INSTRUCTIONS
1. Labs at your earliest convenience, and again in 6 weeks. 2. Alternate 2.5mg with 5mg daily prednisone for 2-4 weeks, then continue 2.5mg daily prednisone for at least a month, and then if feeling good decrease to 2.5mg every other day for at least a month, then stop if still good. 3. Return in 3-4 months, call or message with any problems.

## 2021-09-13 NOTE — PROGRESS NOTES
REASON FOR VISIT:   Andree Johnson is a 70 y.o. male with history of L3 osteomyelitis who is returning for followup of polymyalgia rheumatica 2/2 seronegative rheumatoid arthritis. HISTORY OF PRESENT ILLNESS    After last visit WBC returned low at 1.9, driven primarily by drop in [address] to 1100, still lymphopenic at 500. Reduced leflunomide from 20mg to 10mg daily. Doing yardwork over the weekend, able to pull weeds for 2.5 hours. Leaving this weekend for [facility], for about a week. Currently taking 2.5mg bid prednisone. About a month ago had tried to wean to 2.5mg daily and became achier in shoulders, so resumed 2.5mg bid. Now however feels \"back to my old self\" without pain or stiffness. Has had 3 episodes of epistaxis. Stop within a few minutes, yesterday took a bit longer. Denies sinus pain or congestion. Did blood tests for dietary triggers he was told could be driving his inflammation, now trialing a more restrictive diet (garlic, beef, lemon juice, pepper). Has lost about 12 pounds he attributes to reductions in prednisone and elimination of most carbohydrates. Disease History:  Initial visit 6/4/2021. 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 Would take 15 seconds after standing to get moving. Returned to [place] in January, saw Dr. Jennifer Dee; ESR was 28, started prednisone 15mg daily with marked improvement, but with reduction to 5mg daily aches returned. Increased back to 7.5mg daily (5mg in the morning and 2.5mg at night) which has been tolerable. Now, once he gets out of bed he can get moving within a couple of minutes without difficulty. He is stiff after any yard work, hard to get moving once sitting. Played college basketball, had been active his whole life until the 2018 osteomyelitis. Still able to work around the house and in the yard. Treated with dapto/ceftriaxone x 8 weeks followed by a year of doxycycline.  Saw Dr. Rito Carrasco 8/2018, at which time normal WBC noted before infection, and felt WBC would improve with more time from acute infection. . reassured him no need for bone marrow biopsy. Sees a chiropractor regularly which helps. Has had punctate psoriasis on chest and legs, treated by his dermatologist with topical steroids with good control. Denies headaches or acute visual changes. Occasional nondrenching night sweats, no marked weight changes (down about 5 pounds from February). Has colonoscopy upcoming next month, has been OK in the past, gets these every 5 years. Follows with Dermatology for recurrent BCC's, has thyroid nodules he is following. Initial visit left 1st MCP synovitis noted. Labs with negative RF, CCP. ESR borderline at 28. REVIEW OF SYSTEMS  A comprehensive review of systems was negative except for that written in the HPI. A 10-point review of systems is per the new patient questionnaire, which has been reviewed extensively and scanned into the electronic medical record for future reference. Review of systems is as above and is otherwise negative. ALLERGIES  Patient has no known allergies. MEDICATIONS  Current Outpatient Medications   Medication Sig    predniSONE (DELTASONE) 5 mg tablet TAKE 1 AND 1/2 TABLETS BY MOUTH EVERY DAY    leflunomide (ARAVA) 10 mg tablet Take 1 Tablet by mouth daily.  levothyroxine (SYNTHROID) 150 mcg tablet TAKE 1 TABLET BY MOUTH EVERY DAY BEFORE BREAKFAST    folic acid (FOLVITE) 1 mg tablet Take 1 Tablet by mouth daily.  furosemide (LASIX) 20 mg tablet TAKE ONE TABLET DAILY AS NEEDED FOR FOOT SWELLING INDICATIONS: VISIBLE WATER RETENTION    clobetasoL (TEMOVATE) 0.05 % ointment Apply  to affected area two (2) times a day.  TURMERIC PO Take  by mouth.  potassium chloride SA (MICRO-K) 10 mEq capsule Take 10 mEq by mouth two (2) times a day.  cholecalciferol, vitamin D3, (Vitamin D3) 50 mcg (2,000 unit) tab Take  by mouth.     OTHER C-MAX- 1500MG    tamsulosin (FLOMAX) 0.4 mg capsule Take 0.4 mg by mouth daily.  halobetasol (ULTRAVATE) 0.05 % topical cream APPLY TO RASH ON BACK AND NECK TWICE A DAY    triamcinolone acetonide (KENALOG) 0.1 % topical cream APPLY TO BODY ECZEMA UP TO TWICE A DAY AS NEEDED    multivitamin (ONE A DAY) tablet Take 1 Tab by mouth daily. No current facility-administered medications for this visit. PAST MEDICAL HISTORY  Past Medical History:   Diagnosis Date    Bone infection ([address]     L3 spine    Family history of skin cancer     brother-BCC    History of vascular access device 04/10/2018    [facility] VAT - 4 FR single, R basilic, 43 cm for LTA    Skin cancer     SCC nose, right eye lid, left eye brow and left cheek    Sun-damaged skin     Thyroid disease        FAMILY HISTORY  family history includes Arthritis-osteo in his brother and mother; Diabetes in his father; Heart Disease in his father; No Known Problems in his daughter, son, and son; Osteoporosis in his brother. No rheumatoid arthritis. SOCIAL HISTORY  He  reports that he has never smoked. He has never used smokeless tobacco. He reports current alcohol use of about 1.0 standard drinks of alcohol per week. He reports that he does not use drugs. 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 DATA  There were no vitals taken for this visit. There is no height or weight on file to calculate BMI. No flowsheet data found. General:  The patient is well developed, well nourished, alert, and in no apparent distress. Eyes: Sclera are anicteric. No conjunctival injection. HEENT:  Oropharynx is clear. No oral ulcers. Adequate salivary pooling. No cervical or supraclavicular lymphadenopathy. Lungs:  Clear to auscultation bilaterally, without wheeze or stridor. Normal respiratory effort. Cor:  Regular rate and rhythm.   No murmur rub or gallop. Abdomen: Soft, non-tender, without hepatomegaly or masses. Extremities: No calf tenderness. Stable 1+ B LE edema distal to mid-shin slightly more prominent in RLE (longstanding per patient), still postinflammatory hyperpigmentation c/w chronic venous stasis. Warm and well perfused. Skin:  No significant abnormalities. Chronic venous stasis changes as below. Photodistributed poikiloderma, few hematomas over extensor forearms. Stable mild palmar hyperkeratosis and desquamation. Yellowing of toenails with plantar hyperkeratosis not reexamined today. Neuro: Nonfocal, no foot or wrist drop. Musculoskeletal:    A comprehensive musculoskeletal exam was performed for all joints of each upper and lower extremity and assessed for swelling, tenderness and range of motion. Results are documented as below:  Bilateral deltoid-predominant shoulder soreness at extremes of ROM, intact passive ROM. Interval resolved left 1st MCP synovitis. Early right palmar dupuytren's. Bony prominence of bilateral knees with bilateral crepitus, trace cool right> left knee effusions  No evidence of synovitis in the wrists, shoulders, elbows, hips, knees or ankles. Labs:  21: WBC 1.9 (ANC 1100, ), Hgb 12.5, Plt 183; Tbili 1.4, AST 21, ALT 18, AlkP 75, Tprot 6.1, Alb 4.0  21: WBC 4.2 (ANC 3200, ), Hgb 13.2, Plt 155  21: CMP WNL, CBC WNL, CCP neg, RF neg, Hep B cAb-, SAb-, SAg-; Hep C Ab-; QFN gold negative; ESR 10, CRP >9.5mg/L  3/5/21: Cr 0.96, TSH WNL, Lyme WB negative  21 CRP >9.5mg/L; Cr 1.10, Tbili 1.2, AST 16, ALT 23, AlkP 109; IDALMIS comprehensive negative including SSA, SSB; WBC 3.8 (ANC 2500, )  20 Vit D 60    Imagin18 CT Lspine:  Findings related to discitis/osteomyelitis at L2-L3. Osseous changes are most  pronounced at L3. Minimal paraspinal soft tissue abnormality/inflammatory  change. 18 MR Lspine:  Impression:   1. Discitis L2-3 again demonstrated.  There is increasing endplate enhancement   and irregularity L2. There is enhancing material extending 3 defect within the   superior endplate of L3 into the adjacent disc space. There is no epidural   collection. The degree of paraspinous soft tissue enhancement has not   significantly changed   2. Enhancement of the bilateral L3-4 facets unchanged    Pathology:  4/6/18 L3 bone, biopsy:   Marrow fibrosis with inflammation and hemosiderin deposition. A neoplastic process is not identified. ASSESSMENT AND PLAN  Mr. Merry Guerra is a 70 y.o. male who presents for evaluation of prednisone-responsive proximal arthralgia and myalgia consistent with polymyalgia rheumatica (PMR), possibly 2/2 early seronegative RA given small joint synovitis. Doing better with low-dose leflunomide and prednisone though an isolated WBC < 2 still driven by lymphopenia. Updating CBC with lower dose leflunomide, he is eager to continue weaning daily prednisone dose which will pursue by 1-2.5mg every 4 weeks as able. 1. PMR (polymyalgia rheumatica) (HCC)  - Cont leflunomide 10mg daily  - Wean prednisone to 2.5mg alternating with 5mg daily x 2-4 weeks, then to 2.5mg daily x 1mo, then 2.5 every other day x 2-4 weeks, then stop as tolerated  - CBC WITH AUTOMATED DIFF; Future  - SED RATE (ESR); Future  - C REACTIVE PROTEIN, QT; Future  - CBC WITH AUTOMATED DIFF  - SED RATE (ESR)  - C REACTIVE PROTEIN, QT  - C REACTIVE PROTEIN, QT; Future  - CBC WITH AUTOMATED DIFF; Future  - METABOLIC PANEL, COMPREHENSIVE; Future  - SED RATE (ESR); Future    2. Ongoing use of possibly toxic medication  - CBC WITH AUTOMATED DIFF; Future  - SED RATE (ESR); Future  - C REACTIVE PROTEIN, QT; Future  - CBC WITH AUTOMATED DIFF; Future  - METABOLIC PANEL, COMPREHENSIVE; Future    3. Lymphopenia  - CBC WITH AUTOMATED DIFF; Future  - SED RATE (ESR); Future  - C REACTIVE PROTEIN, QT; Future    Patient Instructions   1. Labs at your earliest convenience, and again in 6 weeks.     2. Alternate 2.5mg with 5mg daily prednisone for 2-4 weeks, then continue 2.5mg daily prednisone for at least a month, and then if feeling good decrease to 2.5mg every other day for at least a month, then stop if still good. 3. Return in 3-4 months, call or message with any problems. Orders Placed This Encounter    CBC WITH AUTOMATED DIFF    SED RATE (ESR)    C REACTIVE PROTEIN, QT    C REACTIVE PROTEIN, QT    CBC WITH AUTOMATED DIFF    METABOLIC PANEL, COMPREHENSIVE    SED RATE (ESR)       Medications: I am having Jen Berry \"Micah\" maintain his multivitamin, triamcinolone acetonide, halobetasol, tamsulosin, clobetasoL, TURMERIC PO, potassium chloride SA, cholecalciferol (vitamin D3), OTHER, furosemide, folic acid, levothyroxine, leflunomide, and predniSONE. Follow up: Return in about 3 months (around 12/13/2021).     Face to face time: 25 minutes  Note preparation and records review day of service: 20 minutes  Total provider time day of service: 45 minutes    Dariusz Taylor MD    Adult Rheumatology   [address] [place] [address], [address] [place] [address]   Phone [phone]  Fax [phone]

## 2021-09-15 LAB
BASOPHILS # BLD AUTO: 0 X10E3/UL (ref 0–0.2)
BASOPHILS NFR BLD AUTO: 1 %
CRP SERPL-MCNC: 9 MG/L (ref 0–10)
EOSINOPHIL # BLD AUTO: 0.1 X10E3/UL (ref 0–0.4)
EOSINOPHIL NFR BLD AUTO: 2 %
ERYTHROCYTE [DISTWIDTH] IN BLOOD BY AUTOMATED COUNT: 14.9 % (ref 11.6–15.4)
ERYTHROCYTE [SEDIMENTATION RATE] IN BLOOD BY WESTERGREN METHOD: 4 MM/HR (ref 0–30)
HCT VFR BLD AUTO: 38.8 % (ref 37.5–51)
HGB BLD-MCNC: 13.2 G/DL (ref 13–17.7)
IMM GRANULOCYTES # BLD AUTO: 0 X10E3/UL (ref 0–0.1)
IMM GRANULOCYTES NFR BLD AUTO: 0 %
LYMPHOCYTES # BLD AUTO: 0.5 X10E3/UL (ref 0.7–3.1)
LYMPHOCYTES NFR BLD AUTO: 16 %
MCH RBC QN AUTO: 31.6 PG (ref 26.6–33)
MCHC RBC AUTO-ENTMCNC: 34 G/DL (ref 31.5–35.7)
MCV RBC AUTO: 93 FL (ref 79–97)
MONOCYTES # BLD AUTO: 0.2 X10E3/UL (ref 0.1–0.9)
MONOCYTES NFR BLD AUTO: 7 %
NEUTROPHILS # BLD AUTO: 2.2 X10E3/UL (ref 1.4–7)
NEUTROPHILS NFR BLD AUTO: 74 %
PLATELET # BLD AUTO: 169 X10E3/UL (ref 150–450)
RBC # BLD AUTO: 4.18 X10E6/UL (ref 4.14–5.8)
WBC # BLD AUTO: 3 X10E3/UL (ref 3.4–10.8)

## 2021-09-28 DIAGNOSIS — E03.9 ACQUIRED HYPOTHYROIDISM: Chronic | ICD-10-CM

## 2021-09-28 RX ORDER — LEVOTHYROXINE SODIUM 150 UG/1
TABLET ORAL
Qty: 90 TABLET | Refills: 0 | Status: SHIPPED | OUTPATIENT
Start: 2021-09-28 | End: 2021-12-07

## 2021-10-18 ENCOUNTER — TELEPHONE (OUTPATIENT)
Dept: FAMILY MEDICINE CLINIC | Age: 71
End: 2021-10-18

## 2021-10-18 NOTE — TELEPHONE ENCOUNTER
----- Message from Esperanza Carrillo sent at 10/18/2021 12:06 PM EDT -----  Subject: Message to Provider    QUESTIONS  Information for Provider? I scheduled an appt for patient on 10- at   3:30 PM. He has a rash on both legs looks like blisters. He would like to   be seen earlier if possible.  ---------------------------------------------------------------------------  --------------  CALL BACK INFO  What is the best way for the office to contact you? OK to leave message on   voicemail  Preferred Call Back Phone Number? [phone]  ---------------------------------------------------------------------------  --------------  SCRIPT ANSWERS  Relationship to Patient?  Self

## 2021-10-20 NOTE — TELEPHONE ENCOUNTER
Called and spoke with pt, and he confirms he did see dermatology. Pt would like to keep his 10/26/2021 appointment for follow up.

## 2021-10-26 ENCOUNTER — OFFICE VISIT (OUTPATIENT)
Dept: FAMILY MEDICINE CLINIC | Age: 71
End: 2021-10-26
Payer: MEDICARE

## 2021-10-26 VITALS
HEIGHT: 77 IN | SYSTOLIC BLOOD PRESSURE: 117 MMHG | BODY MASS INDEX: 27.16 KG/M2 | HEART RATE: 78 BPM | WEIGHT: 230 LBS | TEMPERATURE: 98.1 F | DIASTOLIC BLOOD PRESSURE: 73 MMHG | OXYGEN SATURATION: 96 %

## 2021-10-26 DIAGNOSIS — I87.2 VENOUS STASIS DERMATITIS OF LEFT LOWER EXTREMITY: ICD-10-CM

## 2021-10-26 DIAGNOSIS — H69.83 DYSFUNCTION OF BOTH EUSTACHIAN TUBES: ICD-10-CM

## 2021-10-26 DIAGNOSIS — R60.0 LEG EDEMA, LEFT: Primary | ICD-10-CM

## 2021-10-26 DIAGNOSIS — M35.3 PMR (POLYMYALGIA RHEUMATICA) (HCC): ICD-10-CM

## 2021-10-26 DIAGNOSIS — Z23 ENCOUNTER FOR IMMUNIZATION: ICD-10-CM

## 2021-10-26 PROCEDURE — 1101F PT FALLS ASSESS-DOCD LE1/YR: CPT | Performed by: FAMILY MEDICINE

## 2021-10-26 PROCEDURE — 90694 VACC AIIV4 NO PRSRV 0.5ML IM: CPT | Performed by: FAMILY MEDICINE

## 2021-10-26 PROCEDURE — G8419 CALC BMI OUT NRM PARAM NOF/U: HCPCS | Performed by: FAMILY MEDICINE

## 2021-10-26 PROCEDURE — 3017F COLORECTAL CA SCREEN DOC REV: CPT | Performed by: FAMILY MEDICINE

## 2021-10-26 PROCEDURE — G8536 NO DOC ELDER MAL SCRN: HCPCS | Performed by: FAMILY MEDICINE

## 2021-10-26 PROCEDURE — G8510 SCR DEP NEG, NO PLAN REQD: HCPCS | Performed by: FAMILY MEDICINE

## 2021-10-26 PROCEDURE — G0008 ADMIN INFLUENZA VIRUS VAC: HCPCS | Performed by: FAMILY MEDICINE

## 2021-10-26 PROCEDURE — G8427 DOCREV CUR MEDS BY ELIG CLIN: HCPCS | Performed by: FAMILY MEDICINE

## 2021-10-26 PROCEDURE — 99214 OFFICE O/P EST MOD 30 MIN: CPT | Performed by: FAMILY MEDICINE

## 2021-10-26 RX ORDER — FLUTICASONE PROPIONATE 50 MCG
2 SPRAY, SUSPENSION (ML) NASAL DAILY
Qty: 3 EACH | Refills: 1 | Status: SHIPPED | OUTPATIENT
Start: 2021-10-26 | End: 2022-07-12

## 2021-10-26 NOTE — PROGRESS NOTES
Identified pt with two pt identifiers(name and ). Reviewed record in preparation for visit and have obtained necessary documentation. Chief Complaint   Patient presents with    Skin Problem     b/l legs        Vitals:    10/26/21 1522   BP: 117/73   Pulse: 78   Temp: 98.1 °F (36.7 °C)   TempSrc: Temporal   SpO2: 96%   Weight: 230 lb (104.3 kg)   Height: 6' 5\" (1.956 m)   PainSc:   0 - No pain       Health Maintenance Due   Topic    Shingrix Vaccine Age 50> (1 of 2)    Medicare Yearly Exam     Flu Vaccine (1)    COVID-19 Vaccine (3 - Pfizer booster)       Coordination of Care Questionnaire:  :   1) Have you been to an emergency room, urgent care, or hospitalized since your last visit? If yes, where when, and reason for visit? Yes seen at the urgent care about 3 week for rashes on both legs. Pt was also seen by his Dermatologist for the same thing. 2. Have seen or consulted any other health care provider since your last visit? If yes, where when, and reason for visit?   No

## 2021-10-26 NOTE — PROGRESS NOTES
Family Medicine Follow-Up Progress Note  Patient: Orion Martinez  1950, 70 y.o., male  Encounter Date: 10/26/2021    ASSESSMENT & PLAN    ICD-10-CM ICD-9-CM    1. Leg edema, left  R60.0 782.3 DUPLEX LOWER EXT VENOUS RIGHT   2. Venous stasis dermatitis of left lower extremity  I87.2 454.1 DUPLEX LOWER EXT VENOUS RIGHT   3. PMR (polymyalgia rheumatica) ([facility])  M35.3 725    4. Dysfunction of both eustachian tubes  H69.83 381.81 fluticasone propionate (FLONASE) 50 mcg/actuation nasal spray   5. Encounter for immunization  Z23 V03.89 FLU (FLUAD QUAD INFLUENZA VACCINE,QUAD,ADJUVANTED)       Orders Placed This Encounter    FLU (FLUAD QUAD INFLUENZA VACCINE,QUAD,ADJUVANTED)    DUPLEX LOWER EXT VENOUS RIGHT     Standing Status:   Future     Standing Expiration Date:   2022    fluticasone propionate (FLONASE) 50 mcg/actuation nasal spray     Si Sprays by Both Nostrils route daily. Dispense:  3 Each     Refill:  1       Patient Instructions   LE edema with stasis derm:   There is asym btw l and right and with recent and upcoming travel, to be safe we will check a DVT study and reassess  Pt to c/w supportive measures he's already doing  PMR is improved tremendously, this is a good QOL improvement for him  Eustachian tubes seem dysfunctional now, if bending fwd getting loss of hearing L ear more than R--recommend start with flonase (if nose bleed stop, let me know), [facility], consider ENT for eval    Flu shot        [facility]  Chief Complaint   Patient presents with    Skin Problem     b/l legs       SUBJECTIVE  Orion Martinez is a 70 y.o. male presenting today for stasis dermatitits  Has seen derm  Using compression leggings  Using cream to keep it moist  Over night it gets better, during the day it worsens  They itch pretty bad  Asymmetry L much more significant than R  No significant SOB with exercise or at rest  No significant chest pain  Some decreased exercise tolerance    PMR: seeing Dr Lyndal Hodgkins, rheum, his shoulders feel \"great\"    Went to the [place] audiologist, he is sometimes having trouble hearing his wife when he is sitting next to her on the couch, he used to have a hearing aid, he got it fixed, his hearing has gotten worse and he got his hearing aid adjusted, but at times he feels liek he's in an airplane and his head feels really congested    He has a lot of post nasal drip, he sometimes bends over and he can feel as if his hearing aid has gone out  Audiology suggested maybe eustachian tubes could be blocked  Taking zyrtec, not any nose sprays    A lot of throat clearing, it doesn't seem seasonal    He has a r nose bleed many mornings    Went to Dr Ligia Macedo, was told to do a blood test for sensitivity  Doing an elimination diet  Has lost some weight doing the diet, eating less bread, not having beef or garlic          ROS  Review of Systems  A 12 point review of systems was negative except as noted here or in the HPI. OBJECTIVE  Visit Vitals  /73 (BP 1 Location: Left upper arm, BP Patient Position: Sitting, BP Cuff Size: Large adult)   Pulse 78   Temp 98.1 °F (36.7 °C) (Temporal)   Ht 6' 5\" (1.956 m)   Wt 230 lb (104.3 kg)   SpO2 96%   BMI 27.27 kg/m²       Physical Exam  Vitals reviewed. Constitutional:       General: He is not in acute distress. Appearance: Normal appearance. He is normal weight. He is not ill-appearing, toxic-appearing or diaphoretic. HENT:      Head: Normocephalic and atraumatic. Mouth/Throat:      Mouth: Mucous membranes are moist.   Eyes:      General: No scleral icterus. Pulmonary:      Effort: Pulmonary effort is normal. No respiratory distress. Skin:     Coloration: Skin is not jaundiced or pale. Findings: No bruising, erythema, lesion or rash. Comments: B/l le stasis derm with skin thickening pronounced L >R and blisters resolving   Neurological:      General: No focal deficit present. Mental Status: He is alert. Cranial Nerves:  No cranial nerve deficit. Gait: Gait normal.   Psychiatric:         Mood and Affect: Mood normal.         Behavior: Behavior normal.         Thought Content: Thought content normal.         Judgment: Judgment normal.         No results found for any visits on 10/26/21. HISTORICAL  Reviewed and updated today, and as noted below:    Past Medical History:   Diagnosis Date    Bone infection ([address]     L3 spine    Family history of skin cancer     brother-BCC    History of vascular access device 04/10/2018    [facility] VAT - 4 FR single, R basilic, 43 cm for LTA    Skin cancer     SCC nose, right eye lid, left eye brow and left cheek    Sun-damaged skin     Thyroid disease      Past Surgical History:   Procedure Laterality Date    COLONOSCOPY N/A 7/6/2021    COLONOSCOPY performed by Felix Sosa MD at [address] HX COLONOSCOPY      HX MOHS PROCEDURES  01/22/2018    SCC L cheek by Dr. Rosario Gale  2012    colonoscopy     Family History   Problem Relation Age of Onset    Arthritis-osteo Mother     Diabetes Father     Heart Disease Father     Arthritis-osteo Brother     Osteoporosis Brother     No Known Problems Son     No Known Problems Son     No Known Problems Daughter     Cancer Neg Hx     Hypertension Neg Hx     Thyroid Disease Neg Hx      Social History     Tobacco Use   Smoking Status Never Smoker   Smokeless Tobacco Never Used     Social History     Socioeconomic History    Marital status:      Spouse name: Not on file    Number of children: Not on file    Years of education: Not on file    Highest education level: Not on file   Tobacco Use    Smoking status: Never Smoker    Smokeless tobacco: Never Used   Vaping Use    Vaping Use: Never used   Substance and Sexual Activity    Alcohol use:  Yes     Alcohol/week: 1.0 standard drinks     Types: 1 Cans of beer per week    Drug use: No    Sexual activity: Yes     Partners: Female     Social Determinants of Health     Financial Resource Strain:     Difficulty of Paying Living Expenses:    Food Insecurity:     Worried About Running Out of Food in the Last Year:     [address] in the Last Year:    Transportation Needs:     Lack of Transportation (Medical):  Lack of Transportation (Non-Medical):    Physical Activity:     Days of Exercise per Week:     Minutes of Exercise per Session:    Stress:     Feeling of Stress :    Social Connections:     Frequency of Communication with Friends and Family:     Frequency of Social Gatherings with Friends and Family:     Attends [employer] Services:     Active Member of Clubs or Organizations:     Attends Club or Organization Meetings:     Marital Status:      No Known Allergies    LAB REVIEW  Lab Results   Component Value Date/Time    Sodium 142 08/20/2021 10:59 AM    Potassium 4.3 08/20/2021 10:59 AM    Chloride 106 08/20/2021 10:59 AM    CO2 23 08/20/2021 10:59 AM    Anion gap 5 03/05/2021 10:00 AM    Glucose 89 08/20/2021 10:59 AM    BUN 23 08/20/2021 10:59 AM    Creatinine 0.98 08/20/2021 10:59 AM    BUN/Creatinine ratio 23 08/20/2021 10:59 AM    GFR est AA 89 08/20/2021 10:59 AM    GFR est non-AA 77 08/20/2021 10:59 AM    Calcium 8.8 08/20/2021 10:59 AM    Bilirubin, total 1.4 (H) 08/20/2021 10:59 AM    Alk.  phosphatase 75 08/20/2021 10:59 AM    Protein, total 6.1 08/20/2021 10:59 AM    Albumin 4.0 08/20/2021 10:59 AM    Globulin 3.1 01/12/2021 10:08 AM    A-G Ratio 1.9 08/20/2021 10:59 AM    ALT (SGPT) 18 08/20/2021 10:59 AM     Lab Results   Component Value Date/Time    WBC 3.0 (L) 09/14/2021 01:14 PM    HGB 13.2 09/14/2021 01:14 PM    HCT 38.8 09/14/2021 01:14 PM    PLATELET 845 15/16/7016 01:14 PM    MCV 93 09/14/2021 01:14 PM     No results found for: HBA1C, CCV6MXSV, NPV8XMGA, YAV2JIAO  Lab Results   Component Value Date/Time    Cholesterol, total 214 (H) 06/04/2020 09:21 AM    HDL Cholesterol 59 06/04/2020 09:21 AM    LDL, calculated 142.2 (H) 06/04/2020 09:21 AM    VLDL, calculated 12.8 06/04/2020 09:21 AM    Triglyceride 64 06/04/2020 09:21 AM    CHOL/HDL Ratio 3.6 06/04/2020 09:21 AM           Kylah Goodrich MD  [facility]  10/26/21 3:44 PM    Portions of this note may have been populated using smart dictation software and may have \"sounds-like\" errors present. Pt was counseled on risks, benefits and alternatives of treatment options. All questions were asked and answered and the patient was agreeable with the treatment plan as outlined.

## 2021-10-26 NOTE — PATIENT INSTRUCTIONS
LE edema with stasis derm:   There is asym btw l and right and with recent and upcoming travel, to be safe we will check a DVT study and reassess  Pt to c/w supportive measures he's already doing  PMR is improved tremendously, this is a good QOL improvement for him  Eustachian tubes seem dysfunctional now, if bending fwd getting loss of hearing L ear more than R--recommend start with flonase (if nose bleed stop, let me know), cw zyrtec, consider ENT for eval    Flu shot

## 2021-10-27 ENCOUNTER — HOSPITAL ENCOUNTER (OUTPATIENT)
Dept: VASCULAR SURGERY | Age: 71
Discharge: HOME | End: 2021-10-27
Attending: FAMILY MEDICINE
Payer: MEDICARE

## 2021-10-27 DIAGNOSIS — R60.0 LEG EDEMA, LEFT: ICD-10-CM

## 2021-10-27 DIAGNOSIS — I87.2 VENOUS STASIS DERMATITIS OF LEFT LOWER EXTREMITY: ICD-10-CM

## 2021-10-27 PROCEDURE — 93971 EXTREMITY STUDY: CPT

## 2021-12-07 DIAGNOSIS — M79.89 FOOT SWELLING: ICD-10-CM

## 2021-12-07 DIAGNOSIS — E03.9 ACQUIRED HYPOTHYROIDISM: Chronic | ICD-10-CM

## 2021-12-07 RX ORDER — LEVOTHYROXINE SODIUM 150 UG/1
TABLET ORAL
Qty: 90 TABLET | Refills: 0 | Status: SHIPPED | OUTPATIENT
Start: 2021-12-07 | End: 2022-03-03

## 2021-12-07 RX ORDER — FUROSEMIDE 20 MG/1
TABLET ORAL
Qty: 90 TABLET | Refills: 0 | Status: SHIPPED | OUTPATIENT
Start: 2021-12-07 | End: 2021-12-09

## 2021-12-09 ENCOUNTER — PATIENT MESSAGE (OUTPATIENT)
Dept: RHEUMATOLOGY | Age: 71
End: 2021-12-09

## 2021-12-09 DIAGNOSIS — M79.89 FOOT SWELLING: ICD-10-CM

## 2021-12-09 DIAGNOSIS — M06.00 SERONEGATIVE RHEUMATOID ARTHRITIS (HCC): ICD-10-CM

## 2021-12-09 RX ORDER — FUROSEMIDE 20 MG/1
TABLET ORAL
Qty: 90 TABLET | Refills: 0 | Status: SHIPPED | OUTPATIENT
Start: 2021-12-09 | End: 2022-05-09

## 2021-12-09 NOTE — TELEPHONE ENCOUNTER
From: Theador Payment  To: Flavio Gusman MD  Sent: 12/9/2021 1:05 PM EST  Subject: Leflunomide Refill    I need a refill of Leflunomide 10 MG. Thanks.     Chayito Lerma

## 2021-12-10 RX ORDER — LEFLUNOMIDE 10 MG/1
10 TABLET ORAL DAILY
Qty: 30 TABLET | Refills: 0 | Status: SHIPPED | OUTPATIENT
Start: 2021-12-10 | End: 2022-02-08

## 2021-12-22 ENCOUNTER — OFFICE VISIT (OUTPATIENT)
Dept: RHEUMATOLOGY | Age: 71
End: 2021-12-22
Payer: MEDICARE

## 2021-12-22 VITALS
BODY MASS INDEX: 27.13 KG/M2 | HEIGHT: 77 IN | TEMPERATURE: 97.7 F | HEART RATE: 78 BPM | RESPIRATION RATE: 16 BRPM | DIASTOLIC BLOOD PRESSURE: 78 MMHG | WEIGHT: 229.8 LBS | SYSTOLIC BLOOD PRESSURE: 112 MMHG | OXYGEN SATURATION: 95 %

## 2021-12-22 DIAGNOSIS — Z79.899 ONGOING USE OF POSSIBLY TOXIC MEDICATION: ICD-10-CM

## 2021-12-22 DIAGNOSIS — M35.3 PMR (POLYMYALGIA RHEUMATICA) (HCC): ICD-10-CM

## 2021-12-22 DIAGNOSIS — D72.810 LYMPHOPENIA: ICD-10-CM

## 2021-12-22 DIAGNOSIS — M06.00 SERONEGATIVE RHEUMATOID ARTHRITIS (HCC): Primary | ICD-10-CM

## 2021-12-22 PROCEDURE — G8536 NO DOC ELDER MAL SCRN: HCPCS | Performed by: INTERNAL MEDICINE

## 2021-12-22 PROCEDURE — G8419 CALC BMI OUT NRM PARAM NOF/U: HCPCS | Performed by: INTERNAL MEDICINE

## 2021-12-22 PROCEDURE — G8510 SCR DEP NEG, NO PLAN REQD: HCPCS | Performed by: INTERNAL MEDICINE

## 2021-12-22 PROCEDURE — 99215 OFFICE O/P EST HI 40 MIN: CPT | Performed by: INTERNAL MEDICINE

## 2021-12-22 PROCEDURE — G8427 DOCREV CUR MEDS BY ELIG CLIN: HCPCS | Performed by: INTERNAL MEDICINE

## 2021-12-22 PROCEDURE — 1101F PT FALLS ASSESS-DOCD LE1/YR: CPT | Performed by: INTERNAL MEDICINE

## 2021-12-22 PROCEDURE — 3017F COLORECTAL CA SCREEN DOC REV: CPT | Performed by: INTERNAL MEDICINE

## 2021-12-22 PROCEDURE — G0463 HOSPITAL OUTPT CLINIC VISIT: HCPCS | Performed by: INTERNAL MEDICINE

## 2021-12-22 NOTE — PATIENT INSTRUCTIONS
1. After this month, stop the prednisone. Continue leflunomide 10mg daily. 2. Labs ASAP. 3. Send me a message if joint pains worsen or you develop new problems--particularly if the diarrhea persists. 4. Return in 3 months.

## 2021-12-22 NOTE — PROGRESS NOTES
REASON FOR VISIT:   Trevor Jauregui is a 70 y.o. male with history of L3 osteomyelitis who is returning for followup of polymyalgia rheumatica 2/2 seronegative rheumatoid arthritis. HISTORY OF PRESENT ILLNESS    Take 2.5mg prednisone every other day since 12/6, and leflunomide 10mg daily. Some looser stools with diarrhea about every 3 days. Not overly distressing, no associated abdominal pain, self-limited. Joints feel good. Joints are loose by breakfast time. No interval rashes. No headache or visual changes. Disease History:  Initial visit 6/4/2021. 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 Would take 15 seconds after standing to get moving. Returned to [place] in January, saw Dr. Sean Leslie; ESR was 28, started prednisone 15mg daily with marked improvement, but with reduction to 5mg daily aches returned. Increased back to 7.5mg daily (5mg in the morning and 2.5mg at night) which has been tolerable. Now, once he gets out of bed he can get moving within a couple of minutes without difficulty. He is stiff after any yard work, hard to get moving once sitting. Played college basketball, had been active his whole life until the 2018 osteomyelitis. Still able to work around the house and in the yard. Treated with dapto/ceftriaxone x 8 weeks followed by a year of doxycycline. Saw Dr. Martin Oleary 8/2018, at which time normal WBC noted before infection, and felt WBC would improve with more time from acute infection. . reassured him no need for bone marrow biopsy. Sees a chiropractor regularly which helps. Has had punctate psoriasis on chest and legs, treated by his dermatologist with topical steroids with good control. Denies headaches or acute visual changes. Occasional nondrenching night sweats, no marked weight changes (down about 5 pounds from February). Has colonoscopy upcoming next month, has been OK in the past, gets these every 5 years. Follows with Dermatology for recurrent BCC's, has thyroid nodules he is following. Initial visit left 1st MCP synovitis noted. Labs with negative RF, CCP. ESR borderline at 28. REVIEW OF SYSTEMS  A comprehensive review of systems was negative except for that written in the HPI. A 10-point review of systems is per the new patient questionnaire, which has been reviewed extensively and scanned into the electronic medical record for future reference. Review of systems is as above and is otherwise negative. ALLERGIES  Patient has no known allergies. MEDICATIONS  Current Outpatient Medications   Medication Sig    leflunomide (ARAVA) 10 mg tablet Take 1 Tablet by mouth daily.  furosemide (LASIX) 20 mg tablet TAKE ONE TABLET DAILY AS NEEDED FOR FOOT SWELLING INDICATIONS: VISIBLE WATER RETENTION    levothyroxine (SYNTHROID) 150 mcg tablet TAKE 1 TABLET BY MOUTH EVERY DAY BEFORE BREAKFAST    fluticasone propionate (FLONASE) 50 mcg/actuation nasal spray 2 Sprays by Both Nostrils route daily.  predniSONE (DELTASONE) 5 mg tablet Take 2.5mg alternating with 5mg daily x 2-4 weeks, then 2.5mg daily x 1mo, then 2.5 every other day x 2-4 weeks, then stop if no persistent pain/stiffness.  folic acid (FOLVITE) 1 mg tablet Take 1 Tablet by mouth daily.  clobetasoL (TEMOVATE) 0.05 % ointment Apply  to affected area two (2) times a day.  potassium chloride SA (MICRO-K) 10 mEq capsule Take 10 mEq by mouth two (2) times a day.  cholecalciferol, vitamin D3, (Vitamin D3) 50 mcg (2,000 unit) tab Take  by mouth.  OTHER C-MAX- 1500MG    tamsulosin (FLOMAX) 0.4 mg capsule Take 0.4 mg by mouth daily.  halobetasol (ULTRAVATE) 0.05 % topical cream APPLY TO RASH ON BACK AND NECK TWICE A DAY    triamcinolone acetonide (KENALOG) 0.1 % topical cream APPLY TO BODY ECZEMA UP TO TWICE A DAY AS NEEDED    multivitamin (ONE A DAY) tablet Take 1 Tab by mouth daily.  TURMERIC PO Take  by mouth.  (Patient not taking: Reported on 9/13/2021)     No current facility-administered medications for this visit. PAST MEDICAL HISTORY  Past Medical History:   Diagnosis Date    Bone infection ([address]     L3 spine    Family history of skin cancer     brother-BCC    History of vascular access device 04/10/2018    [facility] VAT - 4 FR single, R basilic, 43 cm for LTA    Skin cancer     SCC nose, right eye lid, left eye brow and left cheek    Sun-damaged skin     Thyroid disease        FAMILY HISTORY  family history includes Diabetes in his father; Heart Disease in his father; No Known Problems in his daughter, son, and son; Michalene Ply in his brother and mother; Osteoporosis in his brother. No rheumatoid arthritis. SOCIAL HISTORY  He  reports that he has never smoked. He has never used smokeless tobacco. He reports current alcohol use of about 1.0 standard drink of alcohol per week. He reports that he does not use drugs. 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 DATA  Visit Vitals  /78 (BP 1 Location: Left upper arm, BP Patient Position: Sitting)   Pulse 78   Temp 97.7 °F (36.5 °C) (Oral)   Resp 16   Ht 6' 5\" (1.956 m)   Wt 229 lb 12.8 oz (104.2 kg)   SpO2 95%   BMI 27.25 kg/m²    Body mass index is 27.25 kg/m². No flowsheet data found. General:  The patient is well developed, well nourished, alert, and in no apparent distress. Eyes: Sclera are anicteric. No conjunctival injection. HEENT:  Oropharynx is clear. No oral ulcers. Adequate salivary pooling. No cervical or supraclavicular lymphadenopathy. Lungs:  Clear to auscultation bilaterally, without wheeze or stridor. Normal respiratory effort. Cor:  Regular rate and rhythm. No murmur rub or gallop. Abdomen: Soft, non-tender, without hepatomegaly or masses. Extremities: No calf tenderness.  Unchanged 1+ B LE edema symmetrically below mid-shin, still postinflammatory hyperpigmentation c/w chronic venous stasis. Warm and well perfused. Skin:  No significant abnormalities. Chronic venous stasis changes as below. Photodistributed poikiloderma, few hematomas over extensor forearms. Stable mild palmar hyperkeratosis and desquamation. Yellowing of toenails with plantar hyperkeratosis not reexamined today. Neuro: Nonfocal, no foot or wrist drop. Musculoskeletal:    A comprehensive musculoskeletal exam was performed for all joints of each upper and lower extremity and assessed for swelling, tenderness and range of motion. Results are documented as below:  Interval resolved shoulder tenderness, full painless passive ROM. No return of left 1st MCP synovitis. Early right palmar dupuytren's. Bony prominence of bilateral knees with bilateral crepitus, no effusion  No evidence of synovitis in the wrists, shoulders, elbows, hips, knees or ankles. Labs:  9/14/21: WBC 3.0, Hgb 13.2, Plt 169, ESR 4, CRP 9mg/L  8/20/21: WBC 1.9 (ANC 1100, ), Hgb 12.5, Plt 183; Tbili 1.4, AST 21, ALT 18, AlkP 75, Tprot 6.1, Alb 4.0  8/4/21: WBC 4.2 (ANC 3200, ), Hgb 13.2, Plt 155  6/4/21: CMP WNL, CBC WNL, CCP neg, RF neg, Hep B cAb-, SAb-, SAg-; Hep C Ab-; QFN gold negative; ESR 10, CRP >9.5mg/L  3/5/21: Cr 0.96, TSH WNL, Lyme WB negative  1/12/21 CRP >9.5mg/L; Cr 1.10, Tbili 1.2, AST 16, ALT 23, AlkP 109; IDALMIS comprehensive negative including SSA, SSB; WBC 3.8 (ANC 2500, )  8/6/20 Vit D 60    Imaging:  10/27/21: LE duplex:  Right lower extremity venous duplex negative for deep venous thrombosis or thrombophlebitis. Left common femoral vein is thrombus free. NOTE: Prominent lymph node noted in the right groin, which measures 3.44 x 0.73 cm.     6/9/18 CT Lspine:  Findings related to discitis/osteomyelitis at L2-L3. Osseous changes are most  pronounced at L3. Minimal paraspinal soft tissue abnormality/inflammatory  change. 6/4/18 MR Lspine:  Impression:   1. Discitis L2-3 again demonstrated. There is increasing endplate enhancement   and irregularity L2. There is enhancing material extending 3 defect within the   superior endplate of L3 into the adjacent disc space. There is no epidural   collection. The degree of paraspinous soft tissue enhancement has not   significantly changed   2. Enhancement of the bilateral L3-4 facets unchanged    Pathology:  4/6/18 L3 bone, biopsy:   Marrow fibrosis with inflammation and hemosiderin deposition. A neoplastic process is not identified. ASSESSMENT AND PLAN  Mr. Raheem Esteves is a 70 y.o. male who presents for evaluation of prednisone-responsive proximal arthralgia and myalgia consistent with polymyalgia rheumatica (PMR), possibly 2/2 early seronegative RA given small joint synovitis. No synovitis currently and symptoms well-controlled--continuing prednisone wean toward off, with continued low-dose leflunomide. 1. Seronegative rheumatoid arthritis (HCC)  -Cont leflunomide 10mg daily  -Stop prednisone at end of month. - C REACTIVE PROTEIN, QT; Future  - CBC WITH AUTOMATED DIFF; Future  - METABOLIC PANEL, COMPREHENSIVE; Future  - SED RATE (ESR); Future    2. PMR (polymyalgia rheumatica) (HCC)  - C REACTIVE PROTEIN, QT; Future  - SED RATE (ESR); Future    3. Lymphopenia  - CBC WITH AUTOMATED DIFF; Future    4. Ongoing use of possibly toxic medication  - CBC WITH AUTOMATED DIFF; Future  - METABOLIC PANEL, COMPREHENSIVE; Future      Patient Instructions   1. After this month, stop the prednisone. Continue leflunomide 10mg daily. 2. Labs ASAP. 3. Send me a message if joint pains worsen or you develop new problems--particularly if the diarrhea persists. 4. Return in 3 months.         Orders Placed This Encounter    C REACTIVE PROTEIN, QT    CBC WITH AUTOMATED DIFF    METABOLIC PANEL, COMPREHENSIVE    SED RATE (ESR)       Medications: I am having Syl Ritter \"Micah\" maintain his multivitamin, triamcinolone acetonide, halobetasol, tamsulosin, clobetasoL, TURMERIC PO, potassium chloride SA, cholecalciferol (vitamin D3), OTHER, folic acid, predniSONE, fluticasone propionate, levothyroxine, furosemide, and leflunomide. Follow up: Return in about 3 months (around 3/22/2022).     Face to face time: 25 minutes  Note preparation and records review day of service: 20 minutes  Total provider time day of service: 45 minutes    Magdiel Eckert MD    Adult Rheumatology   [facility] [address], [address] [facility], [address] [place] [address]   Phone [phone]  Fax [phone]

## 2021-12-22 NOTE — PROGRESS NOTES
Chief Complaint   Patient presents with    Joint Pain     1. Have you been to the ER, urgent care clinic since your last visit? Hospitalized since your last visit? No    2. Have you seen or consulted any other health care providers outside of the [address] [place] [address] since your last visit? Include any pap smears or colon screening.  No

## 2021-12-24 LAB
ALBUMIN SERPL-MCNC: 4.1 G/DL (ref 3.7–4.7)
ALBUMIN/GLOB SERPL: 2 {RATIO} (ref 1.2–2.2)
ALP SERPL-CCNC: 90 IU/L (ref 44–121)
ALT SERPL-CCNC: 12 IU/L (ref 0–44)
AST SERPL-CCNC: 14 IU/L (ref 0–40)
BASOPHILS # BLD AUTO: 0 X10E3/UL (ref 0–0.2)
BASOPHILS NFR BLD AUTO: 0 %
BILIRUB SERPL-MCNC: 1.5 MG/DL (ref 0–1.2)
BUN SERPL-MCNC: 27 MG/DL (ref 8–27)
BUN/CREAT SERPL: 26 (ref 10–24)
CALCIUM SERPL-MCNC: 8.8 MG/DL (ref 8.6–10.2)
CHLORIDE SERPL-SCNC: 104 MMOL/L (ref 96–106)
CO2 SERPL-SCNC: 22 MMOL/L (ref 20–29)
CREAT SERPL-MCNC: 1.04 MG/DL (ref 0.76–1.27)
CRP SERPL-MCNC: 11 MG/L (ref 0–10)
EOSINOPHIL # BLD AUTO: 0.1 X10E3/UL (ref 0–0.4)
EOSINOPHIL NFR BLD AUTO: 6 %
ERYTHROCYTE [DISTWIDTH] IN BLOOD BY AUTOMATED COUNT: 14.4 % (ref 11.6–15.4)
ERYTHROCYTE [SEDIMENTATION RATE] IN BLOOD BY WESTERGREN METHOD: 7 MM/HR (ref 0–30)
GLOBULIN SER CALC-MCNC: 2.1 G/DL (ref 1.5–4.5)
GLUCOSE SERPL-MCNC: 94 MG/DL (ref 65–99)
HCT VFR BLD AUTO: 40.9 % (ref 37.5–51)
HGB BLD-MCNC: 13.5 G/DL (ref 13–17.7)
IMM GRANULOCYTES # BLD AUTO: 0 X10E3/UL (ref 0–0.1)
IMM GRANULOCYTES NFR BLD AUTO: 0 %
LYMPHOCYTES # BLD AUTO: 0.7 X10E3/UL (ref 0.7–3.1)
LYMPHOCYTES NFR BLD AUTO: 27 %
MCH RBC QN AUTO: 30.4 PG (ref 26.6–33)
MCHC RBC AUTO-ENTMCNC: 33 G/DL (ref 31.5–35.7)
MCV RBC AUTO: 92 FL (ref 79–97)
MONOCYTES # BLD AUTO: 0.2 X10E3/UL (ref 0.1–0.9)
MONOCYTES NFR BLD AUTO: 9 %
NEUTROPHILS # BLD AUTO: 1.4 X10E3/UL (ref 1.4–7)
NEUTROPHILS NFR BLD AUTO: 58 %
PLATELET # BLD AUTO: 203 X10E3/UL (ref 150–450)
POTASSIUM SERPL-SCNC: 3.9 MMOL/L (ref 3.5–5.2)
PROT SERPL-MCNC: 6.2 G/DL (ref 6–8.5)
RBC # BLD AUTO: 4.44 X10E6/UL (ref 4.14–5.8)
SODIUM SERPL-SCNC: 141 MMOL/L (ref 134–144)
WBC # BLD AUTO: 2.5 X10E3/UL (ref 3.4–10.8)

## 2022-02-08 DIAGNOSIS — M06.00 SERONEGATIVE RHEUMATOID ARTHRITIS (HCC): ICD-10-CM

## 2022-02-08 DIAGNOSIS — Z79.899 ONGOING USE OF POSSIBLY TOXIC MEDICATION: Primary | ICD-10-CM

## 2022-02-08 RX ORDER — LEFLUNOMIDE 10 MG/1
TABLET ORAL
Qty: 30 TABLET | Refills: 1 | Status: SHIPPED | OUTPATIENT
Start: 2022-02-08 | End: 2022-04-05

## 2022-03-02 DIAGNOSIS — E03.9 ACQUIRED HYPOTHYROIDISM: Chronic | ICD-10-CM

## 2022-03-03 RX ORDER — LEVOTHYROXINE SODIUM 150 UG/1
TABLET ORAL
Qty: 90 TABLET | Refills: 0 | Status: SHIPPED | OUTPATIENT
Start: 2022-03-03 | End: 2022-06-02

## 2022-03-09 ENCOUNTER — ANCILLARY PROCEDURE (OUTPATIENT)
Dept: CARDIOLOGY CLINIC | Age: 72
End: 2022-03-09
Payer: MEDICARE

## 2022-03-09 VITALS
SYSTOLIC BLOOD PRESSURE: 122 MMHG | WEIGHT: 229 LBS | DIASTOLIC BLOOD PRESSURE: 62 MMHG | BODY MASS INDEX: 27.04 KG/M2 | HEIGHT: 77 IN

## 2022-03-09 DIAGNOSIS — I34.0 NONRHEUMATIC MITRAL VALVE REGURGITATION: ICD-10-CM

## 2022-03-09 LAB
ECHO AO ASC DIAM: 3.9 CM
ECHO AO ASCENDING AORTA INDEX: 1.65 CM/M2
ECHO AO ROOT DIAM: 4.1 CM
ECHO AO ROOT INDEX: 1.73 CM/M2
ECHO AV AREA PEAK VELOCITY: 4.2 CM2
ECHO AV AREA PEAK VELOCITY: 4.2 CM2
ECHO AV AREA VTI: 4.2 CM2
ECHO AV AREA/BSA VTI: 1.8 CM2/M2
ECHO AV MEAN GRADIENT: 5 MMHG
ECHO AV MEAN VELOCITY: 1 M/S
ECHO AV PEAK GRADIENT: 10 MMHG
ECHO AV PEAK VELOCITY: 1.6 M/S
ECHO AV VELOCITY RATIO: 0.75
ECHO AV VTI: 28 CM
ECHO EST RA PRESSURE: 3 MMHG
ECHO LA DIAMETER INDEX: 1.86 CM/M2
ECHO LA DIAMETER: 4.4 CM
ECHO LA TO AORTIC ROOT RATIO: 1.07
ECHO LA VOL 2C: 91 ML (ref 18–58)
ECHO LA VOL 4C: 66 ML (ref 18–58)
ECHO LA VOLUME AREA LENGTH: 84 ML
ECHO LA VOLUME INDEX A2C: 38 ML/M2 (ref 16–34)
ECHO LA VOLUME INDEX A4C: 28 ML/M2 (ref 16–34)
ECHO LA VOLUME INDEX AREA LENGTH: 35 ML/M2 (ref 16–34)
ECHO LV E' LATERAL VELOCITY: 9 CM/S
ECHO LV E' SEPTAL VELOCITY: 7 CM/S
ECHO LV EF PHYSICIAN: 60 %
ECHO LV FRACTIONAL SHORTENING: 35 % (ref 28–44)
ECHO LV INTERNAL DIMENSION DIASTOLE INDEX: 2.32 CM/M2
ECHO LV INTERNAL DIMENSION DIASTOLIC: 5.5 CM (ref 4.2–5.9)
ECHO LV INTERNAL DIMENSION SYSTOLIC INDEX: 1.52 CM/M2
ECHO LV INTERNAL DIMENSION SYSTOLIC: 3.6 CM
ECHO LV IVSD: 1 CM (ref 0.6–1)
ECHO LV MASS 2D: 213.2 G (ref 88–224)
ECHO LV MASS INDEX 2D: 89.9 G/M2 (ref 49–115)
ECHO LV POSTERIOR WALL DIASTOLIC: 1 CM (ref 0.6–1)
ECHO LV RELATIVE WALL THICKNESS RATIO: 0.36
ECHO LVOT AREA: 5.7 CM2
ECHO LVOT AV VTI INDEX: 0.74
ECHO LVOT DIAM: 2.7 CM
ECHO LVOT MEAN GRADIENT: 2 MMHG
ECHO LVOT PEAK GRADIENT: 6 MMHG
ECHO LVOT PEAK VELOCITY: 1.2 M/S
ECHO LVOT STROKE VOLUME INDEX: 50.2 ML/M2
ECHO LVOT SV: 119 ML
ECHO LVOT VTI: 20.8 CM
ECHO MV A VELOCITY: 0.64 M/S
ECHO MV AREA PHT: 3.7 CM2
ECHO MV AREA VTI: 4.2 CM2
ECHO MV E DECELERATION TIME (DT): 207.2 MS
ECHO MV E VELOCITY: 0.82 M/S
ECHO MV E/A RATIO: 1.28
ECHO MV E/E' LATERAL: 9.11
ECHO MV E/E' RATIO (AVERAGED): 10.41
ECHO MV E/E' SEPTAL: 11.71
ECHO MV LVOT VTI INDEX: 1.38
ECHO MV MAX VELOCITY: 0.7 M/S
ECHO MV MEAN GRADIENT: 1 MMHG
ECHO MV MEAN VELOCITY: 0.5 M/S
ECHO MV PEAK GRADIENT: 2 MMHG
ECHO MV PRESSURE HALF TIME (PHT): 60.1 MS
ECHO MV VTI: 28.6 CM
ECHO RIGHT VENTRICULAR SYSTOLIC PRESSURE (RVSP): 21 MMHG
ECHO RV FREE WALL PEAK S': 11 CM/S
ECHO RV INTERNAL DIMENSION: 4 CM
ECHO RV TAPSE: 2.1 CM (ref 1.5–2)
ECHO TV REGURGITANT MAX VELOCITY: 2.15 M/S
ECHO TV REGURGITANT PEAK GRADIENT: 19 MMHG

## 2022-03-09 PROCEDURE — 93306 TTE W/DOPPLER COMPLETE: CPT | Performed by: INTERNAL MEDICINE

## 2022-03-11 ENCOUNTER — OFFICE VISIT (OUTPATIENT)
Dept: CARDIOLOGY CLINIC | Age: 72
End: 2022-03-11
Payer: MEDICARE

## 2022-03-11 VITALS
WEIGHT: 233 LBS | OXYGEN SATURATION: 98 % | HEART RATE: 68 BPM | DIASTOLIC BLOOD PRESSURE: 70 MMHG | SYSTOLIC BLOOD PRESSURE: 112 MMHG | BODY MASS INDEX: 27.51 KG/M2 | HEIGHT: 77 IN

## 2022-03-11 DIAGNOSIS — I34.0 NONRHEUMATIC MITRAL VALVE REGURGITATION: Primary | ICD-10-CM

## 2022-03-11 PROCEDURE — 3017F COLORECTAL CA SCREEN DOC REV: CPT | Performed by: INTERNAL MEDICINE

## 2022-03-11 PROCEDURE — G0463 HOSPITAL OUTPT CLINIC VISIT: HCPCS | Performed by: INTERNAL MEDICINE

## 2022-03-11 PROCEDURE — G8419 CALC BMI OUT NRM PARAM NOF/U: HCPCS | Performed by: INTERNAL MEDICINE

## 2022-03-11 PROCEDURE — 99214 OFFICE O/P EST MOD 30 MIN: CPT | Performed by: INTERNAL MEDICINE

## 2022-03-11 PROCEDURE — G8432 DEP SCR NOT DOC, RNG: HCPCS | Performed by: INTERNAL MEDICINE

## 2022-03-11 PROCEDURE — G8536 NO DOC ELDER MAL SCRN: HCPCS | Performed by: INTERNAL MEDICINE

## 2022-03-11 PROCEDURE — G8428 CUR MEDS NOT DOCUMENT: HCPCS | Performed by: INTERNAL MEDICINE

## 2022-03-11 PROCEDURE — 1101F PT FALLS ASSESS-DOCD LE1/YR: CPT | Performed by: INTERNAL MEDICINE

## 2022-03-11 NOTE — PROGRESS NOTES
Office Follow-up    NAME: Sonia Trujillo   :  1950  MRM:  [id number]    Date:  3/11/2022         Assessment and Plan:     1. Lower extremity edema: This is mild and asymmetrical.  From cardiac standpoint LV function is normal on recent echocardiogram with only moderate mitral regurgitation. Likely this edema is noncardiac in origin possibly vericose veins or dependent edema. No other cardiac symptoms.      2. Moderate mitral regurgitation: The etiology of mitral regurgitation is mitral valve prolapse. This is only moderate grade. At this time there is no symptoms. Pulmonary pressures are normal.  We will continue to do surveillance echo in a year.     3.  Mild aortic regurgitation: This is likely degenerative cause. Continue to observe.     4. See Dr. Alfonso Ching in 1 year. 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 ATTENTION:   This medical record was transcribed using an electronic medical records/speech recognition system. Although proofread, it may and can contain electronic, spelling and other errors. Corrections may be executed at a later time. Please feel free to contact us for any clarifications as needed. Subjective:     Sonia Trujillo, a 67y.o. year-old who presents for followup. Denies CP, SOB, Palpitations, dizziness.        HPI: Sonia Trujillo is a 70 y.o. male with past medical history significant for L3 osteomyelitis diagnosed in 2018, is here for evaluation of lower extremity edema. He has been having the symptoms for past few months but the most recent days he had seen more ankle edema right more than the left side. He had an echocardiogram performed as a part of evaluation and echocardiogram demonstrated normal LV function with moderate mitral regurgitation with posterior mitral valve leaflet prolapse and mild aortic regurgitation. No symptoms of chest pain, shortness of breath, lightheadedness or dizziness or presyncope or syncope on exertion.   Does not smoke tobacco.  No previous cardiac history. Lab results from January 12, 2021 were personally reviewed. CBC, CMP were normal.  C-reactive protein is elevated at 9.5. Exam:     Physical Exam:  Visit Vitals  /70 (BP 1 Location: Left upper arm, BP Patient Position: Sitting)   Pulse 68   Ht 6' 5\" (1.956 m)   Wt 233 lb (105.7 kg)   SpO2 98%   BMI 27.63 kg/m²     General appearance - alert, well appearing, and in no distress  Mental status - affect appropriate to mood  Eyes - sclera anicteric, moist mucous membranes  Neck - supple, no significant adenopathy  Chest - clear to auscultation, no wheezes, rales or rhonchi  Heart - normal rate, regular rhythm, normal S1, S2, PSM grade 3/6 in mitral region. No rubs, clicks or gallops  Abdomen - soft, nontender, nondistended, no masses or organomegaly  Extremities - peripheral pulses normal, no pedal edema  Skin - normal coloration  no rashes    Medications:     Current Outpatient Medications   Medication Sig    levothyroxine (SYNTHROID) 150 mcg tablet TAKE 1 TABLET BY MOUTH EVERY DAY BEFORE BREAKFAST    furosemide (LASIX) 20 mg tablet TAKE ONE TABLET DAILY AS NEEDED FOR FOOT SWELLING INDICATIONS: VISIBLE WATER RETENTION    fluticasone propionate (FLONASE) 50 mcg/actuation nasal spray 2 Sprays by Both Nostrils route daily.  clobetasoL (TEMOVATE) 0.05 % ointment Apply  to affected area two (2) times a day.  cholecalciferol, vitamin D3, (Vitamin D3) 50 mcg (2,000 unit) tab Take  by mouth.  OTHER C-MAX- 1500MG    tamsulosin (FLOMAX) 0.4 mg capsule Take 0.4 mg by mouth daily.  halobetasol (ULTRAVATE) 0.05 % topical cream APPLY TO RASH ON BACK AND NECK TWICE A DAY    triamcinolone acetonide (KENALOG) 0.1 % topical cream APPLY TO BODY ECZEMA UP TO TWICE A DAY AS NEEDED    multivitamin (ONE A DAY) tablet Take 1 Tab by mouth daily.     leflunomide (ARAVA) 10 mg tablet TAKE 1 TABLET BY MOUTH EVERY DAY    predniSONE (DELTASONE) 5 mg tablet Take 2.5mg alternating with 5mg daily x 2-4 weeks, then 2.5mg daily x 1mo, then 2.5 every other day x 2-4 weeks, then stop if no persistent pain/stiffness.  folic acid (FOLVITE) 1 mg tablet Take 1 Tablet by mouth daily.  TURMERIC PO Take  by mouth. (Patient not taking: Reported on 9/13/2021)    potassium chloride SA (MICRO-K) 10 mEq capsule Take 10 mEq by mouth two (2) times a day. (Patient not taking: Reported on 3/11/2022)     No current facility-administered medications for this visit. Diagnostic Data Review:       No specialty comments available. Lab Review:     Lab Results   Component Value Date/Time    Cholesterol, total 214 (H) 06/04/2020 09:21 AM    HDL Cholesterol 59 06/04/2020 09:21 AM    LDL, calculated 142.2 (H) 06/04/2020 09:21 AM    Triglyceride 64 06/04/2020 09:21 AM    CHOL/HDL Ratio 3.6 06/04/2020 09:21 AM     Lab Results   Component Value Date/Time    Creatinine 1.04 12/23/2021 10:34 AM     Lab Results   Component Value Date/Time    BUN 27 12/23/2021 10:34 AM     Lab Results   Component Value Date/Time    Potassium 3.9 12/23/2021 10:34 AM     No results found for: HBA1C, [id number]  Lab Results   Component Value Date/Time    HGB 13.5 12/23/2021 10:34 AM     Lab Results   Component Value Date/Time    PLATELET 312 34/01/8021 10:34 AM     No results for input(s): CPK, CKMB, TROIQ in the last 72 hours. No lab exists for component: CKQMB, CPKMB             _________________________________________________Vel Abbott.  Palma Mckeon MD, [facility]

## 2022-03-11 NOTE — PROGRESS NOTES
Chief Complaint   Patient presents with    Mitral Regurgitation     Visit Vitals  /70 (BP 1 Location: Left upper arm, BP Patient Position: Sitting)   Pulse 68   Ht 6' 5\" (1.956 m)   Wt 233 lb (105.7 kg)   SpO2 98%   BMI 27.63 kg/m²     Chest pain denied     Palpations denied    SOB denied    Dizziness denied    Swelling in hands/feet BLE    Recent hospital stays denied

## 2022-03-11 NOTE — PROGRESS NOTES
All orders entered per verbal orders of Dr. Josue Nereyda        See Dr. Jaimee Albert in 1 year with same day Echo for Mitral regurgitation

## 2022-03-11 NOTE — PATIENT INSTRUCTIONS
See Dr. Shanks Client in 1 year with same day Echo for Mitral regurgitation. Jessy Ellis  (RN)  2022 16:06:28
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
## 2022-03-19 PROBLEM — D72.819 LEUKOPENIA: Status: ACTIVE | Noted: 2018-04-05

## 2022-04-05 DIAGNOSIS — M06.00 SERONEGATIVE RHEUMATOID ARTHRITIS (HCC): ICD-10-CM

## 2022-04-05 RX ORDER — LEFLUNOMIDE 10 MG/1
TABLET ORAL
Qty: 30 TABLET | Refills: 0 | Status: SHIPPED | OUTPATIENT
Start: 2022-04-05 | End: 2022-04-14

## 2022-04-05 NOTE — TELEPHONE ENCOUNTER
Please remind patient to have February labs drawn, I refilled it for 1 more month and once we can verify normal labs I can extend it a full 3 months

## 2022-04-12 LAB
ALBUMIN SERPL-MCNC: 4.1 G/DL (ref 3.7–4.7)
ALBUMIN/GLOB SERPL: 2 {RATIO} (ref 1.2–2.2)
ALP SERPL-CCNC: 102 IU/L (ref 44–121)
ALT SERPL-CCNC: 16 IU/L (ref 0–44)
AST SERPL-CCNC: 19 IU/L (ref 0–40)
BASOPHILS # BLD AUTO: 0 X10E3/UL (ref 0–0.2)
BASOPHILS NFR BLD AUTO: 1 %
BILIRUB SERPL-MCNC: 1.3 MG/DL (ref 0–1.2)
BUN SERPL-MCNC: 20 MG/DL (ref 8–27)
BUN/CREAT SERPL: 20 (ref 10–24)
CALCIUM SERPL-MCNC: 8.8 MG/DL (ref 8.6–10.2)
CHLORIDE SERPL-SCNC: 106 MMOL/L (ref 96–106)
CO2 SERPL-SCNC: 22 MMOL/L (ref 20–29)
CREAT SERPL-MCNC: 0.98 MG/DL (ref 0.76–1.27)
CRP SERPL-MCNC: 18 MG/L (ref 0–10)
EGFR: 82 ML/MIN/1.73
EOSINOPHIL # BLD AUTO: 0.2 X10E3/UL (ref 0–0.4)
EOSINOPHIL NFR BLD AUTO: 6 %
ERYTHROCYTE [DISTWIDTH] IN BLOOD BY AUTOMATED COUNT: 14.5 % (ref 11.6–15.4)
ERYTHROCYTE [SEDIMENTATION RATE] IN BLOOD BY WESTERGREN METHOD: 4 MM/HR (ref 0–30)
GLOBULIN SER CALC-MCNC: 2.1 G/DL (ref 1.5–4.5)
GLUCOSE SERPL-MCNC: 98 MG/DL (ref 65–99)
HCT VFR BLD AUTO: 39.8 % (ref 37.5–51)
HGB BLD-MCNC: 13 G/DL (ref 13–17.7)
IMM GRANULOCYTES # BLD AUTO: 0 X10E3/UL (ref 0–0.1)
IMM GRANULOCYTES NFR BLD AUTO: 0 %
LYMPHOCYTES # BLD AUTO: 0.8 X10E3/UL (ref 0.7–3.1)
LYMPHOCYTES NFR BLD AUTO: 26 %
MCH RBC QN AUTO: 29.9 PG (ref 26.6–33)
MCHC RBC AUTO-ENTMCNC: 32.7 G/DL (ref 31.5–35.7)
MCV RBC AUTO: 92 FL (ref 79–97)
MONOCYTES # BLD AUTO: 0.3 X10E3/UL (ref 0.1–0.9)
MONOCYTES NFR BLD AUTO: 9 %
NEUTROPHILS # BLD AUTO: 1.7 X10E3/UL (ref 1.4–7)
NEUTROPHILS NFR BLD AUTO: 58 %
PLATELET # BLD AUTO: 189 X10E3/UL (ref 150–450)
POTASSIUM SERPL-SCNC: 4.2 MMOL/L (ref 3.5–5.2)
PROT SERPL-MCNC: 6.2 G/DL (ref 6–8.5)
RBC # BLD AUTO: 4.35 X10E6/UL (ref 4.14–5.8)
SODIUM SERPL-SCNC: 143 MMOL/L (ref 134–144)
WBC # BLD AUTO: 3 X10E3/UL (ref 3.4–10.8)

## 2022-04-14 ENCOUNTER — OFFICE VISIT (OUTPATIENT)
Dept: RHEUMATOLOGY | Age: 72
End: 2022-04-14
Payer: MEDICARE

## 2022-04-14 VITALS
BODY MASS INDEX: 27.39 KG/M2 | WEIGHT: 232 LBS | DIASTOLIC BLOOD PRESSURE: 61 MMHG | OXYGEN SATURATION: 96 % | TEMPERATURE: 97.8 F | RESPIRATION RATE: 16 BRPM | SYSTOLIC BLOOD PRESSURE: 107 MMHG | HEIGHT: 77 IN | HEART RATE: 68 BPM

## 2022-04-14 DIAGNOSIS — M35.3 PMR (POLYMYALGIA RHEUMATICA) (HCC): Primary | ICD-10-CM

## 2022-04-14 DIAGNOSIS — M79.89 LEG SWELLING: ICD-10-CM

## 2022-04-14 PROCEDURE — G8510 SCR DEP NEG, NO PLAN REQD: HCPCS | Performed by: INTERNAL MEDICINE

## 2022-04-14 PROCEDURE — G8536 NO DOC ELDER MAL SCRN: HCPCS | Performed by: INTERNAL MEDICINE

## 2022-04-14 PROCEDURE — 1101F PT FALLS ASSESS-DOCD LE1/YR: CPT | Performed by: INTERNAL MEDICINE

## 2022-04-14 PROCEDURE — G8427 DOCREV CUR MEDS BY ELIG CLIN: HCPCS | Performed by: INTERNAL MEDICINE

## 2022-04-14 PROCEDURE — G8419 CALC BMI OUT NRM PARAM NOF/U: HCPCS | Performed by: INTERNAL MEDICINE

## 2022-04-14 PROCEDURE — 3017F COLORECTAL CA SCREEN DOC REV: CPT | Performed by: INTERNAL MEDICINE

## 2022-04-14 PROCEDURE — G0463 HOSPITAL OUTPT CLINIC VISIT: HCPCS | Performed by: INTERNAL MEDICINE

## 2022-04-14 PROCEDURE — 99214 OFFICE O/P EST MOD 30 MIN: CPT | Performed by: INTERNAL MEDICINE

## 2022-04-14 NOTE — PATIENT INSTRUCTIONS
1) Use sarna lotion with compression socks for the itchiness and edema. 2) Schedule if needed for new headaches, joint pain, swelling or other concerns, otherwise your PMR may be resolved.

## 2022-04-14 NOTE — PROGRESS NOTES
Chief Complaint   Patient presents with    Arthritis     1. Have you been to the ER, urgent care clinic since your last visit? Hospitalized since your last visit? No    2. Have you seen or consulted any other health care providers outside of the [address] [place] [address] since your last visit? Include any pap smears or colon screening.  No

## 2022-04-14 NOTE — LETTER
4/14/2022    Patient: Hernan Leo   YOB: 1950   Date of Visit: 4/14/2022     Maty Schmidt, [address] [facility]  [address] [facility]  [address] [facility]  [address] [facility] [address]    Dear Maty Schmidt MD,    We recently saw Mr. Mirtha Amaral in the [facility] for evaluation. My notes for this consultation are attached. If you have questions, please do not hesitate to call me.       Sincerely,    Ulises Bang MD [facility]  Cell: [phone]

## 2022-04-14 NOTE — PROGRESS NOTES
REASON FOR VISIT:   Jamin Bass is a 67 y.o. male with history of L3 osteomyelitis who is returning for followup of polymyalgia rheumatica. HISTORY OF PRESENT ILLNESS  Pt presents today for a follow up. He is doing well today without any complaints. Pt stopped prednisone in January. He stopped leflunomide in February due to loose stool. Since stopping the loose stool has resolved. Pt reports no issues with stiffness. Pt reports that he will feel stiff if he sits down for awhile but loosens up quickly. Joints feel good. Pt notes of continued ankle edema. He reports trying to elevate his feet but has not been consistent with that. He state has had the edema for as long as he can remember and that it has gotten slowly worse over the years but never significantly worse. Pt reports using compression socks causes his legs to itch. Denies recent HA. Pt plays golf. Disease History:  Initial visit 6/4/2021. 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 Would take 15 seconds after standing to get moving. Returned to [place] in January, saw Dr. Vera Hinton; ESR was 28, started prednisone 15mg daily with marked improvement, but with reduction to 5mg daily aches returned. Increased back to 7.5mg daily (5mg in the morning and 2.5mg at night) which has been tolerable. Now, once he gets out of bed he can get moving within a couple of minutes without difficulty. He is stiff after any yard work, hard to get moving once sitting. Played college basketball, had been active his whole life until the 2018 osteomyelitis. Still able to work around the house and in the yard. Treated with dapto/ceftriaxone x 8 weeks followed by a year of doxycycline. Saw Dr. Isabell De 8/2018, at which time normal WBC noted before infection, and felt WBC would improve with more time from acute infection. . reassured him no need for bone marrow biopsy.    Sees a chiropractor regularly which helps. Has had punctate psoriasis on chest and legs, treated by his dermatologist with topical steroids with good control. Denies headaches or acute visual changes. Occasional nondrenching night sweats, no marked weight changes (down about 5 pounds from February). Has colonoscopy upcoming next month, has been OK in the past, gets these every 5 years. Follows with Dermatology for recurrent BCC's, has thyroid nodules he is following. Initial visit left 1st MCP synovitis noted. Labs with negative RF, CCP. ESR borderline at 28. REVIEW OF SYSTEMS  A comprehensive review of systems was negative except for that written in the HPI. A 10-point review of systems is per the new patient questionnaire, which has been reviewed extensively and scanned into the electronic medical record for future reference. Review of systems is as above and is otherwise negative. ALLERGIES  Patient has no known allergies. MEDICATIONS  Current Outpatient Medications   Medication Sig    levothyroxine (SYNTHROID) 150 mcg tablet TAKE 1 TABLET BY MOUTH EVERY DAY BEFORE BREAKFAST    furosemide (LASIX) 20 mg tablet TAKE ONE TABLET DAILY AS NEEDED FOR FOOT SWELLING INDICATIONS: VISIBLE WATER RETENTION    fluticasone propionate (FLONASE) 50 mcg/actuation nasal spray 2 Sprays by Both Nostrils route daily.  folic acid (FOLVITE) 1 mg tablet Take 1 Tablet by mouth daily.  potassium chloride SA (MICRO-K) 10 mEq capsule Take 10 mEq by mouth two (2) times a day.  cholecalciferol, vitamin D3, (Vitamin D3) 50 mcg (2,000 unit) tab Take  by mouth.  OTHER C-MAX- 1500MG    tamsulosin (FLOMAX) 0.4 mg capsule Take 0.4 mg by mouth daily.  triamcinolone acetonide (KENALOG) 0.1 % topical cream APPLY TO BODY ECZEMA UP TO TWICE A DAY AS NEEDED    multivitamin (ONE A DAY) tablet Take 1 Tab by mouth daily.     leflunomide (ARAVA) 10 mg tablet TAKE 1 TABLET BY MOUTH EVERY DAY    predniSONE (DELTASONE) 5 mg tablet Take 2.5mg alternating with 5mg daily x 2-4 weeks, then 2.5mg daily x 1mo, then 2.5 every other day x 2-4 weeks, then stop if no persistent pain/stiffness.  clobetasoL (TEMOVATE) 0.05 % ointment Apply  to affected area two (2) times a day.  TURMERIC PO Take  by mouth. (Patient not taking: Reported on 9/13/2021)    halobetasol (ULTRAVATE) 0.05 % topical cream APPLY TO RASH ON BACK AND NECK TWICE A DAY     No current facility-administered medications for this visit. PAST MEDICAL HISTORY  Past Medical History:   Diagnosis Date    Bone infection ([address]     L3 spine    Family history of skin cancer     brother-BCC    History of vascular access device 04/10/2018    [facility] VAT - 4 FR single, R basilic, 43 cm for LTA    Skin cancer     SCC nose, right eye lid, left eye brow and left cheek    Sun-damaged skin     Thyroid disease        FAMILY HISTORY  family history includes Diabetes in his father; Heart Disease in his father; No Known Problems in his daughter, son, and son; Steva Oppenheim in his brother and mother; Osteoporosis in his brother. No rheumatoid arthritis. SOCIAL HISTORY  He  reports that he has never smoked. He has never used smokeless tobacco. He reports current alcohol use of about 1.0 standard drink of alcohol per week. He reports that he does not use drugs. 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 DATA  Visit Vitals  /61 (BP 1 Location: Left upper arm, BP Patient Position: Sitting)   Pulse 68   Temp 97.8 °F (36.6 °C) (Oral)   Resp 16   Ht 6' 5\" (1.956 m)   Wt 232 lb (105.2 kg)   SpO2 96%   BMI 27.51 kg/m²    Body mass index is 27.51 kg/m². No flowsheet data found. General:  The patient is well developed, well nourished, alert, and in no apparent distress. Eyes: Sclera are anicteric. No conjunctival injection. HEENT:  Oropharynx is clear. No oral ulcers.  Adequate salivary pooling. No cervical or supraclavicular lymphadenopathy. Lungs:  Clear to auscultation bilaterally, without wheeze or stridor. Normal respiratory effort. Cor:  Regular rate and rhythm. No murmur rub or gallop. Abdomen: Soft, non-tender, without hepatomegaly or masses. Extremities: No calf tenderness. Unchanged 1+ B LE edema symmetrically below mid-shin, postinflammatory hyperpigmentation c/w chronic venous stasis. Warm and well perfused. Skin:  No significant abnormalities. Chronic venous stasis changes as below. Photodistributed poikiloderma, few hematomas over extensor forearms. Resolved mild palmar hyperkeratosis and desquamation. Yellowing of toenails with plantar hyperkeratosis not reexamined today. Neuro: Nonfocal, no foot or wrist drop. Musculoskeletal:    A comprehensive musculoskeletal exam was performed for all joints of each upper and lower extremity and assessed for swelling, tenderness and range of motion. Results are documented as below:  Persistently resolved shoulder tenderness, full painless passive ROM. No return of left 1st MCP synovitis. Early right palmar dupuytren's. Bony prominence of bilateral knees with bilateral crepitus, no effusion  No evidence of synovitis in the wrists, shoulders, elbows, hips, knees or ankles. Labs:  4/11/22: ESR 4, CRP 18 mg/L, Cr 0.98, tBili 1.3, AlkP 102, AST 19, ALT 16.  9/14/21: WBC 3.0, Hgb 13.2, Plt 169, ESR 4, CRP 9mg/L  8/20/21: WBC 1.9 (ANC 1100, ), Hgb 12.5, Plt 183; Tbili 1.4, AST 21, ALT 18, AlkP 75, Tprot 6.1, Alb 4.0  8/4/21: WBC 4.2 (ANC 3200, ), Hgb 13.2, Plt 155  6/4/21: CMP WNL, CBC WNL, CCP neg, RF neg, Hep B cAb-, SAb-, SAg-; Hep C Ab-; QFN gold negative; ESR 10, CRP >9.5mg/L  3/5/21: Cr 0.96, TSH WNL, Lyme WB negative  1/12/21 CRP >9.5mg/L; Cr 1.10, Tbili 1.2, AST 16, ALT 23, AlkP 109;  IDALMIS comprehensive negative including SSA, SSB; WBC 3.8 (ANC 2500, )  8/6/20 Vit D 60    Imaging:  10/27/21: LE duplex:  Right lower extremity venous duplex negative for deep venous thrombosis or thrombophlebitis. Left common femoral vein is thrombus free. NOTE: Prominent lymph node noted in the right groin, which measures 3.44 x 0.73 cm.     6/9/18 CT Lspine:  Findings related to discitis/osteomyelitis at L2-L3. Osseous changes are most  pronounced at L3. Minimal paraspinal soft tissue abnormality/inflammatory  change. 6/4/18 MR Lspine:  Impression:   1. Discitis L2-3 again demonstrated. There is increasing endplate enhancement   and irregularity L2. There is enhancing material extending 3 defect within the   superior endplate of L3 into the adjacent disc space. There is no epidural   collection. The degree of paraspinous soft tissue enhancement has not   significantly changed   2. Enhancement of the bilateral L3-4 facets unchanged    Pathology:  4/6/18 L3 bone, biopsy:   Marrow fibrosis with inflammation and hemosiderin deposition. A neoplastic process is not identified. ASSESSMENT AND PLAN  Mr. Nay Peterson is a 67 y.o. male who presents for evaluation of prednisone-responsive proximal arthralgia and myalgia consistent with polymyalgia rheumatica (PMR), but no return of small or large joint inflammatory arthralgias since stopping prednisone and then leflunomide over 2 months ago. At this point favor follwing patient clinically as-needed, would not trend acute phase reactants in isolation as treatment would not be directed against lab abnormalities alone. Reviewed management of dependent edema, red flag inflammatory symptoms that would warrant contacting us for updated evaluation. 1. PMR (polymyalgia rheumatica) (HCC)  -Remain off of prednisone and leflunomide barring return of joint pain/stiffness, headaches, or visual complaints    2. Leg swelling  - Reviewed compression stockings, sarna lotion      Patient Instructions   1) Use sarna lotion with compression socks for the itchiness and edema.      2) Schedule if needed for new headaches, joint pain, swelling or other concerns, otherwise your PMR may be resolved. No orders of the defined types were placed in this encounter. Medications: I am having Denia Amaral \"Micah\" maintain his multivitamin, triamcinolone acetonide, halobetasol, tamsulosin, clobetasoL, TURMERIC PO, potassium chloride SA, cholecalciferol (vitamin D3), OTHER, folic acid, predniSONE, fluticasone propionate, furosemide, levothyroxine, and leflunomide. Follow up: Return if symptoms worsen or fail to improve. Face to face time: 15 minutes  Note preparation and records review day of service: 17 minutes  Total provider time day of service: 32 Minutes    This was scribed by Joselyn Jung in the presence of Dr. Forrest Collins.      Abraham Tineo MD    Adult Rheumatology   [facility]  A Part of [address]   Phone [phone]  Fax [phone]

## 2022-05-08 DIAGNOSIS — M79.89 FOOT SWELLING: ICD-10-CM

## 2022-05-09 RX ORDER — FUROSEMIDE 20 MG/1
TABLET ORAL
Qty: 90 TABLET | Refills: 0 | Status: SHIPPED | OUTPATIENT
Start: 2022-05-09 | End: 2022-08-09 | Stop reason: SDUPTHER

## 2022-06-02 DIAGNOSIS — E03.9 ACQUIRED HYPOTHYROIDISM: Chronic | ICD-10-CM

## 2022-06-02 RX ORDER — LEVOTHYROXINE SODIUM 150 UG/1
TABLET ORAL
Qty: 90 TABLET | Refills: 0 | Status: SHIPPED | OUTPATIENT
Start: 2022-06-02 | End: 2022-08-29

## 2022-07-12 ENCOUNTER — OFFICE VISIT (OUTPATIENT)
Dept: FAMILY MEDICINE CLINIC | Age: 72
End: 2022-07-12
Payer: MEDICARE

## 2022-07-12 VITALS
DIASTOLIC BLOOD PRESSURE: 74 MMHG | BODY MASS INDEX: 27.87 KG/M2 | TEMPERATURE: 97.1 F | OXYGEN SATURATION: 98 % | SYSTOLIC BLOOD PRESSURE: 112 MMHG | HEART RATE: 68 BPM | HEIGHT: 77 IN | RESPIRATION RATE: 16 BRPM | WEIGHT: 236 LBS

## 2022-07-12 DIAGNOSIS — D72.819 LEUKOPENIA, UNSPECIFIED TYPE: ICD-10-CM

## 2022-07-12 DIAGNOSIS — Z71.89 ADVANCED DIRECTIVES, COUNSELING/DISCUSSION: ICD-10-CM

## 2022-07-12 DIAGNOSIS — I87.2 VENOUS STASIS DERMATITIS OF LEFT LOWER EXTREMITY: ICD-10-CM

## 2022-07-12 DIAGNOSIS — E78.49 OTHER HYPERLIPIDEMIA: ICD-10-CM

## 2022-07-12 DIAGNOSIS — Z12.5 SCREENING FOR PROSTATE CANCER: ICD-10-CM

## 2022-07-12 DIAGNOSIS — E55.9 VITAMIN D DEFICIENCY: ICD-10-CM

## 2022-07-12 DIAGNOSIS — R21 RASH: ICD-10-CM

## 2022-07-12 DIAGNOSIS — Z00.00 MEDICARE ANNUAL WELLNESS VISIT, SUBSEQUENT: Primary | ICD-10-CM

## 2022-07-12 DIAGNOSIS — R60.0 LEG EDEMA, LEFT: ICD-10-CM

## 2022-07-12 DIAGNOSIS — E03.9 ACQUIRED HYPOTHYROIDISM: Chronic | ICD-10-CM

## 2022-07-12 DIAGNOSIS — M35.3 PMR (POLYMYALGIA RHEUMATICA) (HCC): ICD-10-CM

## 2022-07-12 LAB
25(OH)D3 SERPL-MCNC: 51.5 NG/ML (ref 30–100)
ALBUMIN SERPL-MCNC: 3.7 G/DL (ref 3.5–5)
ALBUMIN/GLOB SERPL: 1.3 {RATIO} (ref 1.1–2.2)
ALP SERPL-CCNC: 96 U/L (ref 45–117)
ALT SERPL-CCNC: 12 U/L (ref 12–78)
ANION GAP SERPL CALC-SCNC: 6 MMOL/L (ref 5–15)
AST SERPL-CCNC: 10 U/L (ref 15–37)
BASOPHILS # BLD: 0 K/UL (ref 0–0.1)
BASOPHILS NFR BLD: 1 % (ref 0–1)
BILIRUB SERPL-MCNC: 1.2 MG/DL (ref 0.2–1)
BUN SERPL-MCNC: 19 MG/DL (ref 6–20)
BUN/CREAT SERPL: 18 (ref 12–20)
CALCIUM SERPL-MCNC: 8.6 MG/DL (ref 8.5–10.1)
CHLORIDE SERPL-SCNC: 106 MMOL/L (ref 97–108)
CHOLEST SERPL-MCNC: 223 MG/DL
CO2 SERPL-SCNC: 28 MMOL/L (ref 21–32)
CREAT SERPL-MCNC: 1.04 MG/DL (ref 0.7–1.3)
DIFFERENTIAL METHOD BLD: ABNORMAL
EOSINOPHIL # BLD: 0.2 K/UL (ref 0–0.4)
EOSINOPHIL NFR BLD: 6 % (ref 0–7)
ERYTHROCYTE [DISTWIDTH] IN BLOOD BY AUTOMATED COUNT: 15.6 % (ref 11.5–14.5)
GLOBULIN SER CALC-MCNC: 2.8 G/DL (ref 2–4)
GLUCOSE SERPL-MCNC: 90 MG/DL (ref 65–100)
HCT VFR BLD AUTO: 42 % (ref 36.6–50.3)
HDLC SERPL-MCNC: 65 MG/DL
HDLC SERPL: 3.4 {RATIO} (ref 0–5)
HGB BLD-MCNC: 13.4 G/DL (ref 12.1–17)
IMM GRANULOCYTES # BLD AUTO: 0 K/UL (ref 0–0.04)
IMM GRANULOCYTES NFR BLD AUTO: 0 % (ref 0–0.5)
LDLC SERPL CALC-MCNC: 143.2 MG/DL (ref 0–100)
LYMPHOCYTES # BLD: 0.8 K/UL (ref 0.8–3.5)
LYMPHOCYTES NFR BLD: 28 % (ref 12–49)
MCH RBC QN AUTO: 30.4 PG (ref 26–34)
MCHC RBC AUTO-ENTMCNC: 31.9 G/DL (ref 30–36.5)
MCV RBC AUTO: 95.2 FL (ref 80–99)
MONOCYTES # BLD: 0.2 K/UL (ref 0–1)
MONOCYTES NFR BLD: 7 % (ref 5–13)
NEUTS SEG # BLD: 1.5 K/UL (ref 1.8–8)
NEUTS SEG NFR BLD: 58 % (ref 32–75)
NRBC # BLD: 0 K/UL (ref 0–0.01)
NRBC BLD-RTO: 0 PER 100 WBC
PLATELET # BLD AUTO: 170 K/UL (ref 150–400)
PMV BLD AUTO: 10.6 FL (ref 8.9–12.9)
POTASSIUM SERPL-SCNC: 4.3 MMOL/L (ref 3.5–5.1)
PROT SERPL-MCNC: 6.5 G/DL (ref 6.4–8.2)
PSA SERPL-MCNC: 1.4 NG/ML (ref 0.01–4)
RBC # BLD AUTO: 4.41 M/UL (ref 4.1–5.7)
RBC MORPH BLD: ABNORMAL
RBC MORPH BLD: ABNORMAL
SODIUM SERPL-SCNC: 140 MMOL/L (ref 136–145)
TRIGL SERPL-MCNC: 74 MG/DL (ref ?–150)
VLDLC SERPL CALC-MCNC: 14.8 MG/DL
WBC # BLD AUTO: 2.7 K/UL (ref 4.1–11.1)

## 2022-07-12 PROCEDURE — G8536 NO DOC ELDER MAL SCRN: HCPCS | Performed by: FAMILY MEDICINE

## 2022-07-12 PROCEDURE — G8510 SCR DEP NEG, NO PLAN REQD: HCPCS | Performed by: FAMILY MEDICINE

## 2022-07-12 PROCEDURE — 99214 OFFICE O/P EST MOD 30 MIN: CPT | Performed by: FAMILY MEDICINE

## 2022-07-12 PROCEDURE — 1123F ACP DISCUSS/DSCN MKR DOCD: CPT | Performed by: FAMILY MEDICINE

## 2022-07-12 PROCEDURE — G8427 DOCREV CUR MEDS BY ELIG CLIN: HCPCS | Performed by: FAMILY MEDICINE

## 2022-07-12 PROCEDURE — 3017F COLORECTAL CA SCREEN DOC REV: CPT | Performed by: FAMILY MEDICINE

## 2022-07-12 PROCEDURE — 1101F PT FALLS ASSESS-DOCD LE1/YR: CPT | Performed by: FAMILY MEDICINE

## 2022-07-12 PROCEDURE — 99497 ADVNCD CARE PLAN 30 MIN: CPT | Performed by: FAMILY MEDICINE

## 2022-07-12 PROCEDURE — G8417 CALC BMI ABV UP PARAM F/U: HCPCS | Performed by: FAMILY MEDICINE

## 2022-07-12 PROCEDURE — G0439 PPPS, SUBSEQ VISIT: HCPCS | Performed by: FAMILY MEDICINE

## 2022-07-12 RX ORDER — CALCIPOTRIENE 50 UG/G
OINTMENT TOPICAL 2 TIMES DAILY
Qty: 60 G | Refills: 0 | Status: SHIPPED | OUTPATIENT
Start: 2022-07-12

## 2022-07-12 NOTE — PATIENT INSTRUCTIONS
1. Medicare annual wellness visit, subsequent  Done today see note    2. Advanced directives, counseling/discussion  See note, to bring his acp  - ADVANCE CARE PLANNING FIRST 30 MINS  - FULL CODE    3. Acquired hypothyroidism  Labs per orders, c/w lt4  - THYROID CASCADE PROFILE; Future    4. Vitamin D deficiency  Labs per order  - VITAMIN D, 25 HYDROXY; Future    5. Leukopenia, unspecified type  Labs per orders, has had workup  - CBC WITH AUTOMATED DIFF; Future    6. Screening for prostate cancer  routine  - PSA SCREENING (SCREENING); Future    7. Leg edema, left  Stable, controlled    8. Venous stasis dermatitis of left lower extremity  As above    9. PMR (polymyalgia rheumatica) (HCC)  Controlled, not currently active    10. Other hyperlipidemia  Due for recheck  - METABOLIC PANEL, COMPREHENSIVE; Future  - LIPID PANEL; Future    11. Rash  Looks like guttate psoriaiss, recommend dovonox and see derm bakc  - REFERRAL TO DERMATOLOGY  - calcipotriene (DOVONOX) 0.005 % ointment; Apply  to affected area two (2) times a day.   Dispense: 60 g; Refill: 0

## 2022-07-12 NOTE — PROGRESS NOTES
Chief Complaint   Patient presents with    Annual Wellness Visit     [facility] cocnerns      1. \"Have you been to the ER, urgent care clinic since your last visit? Hospitalized since your last visit? \" no    2. \"Have you seen or consulted any other health care providers outside of the [address] [place] [address] since your last visit? \" No     3. For patients aged 39-70: Has the patient had a colonoscopy / FIT/ Cologuard? Yes - no Care Gap present      If the patient is female:    4. For patients aged 41-77: Has the patient had a mammogram within the past 2 years? NA - based on age or sex      11. For patients aged 21-65: Has the patient had a pap smear? NA - based on age or sex        This is the Subsequent Medicare Annual Wellness Exam, performed 12 months or more after the Initial AWV or the last Subsequent AWV    I have reviewed the patient's medical history in detail and updated the computerized patient record. Assessment/Plan   Education and counseling provided:  Are appropriate based on today's review and evaluation    1. Medicare annual wellness visit, subsequent  2. Advanced directives, counseling/discussion  -     ADVANCE CARE PLANNING FIRST 30 MINS  -     FULL CODE  3. Acquired hypothyroidism  -     THYROID CASCADE PROFILE; Future  4. Vitamin D deficiency  -     VITAMIN D, 25 HYDROXY; Future  5. Leukopenia, unspecified type  -     CBC WITH AUTOMATED DIFF; Future  6. Screening for prostate cancer  -     PSA SCREENING (SCREENING); Future  7. Leg edema, left  8. Venous stasis dermatitis of left lower extremity  9. PMR (polymyalgia rheumatica) (HCC)  10. Other hyperlipidemia  -     METABOLIC PANEL, COMPREHENSIVE; Future  -     LIPID PANEL; Future  11. Rash  -     REFERRAL TO DERMATOLOGY  -     calcipotriene (DOVONOX) 0.005 % ointment;  Apply  to affected area two (2) times a day., Normal, Disp-60 g, R-0       Depression Risk Factor Screening     3 most recent PHQ Screens 7/12/2022   Little interest or pleasure in doing things Not at all   Feeling down, depressed, irritable, or hopeless Not at all   Total Score PHQ 2 0       Alcohol & Drug Abuse Risk Screen   Do you average more than 1 drink per night or more than 7 drinks a week?: (P) No  In the past three months have you had more than 4 drinks containing alcohol on one occasion?: (P) No            Functional Ability and Level of Safety   Hearing:  Hearing: (P) Patient wears hearing aid      Activities of Daily Living: The home contains: (P) no safety equipment  Functional ADLs: (P) Patient does total self care     Ambulation:  Patient ambulates: (P) with no difficulty     Fall Risk:  Fall Risk Assessment, last 12 mths 7/12/2022   Able to walk? Yes   Fall in past 12 months? 0   Do you feel unsteady? 0   Are you worried about falling 0   Number of falls in past 12 months -   Fall with injury? -     Abuse Screen:  Do you ever feel afraid of your partner?: No  Are you in a relationship with someone who physically or mentally threatens you?: No  Is it safe for you to go home?: Yes        Cognitive Screening   Has your family/caregiver stated any concerns about your memory?: (P) No     Cognitive Screening: Normal - Verbal Fluency Test    Health Maintenance Due     Health Maintenance Due   Topic Date Due    Shingrix Vaccine Age 49> (1 of 2) Never done       Patient Care Team   Patient Care Team:  Kristian Jolley MD as PCP - General (Family Medicine)  Kristian Jolley MD as PCP - [address] [place] [address] [facility]  Stephanie Emmanuel MD (Ophthalmology)  Wilber Jack MD (Urology)  Claude Jones MD (Hematology and Oncology)    History     Patient Active Problem List   Diagnosis Code    Acquired hypothyroidism E03.9    Vitamin D deficiency E55.9    Skin cancer C44.90    Advanced care planning/counseling discussion Z71.89    Leukopenia D72.819    Fall W19. Tami Gottron     Past Medical History:   Diagnosis Date    Bone infection ([address]     L3 spine    Family history of skin cancer     brother-BCC    History of vascular access device 04/10/2018    [facility] VAT - 4 FR single, R basilic, 43 cm for LTA    Skin cancer     SCC nose, right eye lid, left eye brow and left cheek    Sun-damaged skin     Thyroid disease       Past Surgical History:   Procedure Laterality Date    COLONOSCOPY N/A 7/6/2021    COLONOSCOPY performed by Dimitris Victor MD at [facility] HX COLONOSCOPY      HX MOHS PROCEDURES  01/22/2018    SCC L cheek by Dr. Freddie Luke  2012    colonoscopy     Current Outpatient Medications   Medication Sig Dispense Refill    calcipotriene (DOVONOX) 0.005 % ointment Apply  to affected area two (2) times a day. 60 g 0    levothyroxine (SYNTHROID) 150 mcg tablet TAKE 1 TABLET BY MOUTH EVERY DAY BEFORE BREAKFAST 90 Tablet 0    furosemide (LASIX) 20 mg tablet TAKE ONE TABLET DAILY AS NEEDED FOR FOOT SWELLING INDICATIONS: VISIBLE WATER RETENTION 90 Tablet 0    folic acid (FOLVITE) 1 mg tablet Take 1 Tablet by mouth daily. 90 Tablet 5    potassium chloride SA (MICRO-K) 10 mEq capsule Take 10 mEq by mouth two (2) times a day.  cholecalciferol, vitamin D3, (Vitamin D3) 50 mcg (2,000 unit) tab Take  by mouth.  OTHER C-MAX- 1500MG      tamsulosin (FLOMAX) 0.4 mg capsule Take 0.4 mg by mouth daily.  triamcinolone acetonide (KENALOG) 0.1 % topical cream APPLY TO BODY ECZEMA UP TO TWICE A DAY AS NEEDED  12    multivitamin (ONE A DAY) tablet Take 1 Tab by mouth daily.        No Known Allergies    Family History   Problem Relation Age of Onset    OSTEOARTHRITIS Mother     Diabetes Father     Heart Disease Father     OSTEOARTHRITIS Brother     Osteoporosis Brother     No Known Problems Son     No Known Problems Son     No Known Problems Daughter     Cancer Neg Hx     Hypertension Neg Hx     Thyroid Disease Neg Hx      Social History     Tobacco Use    Smoking status: Never Smoker    Smokeless tobacco: Never Used   Substance Use Topics    Alcohol use: Yes     Alcohol/week: 1.0 standard drink     Types: 1 Cans of beer per week     Family Medicine Follow-Up Progress Note  Patient: Miguel Bull  1950, 67 y.o., male  Encounter Date: 7/12/2022    ASSESSMENT & PLAN    ICD-10-CM ICD-9-CM    1. Medicare annual wellness visit, subsequent  Z00.00 V70.0    2. Advanced directives, counseling/discussion  Z71.89 V65.49 ADVANCE CARE PLANNING FIRST 30 MINS      FULL CODE   3. Acquired hypothyroidism  E03.9 244.9 THYROID CASCADE PROFILE   4. Vitamin D deficiency  E55.9 268.9 VITAMIN D, 25 HYDROXY   5. Leukopenia, unspecified type  D72.819 288.50 CBC WITH AUTOMATED DIFF   6. Screening for prostate cancer  Z12.5 V76.44 PSA SCREENING (SCREENING)   7. Leg edema, left  R60.0 782.3    8. Venous stasis dermatitis of left lower extremity  I87.2 454.1    9. PMR (polymyalgia rheumatica) ([facility])  M35.3 725    10. Other hyperlipidemia  Y08.38 831.4 METABOLIC PANEL, COMPREHENSIVE      LIPID PANEL   11.  Rash  R21 782.1 REFERRAL TO DERMATOLOGY      calcipotriene (DOVONOX) 0.005 % ointment       Orders Placed This Encounter    Order - [address] (16-30 minutes)    THYROID CASCADE PROFILE     Standing Status:   Future     Standing Expiration Date:   7/12/2023    VITAMIN D, 25 HYDROXY     Standing Status:   Future     Standing Expiration Date:   7/12/2023    CBC WITH AUTOMATED DIFF     Standing Status:   Future     Standing Expiration Date:   5/73/1152    METABOLIC PANEL, COMPREHENSIVE     Standing Status:   Future     Standing Expiration Date:   7/12/2023    LIPID PANEL     Standing Status:   Future     Standing Expiration Date:   7/12/2023    PSA SCREENING (SCREENING)     Standing Status:   Future     Standing Expiration Date:   7/12/2023    REFERRAL TO DERMATOLOGY     Referral Priority:   Routine     Referral Type:   Consultation     Referral Reason:   Specialty Services Required     Referred to Provider:   Buzz Smith MD     Requested Specialty: Dermatology Physician     Number of Visits Requested:   1    FULL CODE    calcipotriene (DOVONOX) 0.005 % ointment     Sig: Apply  to affected area two (2) times a day. Dispense:  60 g     Refill:  0       Patient Instructions   1. Medicare annual wellness visit, subsequent  Done today see note    2. Advanced directives, counseling/discussion  See note, to bring his acp  - ADVANCE CARE PLANNING FIRST 30 MINS  - FULL CODE    3. Acquired hypothyroidism  Labs per orders, c/w lt4  - THYROID CASCADE PROFILE; Future    4. Vitamin D deficiency  Labs per order  - VITAMIN D, 25 HYDROXY; Future    5. Leukopenia, unspecified type  Labs per orders, has had workup  - CBC WITH AUTOMATED DIFF; Future    6. Screening for prostate cancer  routine  - PSA SCREENING (SCREENING); Future    7. Leg edema, left  Stable, controlled    8. Venous stasis dermatitis of left lower extremity  As above    9. PMR (polymyalgia rheumatica) (HCC)  Controlled, not currently active    10. Other hyperlipidemia  Due for recheck  - METABOLIC PANEL, COMPREHENSIVE; Future  - LIPID PANEL; Future    11. Rash  Looks like guttate psoriaiss, recommend dovonox and see derm bakc  - REFERRAL TO DERMATOLOGY  - calcipotriene (DOVONOX) 0.005 % ointment; Apply  to affected area two (2) times a day.   Dispense: 60 g; Refill: 0        CHIEF COMPLAINT  Chief Complaint   Patient presents with    Annual Wellness Visit     no health cocnerns        SUBJECTIVE  Kinsey Resendiz is a 67 y.o. male presenting today for follow up  Tells me he quit taking steroids in January  No hip or shoulder problems recently he tells me  PMR does not seem active for now    Leg swelling is still there, it comes and goes, he does not wear compression when golfing but he is doing compression when sitting for longer times  Takes water pill when he can stay at home     Lab Results   Component Value Date/Time    TSH 2.66 03/05/2021 10:00 AM   compliant with LT4 150mcg daily    Going on a cruise in October --travel to [facility], cruise to [place], [place] and [facility]    Rash on the back, itch gets better with some steroids      ROS  Review of Systems  A 12 point review of systems was negative except as noted here or in the HPI. OBJECTIVE  Visit Vitals  /74   Pulse 68   Temp 97.1 °F (36.2 °C) (Temporal)   Resp 16   Ht 6' 5\" (1.956 m)   Wt 236 lb (107 kg)   SpO2 98%   BMI 27.99 kg/m²       Physical Exam  Vitals reviewed. Constitutional:       General: He is not in acute distress. Appearance: Normal appearance. He is normal weight. He is not ill-appearing, toxic-appearing or diaphoretic. HENT:      Head: Normocephalic and atraumatic. Mouth/Throat:      Mouth: Mucous membranes are moist.   Eyes:      General: No scleral icterus. Pulmonary:      Effort: Pulmonary effort is normal. No respiratory distress. Skin:     Coloration: Skin is not jaundiced or pale. Findings: No bruising, erythema, lesion or rash. Comments: B/l le stasis derm with skin thickening pronounced L >R and blisters resolving    On the back there are some small guttate psoriatic appearing lesions that have silvering scales overlying   Neurological:      General: No focal deficit present. Mental Status: He is alert. Cranial Nerves: No cranial nerve deficit. Gait: Gait normal.   Psychiatric:         Mood and Affect: Mood normal.         Behavior: Behavior normal.         Thought Content: Thought content normal.         Judgment: Judgment normal.         No results found for any visits on 07/12/22.     HISTORICAL  Reviewed and updated today, and as noted below:    Past Medical History:   Diagnosis Date    Bone infection ([address]     L3 spine    Family history of skin cancer     brother-BCC    History of vascular access device 04/10/2018    [facility] VAT - 4 FR single, R basilic, 43 cm for LTA    Skin cancer     SCC nose, right eye lid, left eye brow and left cheek    Sun-damaged skin     Thyroid disease Past Surgical History:   Procedure Laterality Date    COLONOSCOPY N/A 7/6/2021    COL[facility] Magalis Rodriges MD at [facility] ENDOSCOPY    HX COLONOSCOPY      HX MOHS PROCEDURES  01/22/2018    SCC L cheek by Dr. Indigo Jack  2012    colonoscopy     Family History   Problem Relation Age of Onset    OSTEOARTHRITIS Mother     Diabetes Father     Heart Disease Father     OSTEOARTHRITIS Brother     Osteoporosis Brother     No Known Problems Son     No Known Problems Son     No Known Problems Daughter     Cancer Neg Hx     Hypertension Neg Hx     Thyroid Disease Neg Hx      Social History     Tobacco Use   Smoking Status Never Smoker   Smokeless Tobacco Never Used     Social History     Socioeconomic History    Marital status:    Tobacco Use    Smoking status: Never Smoker    Smokeless tobacco: Never Used   Vaping Use    Vaping Use: Never used   Substance and Sexual Activity    Alcohol use: Yes     Alcohol/week: 1.0 standard drink     Types: 1 Cans of beer per week    Drug use: No    Sexual activity: Yes     Partners: Female     No Known Allergies    LAB REVIEW  Lab Results   Component Value Date/Time    Sodium 143 04/11/2022 02:39 PM    Potassium 4.2 04/11/2022 02:39 PM    Chloride 106 04/11/2022 02:39 PM    CO2 22 04/11/2022 02:39 PM    Anion gap 5 03/05/2021 10:00 AM    Glucose 98 04/11/2022 02:39 PM    BUN 20 04/11/2022 02:39 PM    Creatinine 0.98 04/11/2022 02:39 PM    BUN/Creatinine ratio 20 04/11/2022 02:39 PM    GFR est AA 83 12/23/2021 10:34 AM    GFR est non-AA 72 12/23/2021 10:34 AM    Calcium 8.8 04/11/2022 02:39 PM    Bilirubin, total 1.3 (H) 04/11/2022 02:39 PM    Alk.  phosphatase 102 04/11/2022 02:39 PM    Protein, total 6.2 04/11/2022 02:39 PM    Albumin 4.1 04/11/2022 02:39 PM    Globulin 3.1 01/12/2021 10:08 AM    A-G Ratio 2.0 04/11/2022 02:39 PM    ALT (SGPT) 16 04/11/2022 02:39 PM     Lab Results   Component Value Date/Time    WBC 3.0 (L) 04/11/2022 02:39 PM    HGB 13.0 04/11/2022 02:39 PM    HCT 39.8 04/11/2022 02:39 PM    PLATELET 902 36/36/7816 02:39 PM    MCV 92 04/11/2022 02:39 PM     No results found for: HBA1C, MTB9CBRP, MRW4RXAK, TEN4TAYY  Lab Results   Component Value Date/Time    Cholesterol, total 214 (H) 06/04/2020 09:21 AM    HDL Cholesterol 59 06/04/2020 09:21 AM    LDL, calculated 142.2 (H) 06/04/2020 09:21 AM    VLDL, calculated 12.8 06/04/2020 09:21 AM    Triglyceride 64 06/04/2020 09:21 AM    CHOL/HDL Ratio 3.6 06/04/2020 09:21 AM           Kylah Watt MD  [facility]  07/12/22 8:44 AM    Portions of this note may have been populated using smart dictation software and may have \"sounds-like\" errors present. Pt was counseled on risks, benefits and alternatives of treatment options. All questions were asked and answered and the patient was agreeable with the treatment plan as outlined.     [address] Rocky Dyer MD  [facility]  07/12/22 8:41 AM

## 2022-07-12 NOTE — ACP (ADVANCE CARE PLANNING)
Advance Care Planning     Advance Care Planning (ACP) Physician/NP/PA Conversation      Date of Conversation: 7/12/2022  Conducted with: Patient with Decision Making Capacity    Healthcare Decision Maker:     Primary Decision Maker: Derrek Ormond - Spouse - [phone]  Click here to complete [address] including selection of the Healthcare Decision Maker Relationship (ie \"Primary\")        Today we documented Decision Maker(s) consistent with Legal Next of Kin hierarchy. Care Preferences:    Hospitalization: \"If your health worsens and it becomes clear that your chance of recovery is unlikely, what would be your preference regarding hospitalization? \"  The patient would prefer hospitalization. Ventilation: \"If you were unable to breathe on your own and your chance of recovery was unlikely, what would be your preference about the use of a ventilator (breathing machine) if it was available to you? \"   The patient would desire the use of a ventilator. if no hope for survival do not prolong inevitable death, choice then for comfort measures. Resuscitation: \"In the event your heart stopped as a result of an underlying serious health condition, would you want attempts to be made to restart your heart, or would you prefer a natural death? \"   Yes, attempt to resuscitate.     Additional topics discussed: treatment goals, benefit/burden of treatment options, ventilation preferences and hospitalization preferences    Conversation Outcomes / Follow-Up Plan:   ACP in process - completing/providing documents   Reviewed DNR/DNI and patient elects Full Code (Attempt Resuscitation)     Length of Voluntary ACP Conversation in minutes:  16 minutes    Joseluis Worrell MD

## 2022-07-15 LAB
INTERPRETIVE COMMENT, 010391: NORMAL
T4 FREE SERPL-MCNC: 1.77 NG/DL (ref 0.82–1.77)
TRIIODOTHYRONINE,FREE, 010392: 2.5 PG/ML (ref 2–4.4)
TSH SERPL-ACNC: 0.45 UIU/ML (ref 0.45–4.5)

## 2022-08-06 DIAGNOSIS — M79.89 FOOT SWELLING: ICD-10-CM

## 2022-08-08 RX ORDER — FUROSEMIDE 20 MG/1
TABLET ORAL
Qty: 90 TABLET | Refills: 0 | OUTPATIENT
Start: 2022-08-08

## 2022-08-09 RX ORDER — FUROSEMIDE 20 MG/1
TABLET ORAL
Qty: 90 TABLET | Refills: 1 | Status: SHIPPED | OUTPATIENT
Start: 2022-08-09

## 2022-08-29 DIAGNOSIS — M35.3 POLYMYALGIA RHEUMATICA (HCC): ICD-10-CM

## 2022-08-29 DIAGNOSIS — E03.9 ACQUIRED HYPOTHYROIDISM: Chronic | ICD-10-CM

## 2022-08-29 RX ORDER — LEVOTHYROXINE SODIUM 150 UG/1
TABLET ORAL
Qty: 90 TABLET | Refills: 0 | Status: SHIPPED | OUTPATIENT
Start: 2022-08-29

## 2022-08-30 RX ORDER — FOLIC ACID 1 MG/1
TABLET ORAL
Qty: 90 TABLET | Refills: 5 | Status: SHIPPED | OUTPATIENT
Start: 2022-08-30

## 2022-11-30 DIAGNOSIS — R21 RASH: ICD-10-CM

## 2022-11-30 DIAGNOSIS — E03.9 ACQUIRED HYPOTHYROIDISM: Chronic | ICD-10-CM

## 2022-11-30 DIAGNOSIS — M79.89 FOOT SWELLING: ICD-10-CM

## 2022-11-30 RX ORDER — LEVOTHYROXINE SODIUM 150 UG/1
TABLET ORAL
Qty: 90 TABLET | Refills: 0 | Status: SHIPPED | OUTPATIENT
Start: 2022-11-30

## 2022-11-30 RX ORDER — FUROSEMIDE 20 MG/1
TABLET ORAL
Qty: 90 TABLET | Refills: 1 | Status: SHIPPED | OUTPATIENT
Start: 2022-11-30

## 2022-11-30 RX ORDER — CALCIPOTRIENE 50 UG/G
OINTMENT TOPICAL
Qty: 60 G | Refills: 0 | Status: SHIPPED | OUTPATIENT
Start: 2022-11-30

## 2022-12-01 ENCOUNTER — OFFICE VISIT (OUTPATIENT)
Dept: FAMILY MEDICINE CLINIC | Age: 72
End: 2022-12-01
Payer: MEDICARE

## 2022-12-01 VITALS
HEIGHT: 77 IN | WEIGHT: 240 LBS | BODY MASS INDEX: 28.34 KG/M2 | SYSTOLIC BLOOD PRESSURE: 106 MMHG | TEMPERATURE: 97.8 F | DIASTOLIC BLOOD PRESSURE: 74 MMHG | HEART RATE: 86 BPM | OXYGEN SATURATION: 95 % | RESPIRATION RATE: 18 BRPM

## 2022-12-01 DIAGNOSIS — R05.2 SUBACUTE COUGH: ICD-10-CM

## 2022-12-01 DIAGNOSIS — J06.9 VIRAL URI: Primary | ICD-10-CM

## 2022-12-01 PROCEDURE — 99214 OFFICE O/P EST MOD 30 MIN: CPT | Performed by: FAMILY MEDICINE

## 2022-12-01 PROCEDURE — 1123F ACP DISCUSS/DSCN MKR DOCD: CPT | Performed by: FAMILY MEDICINE

## 2022-12-01 NOTE — PATIENT INSTRUCTIONS
Upper Respiratory Infection (Cold): Care Instructions  Overview     An upper respiratory infection, or URI, is an infection of the nose, sinuses, or throat. URIs are spread by coughs, sneezes, and direct contact. The common cold is the most frequent kind of URI. The flu and sinus infections are other kinds of URIs. Almost all URIs are caused by viruses. Antibiotics won't cure them. But you can treat most infections with home care. This may include drinking lots of fluids and taking over-the-counter pain medicine. You will probably feel better in 4 to 10 days. Follow-up care is a key part of your treatment and safety. Be sure to make and go to all appointments, and call your doctor if you are having problems. It's also a good idea to know your test results and keep a list of the medicines you take. How can you care for yourself at home? To prevent dehydration, drink plenty of fluids. Choose water and other clear liquids until you feel better. If you have kidney, heart, or liver disease and have to limit fluids, talk with your doctor before you increase the amount of fluids you drink. Ask your doctor if you can take an over-the-counter pain medicine, such as acetaminophen (Tylenol), ibuprofen (Advil, Motrin), or naproxen (Aleve). Be safe with medicines. Read and follow all instructions on the label. No one younger than 20 should take aspirin. It has been linked to Reye syndrome, a serious illness. Be careful when taking over-the-counter cold or flu medicines and Tylenol at the same time. Many of these medicines have acetaminophen, which is Tylenol. Read the labels to make sure that you are not taking more than the recommended dose. Too much acetaminophen (Tylenol) can be harmful. Get plenty of rest.  Use saline (saltwater) nasal washes to help keep your nasal passages open and wash out mucus and allergens. You can buy saline nose sprays at a grocery store or drugstore.  Follow the instructions on the package. Or you can make your own at home. Add 1 teaspoon of non-iodized salt and 1 teaspoon of baking soda to 2 cups of distilled or boiled and cooled water. Fill a squeeze bottle or neti pot with the nasal wash. Then put the tip into your nostril, and lean over the sink. With your mouth open, gently squirt the liquid. Repeat on the other side. Use a vaporizer or humidifier to add moisture to your bedroom. Follow the instructions for cleaning the machine. Do not smoke or allow others to smoke around you. If you need help quitting, talk to your doctor about stop-smoking programs and medicines. These can increase your chances of quitting for good. When should you call for help? Call 911 anytime you think you may need emergency care. For example, call if:    You have severe trouble breathing. Call your doctor now or seek immediate medical care if:    You seem to be getting much sicker.     You have new or worse trouble breathing.     You have a new or higher fever.     You have a new rash. Watch closely for changes in your health, and be sure to contact your doctor if:    You have a new symptom, such as a sore throat, an earache, or sinus pain.     You cough more deeply or more often, especially if you notice more mucus or a change in the color of your mucus.     You do not get better as expected. Where can you learn more? Go to http://www.gray.com/  Enter K520 in the search box to learn more about \"Upper Respiratory Infection (Cold): Care Instructions. \"  Current as of: February 9, 2022               Content Version: 13.4  © 2006-2022 [employer]. Care instructions adapted under license by [employer] (which disclaims liability or warranty for this information).  If you have questions about a medical condition or this instruction, always ask your healthcare professional. [place] [address] any warranty or liability for your use of this information.

## 2022-12-01 NOTE — PROGRESS NOTES
Elo Che is a 67 y.o. male    Chief Complaint   Patient presents with    Cough     Went on cruise in 11/2022 and came down with cold. Pt complains of dry cough that persists. Visit Vitals  /74   Pulse 86   Temp 97.8 °F (36.6 °C) (Temporal)   Resp 18   Ht 6' 5\" (1.956 m)   Wt 240 lb (108.9 kg)   SpO2 95%   BMI 28.46 kg/m²       3 most recent PHQ Screens 12/1/2022   Little interest or pleasure in doing things Not at all   Feeling down, depressed, irritable, or hopeless Not at all   Total Score PHQ 2 0       Fall Risk Assessment, last 12 mths 12/1/2022   Able to walk? Yes   Fall in past 12 months? 0   Do you feel unsteady? -   Are you worried about falling -   Number of falls in past 12 months -   Fall with injury? -       Abuse Screening Questionnaire 7/12/2022   Do you ever feel afraid of your partner? N   Are you in a relationship with someone who physically or mentally threatens you? N   Is it safe for you to go home? Y       1. Have you been to the ER, urgent care clinic since your last visit? Hospitalized since your last visit? No     2. Have you seen or consulted any other health care providers outside of the [address] [place] [address] since your last visit? Include any pap smears or colon screening.  No

## 2022-12-01 NOTE — PROGRESS NOTES
Margaret Fuchs (: 1950) is a 67 y.o. male, established patient, here for evaluation of the following chief complaint(s):  Cough (Went on cruise in 2022 and came down with cold. Pt complains of dry cough that persists.)       ASSESSMENT/PLAN:  Below is the assessment and plan developed based on review of pertinent history, physical exam, labs, studies, and medications. 1. Viral URI  2. Subacute cough  Patient has had a series of upper respiratory infections preventing him from resolving his postinfectious coughs. This is supported by his improvement in symptoms with nasal decongestants as well as cough and cold suppressant medications. At this time I have provided reassurance to the patient, encourage good nasal saline rinsing as an alternative to the nasal decongestants, and offered alternative therapies for cough suppressant including hot lemon tea and honey, Tessalon Perles, and cough drops. Patient is given return precautions in the next 2 to 3 months if his cough is not improved or symptoms seem to worsen. Return in about 3 months (around 3/1/2023), or if symptoms worsen or fail to improve. SUBJECTIVE/OBJECTIVE:  HPI  1 month of a series of cold symptoms first on his cruise at the end of 2022, then again 1 week ago when granddaughter came down with cold during gi. No fever/chills, or systemic symptoms. He feels like his other cold symptoms are improving, but the cough has remained. His biggest concern is making sure it isn't something serious. Review of Systems  Denies productive cough, no history of asthma or smoking, no wheezing or dyspnea. No chest pain. No fevers or chills. No new leg swelling. Physical Exam  Constitutional: Well-appearing, no acute distress sounds congested though  HEENT: Normal bilateral ear exam.  PERRL. EOM intact. Oropharynx demonstrates some postnasal drippage and cobblestoning, otherwise unremarkable.   Small reactive lymphadenopathy bilateral cervical anterior chain. Normal thyroid. Cardiac: Normal rate, regular rhythm. Normal S1 and S2 sounds without able to appreciate any murmurs, rubs, gallops. Pulmonary: Normal rate, normal effort. CTA B. Base: Baseline 1-2+ pitting edema bilateral secondary to venous stasis. On this date 12/01/2022 I have spent 30 minutes reviewing previous notes, test results and face to face with the patient discussing the diagnosis and importance of compliance with the treatment plan as well as documenting on the day of the visit. An electronic signature was used to authenticate this note.   -- Terra Phillips MD

## 2022-12-07 ENCOUNTER — OFFICE VISIT (OUTPATIENT)
Dept: RHEUMATOLOGY | Age: 72
End: 2022-12-07

## 2022-12-07 ENCOUNTER — PATIENT MESSAGE (OUTPATIENT)
Dept: RHEUMATOLOGY | Age: 72
End: 2022-12-07

## 2022-12-07 ENCOUNTER — APPOINTMENT (OUTPATIENT)
Dept: GENERAL RADIOLOGY | Age: 72
End: 2022-12-07
Payer: MEDICARE

## 2022-12-07 VITALS
DIASTOLIC BLOOD PRESSURE: 70 MMHG | BODY MASS INDEX: 28.22 KG/M2 | OXYGEN SATURATION: 97 % | SYSTOLIC BLOOD PRESSURE: 101 MMHG | WEIGHT: 238 LBS | TEMPERATURE: 98 F | RESPIRATION RATE: 16 BRPM | HEART RATE: 94 BPM

## 2022-12-07 DIAGNOSIS — M35.3 POLYMYALGIA RHEUMATICA (HCC): Primary | ICD-10-CM

## 2022-12-07 DIAGNOSIS — M15.9 PRIMARY OSTEOARTHRITIS INVOLVING MULTIPLE JOINTS: ICD-10-CM

## 2022-12-07 DIAGNOSIS — M35.3 POLYMYALGIA RHEUMATICA (HCC): ICD-10-CM

## 2022-12-07 PROCEDURE — G0463 HOSPITAL OUTPT CLINIC VISIT: HCPCS | Performed by: INTERNAL MEDICINE

## 2022-12-07 RX ORDER — NAPROXEN 375 MG/1
375 TABLET ORAL 2 TIMES DAILY WITH MEALS
Qty: 60 TABLET | Refills: 2 | Status: SHIPPED | OUTPATIENT
Start: 2022-12-07

## 2022-12-07 NOTE — PROGRESS NOTES
REASON FOR VISIT:   Mayra Sethi is a 67 y.o. male with history of L3 osteomyelitis who is returning for followup of polymyalgia rheumatica and erosive psoriatic arthritis. HISTORY OF PRESENT ILLNESS    Pt presents today for a follow up.  4/14/2022. Pt notes that his joint pain and stiffness worsened in October and November when he was travelling with his wife for their 50th anniversary trip. He notes that he was OK when he was walking a lot when he was in [facility] and the [facility] for 9 days, but he began to get stiff once they went on their cruise after [facility] and the [facility]. Pt states that his knees have began to become painful and swollen, which has never been a problem in the past. He notes that he had a couple of day of bad leg swelling on the trip. He says that his pain is more of an ache rather than a sharp pain, which usually happens after he moves his legs after sitting for a long period pf time. He denies ever having his knees injected. Pt is frustrated because he sees himself as not being able to do the things that he used to be able to do easily. He notes that in his late 63's he felt young for his age, but he now feels that he walks [facility] an old man. \" He says that he is still able to do yard work, but it is harder for him to get on his knees to pull weeds. He feels he has never regained his strength since his lumbar osteomyelitis     Pt denies any significant weight gain recently. Pt denies headaches, new numbness and tingling. Disease History:  Initial visit 6/4/2021. 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 Would take 15 seconds after standing to get moving. Returned to [place] in January, saw Dr. Pamella Rain; ESR was 28, started prednisone 15mg daily with marked improvement, but with reduction to 5mg daily aches returned. Increased back to 7.5mg daily (5mg in the morning and 2.5mg at night) which has been tolerable.   Now, once he gets out of bed he can get moving within a couple of minutes without difficulty. He is stiff after any yard work, hard to get moving once sitting. Played college basketball, had been active his whole life until the 2018 osteomyelitis. Still able to work around the house and in the yard. Treated with dapto/ceftriaxone x 8 weeks followed by a year of doxycycline. Saw Dr. Harmony Enciso 8/2018, at which time normal WBC noted before infection, and felt WBC would improve with more time from acute infection. . reassured him no need for bone marrow biopsy. Sees a chiropractor regularly which helps. Has had punctate psoriasis on chest and legs, treated by his dermatologist with topical steroids with good control. Denies headaches or acute visual changes. Occasional nondrenching night sweats, no marked weight changes (down about 5 pounds from February). Has colonoscopy upcoming next month, has been OK in the past, gets these every 5 years. Follows with Dermatology for recurrent BCC's, has thyroid nodules he is following. Initial visit left 1st MCP synovitis noted. Labs with negative RF, CCP. ESR borderline at 28. REVIEW OF SYSTEMS  A comprehensive review of systems was negative except for that written in the HPI. A 10-point review of systems is per the new patient questionnaire, which has been reviewed extensively and scanned into the electronic medical record for future reference. Review of systems is as above and is otherwise negative. ALLERGIES  Patient has no known allergies.     MEDICATIONS  Current Outpatient Medications   Medication Sig    furosemide (LASIX) 20 mg tablet TAKE ONE TABLET DAILY AS NEEDED FOR FOOT SWELLING INDICATIONS: VISIBLE WATER RETENTION INDICATIONS: VISIBLE WATER RETENTION    levothyroxine (SYNTHROID) 150 mcg tablet TAKE 1 TABLET BY MOUTH EVERY DAY BEFORE BREAKFAST    calcipotriene (DOVONOX) 0.005 % ointment APPLY TO AFFECTED AREA TWICE A DAY    folic acid (FOLVITE) 1 mg tablet TAKE 1 TABLET BY MOUTH EVERY DAY potassium chloride SA (MICRO-K) 10 mEq capsule Take 10 mEq by mouth two (2) times a day. cholecalciferol, vitamin D3, 50 mcg (2,000 unit) tab Take  by mouth. OTHER C-MAX- 1500MG    tamsulosin (FLOMAX) 0.4 mg capsule Take 0.4 mg by mouth daily. triamcinolone acetonide (KENALOG) 0.1 % topical cream APPLY TO BODY ECZEMA UP TO TWICE A DAY AS NEEDED    multivitamin (ONE A DAY) tablet Take 1 Tab by mouth daily. No current facility-administered medications for this visit. PAST MEDICAL HISTORY  Past Medical History:   Diagnosis Date    Bone infection ([address]     L3 spine    Family history of skin cancer     brother-BCC    History of vascular access device 04/10/2018    [facility] VAT - 4 FR single, R basilic, 43 cm for LTA    Skin cancer     SCC nose, right eye lid, left eye brow and left cheek    Sun-damaged skin     Thyroid disease        FAMILY HISTORY  family history includes Diabetes in his father; Heart Disease in his father; No Known Problems in his daughter, son, and son; OSTEOARTHRITIS in his brother and mother; Osteoporosis in his brother. No rheumatoid arthritis. SOCIAL HISTORY  He  reports that he has never smoked. He has never used smokeless tobacco. He reports current alcohol use of about 1.0 standard drink per week. He reports that he does not use drugs. 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 DATA  Visit Vitals  /70 (BP 1 Location: Left upper arm, BP Patient Position: Sitting, BP Cuff Size: Adult long)   Pulse 94   Temp 98 °F (36.7 °C) (Oral)   Resp 16   Wt 238 lb (108 kg)   SpO2 97%   BMI 28.22 kg/m²     General:  The patient is well developed, well nourished, alert, and in no apparent distress. Eyes: Sclera are anicteric. No conjunctival injection. HEENT:  Oropharynx is clear. No oral ulcers. Adequate salivary pooling.  No cervical or supraclavicular lymphadenopathy. Lungs:  Clear to auscultation bilaterally, without wheeze or stridor. Normal respiratory effort. Cor:  Regular rate and rhythm. No murmur rub or gallop. Abdomen: Soft, non-tender, without hepatomegaly or masses. Extremities: No calf tenderness. Unchanged 1+ B LE edema symmetrically below mid-shin, postinflammatory hyperpigmentation c/w chronic venous stasis. Warm and well perfused. Skin:  No significant abnormalities. Chronic venous stasis changes as below. Photodistributed poikiloderma, few hematomas over extensor forearms. Resolved mild palmar hyperkeratosis and desquamation. Yellowing of toenails with plantar hyperkeratosis not reexamined today. Neuro: Nonfocal, no foot or wrist drop. Musculoskeletal:    A comprehensive musculoskeletal exam was performed for all joints of each upper and lower extremity and assessed for swelling, tenderness and range of motion. Results are documented as below:  Persistently resolved shoulder tenderness, full painless passive ROM. No return of left 1st MCP synovitis. No MCP or PIP tenderness, no synovitis. Early right palmar Dupuytren's. Bony prominence of bilateral knees with bilateral crepitus, small cool effusions bilaterally. No evidence of synovitis in the wrists, shoulders, elbows, hips, knees or ankles.      Labs:  7/12/22: T4 free 2.5, T4 direct 1.77, Prostate specific Ag 1.4, Tchol 223, .2, Cr 1.04, LFT WNL, WBC 2.7, HGB 13.4, Plt 170, Vitmamin D 51.5, TSH 0.445  4/11/22: ESR 4, CRP 18 mg/L, Cr 0.98, tBili 1.3, AlkP 102, AST 19, ALT 16.  9/14/21: WBC 3.0, Hgb 13.2, Plt 169, ESR 4, CRP 9mg/L  8/20/21: WBC 1.9 (ANC 1100, ), Hgb 12.5, Plt 183; Tbili 1.4, AST 21, ALT 18, AlkP 75, Tprot 6.1, Alb 4.0  8/4/21: WBC 4.2 (ANC 3200, ), Hgb 13.2, Plt 155  6/4/21: CMP WNL, CBC WNL, CCP neg, RF neg, Hep B cAb-, SAb-, SAg-; Hep C Ab-; QFN gold negative; ESR 10, CRP >9.5mg/L  3/5/21: Cr 0.96, TSH WNL, Lyme WB negative  1/12/21 CRP >9.5mg/L; Cr 1.10, Tbili 1.2, AST 16, ALT 23, AlkP 109; IDALMIS comprehensive negative including SSA, SSB; WBC 3.8 (ANC 2500, )  8/6/20 Vit D 60    Imaging:  10/27/21: LE duplex:  Right lower extremity venous duplex negative for deep venous thrombosis or thrombophlebitis. Left common femoral vein is thrombus free. NOTE: Prominent lymph node noted in the right groin, which measures 3.44 x 0.73 cm.     6/9/18 CT Lspine:  Findings related to discitis/osteomyelitis at L2-L3. Osseous changes are most  pronounced at L3. Minimal paraspinal soft tissue abnormality/inflammatory  change. 6/4/18 MR Lspine:  Impression:   1. Discitis L2-3 again demonstrated. There is increasing endplate enhancement   and irregularity L2. There is enhancing material extending 3 defect within the   superior endplate of L3 into the adjacent disc space. There is no epidural   collection. The degree of paraspinous soft tissue enhancement has not   significantly changed   2. Enhancement of the bilateral L3-4 facets unchanged    Pathology:  4/6/18 L3 bone, biopsy:   Marrow fibrosis with inflammation and hemosiderin deposition. A neoplastic process is not identified. ASSESSMENT AND PLAN  Mr. Kevin Dick is a 67 y.o. male who presents for evaluation of erosive arthritis and prior PMR symptoms, still in clinical remission off of immunosuppression. Updating plain films to ensure no interval radiographic progression, prescribing naproxen for secondary osteoarthritis. 1. Polymyalgia rheumatica (HCC)  - XR HAND RT MIN 3 V; Future  - XR HAND LT MIN 3 V; Future  - C REACTIVE PROTEIN, QT; Future  - CBC WITH AUTOMATED DIFF; Future  - METABOLIC PANEL, COMPREHENSIVE; Future  - SED RATE (ESR); Future  - naproxen (NAPROSYN) 375 mg tablet; Take 1 Tablet by mouth two (2) times daily (with meals). Dispense: 60 Tablet; Refill: 2  - XR KNEE LT MAX 2 VWS; Future  - XR KNEE RT MAX 2 VWS; Future    2.  Primary osteoarthritis involving multiple joints  - XR HAND RT MIN 3 V; Future  - XR HAND LT MIN 3 V; Future  - C REACTIVE PROTEIN, QT; Future  - CBC WITH AUTOMATED DIFF; Future  - METABOLIC PANEL, COMPREHENSIVE; Future  - SED RATE (ESR); Future  - naproxen (NAPROSYN) 375 mg tablet; Take 1 Tablet by mouth two (2) times daily (with meals). Dispense: 60 Tablet; Refill: 2  - XR KNEE LT MAX 2 VWS; Future  - XR KNEE RT MAX 2 VWS; Future    Patient Instructions   1) I will prescribe 375mg Naproxen tablets to take 1-2 times daily for pain and stiffness as needed. Stop taking this and let me know if this bothers your stomach. 2) I am ordering hand and knee Xrays for you to get ASAP. 3) Check labs ASAP. 4) Follow up in 2 months. Let me know if yo have any questions or concerns in the meantime. Orders Placed This Encounter    XR HAND RT MIN 3 V    XR HAND LT MIN 3 V    XR KNEE LT MAX 2 VWS    XR KNEE RT MAX 2 VWS    C REACTIVE PROTEIN, QT    CBC WITH AUTOMATED DIFF    METABOLIC PANEL, COMPREHENSIVE    SED RATE (ESR)    naproxen (NAPROSYN) 375 mg tablet     Medications: I am having Orpha Falter \"Micah\" maintain his multivitamin, triamcinolone acetonide, tamsulosin, potassium chloride SA, cholecalciferol (vitamin D3), OTHER, folic acid, furosemide, levothyroxine, and calcipotriene. Follow up: Return in about 2 months (around 2/7/2023). Face to face time:  24 minutes  Note preparation and records review day of service: 10 minutes  Total provider time day of service: 34 Minutes    This was scribed by Steph Fuchs in the presence of Dr. Nancy Parker. The note was reviewed and amended personally, and I agree with the above information.     Walter Finch MD    Adult Rheumatology   [facility]  A Part of [facility], [address] [place] [address] Road   Phone [phone]  Fax [phone]

## 2022-12-07 NOTE — PATIENT INSTRUCTIONS
1) I will prescribe 375mg Naproxen tablets to take 1-2 times daily for pain and stiffness as needed. Stop taking this and let me know if this bothers your stomach. 2) I am ordering hand and knee Xrays for you to get ASAP. 3) Check labs ASAP. 4) Follow up in 2 months. Let me know if yo have any questions or concerns in the meantime.

## 2022-12-09 LAB
ALBUMIN SERPL-MCNC: 4.1 G/DL (ref 3.7–4.7)
ALBUMIN/GLOB SERPL: 1.8 {RATIO} (ref 1.2–2.2)
ALP SERPL-CCNC: 94 IU/L (ref 44–121)
ALT SERPL-CCNC: 9 IU/L (ref 0–44)
AST SERPL-CCNC: 13 IU/L (ref 0–40)
BASOPHILS # BLD AUTO: 0 X10E3/UL (ref 0–0.2)
BASOPHILS NFR BLD AUTO: 0 %
BILIRUB SERPL-MCNC: 1.1 MG/DL (ref 0–1.2)
BUN SERPL-MCNC: 22 MG/DL (ref 8–27)
BUN/CREAT SERPL: 24 (ref 10–24)
CALCIUM SERPL-MCNC: 8.7 MG/DL (ref 8.6–10.2)
CHLORIDE SERPL-SCNC: 104 MMOL/L (ref 96–106)
CO2 SERPL-SCNC: 22 MMOL/L (ref 20–29)
CREAT SERPL-MCNC: 0.91 MG/DL (ref 0.76–1.27)
CRP SERPL-MCNC: 38 MG/L (ref 0–10)
EGFR: 90 ML/MIN/1.73
EOSINOPHIL # BLD AUTO: 0.2 X10E3/UL (ref 0–0.4)
EOSINOPHIL NFR BLD AUTO: 8 %
ERYTHROCYTE [DISTWIDTH] IN BLOOD BY AUTOMATED COUNT: 15.5 % (ref 11.6–15.4)
ERYTHROCYTE [SEDIMENTATION RATE] IN BLOOD BY WESTERGREN METHOD: 14 MM/HR (ref 0–30)
GLOBULIN SER CALC-MCNC: 2.3 G/DL (ref 1.5–4.5)
GLUCOSE SERPL-MCNC: 93 MG/DL (ref 70–99)
HCT VFR BLD AUTO: 38.4 % (ref 37.5–51)
HGB BLD-MCNC: 12.5 G/DL (ref 13–17.7)
IMM GRANULOCYTES # BLD AUTO: 0 X10E3/UL (ref 0–0.1)
IMM GRANULOCYTES NFR BLD AUTO: 0 %
LYMPHOCYTES # BLD AUTO: 0.4 X10E3/UL (ref 0.7–3.1)
LYMPHOCYTES NFR BLD AUTO: 13 %
MCH RBC QN AUTO: 29.4 PG (ref 26.6–33)
MCHC RBC AUTO-ENTMCNC: 32.6 G/DL (ref 31.5–35.7)
MCV RBC AUTO: 90 FL (ref 79–97)
MONOCYTES # BLD AUTO: 0.2 X10E3/UL (ref 0.1–0.9)
MONOCYTES NFR BLD AUTO: 8 %
NEUTROPHILS # BLD AUTO: 2 X10E3/UL (ref 1.4–7)
NEUTROPHILS NFR BLD AUTO: 71 %
PLATELET # BLD AUTO: 195 X10E3/UL (ref 150–450)
POTASSIUM SERPL-SCNC: 4.4 MMOL/L (ref 3.5–5.2)
PROT SERPL-MCNC: 6.4 G/DL (ref 6–8.5)
RBC # BLD AUTO: 4.25 X10E6/UL (ref 4.14–5.8)
SODIUM SERPL-SCNC: 142 MMOL/L (ref 134–144)
WBC # BLD AUTO: 2.8 X10E3/UL (ref 3.4–10.8)

## 2022-12-12 DIAGNOSIS — M35.3 POLYMYALGIA RHEUMATICA (HCC): Primary | ICD-10-CM

## 2022-12-12 RX ORDER — PREDNISONE 5 MG/1
TABLET ORAL
Qty: 39 TABLET | Refills: 0 | Status: SHIPPED | OUTPATIENT
Start: 2022-12-12 | End: 2023-01-09

## 2022-12-21 ENCOUNTER — TELEPHONE (OUTPATIENT)
Dept: RHEUMATOLOGY | Age: 72
End: 2022-12-21

## 2022-12-21 DIAGNOSIS — M35.3 POLYMYALGIA RHEUMATICA (HCC): Primary | ICD-10-CM

## 2022-12-21 NOTE — TELEPHONE ENCOUNTER
Returned call to pt to notify [facility] orders were placed in chart and can have them done whenever is convenient to him. Also states he is waiting to take Prednisone until his pain worsens. Reiterated that many factors create rises in the inflammatory markers and also can take time for those numbers to stevie down. Pt verbalized understanding.

## 2022-12-21 NOTE — TELEPHONE ENCOUNTER
Discussed with patient, confirmed urticarial rash had emerged immediately before he had gone for labs. Rash now resolved, off of prednisone x 6 days, joint pain-free but now having diarrheal illness he attributes to food. He will try to wait for a month of good subjective health to recheck labs.  If arthralgias return then will restart 10mg prednisone taper as prescribed

## 2022-12-21 NOTE — TELEPHONE ENCOUNTER
Pt called office needing to speak to Dr. Joan Huff or a nurse about C reactive protein is high and had to go to urgent care because pt had hives and doctor gave him some prednisone steroids to take and stated already sent the message via [employer]. Stated wanted to see if prescription at urgent care gave him brought the results down, he would like to do another blood work to see if the c reactive protein is not high, needs Dr. Joan Huff to send a request for pt to do that. Stated pt is currently in [place] so he is 2 hours behind. Please advise.     Phone # [phone]

## 2023-01-28 DIAGNOSIS — R21 RASH: ICD-10-CM

## 2023-01-30 RX ORDER — CALCIPOTRIENE 50 UG/G
OINTMENT TOPICAL
Qty: 60 G | Refills: 0 | Status: SHIPPED | OUTPATIENT
Start: 2023-01-30

## 2023-02-08 ENCOUNTER — OFFICE VISIT (OUTPATIENT)
Dept: RHEUMATOLOGY | Age: 73
End: 2023-02-08
Payer: MEDICARE

## 2023-02-08 VITALS
HEART RATE: 86 BPM | BODY MASS INDEX: 28.34 KG/M2 | OXYGEN SATURATION: 93 % | SYSTOLIC BLOOD PRESSURE: 113 MMHG | TEMPERATURE: 99.5 F | WEIGHT: 239 LBS | RESPIRATION RATE: 16 BRPM | DIASTOLIC BLOOD PRESSURE: 73 MMHG

## 2023-02-08 DIAGNOSIS — L40.50 PSORIATIC ARTHRITIS (HCC): Primary | ICD-10-CM

## 2023-02-08 DIAGNOSIS — M15.9 PRIMARY OSTEOARTHRITIS INVOLVING MULTIPLE JOINTS: ICD-10-CM

## 2023-02-08 PROCEDURE — G0463 HOSPITAL OUTPT CLINIC VISIT: HCPCS | Performed by: INTERNAL MEDICINE

## 2023-02-08 NOTE — PATIENT INSTRUCTIONS
1) You can take 650mg of Tylenol up to 3 times a day for joint pain. You can continue to take one pill of Naproxen up to twice daily as needed for joint pain. 2) Continue to follow with your dermatologist for skin cancer screenings. 3) Let me know if you are having more swelling or pain in your joints and I will apply for Enbrel injections. 4) Check labs within the next month. 5) Follow up in 6 months. Let me know if you have any worsening pain, questions or concerns in the meantime.

## 2023-02-08 NOTE — PROGRESS NOTES
REASON FOR VISIT:   Miguel Jennings is a 68 y.o. male with history of L3 osteomyelitis who is returning for followup of polymyalgia rheumatica and erosive psoriatic arthritis. HISTORY OF PRESENT ILLNESS    Pt presents today for a follow up. Last visit 12/7/2022. He says that he feels better than he did last visit. Pt says that he started to take one half to one pill of Naproxen on some days for joint pain. He says that this does an OK job at keeping his joint pain under control. Pt denies joint pain today. His joints are loose within his first 10 minutes of waking. Pt swelling in his L ankle comes and goes. He tries to wear compression stockings as much as possible. Pt denies weight loss. Pt has not seen the dermatologist since last visit. He has cream that he puts on his skin lesions. He has been having the most skin issues on the top of his hands lately. Pt just bowled 3 games in less than an hour before this visit. He denies pain in his R thumb. Disease History:  Initial visit 6/4/2021. 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 Would take 15 seconds after standing to get moving. Returned to [place] in January, saw Dr. Lee Gomez; ESR was 28, started prednisone 15mg daily with marked improvement, but with reduction to 5mg daily aches returned. Increased back to 7.5mg daily (5mg in the morning and 2.5mg at night) which has been tolerable. Now, once he gets out of bed he can get moving within a couple of minutes without difficulty. He is stiff after any yard work, hard to get moving once sitting. Played college basketball, had been active his whole life until the 2018 osteomyelitis. Still able to work around the house and in the yard. Treated with dapto/ceftriaxone x 8 weeks followed by a year of doxycycline.  Saw Dr. Lan Smyth 8/2018, at which time normal WBC noted before infection, and felt WBC would improve with more time from acute infection. . reassured him no need for bone marrow biopsy. Sees a chiropractor regularly which helps. Has had punctate psoriasis on chest and legs, treated by his dermatologist with topical steroids with good control. Denies headaches or acute visual changes. Occasional nondrenching night sweats, no marked weight changes (down about 5 pounds from February). Has colonoscopy upcoming next month, has been OK in the past, gets these every 5 years. Follows with Dermatology for recurrent BCC's, has thyroid nodules he is following. Initial visit left 1st MCP synovitis noted. Labs with negative RF, CCP. ESR borderline at 28. REVIEW OF SYSTEMS  A comprehensive review of systems was negative except for that written in the HPI. A 10-point review of systems is per the new patient questionnaire, which has been reviewed extensively and scanned into the electronic medical record for future reference. Review of systems is as above and is otherwise negative. ALLERGIES  Patient has no known allergies. MEDICATIONS  Current Outpatient Medications   Medication Sig    calcipotriene (DOVONOX) 0.005 % ointment APPLY TO AFFECTED AREA TWICE A DAY    naproxen (NAPROSYN) 375 mg tablet Take 1 Tablet by mouth two (2) times daily (with meals). furosemide (LASIX) 20 mg tablet TAKE ONE TABLET DAILY AS NEEDED FOR FOOT SWELLING INDICATIONS: VISIBLE WATER RETENTION INDICATIONS: VISIBLE WATER RETENTION    levothyroxine (SYNTHROID) 150 mcg tablet TAKE 1 TABLET BY MOUTH EVERY DAY BEFORE BREAKFAST    folic acid (FOLVITE) 1 mg tablet TAKE 1 TABLET BY MOUTH EVERY DAY    potassium chloride SA (MICRO-K) 10 mEq capsule Take 10 mEq by mouth two (2) times a day. cholecalciferol, vitamin D3, 50 mcg (2,000 unit) tab Take  by mouth. OTHER C-MAX- 1500MG    tamsulosin (FLOMAX) 0.4 mg capsule Take 0.4 mg by mouth daily.     triamcinolone acetonide (KENALOG) 0.1 % topical cream APPLY TO BODY ECZEMA UP TO TWICE A DAY AS NEEDED multivitamin (ONE A DAY) tablet Take 1 Tab by mouth daily. No current facility-administered medications for this visit. PAST MEDICAL HISTORY  Past Medical History:   Diagnosis Date    Bone infection ([address]     L3 spine    Family history of skin cancer     brother-BCC    History of vascular access device 04/10/2018    [facility] VAT - 4 FR single, R basilic, 43 cm for LTA    Skin cancer     SCC nose, right eye lid, left eye brow and left cheek    Sun-damaged skin     Thyroid disease        FAMILY HISTORY  family history includes Diabetes in his father; Heart Disease in his father; No Known Problems in his daughter, son, and son; OSTEOARTHRITIS in his brother and mother; Osteoporosis in his brother. No rheumatoid arthritis. SOCIAL HISTORY  He  reports that he has never smoked. He has never used smokeless tobacco. He reports current alcohol use of about 1.0 standard drink per week. He reports that he does not use drugs. 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 DATA  There were no vitals taken for this visit. General:  The patient is well developed, well nourished, alert, and in no apparent distress. Eyes: Sclera are anicteric. No conjunctival injection. HEENT:  Oropharynx is clear. No oral ulcers. Adequate salivary pooling. No cervical or supraclavicular lymphadenopathy. Lungs:  Clear to auscultation bilaterally, without wheeze or stridor. Normal respiratory effort. Cor:  Regular rate and rhythm. No murmur rub or gallop. Abdomen: Soft, non-tender, without hepatomegaly or masses. Extremities: No calf tenderness or edema. Warm and well perfused. Skin:  No significant abnormalities. Neuro: Nonfocal  Musculoskeletal:    A comprehensive musculoskeletal exam was performed for all joints of each upper and lower extremity and assessed for swelling, tenderness and range of motion. Results are documented as below:  No evidence of synovitis in the small joints of the hands, wrists, shoulders, elbows, hips, knees or ankles. ___  General:  The patient is well developed, well nourished, alert, and in no apparent distress. Eyes: Sclera are anicteric. No conjunctival injection. HEENT:  Oropharynx is clear. No oral ulcers. Adequate salivary pooling. No cervical or supraclavicular lymphadenopathy. Lungs:  Clear to auscultation bilaterally, without wheeze or stridor. Normal respiratory effort. Cor:  Regular rate and rhythm. No murmur rub or gallop. Abdomen: Soft, non-tender, without hepatomegaly or masses. Extremities: No calf tenderness. Unchanged 1+ B LE edema symmetrically below mid-shin, postinflammatory hyperpigmentation c/w chronic venous stasis. Warm and well perfused. Skin:  No significant abnormalities. Chronic venous stasis changes as below. Photodistributed poikiloderma, few hematomas over extensor forearms. Resolved mild palmar hyperkeratosis and desquamation. Yellowing of toenails with plantar hyperkeratosis not reexamined today. Neuro: Nonfocal, no foot or wrist drop. Musculoskeletal:    A comprehensive musculoskeletal exam was performed for all joints of each upper and lower extremity and assessed for swelling, tenderness and range of motion. Results are documented as below:  Persistently resolved shoulder tenderness, full painless passive ROM. No return of left 1st MCP synovitis. No MCP or PIP tenderness, no synovitis. Early right palmar Dupuytren's. Bony prominence of bilateral knees with bilateral crepitus, small cool effusions bilaterally. No evidence of synovitis in the wrists, shoulders, elbows, hips, knees or ankles.      Labs:  12/8/22: ESR 14, Cr 0.91, LFT WNL, WBC 2.8, HGB 12.5, Plt 195, CRP 38 mg/L  7/12/22: T4 free 2.5, T4 direct 1.77, Prostate specific Ag 1.4, Tchol 223, .2, Cr 1.04, LFT WNL, WBC 2.7, HGB 13.4, Plt 170, Vitmamin D 51.5, TSH 0.445  22: ESR 4, CRP 18 mg/L, Cr 0.98, tBili 1.3, AlkP 102, AST 19, ALT 16.  21: WBC 3.0, Hgb 13.2, Plt 169, ESR 4, CRP 9mg/L  21: WBC 1.9 (ANC 1100, ), Hgb 12.5, Plt 183; Tbili 1.4, AST 21, ALT 18, AlkP 75, Tprot 6.1, Alb 4.0  21: WBC 4.2 (ANC 3200, ), Hgb 13.2, Plt 155  21: CMP WNL, CBC WNL, CCP neg, RF neg, Hep B cAb-, SAb-, SAg-; Hep C Ab-; QFN gold negative; ESR 10, CRP >9.5mg/L  3/5/21: Cr 0.96, TSH WNL, Lyme WB negative  21 CRP >9.5mg/L; Cr 1.10, Tbili 1.2, AST 16, ALT 23, AlkP 109; IDALMIS comprehensive negative including SSA, SSB; WBC 3.8 (ANC 2500, )  20 Vit D 60    Imagin/7/22 XR BILATERAL KNEES:   1. Medial and patellofemoral osteoarthritis left knee. 2. Medial osteoarthritis right knee with joint effusion. 22 XR HANDS:   Compared to 2021 exam, grossly unchanged appearance of erosive  arthritis involving multiple bilateral PIP and MCP joints as outlined above. Overall minimally progressed osteoarthritis bilaterally as outlined above. 10/27/21: LE duplex:  Right lower extremity venous duplex negative for deep venous thrombosis or thrombophlebitis. Left common femoral vein is thrombus free. NOTE: Prominent lymph node noted in the right groin, which measures 3.44 x 0.73 cm.     18 CT Lspine:  Findings related to discitis/osteomyelitis at L2-L3. Osseous changes are most  pronounced at L3. Minimal paraspinal soft tissue abnormality/inflammatory  change. 18 MR Lspine:  Impression:   1. Discitis L2-3 again demonstrated. There is increasing endplate enhancement   and irregularity L2. There is enhancing material extending 3 defect within the   superior endplate of L3 into the adjacent disc space. There is no epidural   collection. The degree of paraspinous soft tissue enhancement has not   significantly changed   2.  Enhancement of the bilateral L3-4 facets unchanged    Pathology:  18 L3 bone, biopsy:   Marrow fibrosis with inflammation and hemosiderin deposition. A neoplastic process is not identified. ASSESSMENT AND PLAN  Mr. Ian Marc is a 68 y.o. male who presents for evaluation of erosive arthritis and prior PMR symptoms, still in clinical remission off of immunosuppression. Updating plain films to ensure no interval radiographic progression, prescribing naproxen for secondary osteoarthritis. 1. Polymyalgia rheumatica (HCC)  - XR HAND RT MIN 3 V; Future  - XR HAND LT MIN 3 V; Future  - C REACTIVE PROTEIN, QT; Future  - CBC WITH AUTOMATED DIFF; Future  - METABOLIC PANEL, COMPREHENSIVE; Future  - SED RATE (ESR); Future  - naproxen (NAPROSYN) 375 mg tablet; Take 1 Tablet by mouth two (2) times daily (with meals). Dispense: 60 Tablet; Refill: 2  - XR KNEE LT MAX 2 VWS; Future  - XR KNEE RT MAX 2 VWS; Future    2. Primary osteoarthritis involving multiple joints  - XR HAND RT MIN 3 V; Future  - XR HAND LT MIN 3 V; Future  - C REACTIVE PROTEIN, QT; Future  - CBC WITH AUTOMATED DIFF; Future  - METABOLIC PANEL, COMPREHENSIVE; Future  - SED RATE (ESR); Future  - naproxen (NAPROSYN) 375 mg tablet; Take 1 Tablet by mouth two (2) times daily (with meals). Dispense: 60 Tablet; Refill: 2  - XR KNEE LT MAX 2 VWS; Future  - XR KNEE RT MAX 2 VWS; Future    There are no Patient Instructions on file for this visit. No orders of the defined types were placed in this encounter. Medications: I am having [place] [facility]\" maintain his multivitamin, triamcinolone acetonide, tamsulosin, potassium chloride SA, cholecalciferol (vitamin D3), OTHER, folic acid, furosemide, levothyroxine, naproxen, and calcipotriene. Follow up: No follow-ups on file. Face to face time:  minutes  Note preparation and records review day of service: minutes  Total provider time day of service: Minutes    This was scribed by Bailey Brown in the presence of Dr. David Li. The note was reviewed and amended personally, and I agree with the above information.

## 2023-02-08 NOTE — PROGRESS NOTES
Chief Complaint   Patient presents with    Joint Pain     1. Have you been to the ER, urgent care clinic since your last visit? Hospitalized since your last visit? No    2. Have you seen or consulted any other health care providers outside of the [address] [place] [address] since your last visit? Include any pap smears or colon screening.  No    Icariin/reseratrol/omega/curcumin

## 2023-02-27 DIAGNOSIS — E03.9 ACQUIRED HYPOTHYROIDISM: Chronic | ICD-10-CM

## 2023-02-27 RX ORDER — LEVOTHYROXINE SODIUM 150 UG/1
150 TABLET ORAL
Qty: 90 TABLET | Refills: 1 | Status: SHIPPED | OUTPATIENT
Start: 2023-02-27

## 2023-03-10 DIAGNOSIS — M15.9 PRIMARY OSTEOARTHRITIS INVOLVING MULTIPLE JOINTS: ICD-10-CM

## 2023-03-10 DIAGNOSIS — M35.3 POLYMYALGIA RHEUMATICA (HCC): ICD-10-CM

## 2023-03-10 RX ORDER — NAPROXEN 375 MG/1
TABLET ORAL
Qty: 60 TABLET | Refills: 2 | Status: SHIPPED | OUTPATIENT
Start: 2023-03-10

## 2023-03-13 ENCOUNTER — ANCILLARY PROCEDURE (OUTPATIENT)
Dept: CARDIOLOGY CLINIC | Age: 73
End: 2023-03-13
Payer: MEDICARE

## 2023-03-13 ENCOUNTER — OFFICE VISIT (OUTPATIENT)
Dept: CARDIOLOGY CLINIC | Age: 73
End: 2023-03-13
Payer: MEDICARE

## 2023-03-13 VITALS
HEIGHT: 77 IN | WEIGHT: 242 LBS | DIASTOLIC BLOOD PRESSURE: 60 MMHG | SYSTOLIC BLOOD PRESSURE: 118 MMHG | BODY MASS INDEX: 28.57 KG/M2

## 2023-03-13 VITALS
DIASTOLIC BLOOD PRESSURE: 60 MMHG | SYSTOLIC BLOOD PRESSURE: 118 MMHG | HEIGHT: 77 IN | HEART RATE: 62 BPM | BODY MASS INDEX: 28.57 KG/M2 | OXYGEN SATURATION: 95 % | WEIGHT: 242 LBS

## 2023-03-13 DIAGNOSIS — I77.810 ASCENDING AORTA DILATATION (HCC): ICD-10-CM

## 2023-03-13 DIAGNOSIS — I34.0 NONRHEUMATIC MITRAL VALVE REGURGITATION: ICD-10-CM

## 2023-03-13 DIAGNOSIS — I34.0 NONRHEUMATIC MITRAL VALVE REGURGITATION: Primary | ICD-10-CM

## 2023-03-13 DIAGNOSIS — E78.5 DYSLIPIDEMIA: ICD-10-CM

## 2023-03-13 PROCEDURE — 99214 OFFICE O/P EST MOD 30 MIN: CPT | Performed by: INTERNAL MEDICINE

## 2023-03-13 PROCEDURE — 1101F PT FALLS ASSESS-DOCD LE1/YR: CPT | Performed by: INTERNAL MEDICINE

## 2023-03-13 PROCEDURE — 93306 TTE W/DOPPLER COMPLETE: CPT | Performed by: INTERNAL MEDICINE

## 2023-03-13 PROCEDURE — G8432 DEP SCR NOT DOC, RNG: HCPCS | Performed by: INTERNAL MEDICINE

## 2023-03-13 PROCEDURE — G8417 CALC BMI ABV UP PARAM F/U: HCPCS | Performed by: INTERNAL MEDICINE

## 2023-03-13 PROCEDURE — G8536 NO DOC ELDER MAL SCRN: HCPCS | Performed by: INTERNAL MEDICINE

## 2023-03-13 PROCEDURE — 3017F COLORECTAL CA SCREEN DOC REV: CPT | Performed by: INTERNAL MEDICINE

## 2023-03-13 PROCEDURE — G0463 HOSPITAL OUTPT CLINIC VISIT: HCPCS | Performed by: INTERNAL MEDICINE

## 2023-03-13 PROCEDURE — 1123F ACP DISCUSS/DSCN MKR DOCD: CPT | Performed by: INTERNAL MEDICINE

## 2023-03-13 PROCEDURE — G8427 DOCREV CUR MEDS BY ELIG CLIN: HCPCS | Performed by: INTERNAL MEDICINE

## 2023-03-13 RX ORDER — CETIRIZINE HYDROCHLORIDE 10 MG/1
1 CAPSULE, LIQUID FILLED ORAL DAILY
COMMUNITY

## 2023-03-13 NOTE — PROGRESS NOTES
Nabil Farnsworth is a 68 y.o. male    Chief Complaint   Patient presents with    Cardiac Testing     ECHO TODAY WITH TOMAS    Other     NMVR       Vitals:    03/13/23 1503   BP: 118/60   BP 1 Location: Left upper arm   BP Patient Position: Sitting   Pulse: 62   Height: 6' 5\" (1.956 m)   Weight: 242 lb (109.8 kg)   SpO2: 95%       Chest pain no    SOB no    Dizziness no    Swelling ankles    Refills no      1. Have you been to the ER, urgent care clinic since your last visit? Hospitalized since your last visit? No    2. Have you seen or consulted any other health care providers outside of the [address] [place] [address] since your last visit? Include any pap smears or colon screening. Rajeev Aranda is a 68 y.o. male    Chief Complaint   Patient presents with    Cardiac Testing     ECHO TODAY WITH TOMASLEIDA Hsu     NMVR       Vitals:    03/13/23 1503   BP: 118/60   BP 1 Location: Left upper arm   BP Patient Position: Sitting   Pulse: 62   Height: 6' 5\" (1.956 m)   Weight: 242 lb (109.8 kg)   SpO2: 95%       Chest pain no    SOB no    Dizziness no    Swelling no    Refills no      1. Have you been to the ER, urgent care clinic since your last visit? Hospitalized since your last visit? No    2. Have you seen or consulted any other health care providers outside of the [address] [place] [address] since your last visit? Include any pap smears or colon screening.  No

## 2023-03-13 NOTE — PROGRESS NOTES
Office Follow-up    NAME: Brandt Lemus   :  1950  MRM:  [id number]    Date:  3/13/2023         Assessment and Plan:     1. Lower extremity edema: This is mild and asymmetrical.  From cardiac standpoint LV function is normal on recent echocardiogram with only moderate mitral regurgitation. Likely this edema is noncardiac in origin possibly vericose veins or dependent edema. No other cardiac symptoms. 2.  Moderate mitral regurgitation: The etiology of mitral regurgitation is mitral valve prolapse. This is only moderate grade. At this time there is no symptoms. Pulmonary pressures are normal.  We will continue to do surveillance Echo. 3.  Mild aortic regurgitation: This is likely degenerative cause. Continue to observe. 4. Aotic root and Asc Aorta dilation: Mild. Surveillance. Last 3 yrs there has been no change. Will repeat in 2-3 yrs. 5. Preventive cardiac workup: Check CAC score. 6. Dyslipidmia: LDL is 143. Will need to be on [place]. Dr. Leidy Dolan managing. 7. See Dr. Paulette Mejia in 1 year. 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 ATTENTION:   This medical record was transcribed using an electronic medical records/speech recognition system. Although proofread, it may and can contain electronic, spelling and other errors. Corrections may be executed at a later time. Please feel free to contact us for any clarifications as needed. Subjective:     Brandt Lemus, a 68y.o. year-old who presents for followup. Denies CP, SOB, Palpitations, dizziness. HPI: Brandt Lemus is a 70 y.o. male with past medical history significant for L3 osteomyelitis diagnosed in 2018, is here for evaluation of lower extremity edema. He has been having the symptoms for past few months but the most recent days he had seen more ankle edema right more than the left side.   He had an echocardiogram performed as a part of evaluation and echocardiogram demonstrated normal LV function with moderate mitral regurgitation with posterior mitral valve leaflet prolapse and mild aortic regurgitation. No symptoms of chest pain, shortness of breath, lightheadedness or dizziness or presyncope or syncope on exertion. Does not smoke tobacco.  No previous cardiac history. Lab results from January 12, 2021 were personally reviewed. CBC, CMP were normal.  C-reactive protein is elevated at 9.5. Exam:     Physical Exam:  Visit Vitals  /60 (BP 1 Location: Left upper arm, BP Patient Position: Sitting)   Pulse 62   Ht 6' 5\" (1.956 m)   Wt 242 lb (109.8 kg)   SpO2 95%   BMI 28.70 kg/m²     General appearance - alert, well appearing, and in no distress  Mental status - affect appropriate to mood  Eyes - sclera anicteric, moist mucous membranes  Neck - supple, no significant adenopathy  Chest - clear to auscultation, no wheezes, rales or rhonchi  Heart - normal rate, regular rhythm, normal S1, S2, PSM grade 3/6 in mitral region. No rubs, clicks or gallops  Abdomen - soft, nontender, nondistended, no masses or organomegaly  Extremities - peripheral pulses normal, no pedal edema  Skin - normal coloration  no rashes    Medications:     Current Outpatient Medications   Medication Sig    Cetirizine (ZyrTEC) 10 mg cap Take 1 Capsule by mouth daily. naproxen (NAPROSYN) 375 mg tablet TAKE 1 TABLET BY MOUTH TWICE A DAY WITH MEALS    levothyroxine (SYNTHROID) 150 mcg tablet Take 1 Tablet by mouth Daily (before breakfast). calcipotriene (DOVONOX) 0.005 % ointment APPLY TO AFFECTED AREA TWICE A DAY    furosemide (LASIX) 20 mg tablet TAKE ONE TABLET DAILY AS NEEDED FOR FOOT SWELLING INDICATIONS: VISIBLE WATER RETENTION INDICATIONS: VISIBLE WATER RETENTION    folic acid (FOLVITE) 1 mg tablet TAKE 1 TABLET BY MOUTH EVERY DAY    cholecalciferol, vitamin D3, 50 mcg (2,000 unit) tab Take  by mouth. OTHER C-MAX- 1500MG    tamsulosin (FLOMAX) 0.4 mg capsule Take 0.4 mg by mouth daily.     triamcinolone acetonide (KENALOG) 0.1 % topical cream APPLY TO BODY ECZEMA UP TO TWICE A DAY AS NEEDED    multivitamin (ONE A DAY) tablet Take 1 Tab by mouth daily. potassium chloride SA (MICRO-K) 10 mEq capsule Take 10 mEq by mouth two (2) times a day. No current facility-administered medications for this visit. Diagnostic Data Review:       No specialty comments available. Lab Review:     Lab Results   Component Value Date/Time    Cholesterol, total 223 (H) 07/12/2022 09:27 AM    HDL Cholesterol 65 07/12/2022 09:27 AM    LDL, calculated 143.2 (H) 07/12/2022 09:27 AM    Triglyceride 74 07/12/2022 09:27 AM    CHOL/HDL Ratio 3.4 07/12/2022 09:27 AM     Lab Results   Component Value Date/Time    Creatinine 0.91 12/08/2022 01:54 PM     Lab Results   Component Value Date/Time    BUN 22 12/08/2022 01:54 PM     Lab Results   Component Value Date/Time    Potassium 4.4 12/08/2022 01:54 PM     No results found for: HBA1C, DGO3DCTM, THD1IQCD  Lab Results   Component Value Date/Time    HGB 12.5 (L) 12/08/2022 01:54 PM     Lab Results   Component Value Date/Time    PLATELET 170 48/20/5460 01:54 PM     No results for input(s): CPK, CKMB, TROIQ in the last 72 hours. No lab exists for component: CKQMB, CPKMB             ___________________________________________________    [facility].  King Jigna MD, [facility]

## 2023-03-13 NOTE — PROGRESS NOTES
All orders entered per verbal orders of Dr. Gayathri Cavazos. MD [facility]    CAC score- Risk factors, Screening. Letter given to the pt  See Dr. Tracie Escamilla in 1 year.

## 2023-03-14 LAB
ECHO AO ASC DIAM: 4 CM
ECHO AO ASCENDING AORTA INDEX: 1.65 CM/M2
ECHO AO ROOT DIAM: 4.1 CM
ECHO AO ROOT INDEX: 1.69 CM/M2
ECHO AV AREA PEAK VELOCITY: 3.8 CM2
ECHO AV AREA VTI: 4.2 CM2
ECHO AV AREA/BSA PEAK VELOCITY: 1.6 CM2/M2
ECHO AV AREA/BSA VTI: 1.7 CM2/M2
ECHO AV MEAN GRADIENT: 5 MMHG
ECHO AV MEAN VELOCITY: 1.1 M/S
ECHO AV PEAK GRADIENT: 11 MMHG
ECHO AV PEAK VELOCITY: 1.6 M/S
ECHO AV VELOCITY RATIO: 0.75
ECHO AV VTI: 27.8 CM
ECHO EST RA PRESSURE: 3 MMHG
ECHO LA DIAMETER INDEX: 1.81 CM/M2
ECHO LA DIAMETER: 4.4 CM
ECHO LA TO AORTIC ROOT RATIO: 1.07
ECHO LA VOL 2C: 98 ML (ref 18–58)
ECHO LA VOL 4C: 87 ML (ref 18–58)
ECHO LA VOLUME AREA LENGTH: 99 ML
ECHO LA VOLUME INDEX A2C: 40 ML/M2 (ref 16–34)
ECHO LA VOLUME INDEX A4C: 36 ML/M2 (ref 16–34)
ECHO LA VOLUME INDEX AREA LENGTH: 41 ML/M2 (ref 16–34)
ECHO LV E' LATERAL VELOCITY: 9 CM/S
ECHO LV E' SEPTAL VELOCITY: 8 CM/S
ECHO LV EDV A2C: 180 ML
ECHO LV EDV A4C: 196 ML
ECHO LV EDV BP: 192 ML (ref 67–155)
ECHO LV EDV INDEX A4C: 81 ML/M2
ECHO LV EDV INDEX BP: 79 ML/M2
ECHO LV EDV NDEX A2C: 74 ML/M2
ECHO LV EJECTION FRACTION A2C: 55 %
ECHO LV EJECTION FRACTION A4C: 54 %
ECHO LV EJECTION FRACTION BIPLANE: 54 % (ref 55–100)
ECHO LV ESV A2C: 81 ML
ECHO LV ESV A4C: 90 ML
ECHO LV ESV BP: 88 ML (ref 22–58)
ECHO LV ESV INDEX A2C: 33 ML/M2
ECHO LV ESV INDEX A4C: 37 ML/M2
ECHO LV ESV INDEX BP: 36 ML/M2
ECHO LV FRACTIONAL SHORTENING: 30 % (ref 28–44)
ECHO LV INTERNAL DIMENSION DIASTOLE INDEX: 2.3 CM/M2
ECHO LV INTERNAL DIMENSION DIASTOLIC: 5.6 CM (ref 4.2–5.9)
ECHO LV INTERNAL DIMENSION SYSTOLIC INDEX: 1.6 CM/M2
ECHO LV INTERNAL DIMENSION SYSTOLIC: 3.9 CM
ECHO LV IVSD: 1 CM (ref 0.6–1)
ECHO LV MASS 2D: 205.5 G (ref 88–224)
ECHO LV MASS INDEX 2D: 84.6 G/M2 (ref 49–115)
ECHO LV POSTERIOR WALL DIASTOLIC: 0.9 CM (ref 0.6–1)
ECHO LV RELATIVE WALL THICKNESS RATIO: 0.32
ECHO LVOT AREA: 5.3 CM2
ECHO LVOT AV VTI INDEX: 0.8
ECHO LVOT DIAM: 2.6 CM
ECHO LVOT MEAN GRADIENT: 3 MMHG
ECHO LVOT PEAK GRADIENT: 6 MMHG
ECHO LVOT PEAK VELOCITY: 1.2 M/S
ECHO LVOT STROKE VOLUME INDEX: 48.5 ML/M2
ECHO LVOT SV: 117.8 ML
ECHO LVOT VTI: 22.2 CM
ECHO MV A VELOCITY: 0.64 M/S
ECHO MV AREA PHT: 3.2 CM2
ECHO MV AREA VTI: 4.9 CM2
ECHO MV E DECELERATION TIME (DT): 233.6 MS
ECHO MV E VELOCITY: 0.61 M/S
ECHO MV E/A RATIO: 0.95
ECHO MV E/E' LATERAL: 6.78
ECHO MV E/E' RATIO (AVERAGED): 7.2
ECHO MV E/E' SEPTAL: 7.63
ECHO MV LVOT VTI INDEX: 1.09
ECHO MV MAX VELOCITY: 0.9 M/S
ECHO MV MEAN GRADIENT: 1 MMHG
ECHO MV MEAN VELOCITY: 0.5 M/S
ECHO MV PEAK GRADIENT: 3 MMHG
ECHO MV PRESSURE HALF TIME (PHT): 67.7 MS
ECHO MV REGURGITANT ALIASING (NYQUIST) VELOCITY: 36 CM/S
ECHO MV REGURGITANT VELOCITY PISA: 5.1 M/S
ECHO MV REGURGITANT VTIA: 114.3 CM
ECHO MV VTI: 24.2 CM
ECHO RA AREA 4C: 18.3 CM2
ECHO RA END SYSTOLIC VOLUME APICAL 4 CHAMBER INDEX BSA: 20 ML/M2
ECHO RA VOLUME: 48 ML
ECHO RIGHT VENTRICULAR SYSTOLIC PRESSURE (RVSP): 25 MMHG
ECHO RV INTERNAL DIMENSION: 4 CM
ECHO RV TAPSE: 3 CM (ref 1.7–?)
ECHO TV REGURGITANT MAX VELOCITY: 2.34 M/S
ECHO TV REGURGITANT PEAK GRADIENT: 22 MMHG

## 2023-03-14 PROCEDURE — 93306 TTE W/DOPPLER COMPLETE: CPT | Performed by: INTERNAL MEDICINE

## 2023-03-20 ENCOUNTER — HOSPITAL ENCOUNTER (OUTPATIENT)
Dept: CT IMAGING | Age: 73
Discharge: HOME | End: 2023-03-20
Attending: INTERNAL MEDICINE

## 2023-03-20 DIAGNOSIS — E78.5 DYSLIPIDEMIA: ICD-10-CM

## 2023-03-20 PROCEDURE — 75571 CT HRT W/O DYE W/CA TEST: CPT

## 2023-03-28 ENCOUNTER — PATIENT MESSAGE (OUTPATIENT)
Dept: CARDIOLOGY CLINIC | Age: 73
End: 2023-03-28

## 2023-03-28 RX ORDER — ROSUVASTATIN CALCIUM 5 MG/1
5 TABLET, COATED ORAL
Qty: 90 TABLET | Refills: 3 | Status: SHIPPED | OUTPATIENT
Start: 2023-03-28

## 2023-03-28 NOTE — TELEPHONE ENCOUNTER
All orders entered per verbal orders of Dr. Russell Watt. MD [facility]      CAC is 16 all in LAD. This means there is mild plaque build up in the LAD. Start Crestor 5mg po daily at night time. Continue healthy lifestyle including exercise and healthy diet. Repeat CAC scan in 3-5 years. Refill per VO of Dr. Aleyda Stark:    Last appt: 3/13/2023    Future Appointments   Date Time Provider Harshil Beltrán   7/13/2023  9:30 AM Cely Valerio MD [facility] BS AMB   3/13/2024  2:00 PM Russell Culver MD [facility] BS AMB       Requested Prescriptions     Signed Prescriptions Disp Refills    rosuvastatin (CRESTOR) 5 mg tablet 90 Tablet 3     Sig: Take 1 Tablet by mouth nightly.      Authorizing Provider: Robbie Garcia     Ordering User: Francesco Perez

## 2023-04-01 LAB
ALBUMIN SERPL-MCNC: 3.9 G/DL (ref 3.7–4.7)
ALBUMIN/GLOB SERPL: 1.7 {RATIO} (ref 1.2–2.2)
ALP SERPL-CCNC: 77 IU/L (ref 44–121)
ALT SERPL-CCNC: 10 IU/L (ref 0–44)
AST SERPL-CCNC: 17 IU/L (ref 0–40)
BASOPHILS # BLD AUTO: 0 X10E3/UL (ref 0–0.2)
BASOPHILS NFR BLD AUTO: 1 %
BILIRUB SERPL-MCNC: 1.2 MG/DL (ref 0–1.2)
BUN SERPL-MCNC: 24 MG/DL (ref 8–27)
BUN/CREAT SERPL: 20 (ref 10–24)
CALCIUM SERPL-MCNC: 8.9 MG/DL (ref 8.6–10.2)
CHLORIDE SERPL-SCNC: 106 MMOL/L (ref 96–106)
CO2 SERPL-SCNC: 23 MMOL/L (ref 20–29)
CREAT SERPL-MCNC: 1.18 MG/DL (ref 0.76–1.27)
CRP SERPL-MCNC: 11 MG/L (ref 0–10)
EGFRCR SERPLBLD CKD-EPI 2021: 65 ML/MIN/1.73
EOSINOPHIL # BLD AUTO: 0.2 X10E3/UL (ref 0–0.4)
EOSINOPHIL NFR BLD AUTO: 8 %
ERYTHROCYTE [DISTWIDTH] IN BLOOD BY AUTOMATED COUNT: 15.8 % (ref 11.6–15.4)
ERYTHROCYTE [SEDIMENTATION RATE] IN BLOOD BY WESTERGREN METHOD: 13 MM/HR (ref 0–30)
GLOBULIN SER CALC-MCNC: 2.3 G/DL (ref 1.5–4.5)
GLUCOSE SERPL-MCNC: 95 MG/DL (ref 70–99)
HCT VFR BLD AUTO: 37.7 % (ref 37.5–51)
HGB BLD-MCNC: 12.3 G/DL (ref 13–17.7)
IMM GRANULOCYTES # BLD AUTO: 0 X10E3/UL (ref 0–0.1)
IMM GRANULOCYTES NFR BLD AUTO: 1 %
LYMPHOCYTES # BLD AUTO: 0.8 X10E3/UL (ref 0.7–3.1)
LYMPHOCYTES NFR BLD AUTO: 37 %
MCH RBC QN AUTO: 29.7 PG (ref 26.6–33)
MCHC RBC AUTO-ENTMCNC: 32.6 G/DL (ref 31.5–35.7)
MCV RBC AUTO: 91 FL (ref 79–97)
MONOCYTES # BLD AUTO: 0.1 X10E3/UL (ref 0.1–0.9)
MONOCYTES NFR BLD AUTO: 6 %
NEUTROPHILS # BLD AUTO: 1 X10E3/UL (ref 1.4–7)
NEUTROPHILS NFR BLD AUTO: 47 %
PLATELET # BLD AUTO: 161 X10E3/UL (ref 150–450)
POTASSIUM SERPL-SCNC: 5.2 MMOL/L (ref 3.5–5.2)
PROT SERPL-MCNC: 6.2 G/DL (ref 6–8.5)
RBC # BLD AUTO: 4.14 X10E6/UL (ref 4.14–5.8)
SODIUM SERPL-SCNC: 141 MMOL/L (ref 134–144)
WBC # BLD AUTO: 2.1 X10E3/UL (ref 3.4–10.8)

## 2023-06-27 ENCOUNTER — TELEPHONE (OUTPATIENT)
Facility: CLINIC | Age: 73
End: 2023-06-27

## 2023-06-30 ENCOUNTER — OFFICE VISIT (OUTPATIENT)
Facility: CLINIC | Age: 73
End: 2023-06-30

## 2023-06-30 VITALS
HEART RATE: 58 BPM | TEMPERATURE: 97.1 F | DIASTOLIC BLOOD PRESSURE: 73 MMHG | RESPIRATION RATE: 20 BRPM | OXYGEN SATURATION: 96 % | SYSTOLIC BLOOD PRESSURE: 120 MMHG | BODY MASS INDEX: 27.28 KG/M2 | HEIGHT: 77 IN | WEIGHT: 231 LBS

## 2023-06-30 DIAGNOSIS — E78.49 OTHER HYPERLIPIDEMIA: ICD-10-CM

## 2023-06-30 DIAGNOSIS — E03.9 HYPOTHYROIDISM, UNSPECIFIED TYPE: ICD-10-CM

## 2023-06-30 DIAGNOSIS — I87.2 VENOUS INSUFFICIENCY (CHRONIC) (PERIPHERAL): ICD-10-CM

## 2023-06-30 DIAGNOSIS — L42 PITYRIASIS ROSEA: Primary | ICD-10-CM

## 2023-06-30 DIAGNOSIS — E55.9 VITAMIN D DEFICIENCY: ICD-10-CM

## 2023-06-30 DIAGNOSIS — L40.50 PSORIATIC ARTHRITIS (HCC): ICD-10-CM

## 2023-06-30 DIAGNOSIS — D72.819 LEUKOPENIA, UNSPECIFIED TYPE: ICD-10-CM

## 2023-06-30 SDOH — ECONOMIC STABILITY: FOOD INSECURITY: WITHIN THE PAST 12 MONTHS, YOU WORRIED THAT YOUR FOOD WOULD RUN OUT BEFORE YOU GOT MONEY TO BUY MORE.: NEVER TRUE

## 2023-06-30 SDOH — ECONOMIC STABILITY: TRANSPORTATION INSECURITY
IN THE PAST 12 MONTHS, HAS LACK OF TRANSPORTATION KEPT YOU FROM MEETINGS, WORK, OR FROM GETTING THINGS NEEDED FOR DAILY LIVING?: NO

## 2023-06-30 SDOH — ECONOMIC STABILITY: HOUSING INSECURITY
IN THE LAST 12 MONTHS, WAS THERE A TIME WHEN YOU DID NOT HAVE A STEADY PLACE TO SLEEP OR SLEPT IN A SHELTER (INCLUDING NOW)?: NO

## 2023-06-30 SDOH — ECONOMIC STABILITY: INCOME INSECURITY: HOW HARD IS IT FOR YOU TO PAY FOR THE VERY BASICS LIKE FOOD, HOUSING, MEDICAL CARE, AND HEATING?: NOT HARD AT ALL

## 2023-06-30 SDOH — ECONOMIC STABILITY: FOOD INSECURITY: WITHIN THE PAST 12 MONTHS, THE FOOD YOU BOUGHT JUST DIDN'T LAST AND YOU DIDN'T HAVE MONEY TO GET MORE.: NEVER TRUE

## 2023-06-30 ASSESSMENT — PATIENT HEALTH QUESTIONNAIRE - PHQ9
SUM OF ALL RESPONSES TO PHQ QUESTIONS 1-9: 0
SUM OF ALL RESPONSES TO PHQ QUESTIONS 1-9: 0
2. FEELING DOWN, DEPRESSED OR HOPELESS: 0
1. LITTLE INTEREST OR PLEASURE IN DOING THINGS: 0
SUM OF ALL RESPONSES TO PHQ QUESTIONS 1-9: 0
SUM OF ALL RESPONSES TO PHQ9 QUESTIONS 1 & 2: 0
SUM OF ALL RESPONSES TO PHQ QUESTIONS 1-9: 0

## 2023-07-03 DIAGNOSIS — D72.819 LEUKOPENIA, UNSPECIFIED TYPE: ICD-10-CM

## 2023-07-03 DIAGNOSIS — L40.50 PSORIATIC ARTHRITIS (HCC): ICD-10-CM

## 2023-07-03 DIAGNOSIS — E03.9 HYPOTHYROIDISM, UNSPECIFIED TYPE: ICD-10-CM

## 2023-07-03 DIAGNOSIS — E78.49 OTHER HYPERLIPIDEMIA: ICD-10-CM

## 2023-07-03 LAB
25(OH)D3 SERPL-MCNC: 43.5 NG/ML (ref 30–100)
ALBUMIN SERPL-MCNC: 3.7 G/DL (ref 3.5–5)
ALBUMIN/GLOB SERPL: 1.4 (ref 1.1–2.2)
ALP SERPL-CCNC: 58 U/L (ref 45–117)
ALT SERPL-CCNC: 20 U/L (ref 12–78)
ANION GAP SERPL CALC-SCNC: 5 MMOL/L (ref 5–15)
AST SERPL-CCNC: 14 U/L (ref 15–37)
BASOPHILS # BLD: 0 K/UL (ref 0–0.1)
BASOPHILS NFR BLD: 1 % (ref 0–1)
BILIRUB SERPL-MCNC: 1.4 MG/DL (ref 0.2–1)
BUN SERPL-MCNC: 28 MG/DL (ref 6–20)
BUN/CREAT SERPL: 29 (ref 12–20)
CALCIUM SERPL-MCNC: 8.8 MG/DL (ref 8.5–10.1)
CHLORIDE SERPL-SCNC: 109 MMOL/L (ref 97–108)
CHOLEST SERPL-MCNC: 219 MG/DL
CO2 SERPL-SCNC: 26 MMOL/L (ref 21–32)
CREAT SERPL-MCNC: 0.96 MG/DL (ref 0.7–1.3)
DIFFERENTIAL METHOD BLD: ABNORMAL
EOSINOPHIL # BLD: 0.1 K/UL (ref 0–0.4)
EOSINOPHIL NFR BLD: 5 % (ref 0–7)
ERYTHROCYTE [DISTWIDTH] IN BLOOD BY AUTOMATED COUNT: 16 % (ref 11.5–14.5)
GLOBULIN SER CALC-MCNC: 2.6 G/DL (ref 2–4)
GLUCOSE SERPL-MCNC: 99 MG/DL (ref 65–100)
HCT VFR BLD AUTO: 38.5 % (ref 36.6–50.3)
HDLC SERPL-MCNC: 65 MG/DL
HDLC SERPL: 3.4 (ref 0–5)
HGB BLD-MCNC: 12 G/DL (ref 12.1–17)
IMM GRANULOCYTES # BLD AUTO: 0 K/UL (ref 0–0.04)
IMM GRANULOCYTES NFR BLD AUTO: 0 % (ref 0–0.5)
LDLC SERPL CALC-MCNC: 145.2 MG/DL (ref 0–100)
LYMPHOCYTES # BLD: 0.8 K/UL (ref 0.8–3.5)
LYMPHOCYTES NFR BLD: 35 % (ref 12–49)
MCH RBC QN AUTO: 30.4 PG (ref 26–34)
MCHC RBC AUTO-ENTMCNC: 31.2 G/DL (ref 30–36.5)
MCV RBC AUTO: 97.5 FL (ref 80–99)
MONOCYTES # BLD: 0.1 K/UL (ref 0–1)
MONOCYTES NFR BLD: 6 % (ref 5–13)
NEUTS SEG # BLD: 1.2 K/UL (ref 1.8–8)
NEUTS SEG NFR BLD: 53 % (ref 32–75)
NRBC # BLD: 0 K/UL (ref 0–0.01)
NRBC BLD-RTO: 0 PER 100 WBC
PLATELET # BLD AUTO: 131 K/UL (ref 150–400)
PMV BLD AUTO: 9.9 FL (ref 8.9–12.9)
POTASSIUM SERPL-SCNC: 4.6 MMOL/L (ref 3.5–5.1)
PROT SERPL-MCNC: 6.3 G/DL (ref 6.4–8.2)
RBC # BLD AUTO: 3.95 M/UL (ref 4.1–5.7)
RBC MORPH BLD: ABNORMAL
RBC MORPH BLD: ABNORMAL
SODIUM SERPL-SCNC: 140 MMOL/L (ref 136–145)
TRIGL SERPL-MCNC: 44 MG/DL
TSH SERPL DL<=0.05 MIU/L-ACNC: 0.21 UIU/ML (ref 0.36–3.74)
VLDLC SERPL CALC-MCNC: 8.8 MG/DL
WBC # BLD AUTO: 2.2 K/UL (ref 4.1–11.1)

## 2023-08-01 DIAGNOSIS — M79.89 OTHER SPECIFIED SOFT TISSUE DISORDERS: ICD-10-CM

## 2023-08-01 RX ORDER — FUROSEMIDE 20 MG/1
TABLET ORAL
Qty: 90 TABLET | Refills: 0 | Status: SHIPPED | OUTPATIENT
Start: 2023-08-01

## 2023-08-24 DIAGNOSIS — E03.9 HYPOTHYROIDISM, UNSPECIFIED: ICD-10-CM

## 2023-08-24 RX ORDER — LEVOTHYROXINE SODIUM 0.15 MG/1
TABLET ORAL
Qty: 90 TABLET | Refills: 1 | Status: SHIPPED | OUTPATIENT
Start: 2023-08-24

## 2023-08-29 SDOH — HEALTH STABILITY: PHYSICAL HEALTH: ON AVERAGE, HOW MANY DAYS PER WEEK DO YOU ENGAGE IN MODERATE TO STRENUOUS EXERCISE (LIKE A BRISK WALK)?: 3 DAYS

## 2023-08-29 SDOH — HEALTH STABILITY: PHYSICAL HEALTH: ON AVERAGE, HOW MANY MINUTES DO YOU ENGAGE IN EXERCISE AT THIS LEVEL?: 50 MIN

## 2023-08-29 ASSESSMENT — PATIENT HEALTH QUESTIONNAIRE - PHQ9
SUM OF ALL RESPONSES TO PHQ QUESTIONS 1-9: 0
SUM OF ALL RESPONSES TO PHQ9 QUESTIONS 1 & 2: 0
1. LITTLE INTEREST OR PLEASURE IN DOING THINGS: 0
2. FEELING DOWN, DEPRESSED OR HOPELESS: 0
SUM OF ALL RESPONSES TO PHQ QUESTIONS 1-9: 0

## 2023-08-29 ASSESSMENT — LIFESTYLE VARIABLES
HOW OFTEN DO YOU HAVE SIX OR MORE DRINKS ON ONE OCCASION: 1
HOW OFTEN DO YOU HAVE A DRINK CONTAINING ALCOHOL: 3
HOW MANY STANDARD DRINKS CONTAINING ALCOHOL DO YOU HAVE ON A TYPICAL DAY: 1 OR 2
HOW OFTEN DO YOU HAVE A DRINK CONTAINING ALCOHOL: 2-4 TIMES A MONTH
HOW MANY STANDARD DRINKS CONTAINING ALCOHOL DO YOU HAVE ON A TYPICAL DAY: 1

## 2023-09-01 ENCOUNTER — OFFICE VISIT (OUTPATIENT)
Facility: CLINIC | Age: 73
End: 2023-09-01

## 2023-09-01 VITALS
HEIGHT: 77 IN | RESPIRATION RATE: 16 BRPM | DIASTOLIC BLOOD PRESSURE: 64 MMHG | TEMPERATURE: 97.3 F | OXYGEN SATURATION: 97 % | BODY MASS INDEX: 28.95 KG/M2 | SYSTOLIC BLOOD PRESSURE: 111 MMHG | HEART RATE: 73 BPM | WEIGHT: 245.2 LBS

## 2023-09-01 DIAGNOSIS — Z00.00 MEDICARE ANNUAL WELLNESS VISIT, SUBSEQUENT: Primary | ICD-10-CM

## 2023-09-01 DIAGNOSIS — E03.9 ACQUIRED HYPOTHYROIDISM: ICD-10-CM

## 2023-09-01 DIAGNOSIS — Z23 NEED FOR PROPHYLACTIC VACCINATION AND INOCULATION AGAINST VARICELLA: ICD-10-CM

## 2023-09-01 DIAGNOSIS — R21 SKIN RASH: ICD-10-CM

## 2023-09-01 DIAGNOSIS — Z71.89 ACP (ADVANCE CARE PLANNING): ICD-10-CM

## 2023-09-01 RX ORDER — UPADACITINIB 15 MG/1
TABLET, EXTENDED RELEASE ORAL
COMMUNITY

## 2023-09-01 NOTE — ACP (ADVANCE CARE PLANNING)
Advance Care Planning     Advance Care Planning (ACP) Physician/NP/PA Conversation    Date of Conversation: 9/1/2023  Conducted with: Patient with Decision Making Capacity    Healthcare Decision Maker:      Primary Decision Maker: Josefa Gar - Spouse - [phone]    Click here to complete Healthcare Decision Makers including selection of the Healthcare Decision Maker Relationship (ie \"Primary\")  Today we documented Decision Maker(s) consistent with Legal Next of Kin hierarchy. Care Preferences:    Hospitalization: \"If your health worsens and it becomes clear that your chance of recovery is unlikely, what would be your preference regarding hospitalization? \"  The patient would prefer hospitalization. Ventilation: \"If you were unable to breath on your own and your chance of recovery was unlikely, what would be your preference about the use of a ventilator (breathing machine) if it was available to you? \"  The patient would desire the use of a ventilator. Resuscitation: \"In the event your heart stopped as a result of an underlying serious health condition, would you want attempts made to restart your heart, or would you prefer a natural death? \"  Yes, attempt to resuscitate. treatment goals, ventilation preferences, hospitalization preferences, resuscitation preferences, end of life care preferences (vegetative state/imminent death), and hospice care  In case of persistent vegetative state, unsurvivable catastrophe, would desire comfort measures and to be allowed natural death.     Conversation Outcomes / Follow-Up Plan:  ACP incomplete - refer to ACP Clinical Specialist  Reviewed DNR/DNI and patient elects Full Code (Attempt Resuscitation)    Length of Voluntary ACP Conversation in minutes:  16 minutes    Virgie Schwab MD

## 2023-09-02 LAB — TSH SERPL DL<=0.05 MIU/L-ACNC: 0.22 UIU/ML (ref 0.36–3.74)

## 2023-09-05 ENCOUNTER — CLINICAL DOCUMENTATION (OUTPATIENT)
Dept: SPIRITUAL SERVICES | Age: 73
End: 2023-09-05

## 2023-09-05 NOTE — ACP (ADVANCE CARE PLANNING)
Advance Care Planning   Ambulatory ACP Specialist Patient Outreach    Date:  9/5/2023    ACP Specialist:  Rc Cortez    Outreach call to patient in follow-up to ACP Specialist referral from:Terri Galloway MD    [x] PCP  [] Provider   [] Ambulatory Care Management [] Other     For:                  [x] Advance Directive Assistance              [] Complete Portable DNR order              [] Complete POST/POLST/MOST              [] Code Status Discussion             [] Discuss Goals of Care             [] Early ACP Decision-Making              [] Other (Specify)    Date Referral Received: 9/1/2023    Next Step:   [] ACP scheduled conversation  [x] Outreach again within 3 weeks              [] Email / Mail [address]  [] Email / Mail Advance Directive   [] Closing referral.  Routing closure to referring provider/staff and to ACP Specialist . [] Closure letter mailed to patient with invitation to contact ACP Specialist if / when ready. [] Other (Specify here):         [x] At this time, Healthcare Decision Maker Is:    Advance Care Planning   Healthcare Decision Maker:    Primary Decision Maker: Aster Treadwell - [facility] - [phone]      [] Primary agent named in scanned advance directive. [x] Legal Next of Kin. [] Unable to determine legal decision maker at this time. Outreaches:       [x] 1st -  Date:  9/5/2023               Intervention:  [] Spoke with Patient   [] Left Voice mail [] Email / Mail    [] [employer]  [x] Other [phone] : ACP Outreach after 9/21/2023    Outcomes: Writer acknowledges referral for ACP Specialist was received by ACP Team with direction from provider to attempt ACP outreach after 9/21/2023 as patient will be out of town. [] 2nd -  Date:                 Intervention:  [] Spoke with Patient  [] Left Voice mail [] Email / Mail    [] [facility]  [] Other [phone] :               Outcomes:                [] 3rd -  Date:                Intervention:

## 2024-02-06 ENCOUNTER — PATIENT MESSAGE (OUTPATIENT)
Facility: CLINIC | Age: 74
End: 2024-02-06

## 2024-02-06 DIAGNOSIS — E03.9 ACQUIRED HYPOTHYROIDISM: Primary | ICD-10-CM

## 2024-02-06 NOTE — TELEPHONE ENCOUNTER
Word, Manisha ALLEN MA 2/6/2024 1:57 PM EST      ----- Message -----  From: Vineet Maynard \"Artie\"  Sent: 2/6/2024 1:40 PM EST  To: *  Subject: Blood Test for Prescription Refill     You have mentioned recently that we might look at changing my Levothyroxine prescription. Over the last month I have been gaining weight. My exercise level has remained the same maybe slight increase but I continue to gain weight. I have not changed my eating habits in fact I have been eating less but still struggle to loose pounds. Can we have a blood test to see if a change is needed prior to our appointment on the 14th?  Thanks  Artie Maynard

## 2024-02-12 DIAGNOSIS — E03.9 ACQUIRED HYPOTHYROIDISM: ICD-10-CM

## 2024-02-13 LAB — TSH SERPL DL<=0.05 MIU/L-ACNC: 0.09 UIU/ML (ref 0.36–3.74)

## 2024-02-13 NOTE — RESULT ENCOUNTER NOTE
Thyroid has dropped again  Recommend decreasing thyroid dosing, will send lower dose to pharmacy  Please plan to recheck value in about 4-6 weeks!    Nursing please call to make sure patient gets message and plan

## 2024-02-14 ENCOUNTER — TELEMEDICINE (OUTPATIENT)
Facility: CLINIC | Age: 74
End: 2024-02-14
Payer: MEDICARE

## 2024-02-14 DIAGNOSIS — M79.89 OTHER SPECIFIED SOFT TISSUE DISORDERS: ICD-10-CM

## 2024-02-14 DIAGNOSIS — D69.6 THROMBOCYTOPENIA, UNSPECIFIED (HCC): ICD-10-CM

## 2024-02-14 DIAGNOSIS — L40.50 PSORIATIC ARTHRITIS (HCC): Primary | ICD-10-CM

## 2024-02-14 DIAGNOSIS — I77.810 THORACIC AORTIC ECTASIA (HCC): ICD-10-CM

## 2024-02-14 DIAGNOSIS — E03.9 ACQUIRED HYPOTHYROIDISM: ICD-10-CM

## 2024-02-14 PROCEDURE — G8427 DOCREV CUR MEDS BY ELIG CLIN: HCPCS | Performed by: FAMILY MEDICINE

## 2024-02-14 PROCEDURE — 1123F ACP DISCUSS/DSCN MKR DOCD: CPT | Performed by: FAMILY MEDICINE

## 2024-02-14 PROCEDURE — 3017F COLORECTAL CA SCREEN DOC REV: CPT | Performed by: FAMILY MEDICINE

## 2024-02-14 PROCEDURE — 99214 OFFICE O/P EST MOD 30 MIN: CPT | Performed by: FAMILY MEDICINE

## 2024-02-14 RX ORDER — FUROSEMIDE 20 MG/1
TABLET ORAL
Qty: 90 TABLET | Refills: 2 | Status: SHIPPED | OUTPATIENT
Start: 2024-02-14

## 2024-02-14 NOTE — PROGRESS NOTES
Chief Complaint   Patient presents with    Medication Refill     1. Have you been to the ER, urgent care clinic since your last visit?  Hospitalized since your last visit?No    2. Have you seen or consulted any other health care providers outside of the [facility] since your last visit?  Include any pap smears or colon screening. No    
he tries to d/c he gets symptomatic flare ups so that seems be be the appropriate management plan (Per Dr Huff, dermatology)  Zacarias Weber is better from the last I saw him        Medications, allergies, PMH, PSH, SOCH, FAMH reviewed and updated per routine protocol, see chart for review and changes if not noted here.    Review of Systems    A 12 point review of systems was negative except as noted here or in the HPI.    Objective:   Vital Signs: (As obtained by patient/caregiver at home)        2/12/2024     8:02 AM   Patient-Reported Vitals   Patient-Reported Weight 244   Patient-Reported Height 6'5\"   Patient-Reported Systolic 107 mmHg   Patient-Reported Diastolic 70 mmHg   Patient-Reported Pulse 51   Patient-Reported Temperature 96         [INSTRUCTIONS:  \"[x]\" Indicates a positive item  \"[]\" Indicates a negative item  -- DELETE ALL ITEMS NOT EXAMINED]    Constitutional: [x] Appears well-developed and well-nourished [x] No apparent distress      [] Abnormal -     Mental status: [x] Alert and awake  [x] Oriented to person/place/time [x] Able to follow commands    [] Abnormal -     Eyes:   EOM    [x]  Normal    [] Abnormal -   Sclera  [x]  Normal    [] Abnormal -          Discharge [x]  None visible   [] Abnormal -     HENT: [x] Normocephalic, atraumatic  [] Abnormal -   [x] Mouth/Throat: Mucous membranes are moist    External Ears [x] Normal  [] Abnormal -    Neck: [x] No visualized mass [] Abnormal -     Pulmonary/Chest: [x] Respiratory effort normal   [x] No visualized signs of difficulty breathing or respiratory distress        [] Abnormal -      Musculoskeletal:   [] Normal gait with no signs of ataxia         [x] Normal range of motion of neck        [] Abnormal -     Neurological:        [x] No Facial Asymmetry (Cranial nerve 7 motor function) (limited exam due to video visit)          [x] No gaze palsy        [] Abnormal -          Skin:        [x] No significant exanthematous lesions or

## 2024-05-10 ENCOUNTER — OFFICE VISIT (OUTPATIENT)
Age: 74
End: 2024-05-10
Payer: MEDICARE

## 2024-05-10 VITALS
WEIGHT: 254 LBS | HEIGHT: 77 IN | SYSTOLIC BLOOD PRESSURE: 112 MMHG | BODY MASS INDEX: 29.99 KG/M2 | OXYGEN SATURATION: 96 % | DIASTOLIC BLOOD PRESSURE: 64 MMHG | HEART RATE: 56 BPM

## 2024-05-10 DIAGNOSIS — I34.0 NONRHEUMATIC MITRAL (VALVE) INSUFFICIENCY: Primary | ICD-10-CM

## 2024-05-10 DIAGNOSIS — I77.810 THORACIC AORTIC ECTASIA (HCC): ICD-10-CM

## 2024-05-10 DIAGNOSIS — E78.5 HYPERLIPIDEMIA, UNSPECIFIED HYPERLIPIDEMIA TYPE: ICD-10-CM

## 2024-05-10 PROCEDURE — G8417 CALC BMI ABV UP PARAM F/U: HCPCS | Performed by: INTERNAL MEDICINE

## 2024-05-10 PROCEDURE — 1123F ACP DISCUSS/DSCN MKR DOCD: CPT | Performed by: INTERNAL MEDICINE

## 2024-05-10 PROCEDURE — 99214 OFFICE O/P EST MOD 30 MIN: CPT | Performed by: INTERNAL MEDICINE

## 2024-05-10 PROCEDURE — 1036F TOBACCO NON-USER: CPT | Performed by: INTERNAL MEDICINE

## 2024-05-10 PROCEDURE — 3017F COLORECTAL CA SCREEN DOC REV: CPT | Performed by: INTERNAL MEDICINE

## 2024-05-10 PROCEDURE — G8427 DOCREV CUR MEDS BY ELIG CLIN: HCPCS | Performed by: INTERNAL MEDICINE

## 2024-05-10 RX ORDER — ROSUVASTATIN CALCIUM 10 MG/1
10 TABLET, COATED ORAL NIGHTLY
Qty: 90 TABLET | Refills: 3 | Status: SHIPPED | OUTPATIENT
Start: 2024-05-10

## 2024-05-10 NOTE — PATIENT INSTRUCTIONS
Start Crestor 10MG PO daily.      Echo- Aortic root dilation and Mitral regurgitation.     See Dr. Browne in 1 yr.

## 2024-05-10 NOTE — PROGRESS NOTES
Chief Complaint   Patient presents with    Other     MVD, AVD     Vitals:    05/10/24 1327   BP: 112/64   Site: Left Upper Arm   Position: Sitting   Pulse: 56   SpO2: 96%   Weight: 115.2 kg (254 lb)   Height: 1.956 m (6' 5\")         Chest pain: DENIED     Recent hospital stays: DENIED     Refills: DENIED   
Received VO from Dr. Donald Browne:    Start Crestor 10MG PO daily.       Echo- Aortic root dilation and Mitral regurgitation.      See Dr. Browne in 1 yr.                               
07/12/2022 09:27 AM     Lab Results   Component Value Date/Time    Creatinine 0.91 12/08/2022 01:54 PM     Lab Results   Component Value Date/Time    BUN 22 12/08/2022 01:54 PM     Lab Results   Component Value Date/Time    Potassium 4.4 12/08/2022 01:54 PM     No results found for: HBA1C, BEF4SQKM, DYO3FHRX  Lab Results   Component Value Date/Time    HGB 12.5 (L) 12/08/2022 01:54 PM     Lab Results   Component Value Date/Time    PLATELET 195 12/08/2022 01:54 PM     No results for input(s): CPK, CKMB, TROIQ in the last 72 hours.    No lab exists for component: CKQMB, CPKMB             ___________________________________________________    Donald Browne MD, FACC

## 2024-05-17 NOTE — ACP (ADVANCE CARE PLANNING)
Patient states that a completed Advanced Medical Directive is at home. NN encouraged patient to bring a copy of the completed Advanced Medical Directive to the office for scanning into the medical record. Patient verbalized understanding & agreement. Progressing: [] Met: [x] Not Met: [] Adjusted  Overall Progression Towards Functional goals/ Treatment Progress Update:  [] Patient is progressing as expected towards functional goals listed.    [x] Progression is slowed due to complexities/Impairments listed.  [] Progression has been slowed due to co-morbidities.  [] Plan just implemented, too soon (<30days) to assess goals progression   [] Goals require adjustment due to lack of progress  [] Patient is not progressing as expected and requires additional follow up with physician  [] Other:     CHARGE CAPTURE     PT CHARGE GRID   CPT Code (TIMED) minutes # CPT Code (UNTIMED) #     Therex (03488)  30 2  EVAL:LOW (91113 - Typically 20 minutes face-to-face)     Neuromusc. Re-ed (80615)    Re-Eval (10946)     Manual (31379)    Estim Unattended (44222)     Ther. Act (59019) 20 1  Mech. Traction (94211)     Gait (84941)    Dry Needle 1-2 muscle (68182)     Aquatic Therex (50043)    Dry Needle 3+ muscle (20561)     Iontophoresis (05282)    VASO (72082)     Ultrasound (01663)    Group Therapy (75094)     Estim Attended (08687)    Canalith Repositioning (13363)     Other:    Other:    Total Timed Code Tx Minutes 50 3       Total Treatment Minutes 250-445        Charge Justification:  (82883) THERAPEUTIC EXERCISE - Provided verbal/tactile cueing for activities related to strengthening, flexibility, endurance, ROM performed to prevent loss of range of motion, maintain or improve muscular strength or increase flexibility, following either an injury or surgery.   (46548) HOME EXERCISE PROGRAM - Reviewed/Progressed HEP activities related to strengthening, flexibility, endurance, ROM performed to prevent loss of range of motion, maintain or improve muscular strength or increase flexibility, following either an injury or surgery.  (28170) THERAPEUTIC ACTIVITY - use of dynamic activities to improve functional performance. (Ex include squatting, ascending/descending stairs, walking,

## 2024-07-11 RX ORDER — TAMSULOSIN HYDROCHLORIDE 0.4 MG/1
CAPSULE ORAL
Qty: 90 CAPSULE | Refills: 3 | OUTPATIENT
Start: 2024-07-11

## 2024-07-24 ENCOUNTER — TELEPHONE (OUTPATIENT)
Facility: CLINIC | Age: 74
End: 2024-07-24

## 2024-07-24 NOTE — TELEPHONE ENCOUNTER
Pt has a NP appointment with Jeanine Ureña in October but will be out of medication before appt     LEVOTHYROXINE 137 MCG    Please send to [facility] on [address]

## 2024-07-26 ENCOUNTER — ANCILLARY PROCEDURE (OUTPATIENT)
Age: 74
End: 2024-07-26
Payer: MEDICARE

## 2024-07-26 ENCOUNTER — TELEPHONE (OUTPATIENT)
Facility: CLINIC | Age: 74
End: 2024-07-26

## 2024-07-26 VITALS
BODY MASS INDEX: 29.99 KG/M2 | HEART RATE: 48 BPM | HEIGHT: 77 IN | DIASTOLIC BLOOD PRESSURE: 60 MMHG | WEIGHT: 254 LBS | SYSTOLIC BLOOD PRESSURE: 118 MMHG

## 2024-07-26 DIAGNOSIS — I77.810 THORACIC AORTIC ECTASIA (HCC): ICD-10-CM

## 2024-07-26 DIAGNOSIS — E78.5 HYPERLIPIDEMIA, UNSPECIFIED HYPERLIPIDEMIA TYPE: ICD-10-CM

## 2024-07-26 DIAGNOSIS — I34.0 NONRHEUMATIC MITRAL (VALVE) INSUFFICIENCY: ICD-10-CM

## 2024-07-26 LAB
ECHO AO ASC DIAM: 4 CM
ECHO AO ASCENDING AORTA INDEX: 1.61 CM/M2
ECHO AO ROOT DIAM: 4.2 CM
ECHO AO ROOT INDEX: 1.69 CM/M2
ECHO AV AREA PEAK VELOCITY: 3.1 CM2
ECHO AV AREA VTI: 3.2 CM2
ECHO AV AREA/BSA PEAK VELOCITY: 1.3 CM2/M2
ECHO AV AREA/BSA VTI: 1.3 CM2/M2
ECHO AV MEAN GRADIENT: 5 MMHG
ECHO AV MEAN VELOCITY: 1 M/S
ECHO AV PEAK GRADIENT: 11 MMHG
ECHO AV PEAK VELOCITY: 1.6 M/S
ECHO AV VELOCITY RATIO: 0.75
ECHO AV VTI: 33.5 CM
ECHO BSA: 2.5 M2
ECHO EST RA PRESSURE: 3 MMHG
ECHO LA DIAMETER INDEX: 1.77 CM/M2
ECHO LA DIAMETER: 4.4 CM
ECHO LA TO AORTIC ROOT RATIO: 1.05
ECHO LA VOL A-L A2C: 105 ML (ref 18–58)
ECHO LA VOL A-L A4C: 109 ML (ref 18–58)
ECHO LA VOL MOD A2C: 100 ML (ref 18–58)
ECHO LA VOL MOD A4C: 100 ML (ref 18–58)
ECHO LA VOLUME AREA LENGTH: 108 ML
ECHO LA VOLUME INDEX A-L A2C: 42 ML/M2 (ref 16–34)
ECHO LA VOLUME INDEX A-L A4C: 44 ML/M2 (ref 16–34)
ECHO LA VOLUME INDEX AREA LENGTH: 44 ML/M2 (ref 16–34)
ECHO LA VOLUME INDEX MOD A2C: 40 ML/M2 (ref 16–34)
ECHO LA VOLUME INDEX MOD A4C: 40 ML/M2 (ref 16–34)
ECHO LV E' LATERAL VELOCITY: 9 CM/S
ECHO LV E' SEPTAL VELOCITY: 7 CM/S
ECHO LV EDV A2C: 175 ML
ECHO LV EDV A4C: 190 ML
ECHO LV EDV BP: 187 ML (ref 67–155)
ECHO LV EDV INDEX A4C: 77 ML/M2
ECHO LV EDV INDEX BP: 75 ML/M2
ECHO LV EDV NDEX A2C: 71 ML/M2
ECHO LV EJECTION FRACTION A2C: 58 %
ECHO LV EJECTION FRACTION A4C: 58 %
ECHO LV EJECTION FRACTION BIPLANE: 59 % (ref 55–100)
ECHO LV ESV A2C: 73 ML
ECHO LV ESV A4C: 79 ML
ECHO LV ESV BP: 77 ML (ref 22–58)
ECHO LV ESV INDEX A2C: 29 ML/M2
ECHO LV ESV INDEX A4C: 32 ML/M2
ECHO LV ESV INDEX BP: 31 ML/M2
ECHO LV FRACTIONAL SHORTENING: 33 % (ref 28–44)
ECHO LV INTERNAL DIMENSION DIASTOLE INDEX: 2.34 CM/M2
ECHO LV INTERNAL DIMENSION DIASTOLIC: 5.8 CM (ref 4.2–5.9)
ECHO LV INTERNAL DIMENSION SYSTOLIC INDEX: 1.57 CM/M2
ECHO LV INTERNAL DIMENSION SYSTOLIC: 3.9 CM
ECHO LV IVSD: 1 CM (ref 0.6–1)
ECHO LV MASS 2D: 203.5 G (ref 88–224)
ECHO LV MASS INDEX 2D: 82.1 G/M2 (ref 49–115)
ECHO LV POSTERIOR WALL DIASTOLIC: 0.8 CM (ref 0.6–1)
ECHO LV RELATIVE WALL THICKNESS RATIO: 0.28
ECHO LVOT AREA: 4.5 CM2
ECHO LVOT AV VTI INDEX: 0.73
ECHO LVOT DIAM: 2.4 CM
ECHO LVOT MEAN GRADIENT: 3 MMHG
ECHO LVOT PEAK GRADIENT: 5 MMHG
ECHO LVOT PEAK VELOCITY: 1.2 M/S
ECHO LVOT STROKE VOLUME INDEX: 44.9 ML/M2
ECHO LVOT SV: 111.2 ML
ECHO LVOT VTI: 24.6 CM
ECHO MV A VELOCITY: 0.56 M/S
ECHO MV AREA PHT: 3 CM2
ECHO MV E DECELERATION TIME (DT): 253.1 MS
ECHO MV E VELOCITY: 0.66 M/S
ECHO MV E/A RATIO: 1.18
ECHO MV E/E' LATERAL: 7.33
ECHO MV E/E' RATIO (AVERAGED): 8.38
ECHO MV E/E' SEPTAL: 9.43
ECHO MV PRESSURE HALF TIME (PHT): 73.4 MS
ECHO RA AREA 4C: 20 CM2
ECHO RA END SYSTOLIC VOLUME APICAL 4 CHAMBER INDEX BSA: 23 ML/M2
ECHO RA VOLUME: 57 ML
ECHO RIGHT VENTRICULAR SYSTOLIC PRESSURE (RVSP): 23 MMHG
ECHO RV INTERNAL DIMENSION: 4 CM
ECHO RV TAPSE: 1.8 CM (ref 1.7–?)
ECHO TV REGURGITANT MAX VELOCITY: 2.23 M/S
ECHO TV REGURGITANT PEAK GRADIENT: 20 MMHG

## 2024-07-26 PROCEDURE — 93306 TTE W/DOPPLER COMPLETE: CPT | Performed by: INTERNAL MEDICINE

## 2024-08-01 DIAGNOSIS — E03.9 HYPOTHYROIDISM, UNSPECIFIED: ICD-10-CM

## 2024-08-02 RX ORDER — LEVOTHYROXINE SODIUM 137 UG/1
137 TABLET ORAL
Qty: 90 TABLET | Refills: 1 | Status: SHIPPED | OUTPATIENT
Start: 2024-08-02

## 2024-08-02 NOTE — TELEPHONE ENCOUNTER
Please let know I have placed refill. Patient should go get thyroid lab done in October to check on levels and see if dosage adjustment needs to be made. That way if does, will have time to check once more prior to our new patient appointment.

## 2024-10-01 DIAGNOSIS — E03.9 HYPOTHYROIDISM, UNSPECIFIED: ICD-10-CM

## 2024-10-01 LAB — TSH SERPL DL<=0.05 MIU/L-ACNC: 1.13 UIU/ML (ref 0.36–3.74)

## 2024-10-02 NOTE — RESULT ENCOUNTER NOTE
Mr. Branhamon, I am assisting in covering Dr. Navarro's patients while he is out of the clinic until Oct 7. Your thyroid test is normal! Thank you, CHELSEY Lamas - CNP

## 2024-12-17 SDOH — HEALTH STABILITY: PHYSICAL HEALTH: ON AVERAGE, HOW MANY DAYS PER WEEK DO YOU ENGAGE IN MODERATE TO STRENUOUS EXERCISE (LIKE A BRISK WALK)?: 2 DAYS

## 2024-12-17 SDOH — HEALTH STABILITY: PHYSICAL HEALTH: ON AVERAGE, HOW MANY MINUTES DO YOU ENGAGE IN EXERCISE AT THIS LEVEL?: 40 MIN

## 2024-12-20 ENCOUNTER — OFFICE VISIT (OUTPATIENT)
Facility: CLINIC | Age: 74
End: 2024-12-20

## 2024-12-20 VITALS
HEIGHT: 77 IN | RESPIRATION RATE: 18 BRPM | SYSTOLIC BLOOD PRESSURE: 121 MMHG | OXYGEN SATURATION: 97 % | WEIGHT: 253 LBS | HEART RATE: 55 BPM | DIASTOLIC BLOOD PRESSURE: 74 MMHG | BODY MASS INDEX: 29.87 KG/M2 | TEMPERATURE: 97.7 F

## 2024-12-20 DIAGNOSIS — Z12.5 PROSTATE CANCER SCREENING: ICD-10-CM

## 2024-12-20 DIAGNOSIS — D69.6 THROMBOCYTOPENIA, UNSPECIFIED (HCC): ICD-10-CM

## 2024-12-20 DIAGNOSIS — E78.2 MIXED HYPERLIPIDEMIA: ICD-10-CM

## 2024-12-20 DIAGNOSIS — D70.8 OTHER NEUTROPENIA (HCC): ICD-10-CM

## 2024-12-20 DIAGNOSIS — I77.810 THORACIC AORTIC ECTASIA (HCC): ICD-10-CM

## 2024-12-20 DIAGNOSIS — L40.50 PSORIATIC ARTHRITIS (HCC): ICD-10-CM

## 2024-12-20 DIAGNOSIS — E55.9 VITAMIN D DEFICIENCY: ICD-10-CM

## 2024-12-20 DIAGNOSIS — E03.9 ACQUIRED HYPOTHYROIDISM: Primary | ICD-10-CM

## 2024-12-20 DIAGNOSIS — E03.9 ACQUIRED HYPOTHYROIDISM: ICD-10-CM

## 2024-12-20 ASSESSMENT — PATIENT HEALTH QUESTIONNAIRE - PHQ9
2. FEELING DOWN, DEPRESSED OR HOPELESS: NOT AT ALL
SUM OF ALL RESPONSES TO PHQ9 QUESTIONS 1 & 2: 0
SUM OF ALL RESPONSES TO PHQ QUESTIONS 1-9: 0
1. LITTLE INTEREST OR PLEASURE IN DOING THINGS: NOT AT ALL
SUM OF ALL RESPONSES TO PHQ QUESTIONS 1-9: 0

## 2024-12-20 NOTE — PROGRESS NOTES
Chief Complaint   Patient presents with    Establish Care     1. Have you been to the ER, urgent care clinic since your last visit?  Hospitalized since your last visit?No    2. Have you seen or consulted any other health care providers outside of the [facility] since your last visit?  Include any pap smears or colon screening. No

## 2024-12-20 NOTE — PROGRESS NOTES
Assessment & Plan  1. Hypothyroidism.  His TSH level is within the normal range at 1.13. He will continue his current dosage of levothyroxine 137 mcg daily. A thyroid function test will be conducted in 6 months to monitor his condition.    2. Chronic mild leukopenia.  He has a history of chronic mild leukopenia and anemia, with recent labs showing mild anemia and slightly low platelets. A repeat lab test will be ordered to assess his current status, including a complete blood count, vitamin D levels, liver function, kidney function, electrolytes, and PSA. He will be notified of the results. If the results indicate any abnormalities, a referral to hematology will be considered.    3. Hyperlipidemia.  He has a history of hyperlipidemia, with his most recent LDL level recorded at 145. A CT coronary calcium scan performed in 2023 revealed a calcium score of 16, indicative of mild coronary artery disease for his age. An advanced cholesterol panel will be ordered to further evaluate his lipid profile.    4. Right thumb muscle strain.  He is advised to apply heat, perform stretching exercises, and engage in strengthening exercises for his right thumb. Massage therapy is also recommended to promote blood flow.    5. Psoriasis.  He is currently on Rinvoq for psoriasis, which has been effective in managing his symptoms. He will continue this medication as prescribed by his dermatologist.    Follow-up  The patient is scheduled for a follow-up visit in 6 months.    It was a pleasure seeing Mr. Vineet Maynard today.  No follow-ups on file.    History of Present Illness  The patient is a 74-year-old male who presents for a new patient visit to [facility]. He was previously under the care of Dr. Dot Shah in this practice.    He has been on a regimen of levothyroxine 137 mcg daily, with the dosage being adjusted in January or February. He reports feeling well on the current dose and expresses a desire to undergo blood

## 2024-12-24 LAB
25(OH)D3+25(OH)D2 SERPL-MCNC: 48.1 NG/ML (ref 30–100)
ALBUMIN SERPL-MCNC: 3.9 G/DL (ref 3.8–4.8)
ALP SERPL-CCNC: 63 IU/L (ref 44–121)
ALT SERPL-CCNC: 17 IU/L (ref 0–44)
AST SERPL-CCNC: 23 IU/L (ref 0–40)
BILIRUB SERPL-MCNC: 1.3 MG/DL (ref 0–1.2)
BUN SERPL-MCNC: 24 MG/DL (ref 8–27)
BUN/CREAT SERPL: 21 (ref 10–24)
CALCIUM SERPL-MCNC: 8.8 MG/DL (ref 8.6–10.2)
CHLORIDE SERPL-SCNC: 106 MMOL/L (ref 96–106)
CHOLEST SERPL-MCNC: 203 MG/DL (ref 100–199)
CO2 SERPL-SCNC: 23 MMOL/L (ref 20–29)
CREAT SERPL-MCNC: 1.14 MG/DL (ref 0.76–1.27)
EGFRCR SERPLBLD CKD-EPI 2021: 67 ML/MIN/1.73
GLOBULIN SER CALC-MCNC: 1.9 G/DL (ref 1.5–4.5)
GLUCOSE SERPL-MCNC: 99 MG/DL (ref 70–99)
HDL SERPL-SCNC: 34.1 UMOL/L
HDLC SERPL-MCNC: 57 MG/DL
LDL SERPL QN: 21.4 NM
LDL SERPL-SCNC: 1449 NMOL/L
LDL SMALL SERPL-SCNC: 411 NMOL/L
LDLC SERPL CALC-MCNC: 133 MG/DL (ref 0–99)
LP-IR SCORE SERPL: 35
POTASSIUM SERPL-SCNC: 4.4 MMOL/L (ref 3.5–5.2)
PROT SERPL-MCNC: 5.8 G/DL (ref 6–8.5)
PSA SERPL-MCNC: 1.2 NG/ML (ref 0–4)
SODIUM SERPL-SCNC: 141 MMOL/L (ref 134–144)
TRIGL SERPL-MCNC: 71 MG/DL (ref 0–149)
TSH SERPL DL<=0.005 MIU/L-ACNC: 0.84 UIU/ML (ref 0.45–4.5)

## 2024-12-26 LAB — LPA SERPL-SCNC: 38.1 NMOL/L

## 2024-12-27 LAB — IMP & REVIEW OF LAB RESULTS: NORMAL

## 2025-01-03 DIAGNOSIS — E03.9 HYPOTHYROIDISM, UNSPECIFIED: ICD-10-CM

## 2025-01-03 RX ORDER — LEVOTHYROXINE SODIUM 137 UG/1
137 TABLET ORAL
Qty: 90 TABLET | Refills: 1 | Status: SHIPPED | OUTPATIENT
Start: 2025-01-03

## 2025-05-20 DIAGNOSIS — D69.6 THROMBOCYTOPENIA, UNSPECIFIED: ICD-10-CM

## 2025-05-20 DIAGNOSIS — E03.9 ACQUIRED HYPOTHYROIDISM: ICD-10-CM

## 2025-05-20 DIAGNOSIS — D70.8 OTHER NEUTROPENIA: ICD-10-CM

## 2025-07-01 DIAGNOSIS — E03.9 HYPOTHYROIDISM, UNSPECIFIED: ICD-10-CM

## 2025-07-01 RX ORDER — LEVOTHYROXINE SODIUM 137 UG/1
137 TABLET ORAL
Qty: 90 TABLET | Refills: 1 | Status: SHIPPED | OUTPATIENT
Start: 2025-07-01

## 2025-07-14 ENCOUNTER — OFFICE VISIT (OUTPATIENT)
Age: 75
End: 2025-07-14
Payer: MEDICARE

## 2025-07-14 VITALS
DIASTOLIC BLOOD PRESSURE: 60 MMHG | WEIGHT: 251 LBS | HEIGHT: 77 IN | BODY MASS INDEX: 29.64 KG/M2 | SYSTOLIC BLOOD PRESSURE: 110 MMHG | HEART RATE: 56 BPM | OXYGEN SATURATION: 93 %

## 2025-07-14 DIAGNOSIS — I77.810 THORACIC AORTIC ECTASIA: ICD-10-CM

## 2025-07-14 DIAGNOSIS — I34.0 NONRHEUMATIC MITRAL (VALVE) INSUFFICIENCY: Primary | ICD-10-CM

## 2025-07-14 DIAGNOSIS — I25.83 CORONARY ARTERY DISEASE DUE TO LIPID RICH PLAQUE: ICD-10-CM

## 2025-07-14 DIAGNOSIS — I25.10 CORONARY ARTERY DISEASE DUE TO LIPID RICH PLAQUE: ICD-10-CM

## 2025-07-14 DIAGNOSIS — E78.5 HYPERLIPIDEMIA, UNSPECIFIED HYPERLIPIDEMIA TYPE: ICD-10-CM

## 2025-07-14 PROCEDURE — 3017F COLORECTAL CA SCREEN DOC REV: CPT | Performed by: INTERNAL MEDICINE

## 2025-07-14 PROCEDURE — G8417 CALC BMI ABV UP PARAM F/U: HCPCS | Performed by: INTERNAL MEDICINE

## 2025-07-14 PROCEDURE — G8427 DOCREV CUR MEDS BY ELIG CLIN: HCPCS | Performed by: INTERNAL MEDICINE

## 2025-07-14 PROCEDURE — 1036F TOBACCO NON-USER: CPT | Performed by: INTERNAL MEDICINE

## 2025-07-14 PROCEDURE — 99214 OFFICE O/P EST MOD 30 MIN: CPT | Performed by: INTERNAL MEDICINE

## 2025-07-14 PROCEDURE — 1123F ACP DISCUSS/DSCN MKR DOCD: CPT | Performed by: INTERNAL MEDICINE

## 2025-07-14 PROCEDURE — 1159F MED LIST DOCD IN RCRD: CPT | Performed by: INTERNAL MEDICINE

## 2025-07-14 NOTE — PROGRESS NOTES
Chief Complaint   Patient presents with    Cholesterol Problem    Other     NAVS, TOE     There were no vitals filed for this visit.   There were no vitals taken for this visit.

## 2025-07-14 NOTE — PROGRESS NOTES
Office Follow-up    NAME: Vineet Maynard   :  1950  MRM:  [id number]    Date: 25    Assessment and Plan:     1.  Lower extremity edema: This is mild and asymmetrical.  From cardiac standpoint LV function is normal on recent echocardiogram with only moderate mitral regurgitation.  Likely this edema is noncardiac in origin possibly vericose veins or dependent edema.  No other cardiac symptoms.      2.  Moderate mitral regurgitation: The etiology of mitral regurgitation is mitral valve prolapse.  This is only moderate grade.  At this time there is no symptoms.  Pulmonary pressures are normal.  We will continue to do surveillance Echo.      3.  Mild aortic regurgitation: This is likely degenerative cause.  Continue to observe.     4. 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  Mild. Surveillance Echo.     5. CAD by CAC 11 (2023): Prescribed Rosuvastatin but is not taking due to concern for dementia. LDL is 133. Goal LDL is <70. Diet and exercise will be the primary mode of getting to goal.     6. Dyslipidmia: LDL is 133. Prescribed Rosuvastatin but is not taking due to concern for dementia. LDL is 133. Goal LDL is <70. Diet and exercise will be the primary mode of getting to goal.      7. See Dr. Browne in 1 year.               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       ATTENTION:   This medical record was transcribed using an electronic medical records/speech recognition system.  Although proofread, it may and can contain electronic, spelling and other errors.  Corrections may be executed at a later time.  Please feel free to contact us for any clarifications as needed.      Subjective:     Vineet Maynard, a 73 y.o. year-old who presents for followup. Denies CP, SOB, Palpitations, dizziness.        HPI: Vineet Maynard is a 71 y.o. male with past medical history significant for L3 osteomyelitis diagnosed in 2018, is here for evaluation of lower extremity edema.  He has been having the

## 2025-07-14 NOTE — PROGRESS NOTES
NOE. received from Dr. CLARK Sousa MD:    Echo- MR, AR, Aortic dilation      See Dr. SOUSA in 1 yr.

## 2025-08-07 ENCOUNTER — ANCILLARY PROCEDURE (OUTPATIENT)
Age: 75
End: 2025-08-07
Payer: MEDICARE

## 2025-08-07 VITALS
HEART RATE: 48 BPM | BODY MASS INDEX: 29.64 KG/M2 | WEIGHT: 251 LBS | DIASTOLIC BLOOD PRESSURE: 62 MMHG | HEIGHT: 77 IN | SYSTOLIC BLOOD PRESSURE: 118 MMHG

## 2025-08-07 DIAGNOSIS — I77.810 THORACIC AORTIC ECTASIA: ICD-10-CM

## 2025-08-07 DIAGNOSIS — I25.83 CORONARY ARTERY DISEASE DUE TO LIPID RICH PLAQUE: ICD-10-CM

## 2025-08-07 DIAGNOSIS — I25.10 CORONARY ARTERY DISEASE DUE TO LIPID RICH PLAQUE: ICD-10-CM

## 2025-08-07 DIAGNOSIS — E78.5 HYPERLIPIDEMIA, UNSPECIFIED HYPERLIPIDEMIA TYPE: ICD-10-CM

## 2025-08-07 DIAGNOSIS — I34.0 NONRHEUMATIC MITRAL (VALVE) INSUFFICIENCY: ICD-10-CM

## 2025-08-07 PROCEDURE — 93306 TTE W/DOPPLER COMPLETE: CPT

## 2025-08-11 LAB
ECHO AO ASC DIAM: 4 CM
ECHO AO ASCENDING AORTA INDEX: 1.63 CM/M2
ECHO AO ROOT DIAM: 4.2 CM
ECHO AO ROOT INDEX: 1.71 CM/M2
ECHO AV AREA PEAK VELOCITY: 4 CM2
ECHO AV AREA VTI: 3.9 CM2
ECHO AV AREA/BSA PEAK VELOCITY: 1.6 CM2/M2
ECHO AV AREA/BSA VTI: 1.6 CM2/M2
ECHO AV MEAN GRADIENT: 5 MMHG
ECHO AV MEAN VELOCITY: 1.1 M/S
ECHO AV PEAK GRADIENT: 10 MMHG
ECHO AV PEAK VELOCITY: 1.6 M/S
ECHO AV VELOCITY RATIO: 0.88
ECHO AV VTI: 32.8 CM
ECHO BSA: 2.49 M2
ECHO EST RA PRESSURE: 3 MMHG
ECHO LA DIAMETER INDEX: 1.67 CM/M2
ECHO LA DIAMETER: 4.1 CM
ECHO LA TO AORTIC ROOT RATIO: 0.98
ECHO LA VOL A-L A2C: 69 ML (ref 18–58)
ECHO LA VOL A-L A4C: 68 ML (ref 18–58)
ECHO LA VOL BP: 65 ML (ref 18–58)
ECHO LA VOL MOD A2C: 68 ML (ref 18–58)
ECHO LA VOL MOD A4C: 61 ML (ref 18–58)
ECHO LA VOL/BSA BIPLANE: 26 ML/M2 (ref 16–34)
ECHO LA VOLUME AREA LENGTH: 71 ML
ECHO LA VOLUME INDEX A-L A2C: 28 ML/M2 (ref 16–34)
ECHO LA VOLUME INDEX A-L A4C: 28 ML/M2 (ref 16–34)
ECHO LA VOLUME INDEX AREA LENGTH: 29 ML/M2 (ref 16–34)
ECHO LA VOLUME INDEX MOD A2C: 28 ML/M2 (ref 16–34)
ECHO LA VOLUME INDEX MOD A4C: 25 ML/M2 (ref 16–34)
ECHO LV E' LATERAL VELOCITY: 9.06 CM/S
ECHO LV E' SEPTAL VELOCITY: 7.62 CM/S
ECHO LV EDV A2C: 125 ML
ECHO LV EDV A4C: 139 ML
ECHO LV EDV BP: 132 ML (ref 67–155)
ECHO LV EDV INDEX A4C: 57 ML/M2
ECHO LV EDV INDEX BP: 54 ML/M2
ECHO LV EDV NDEX A2C: 51 ML/M2
ECHO LV EF PHYSICIAN: 60 %
ECHO LV EJECTION FRACTION A2C: 63 %
ECHO LV EJECTION FRACTION A4C: 57 %
ECHO LV EJECTION FRACTION BIPLANE: 60 % (ref 55–100)
ECHO LV ESV A2C: 46 ML
ECHO LV ESV A4C: 59 ML
ECHO LV ESV BP: 53 ML (ref 22–58)
ECHO LV ESV INDEX A2C: 19 ML/M2
ECHO LV ESV INDEX A4C: 24 ML/M2
ECHO LV ESV INDEX BP: 22 ML/M2
ECHO LV FRACTIONAL SHORTENING: 40 % (ref 28–44)
ECHO LV INTERNAL DIMENSION DIASTOLE INDEX: 2.64 CM/M2
ECHO LV INTERNAL DIMENSION DIASTOLIC: 6.5 CM (ref 4.2–5.9)
ECHO LV INTERNAL DIMENSION SYSTOLIC INDEX: 1.59 CM/M2
ECHO LV INTERNAL DIMENSION SYSTOLIC: 3.9 CM
ECHO LV IVSD: 0.9 CM (ref 0.6–1)
ECHO LV MASS 2D: 265.2 G (ref 88–224)
ECHO LV MASS INDEX 2D: 107.8 G/M2 (ref 49–115)
ECHO LV POSTERIOR WALL DIASTOLIC: 1 CM (ref 0.6–1)
ECHO LV RELATIVE WALL THICKNESS RATIO: 0.31
ECHO LVOT AREA: 4.5 CM2
ECHO LVOT AV VTI INDEX: 0.87
ECHO LVOT DIAM: 2.4 CM
ECHO LVOT MEAN GRADIENT: 4 MMHG
ECHO LVOT PEAK GRADIENT: 8 MMHG
ECHO LVOT PEAK VELOCITY: 1.4 M/S
ECHO LVOT STROKE VOLUME INDEX: 52.2 ML/M2
ECHO LVOT SV: 128.4 ML
ECHO LVOT VTI: 28.4 CM
ECHO MV A VELOCITY: 0.86 M/S
ECHO MV AREA PHT: 3.5 CM2
ECHO MV E DECELERATION TIME (DT): 214.2 MS
ECHO MV E VELOCITY: 0.81 M/S
ECHO MV E/A RATIO: 0.94
ECHO MV E/E' LATERAL: 8.94
ECHO MV E/E' RATIO (AVERAGED): 9.79
ECHO MV E/E' SEPTAL: 10.63
ECHO MV PRESSURE HALF TIME (PHT): 62.1 MS
ECHO RV FREE WALL PEAK S': 11.2 CM/S
ECHO RV INTERNAL DIMENSION: 3.6 CM
ECHO RV TAPSE: 1.8 CM (ref 1.7–?)

## 2025-08-11 PROCEDURE — 93306 TTE W/DOPPLER COMPLETE: CPT | Performed by: INTERNAL MEDICINE

## (undated) DEVICE — KIT COLON W/ 1.1OZ LUB AND 2 END

## (undated) DEVICE — SIMPLICITY FLUFF UNDERPAD 23X36, MODERATE: Brand: SIMPLICITY

## (undated) DEVICE — SNARE ENDOSCP M L240CM W27MM SHTH DIA2.4MM CHN 2.8MM OVL

## (undated) DEVICE — 1200 GUARD II KIT W/5MM TUBE W/O VAC TUBE: Brand: GUARDIAN

## (undated) DEVICE — CONTAINER SPEC 20 ML LID NEUT BUFF FORMALIN 10 % POLYPR STS

## (undated) DEVICE — Device

## (undated) DEVICE — NDL PRT INJ NSAF BLNT 18GX1.5 --

## (undated) DEVICE — SYR 3ML LL TIP 1/10ML GRAD --

## (undated) DEVICE — SOLIDIFIER MEDC 1200ML -- CONVERT TO 356117

## (undated) DEVICE — ELECTRODE,RADIOTRANSLUCENT,FOAM,3PK: Brand: MEDLINE

## (undated) DEVICE — BAG SPEC BIOHZRD 10 X 10 IN --

## (undated) DEVICE — BAG BELONG PT PERS CLEAR HANDL

## (undated) DEVICE — SET ADMIN 16ML TBNG L100IN 2 Y INJ SITE IV PIGGY BK DISP

## (undated) DEVICE — POLYP TRAP: Brand: TRAPEASE®

## (undated) DEVICE — CANN NASAL O2 CAPNOGRAPHY AD -- FILTERLINE

## (undated) DEVICE — CATH IV AUTOGRD BC PNK 20GA 25 -- INSYTE

## (undated) DEVICE — ADULT SPO2 SENSOR,REMANUFACTURED,REPROCESSED DEVICE FOR SINGLE USE; REPROCESSED BY COVIDIEN LLC: Brand: NELLCOR